# Patient Record
Sex: MALE | Race: WHITE | Employment: OTHER | URBAN - NONMETROPOLITAN AREA
[De-identification: names, ages, dates, MRNs, and addresses within clinical notes are randomized per-mention and may not be internally consistent; named-entity substitution may affect disease eponyms.]

---

## 2017-01-14 ENCOUNTER — OFFICE VISIT (OUTPATIENT)
Dept: URGENT CARE | Age: 67
End: 2017-01-14
Payer: MEDICARE

## 2017-01-14 VITALS
HEIGHT: 72 IN | OXYGEN SATURATION: 99 % | DIASTOLIC BLOOD PRESSURE: 90 MMHG | SYSTOLIC BLOOD PRESSURE: 140 MMHG | TEMPERATURE: 97.7 F | HEART RATE: 66 BPM | RESPIRATION RATE: 16 BRPM | BODY MASS INDEX: 25.06 KG/M2 | WEIGHT: 185 LBS

## 2017-01-14 DIAGNOSIS — K11.21 ACUTE PAROTITIS: Primary | ICD-10-CM

## 2017-01-14 PROCEDURE — 4040F PNEUMOC VAC/ADMIN/RCVD: CPT | Performed by: NURSE PRACTITIONER

## 2017-01-14 PROCEDURE — G8427 DOCREV CUR MEDS BY ELIG CLIN: HCPCS | Performed by: NURSE PRACTITIONER

## 2017-01-14 PROCEDURE — 1123F ACP DISCUSS/DSCN MKR DOCD: CPT | Performed by: NURSE PRACTITIONER

## 2017-01-14 PROCEDURE — G8484 FLU IMMUNIZE NO ADMIN: HCPCS | Performed by: NURSE PRACTITIONER

## 2017-01-14 PROCEDURE — 4004F PT TOBACCO SCREEN RCVD TLK: CPT | Performed by: NURSE PRACTITIONER

## 2017-01-14 PROCEDURE — 3017F COLORECTAL CA SCREEN DOC REV: CPT | Performed by: NURSE PRACTITIONER

## 2017-01-14 PROCEDURE — G8420 CALC BMI NORM PARAMETERS: HCPCS | Performed by: NURSE PRACTITIONER

## 2017-01-14 PROCEDURE — 99213 OFFICE O/P EST LOW 20 MIN: CPT | Performed by: NURSE PRACTITIONER

## 2017-01-14 RX ORDER — TRAMADOL HYDROCHLORIDE 50 MG/1
TABLET ORAL
Qty: 6 TABLET | Refills: 0 | Status: SHIPPED | OUTPATIENT
Start: 2017-01-14 | End: 2017-01-25 | Stop reason: ALTCHOICE

## 2017-01-14 RX ORDER — AMOXICILLIN AND CLAVULANATE POTASSIUM 500; 125 MG/1; MG/1
1 TABLET, FILM COATED ORAL 3 TIMES DAILY
Qty: 30 TABLET | Refills: 0 | Status: SHIPPED | OUTPATIENT
Start: 2017-01-14 | End: 2017-01-18 | Stop reason: ALTCHOICE

## 2017-01-14 ASSESSMENT — ENCOUNTER SYMPTOMS
EYES NEGATIVE: 1
SINUS PRESSURE: 0
RHINORRHEA: 0
RESPIRATORY NEGATIVE: 1
GASTROINTESTINAL NEGATIVE: 1
SORE THROAT: 0
FACIAL SWELLING: 1
ALLERGIC/IMMUNOLOGIC NEGATIVE: 1
TROUBLE SWALLOWING: 0

## 2017-01-16 ENCOUNTER — OFFICE VISIT (OUTPATIENT)
Dept: PRIMARY CARE CLINIC | Age: 67
End: 2017-01-16
Payer: MEDICARE

## 2017-01-16 VITALS
HEART RATE: 82 BPM | WEIGHT: 182 LBS | DIASTOLIC BLOOD PRESSURE: 90 MMHG | HEIGHT: 72 IN | OXYGEN SATURATION: 99 % | BODY MASS INDEX: 24.65 KG/M2 | RESPIRATION RATE: 16 BRPM | TEMPERATURE: 99.2 F | SYSTOLIC BLOOD PRESSURE: 150 MMHG

## 2017-01-16 DIAGNOSIS — K11.5 SALIVARY GLAND STONE: Primary | ICD-10-CM

## 2017-01-16 PROCEDURE — 1123F ACP DISCUSS/DSCN MKR DOCD: CPT | Performed by: FAMILY MEDICINE

## 2017-01-16 PROCEDURE — 99214 OFFICE O/P EST MOD 30 MIN: CPT | Performed by: FAMILY MEDICINE

## 2017-01-16 PROCEDURE — 4004F PT TOBACCO SCREEN RCVD TLK: CPT | Performed by: FAMILY MEDICINE

## 2017-01-16 PROCEDURE — 3017F COLORECTAL CA SCREEN DOC REV: CPT | Performed by: FAMILY MEDICINE

## 2017-01-16 PROCEDURE — G8420 CALC BMI NORM PARAMETERS: HCPCS | Performed by: FAMILY MEDICINE

## 2017-01-16 PROCEDURE — 4040F PNEUMOC VAC/ADMIN/RCVD: CPT | Performed by: FAMILY MEDICINE

## 2017-01-16 PROCEDURE — G8484 FLU IMMUNIZE NO ADMIN: HCPCS | Performed by: FAMILY MEDICINE

## 2017-01-16 PROCEDURE — G8427 DOCREV CUR MEDS BY ELIG CLIN: HCPCS | Performed by: FAMILY MEDICINE

## 2017-01-16 RX ORDER — TRAMADOL HYDROCHLORIDE 50 MG/1
TABLET ORAL
Qty: 6 TABLET | Refills: 0 | Status: CANCELLED | OUTPATIENT
Start: 2017-01-16

## 2017-01-16 RX ORDER — HYDROCODONE BITARTRATE AND ACETAMINOPHEN 5; 325 MG/1; MG/1
1 TABLET ORAL EVERY 6 HOURS PRN
Qty: 20 TABLET | Refills: 0 | Status: SHIPPED | OUTPATIENT
Start: 2017-01-16 | End: 2017-01-23

## 2017-01-16 RX ORDER — CLINDAMYCIN HYDROCHLORIDE 300 MG/1
300 CAPSULE ORAL 3 TIMES DAILY
Qty: 30 CAPSULE | Refills: 0 | Status: SHIPPED | OUTPATIENT
Start: 2017-01-16 | End: 2017-01-25 | Stop reason: SDUPTHER

## 2017-01-16 RX ORDER — NAPROXEN SODIUM 550 MG/1
550 TABLET ORAL 2 TIMES DAILY WITH MEALS
Qty: 60 TABLET | Refills: 3 | Status: SHIPPED | OUTPATIENT
Start: 2017-01-16 | End: 2017-02-08

## 2017-01-17 ENCOUNTER — HOSPITAL ENCOUNTER (OUTPATIENT)
Dept: CT IMAGING | Age: 67
Discharge: HOME | End: 2017-01-17
Payer: MEDICARE

## 2017-01-17 DIAGNOSIS — K11.5 SALIVARY GLAND STONE: ICD-10-CM

## 2017-01-17 PROCEDURE — 70486 CT MAXILLOFACIAL W/O DYE: CPT

## 2017-01-18 ENCOUNTER — OFFICE VISIT (OUTPATIENT)
Dept: PRIMARY CARE CLINIC | Age: 67
End: 2017-01-18
Payer: MEDICARE

## 2017-01-18 VITALS
OXYGEN SATURATION: 99 % | WEIGHT: 181.13 LBS | RESPIRATION RATE: 16 BRPM | HEART RATE: 82 BPM | SYSTOLIC BLOOD PRESSURE: 132 MMHG | BODY MASS INDEX: 24.53 KG/M2 | DIASTOLIC BLOOD PRESSURE: 80 MMHG | TEMPERATURE: 98.8 F | HEIGHT: 72 IN

## 2017-01-18 DIAGNOSIS — G47.00 INSOMNIA, UNSPECIFIED TYPE: ICD-10-CM

## 2017-01-18 DIAGNOSIS — K11.1 PAROTID GLAND ENLARGEMENT: Primary | ICD-10-CM

## 2017-01-18 PROCEDURE — 4040F PNEUMOC VAC/ADMIN/RCVD: CPT | Performed by: FAMILY MEDICINE

## 2017-01-18 PROCEDURE — 1123F ACP DISCUSS/DSCN MKR DOCD: CPT | Performed by: FAMILY MEDICINE

## 2017-01-18 PROCEDURE — 4004F PT TOBACCO SCREEN RCVD TLK: CPT | Performed by: FAMILY MEDICINE

## 2017-01-18 PROCEDURE — 3017F COLORECTAL CA SCREEN DOC REV: CPT | Performed by: FAMILY MEDICINE

## 2017-01-18 PROCEDURE — 99213 OFFICE O/P EST LOW 20 MIN: CPT | Performed by: FAMILY MEDICINE

## 2017-01-18 PROCEDURE — G8420 CALC BMI NORM PARAMETERS: HCPCS | Performed by: FAMILY MEDICINE

## 2017-01-18 PROCEDURE — G8484 FLU IMMUNIZE NO ADMIN: HCPCS | Performed by: FAMILY MEDICINE

## 2017-01-18 PROCEDURE — G8427 DOCREV CUR MEDS BY ELIG CLIN: HCPCS | Performed by: FAMILY MEDICINE

## 2017-01-18 RX ORDER — AMOXICILLIN AND CLAVULANATE POTASSIUM 500; 125 MG/1; MG/1
1 TABLET, FILM COATED ORAL 3 TIMES DAILY
Qty: 30 TABLET | Refills: 0
Start: 2017-01-18 | End: 2017-01-25

## 2017-01-18 RX ORDER — TEMAZEPAM 15 MG/1
15 CAPSULE ORAL NIGHTLY PRN
Qty: 10 CAPSULE | Refills: 0 | Status: SHIPPED | OUTPATIENT
Start: 2017-01-18 | End: 2017-02-01

## 2017-01-19 ASSESSMENT — ENCOUNTER SYMPTOMS
SHORTNESS OF BREATH: 0
COUGH: 0
FACIAL SWELLING: 1

## 2017-01-22 ASSESSMENT — ENCOUNTER SYMPTOMS
COUGH: 0
SHORTNESS OF BREATH: 0
FACIAL SWELLING: 1

## 2017-01-25 ENCOUNTER — OFFICE VISIT (OUTPATIENT)
Dept: OTOLARYNGOLOGY | Age: 67
End: 2017-01-25
Payer: MEDICARE

## 2017-01-25 VITALS
HEIGHT: 72 IN | BODY MASS INDEX: 25.06 KG/M2 | DIASTOLIC BLOOD PRESSURE: 80 MMHG | OXYGEN SATURATION: 97 % | WEIGHT: 185 LBS | HEART RATE: 60 BPM | SYSTOLIC BLOOD PRESSURE: 126 MMHG

## 2017-01-25 DIAGNOSIS — K11.5 SALIVARY GLAND STONE: ICD-10-CM

## 2017-01-25 DIAGNOSIS — K11.21 ACUTE PAROTITIS: Primary | ICD-10-CM

## 2017-01-25 PROCEDURE — 1123F ACP DISCUSS/DSCN MKR DOCD: CPT | Performed by: OTOLARYNGOLOGY

## 2017-01-25 PROCEDURE — G8427 DOCREV CUR MEDS BY ELIG CLIN: HCPCS | Performed by: OTOLARYNGOLOGY

## 2017-01-25 PROCEDURE — 3017F COLORECTAL CA SCREEN DOC REV: CPT | Performed by: OTOLARYNGOLOGY

## 2017-01-25 PROCEDURE — 4004F PT TOBACCO SCREEN RCVD TLK: CPT | Performed by: OTOLARYNGOLOGY

## 2017-01-25 PROCEDURE — 4040F PNEUMOC VAC/ADMIN/RCVD: CPT | Performed by: OTOLARYNGOLOGY

## 2017-01-25 PROCEDURE — 99203 OFFICE O/P NEW LOW 30 MIN: CPT | Performed by: OTOLARYNGOLOGY

## 2017-01-25 PROCEDURE — G8484 FLU IMMUNIZE NO ADMIN: HCPCS | Performed by: OTOLARYNGOLOGY

## 2017-01-25 PROCEDURE — G8420 CALC BMI NORM PARAMETERS: HCPCS | Performed by: OTOLARYNGOLOGY

## 2017-01-25 RX ORDER — CLINDAMYCIN HYDROCHLORIDE 300 MG/1
300 CAPSULE ORAL 3 TIMES DAILY
Qty: 30 CAPSULE | Refills: 0 | Status: SHIPPED | OUTPATIENT
Start: 2017-01-25 | End: 2017-02-01

## 2017-01-25 RX ORDER — AMOXICILLIN AND CLAVULANATE POTASSIUM 875; 125 MG/1; MG/1
1 TABLET, FILM COATED ORAL 2 TIMES DAILY
Qty: 20 TABLET | Refills: 0 | Status: SHIPPED | OUTPATIENT
Start: 2017-01-25 | End: 2017-02-01

## 2017-01-25 ASSESSMENT — ENCOUNTER SYMPTOMS
ALLERGIC/IMMUNOLOGIC NEGATIVE: 1
EYES NEGATIVE: 1
RESPIRATORY NEGATIVE: 1
GASTROINTESTINAL NEGATIVE: 1

## 2017-02-06 ENCOUNTER — OFFICE VISIT (OUTPATIENT)
Dept: OTOLARYNGOLOGY | Age: 67
End: 2017-02-06
Payer: MEDICARE

## 2017-02-06 VITALS
SYSTOLIC BLOOD PRESSURE: 128 MMHG | DIASTOLIC BLOOD PRESSURE: 74 MMHG | OXYGEN SATURATION: 97 % | BODY MASS INDEX: 25.14 KG/M2 | HEIGHT: 72 IN | HEART RATE: 61 BPM | WEIGHT: 185.6 LBS

## 2017-02-06 DIAGNOSIS — K11.21 PAROTITIS, ACUTE: ICD-10-CM

## 2017-02-06 DIAGNOSIS — Z87.19 HISTORY OF PAROTITIS: Primary | ICD-10-CM

## 2017-02-06 PROCEDURE — 4040F PNEUMOC VAC/ADMIN/RCVD: CPT | Performed by: OTOLARYNGOLOGY

## 2017-02-06 PROCEDURE — 1123F ACP DISCUSS/DSCN MKR DOCD: CPT | Performed by: OTOLARYNGOLOGY

## 2017-02-06 PROCEDURE — G8484 FLU IMMUNIZE NO ADMIN: HCPCS | Performed by: OTOLARYNGOLOGY

## 2017-02-06 PROCEDURE — G8420 CALC BMI NORM PARAMETERS: HCPCS | Performed by: OTOLARYNGOLOGY

## 2017-02-06 PROCEDURE — 4004F PT TOBACCO SCREEN RCVD TLK: CPT | Performed by: OTOLARYNGOLOGY

## 2017-02-06 PROCEDURE — G8427 DOCREV CUR MEDS BY ELIG CLIN: HCPCS | Performed by: OTOLARYNGOLOGY

## 2017-02-06 PROCEDURE — 99212 OFFICE O/P EST SF 10 MIN: CPT | Performed by: OTOLARYNGOLOGY

## 2017-02-06 PROCEDURE — 3017F COLORECTAL CA SCREEN DOC REV: CPT | Performed by: OTOLARYNGOLOGY

## 2017-02-06 ASSESSMENT — ENCOUNTER SYMPTOMS
EYE REDNESS: 0
DIARRHEA: 0
SINUS PRESSURE: 0
ABDOMINAL DISTENTION: 0
WHEEZING: 0
APNEA: 0
NAUSEA: 0
SHORTNESS OF BREATH: 0
EYE DISCHARGE: 0
SORE THROAT: 0
RHINORRHEA: 0
VOMITING: 0
CHEST TIGHTNESS: 0
ANAL BLEEDING: 0
RECTAL PAIN: 0
EYE PAIN: 0
TROUBLE SWALLOWING: 0
CONSTIPATION: 0
COLOR CHANGE: 0
BACK PAIN: 0
ABDOMINAL PAIN: 0
FACIAL SWELLING: 0
BLOOD IN STOOL: 0
STRIDOR: 0
CHOKING: 0
COUGH: 0
PHOTOPHOBIA: 0
EYE ITCHING: 0
VOICE CHANGE: 0

## 2017-02-08 ENCOUNTER — OFFICE VISIT (OUTPATIENT)
Dept: CARDIOLOGY | Age: 67
End: 2017-02-08
Payer: MEDICARE

## 2017-02-08 VITALS
RESPIRATION RATE: 20 BRPM | HEIGHT: 72 IN | HEART RATE: 55 BPM | BODY MASS INDEX: 25.06 KG/M2 | WEIGHT: 185 LBS | OXYGEN SATURATION: 99 % | SYSTOLIC BLOOD PRESSURE: 134 MMHG | DIASTOLIC BLOOD PRESSURE: 86 MMHG

## 2017-02-08 DIAGNOSIS — I47.20 VENTRICULAR TACHYCARDIA: ICD-10-CM

## 2017-02-08 DIAGNOSIS — I10 ESSENTIAL HYPERTENSION: Primary | ICD-10-CM

## 2017-02-08 PROCEDURE — 4040F PNEUMOC VAC/ADMIN/RCVD: CPT | Performed by: INTERNAL MEDICINE

## 2017-02-08 PROCEDURE — 1123F ACP DISCUSS/DSCN MKR DOCD: CPT | Performed by: INTERNAL MEDICINE

## 2017-02-08 PROCEDURE — 3017F COLORECTAL CA SCREEN DOC REV: CPT | Performed by: INTERNAL MEDICINE

## 2017-02-08 PROCEDURE — 4004F PT TOBACCO SCREEN RCVD TLK: CPT | Performed by: INTERNAL MEDICINE

## 2017-02-08 PROCEDURE — G8427 DOCREV CUR MEDS BY ELIG CLIN: HCPCS | Performed by: INTERNAL MEDICINE

## 2017-02-08 PROCEDURE — G8484 FLU IMMUNIZE NO ADMIN: HCPCS | Performed by: INTERNAL MEDICINE

## 2017-02-08 PROCEDURE — G8420 CALC BMI NORM PARAMETERS: HCPCS | Performed by: INTERNAL MEDICINE

## 2017-02-08 PROCEDURE — 99213 OFFICE O/P EST LOW 20 MIN: CPT | Performed by: INTERNAL MEDICINE

## 2017-03-06 ENCOUNTER — OFFICE VISIT (OUTPATIENT)
Dept: OTOLARYNGOLOGY | Age: 67
End: 2017-03-06
Payer: MEDICARE

## 2017-03-06 VITALS
WEIGHT: 183.6 LBS | SYSTOLIC BLOOD PRESSURE: 128 MMHG | HEART RATE: 68 BPM | BODY MASS INDEX: 24.87 KG/M2 | OXYGEN SATURATION: 98 % | DIASTOLIC BLOOD PRESSURE: 74 MMHG | HEIGHT: 72 IN

## 2017-03-06 DIAGNOSIS — D49.0 PAROTID NEOPLASM: Primary | ICD-10-CM

## 2017-03-06 DIAGNOSIS — K11.20 PAROTITIS: ICD-10-CM

## 2017-03-06 PROCEDURE — 1123F ACP DISCUSS/DSCN MKR DOCD: CPT | Performed by: OTOLARYNGOLOGY

## 2017-03-06 PROCEDURE — 99212 OFFICE O/P EST SF 10 MIN: CPT | Performed by: OTOLARYNGOLOGY

## 2017-03-06 PROCEDURE — 4004F PT TOBACCO SCREEN RCVD TLK: CPT | Performed by: OTOLARYNGOLOGY

## 2017-03-06 PROCEDURE — G8427 DOCREV CUR MEDS BY ELIG CLIN: HCPCS | Performed by: OTOLARYNGOLOGY

## 2017-03-06 PROCEDURE — G8484 FLU IMMUNIZE NO ADMIN: HCPCS | Performed by: OTOLARYNGOLOGY

## 2017-03-06 PROCEDURE — 4040F PNEUMOC VAC/ADMIN/RCVD: CPT | Performed by: OTOLARYNGOLOGY

## 2017-03-06 PROCEDURE — G8420 CALC BMI NORM PARAMETERS: HCPCS | Performed by: OTOLARYNGOLOGY

## 2017-03-06 PROCEDURE — 3017F COLORECTAL CA SCREEN DOC REV: CPT | Performed by: OTOLARYNGOLOGY

## 2017-03-14 ENCOUNTER — HOSPITAL ENCOUNTER (OUTPATIENT)
Dept: MRI IMAGING | Age: 67
Discharge: HOME | End: 2017-03-14
Payer: MEDICARE

## 2017-03-14 DIAGNOSIS — K11.20 PAROTITIS: ICD-10-CM

## 2017-03-14 PROCEDURE — 6360000004 HC RX CONTRAST MEDICATION: Performed by: OTOLARYNGOLOGY

## 2017-03-14 PROCEDURE — 70543 MRI ORBT/FAC/NCK W/O &W/DYE: CPT

## 2017-03-14 PROCEDURE — A9579 GAD-BASE MR CONTRAST NOS,1ML: HCPCS | Performed by: OTOLARYNGOLOGY

## 2017-03-14 RX ADMIN — GADOPENTETATE DIMEGLUMINE 20 ML: 469.01 INJECTION INTRAVENOUS at 15:54

## 2017-03-16 ENCOUNTER — TELEPHONE (OUTPATIENT)
Dept: OTOLARYNGOLOGY | Age: 67
End: 2017-03-16

## 2017-03-29 ENCOUNTER — TELEPHONE (OUTPATIENT)
Dept: OTOLARYNGOLOGY | Age: 67
End: 2017-03-29

## 2017-04-05 ENCOUNTER — OFFICE VISIT (OUTPATIENT)
Dept: OTOLARYNGOLOGY | Age: 67
End: 2017-04-05

## 2017-04-05 VITALS
DIASTOLIC BLOOD PRESSURE: 70 MMHG | HEIGHT: 72 IN | WEIGHT: 178 LBS | SYSTOLIC BLOOD PRESSURE: 128 MMHG | HEART RATE: 64 BPM | RESPIRATION RATE: 20 BRPM | OXYGEN SATURATION: 98 % | BODY MASS INDEX: 24.11 KG/M2

## 2017-04-05 DIAGNOSIS — Z98.890 POST-OPERATIVE STATE: Primary | ICD-10-CM

## 2017-04-05 PROCEDURE — 4004F PT TOBACCO SCREEN RCVD TLK: CPT | Performed by: OTOLARYNGOLOGY

## 2017-04-05 PROCEDURE — 99999 PR OFFICE/OUTPT VISIT,PROCEDURE ONLY: CPT | Performed by: OTOLARYNGOLOGY

## 2017-04-13 ENCOUNTER — OFFICE VISIT (OUTPATIENT)
Dept: PRIMARY CARE CLINIC | Age: 67
End: 2017-04-13
Payer: MEDICARE

## 2017-04-13 VITALS
BODY MASS INDEX: 24.46 KG/M2 | SYSTOLIC BLOOD PRESSURE: 116 MMHG | DIASTOLIC BLOOD PRESSURE: 72 MMHG | HEART RATE: 61 BPM | HEIGHT: 72 IN | WEIGHT: 180.6 LBS | OXYGEN SATURATION: 99 %

## 2017-04-13 DIAGNOSIS — I10 ESSENTIAL HYPERTENSION: ICD-10-CM

## 2017-04-13 DIAGNOSIS — D11.0 BENIGN PAROTID TUMOR: Primary | ICD-10-CM

## 2017-04-13 PROCEDURE — 1123F ACP DISCUSS/DSCN MKR DOCD: CPT | Performed by: FAMILY MEDICINE

## 2017-04-13 PROCEDURE — 99213 OFFICE O/P EST LOW 20 MIN: CPT | Performed by: FAMILY MEDICINE

## 2017-04-13 PROCEDURE — 3017F COLORECTAL CA SCREEN DOC REV: CPT | Performed by: FAMILY MEDICINE

## 2017-04-13 PROCEDURE — 4004F PT TOBACCO SCREEN RCVD TLK: CPT | Performed by: FAMILY MEDICINE

## 2017-04-13 PROCEDURE — G8420 CALC BMI NORM PARAMETERS: HCPCS | Performed by: FAMILY MEDICINE

## 2017-04-13 PROCEDURE — 4040F PNEUMOC VAC/ADMIN/RCVD: CPT | Performed by: FAMILY MEDICINE

## 2017-04-13 PROCEDURE — G8427 DOCREV CUR MEDS BY ELIG CLIN: HCPCS | Performed by: FAMILY MEDICINE

## 2017-04-13 ASSESSMENT — ENCOUNTER SYMPTOMS
COUGH: 0
SHORTNESS OF BREATH: 0

## 2017-07-06 ENCOUNTER — HOSPITAL ENCOUNTER (INPATIENT)
Age: 67
LOS: 1 days | Discharge: HOME | DRG: 247 | End: 2017-07-07
Attending: EMERGENCY MEDICINE | Admitting: INTERNAL MEDICINE
Payer: MEDICARE

## 2017-07-06 ENCOUNTER — APPOINTMENT (OUTPATIENT)
Dept: GENERAL RADIOLOGY | Age: 67
DRG: 247 | End: 2017-07-06
Payer: MEDICARE

## 2017-07-06 DIAGNOSIS — I21.4 NSTEMI (NON-ST ELEVATED MYOCARDIAL INFARCTION) (HCC): Primary | ICD-10-CM

## 2017-07-06 DIAGNOSIS — I47.20 VENTRICULAR TACHYCARDIA: ICD-10-CM

## 2017-07-06 LAB
ALBUMIN SERPL-MCNC: 3.9 G/DL (ref 3.5–5.2)
ALP BLD-CCNC: 109 U/L (ref 40–130)
ALT SERPL-CCNC: 17 U/L (ref 5–41)
ANION GAP SERPL CALCULATED.3IONS-SCNC: 14 MMOL/L (ref 7–19)
APTT: 31.4 SEC (ref 26–36.2)
APTT: >200 SEC (ref 26–36.2)
AST SERPL-CCNC: 20 U/L (ref 5–40)
BILIRUB SERPL-MCNC: 0.3 MG/DL (ref 0.2–1.2)
BUN BLDV-MCNC: 18 MG/DL (ref 8–23)
CALCIUM SERPL-MCNC: 9.6 MG/DL (ref 8.8–10.2)
CHLORIDE BLD-SCNC: 101 MMOL/L (ref 98–111)
CO2: 26 MMOL/L (ref 22–29)
CREAT SERPL-MCNC: 1.4 MG/DL (ref 0.5–1.2)
GFR NON-AFRICAN AMERICAN: 50
GLUCOSE BLD-MCNC: 89 MG/DL (ref 74–109)
HCT VFR BLD CALC: 47.5 % (ref 42–52)
HEMOGLOBIN: 15.7 G/DL (ref 14–18)
INR BLD: 0.94 (ref 0.88–1.18)
MCH RBC QN AUTO: 29.7 PG (ref 27–31)
MCHC RBC AUTO-ENTMCNC: 33.1 G/DL (ref 33–37)
MCV RBC AUTO: 90 FL (ref 80–94)
PDW BLD-RTO: 13.8 % (ref 11.5–14.5)
PERFORMED ON: ABNORMAL
PERFORMED ON: ABNORMAL
PLATELET # BLD: 184 K/UL (ref 130–400)
PMV BLD AUTO: 10.3 FL (ref 9.4–12.4)
POC TROPONIN I: 0.41 NG/ML (ref 0–0.08)
POC TROPONIN I: 0.5 NG/ML (ref 0–0.08)
POTASSIUM SERPL-SCNC: 4.1 MMOL/L (ref 3.5–5)
PROTHROMBIN TIME: 12.5 SEC (ref 12–14.6)
RBC # BLD: 5.28 M/UL (ref 4.7–6.1)
SODIUM BLD-SCNC: 141 MMOL/L (ref 136–145)
TOTAL PROTEIN: 7.4 G/DL (ref 6.6–8.7)
TROPONIN: 0.15 NG/ML (ref 0–0.03)
TROPONIN: 3.38 NG/ML (ref 0–0.03)
TROPONIN: 7.16 NG/ML (ref 0–0.03)
WBC # BLD: 6.9 K/UL (ref 4.8–10.8)

## 2017-07-06 PROCEDURE — 85730 THROMBOPLASTIN TIME PARTIAL: CPT

## 2017-07-06 PROCEDURE — 85027 COMPLETE CBC AUTOMATED: CPT

## 2017-07-06 PROCEDURE — 2720000000 HC MISC SURG SUPPLY STERILE $0-50

## 2017-07-06 PROCEDURE — 2580000003 HC RX 258: Performed by: INTERNAL MEDICINE

## 2017-07-06 PROCEDURE — 2500000003 HC RX 250 WO HCPCS

## 2017-07-06 PROCEDURE — 99291 CRITICAL CARE FIRST HOUR: CPT

## 2017-07-06 PROCEDURE — 87070 CULTURE OTHR SPECIMN AEROBIC: CPT

## 2017-07-06 PROCEDURE — 96375 TX/PRO/DX INJ NEW DRUG ADDON: CPT

## 2017-07-06 PROCEDURE — 85610 PROTHROMBIN TIME: CPT

## 2017-07-06 PROCEDURE — 6370000000 HC RX 637 (ALT 250 FOR IP): Performed by: INTERNAL MEDICINE

## 2017-07-06 PROCEDURE — 93458 L HRT ARTERY/VENTRICLE ANGIO: CPT | Performed by: INTERNAL MEDICINE

## 2017-07-06 PROCEDURE — C1725 CATH, TRANSLUMIN NON-LASER: HCPCS

## 2017-07-06 PROCEDURE — B2111ZZ FLUOROSCOPY OF MULTIPLE CORONARY ARTERIES USING LOW OSMOLAR CONTRAST: ICD-10-PCS | Performed by: INTERNAL MEDICINE

## 2017-07-06 PROCEDURE — 6360000002 HC RX W HCPCS: Performed by: EMERGENCY MEDICINE

## 2017-07-06 PROCEDURE — C1874 STENT, COATED/COV W/DEL SYS: HCPCS

## 2017-07-06 PROCEDURE — 96374 THER/PROPH/DIAG INJ IV PUSH: CPT

## 2017-07-06 PROCEDURE — 4A023N7 MEASUREMENT OF CARDIAC SAMPLING AND PRESSURE, LEFT HEART, PERCUTANEOUS APPROACH: ICD-10-PCS | Performed by: INTERNAL MEDICINE

## 2017-07-06 PROCEDURE — 36415 COLL VENOUS BLD VENIPUNCTURE: CPT

## 2017-07-06 PROCEDURE — C1769 GUIDE WIRE: HCPCS

## 2017-07-06 PROCEDURE — 2500000003 HC RX 250 WO HCPCS: Performed by: EMERGENCY MEDICINE

## 2017-07-06 PROCEDURE — 99291 CRITICAL CARE FIRST HOUR: CPT | Performed by: EMERGENCY MEDICINE

## 2017-07-06 PROCEDURE — C1894 INTRO/SHEATH, NON-LASER: HCPCS

## 2017-07-06 PROCEDURE — 2100000000 HC CCU R&B

## 2017-07-06 PROCEDURE — 92941 PRQ TRLML REVSC TOT OCCL AMI: CPT | Performed by: INTERNAL MEDICINE

## 2017-07-06 PROCEDURE — 2720000001 HC MISC SURG SUPPLY STERILE $51-500

## 2017-07-06 PROCEDURE — 2709999900 HC NON-CHARGEABLE SUPPLY

## 2017-07-06 PROCEDURE — 3E033PZ INTRODUCTION OF PLATELET INHIBITOR INTO PERIPHERAL VEIN, PERCUTANEOUS APPROACH: ICD-10-PCS | Performed by: INTERNAL MEDICINE

## 2017-07-06 PROCEDURE — B2151ZZ FLUOROSCOPY OF LEFT HEART USING LOW OSMOLAR CONTRAST: ICD-10-PCS | Performed by: INTERNAL MEDICINE

## 2017-07-06 PROCEDURE — 84484 ASSAY OF TROPONIN QUANT: CPT

## 2017-07-06 PROCEDURE — 71010 XR CHEST PORTABLE: CPT

## 2017-07-06 PROCEDURE — 6370000000 HC RX 637 (ALT 250 FOR IP): Performed by: EMERGENCY MEDICINE

## 2017-07-06 PROCEDURE — 027034Z DILATION OF CORONARY ARTERY, ONE ARTERY WITH DRUG-ELUTING INTRALUMINAL DEVICE, PERCUTANEOUS APPROACH: ICD-10-PCS | Performed by: INTERNAL MEDICINE

## 2017-07-06 PROCEDURE — 6370000000 HC RX 637 (ALT 250 FOR IP)

## 2017-07-06 PROCEDURE — 93005 ELECTROCARDIOGRAM TRACING: CPT

## 2017-07-06 PROCEDURE — 99223 1ST HOSP IP/OBS HIGH 75: CPT | Performed by: INTERNAL MEDICINE

## 2017-07-06 PROCEDURE — C1887 CATHETER, GUIDING: HCPCS

## 2017-07-06 PROCEDURE — 6360000002 HC RX W HCPCS

## 2017-07-06 PROCEDURE — 80053 COMPREHEN METABOLIC PANEL: CPT

## 2017-07-06 RX ORDER — HEPARIN SODIUM 5000 [USP'U]/ML
5000 INJECTION, SOLUTION INTRAVENOUS; SUBCUTANEOUS ONCE
Status: COMPLETED | OUTPATIENT
Start: 2017-07-06 | End: 2017-07-06

## 2017-07-06 RX ORDER — MORPHINE SULFATE 4 MG/ML
2 INJECTION, SOLUTION INTRAMUSCULAR; INTRAVENOUS ONCE
Status: COMPLETED | OUTPATIENT
Start: 2017-07-06 | End: 2017-07-06

## 2017-07-06 RX ORDER — SODIUM CHLORIDE 0.9 % (FLUSH) 0.9 %
10 SYRINGE (ML) INJECTION PRN
Status: DISCONTINUED | OUTPATIENT
Start: 2017-07-06 | End: 2017-07-07

## 2017-07-06 RX ORDER — PRASUGREL 10 MG/1
10 TABLET, FILM COATED ORAL DAILY
Status: DISCONTINUED | OUTPATIENT
Start: 2017-07-06 | End: 2017-07-07

## 2017-07-06 RX ORDER — SIMVASTATIN 5 MG
5 TABLET ORAL DAILY
Status: DISCONTINUED | OUTPATIENT
Start: 2017-07-06 | End: 2017-07-07

## 2017-07-06 RX ORDER — HEPARIN SODIUM 1000 [USP'U]/ML
2000 INJECTION, SOLUTION INTRAVENOUS; SUBCUTANEOUS PRN
Status: DISCONTINUED | OUTPATIENT
Start: 2017-07-06 | End: 2017-07-06

## 2017-07-06 RX ORDER — SODIUM CHLORIDE 9 MG/ML
INJECTION, SOLUTION INTRAVENOUS CONTINUOUS
Status: DISCONTINUED | OUTPATIENT
Start: 2017-07-06 | End: 2017-07-07 | Stop reason: HOSPADM

## 2017-07-06 RX ORDER — ASPIRIN 325 MG
325 TABLET ORAL ONCE
Status: COMPLETED | OUTPATIENT
Start: 2017-07-06 | End: 2017-07-06

## 2017-07-06 RX ORDER — HEPARIN SODIUM 1000 [USP'U]/ML
4000 INJECTION, SOLUTION INTRAVENOUS; SUBCUTANEOUS PRN
Status: DISCONTINUED | OUTPATIENT
Start: 2017-07-06 | End: 2017-07-06

## 2017-07-06 RX ORDER — HEPARIN SODIUM 10000 [USP'U]/100ML
12 INJECTION, SOLUTION INTRAVENOUS CONTINUOUS
Status: DISCONTINUED | OUTPATIENT
Start: 2017-07-06 | End: 2017-07-06

## 2017-07-06 RX ORDER — ONDANSETRON 2 MG/ML
4 INJECTION INTRAMUSCULAR; INTRAVENOUS EVERY 6 HOURS PRN
Status: DISCONTINUED | OUTPATIENT
Start: 2017-07-06 | End: 2017-07-07

## 2017-07-06 RX ORDER — NITROGLYCERIN 20 MG/100ML
5 INJECTION INTRAVENOUS CONTINUOUS
Status: DISCONTINUED | OUTPATIENT
Start: 2017-07-06 | End: 2017-07-06

## 2017-07-06 RX ORDER — ASPIRIN 81 MG/1
81 TABLET ORAL DAILY
Status: DISCONTINUED | OUTPATIENT
Start: 2017-07-07 | End: 2017-07-07

## 2017-07-06 RX ORDER — AMIODARONE HYDROCHLORIDE 50 MG/ML
150 INJECTION, SOLUTION INTRAVENOUS ONCE
Status: COMPLETED | OUTPATIENT
Start: 2017-07-06 | End: 2017-07-06

## 2017-07-06 RX ORDER — SODIUM CHLORIDE 0.9 % (FLUSH) 0.9 %
10 SYRINGE (ML) INJECTION EVERY 12 HOURS SCHEDULED
Status: DISCONTINUED | OUTPATIENT
Start: 2017-07-06 | End: 2017-07-07

## 2017-07-06 RX ORDER — ACETAMINOPHEN 325 MG/1
650 TABLET ORAL EVERY 4 HOURS PRN
Status: DISCONTINUED | OUTPATIENT
Start: 2017-07-06 | End: 2017-07-07

## 2017-07-06 RX ORDER — NITROGLYCERIN 0.4 MG/1
0.4 TABLET SUBLINGUAL EVERY 5 MIN PRN
Status: DISCONTINUED | OUTPATIENT
Start: 2017-07-06 | End: 2017-07-06

## 2017-07-06 RX ADMIN — Medication 10 ML: at 20:55

## 2017-07-06 RX ADMIN — MORPHINE SULFATE 2 MG: 4 INJECTION, SOLUTION INTRAMUSCULAR; INTRAVENOUS at 02:35

## 2017-07-06 RX ADMIN — ASPIRIN 325 MG ORAL TABLET 325 MG: 325 PILL ORAL at 02:25

## 2017-07-06 RX ADMIN — METOPROLOL TARTRATE 25 MG: 25 TABLET ORAL at 08:44

## 2017-07-06 RX ADMIN — METOPROLOL TARTRATE 25 MG: 25 TABLET ORAL at 20:50

## 2017-07-06 RX ADMIN — AMIODARONE HYDROCHLORIDE 150 MG: 50 INJECTION, SOLUTION INTRAVENOUS at 03:25

## 2017-07-06 RX ADMIN — PRASUGREL HYDROCHLORIDE 10 MG: 10 TABLET, FILM COATED ORAL at 08:44

## 2017-07-06 RX ADMIN — SODIUM CHLORIDE: 9 INJECTION, SOLUTION INTRAVENOUS at 05:11

## 2017-07-06 RX ADMIN — HEPARIN SODIUM 5000 UNITS: 5000 INJECTION, SOLUTION INTRAVENOUS; SUBCUTANEOUS at 03:16

## 2017-07-06 ASSESSMENT — ENCOUNTER SYMPTOMS
ABDOMINAL PAIN: 0
VOMITING: 0
EYE PAIN: 0
DIARRHEA: 0
SHORTNESS OF BREATH: 0

## 2017-07-06 ASSESSMENT — PAIN SCALES - GENERAL
PAINLEVEL_OUTOF10: 7
PAINLEVEL_OUTOF10: 6
PAINLEVEL_OUTOF10: 0

## 2017-07-06 ASSESSMENT — PAIN DESCRIPTION - ORIENTATION: ORIENTATION: LEFT

## 2017-07-06 ASSESSMENT — PAIN DESCRIPTION - LOCATION: LOCATION: CHEST;ARM

## 2017-07-07 VITALS
OXYGEN SATURATION: 96 % | BODY MASS INDEX: 25.21 KG/M2 | TEMPERATURE: 98.1 F | RESPIRATION RATE: 15 BRPM | WEIGHT: 186.1 LBS | HEART RATE: 64 BPM | HEIGHT: 72 IN | DIASTOLIC BLOOD PRESSURE: 84 MMHG | SYSTOLIC BLOOD PRESSURE: 157 MMHG

## 2017-07-07 LAB
ANION GAP SERPL CALCULATED.3IONS-SCNC: 13 MMOL/L (ref 7–19)
BUN BLDV-MCNC: 16 MG/DL (ref 8–23)
CALCIUM SERPL-MCNC: 8.4 MG/DL (ref 8.8–10.2)
CHLORIDE BLD-SCNC: 101 MMOL/L (ref 98–111)
CO2: 21 MMOL/L (ref 22–29)
CREAT SERPL-MCNC: 1.3 MG/DL (ref 0.5–1.2)
GFR NON-AFRICAN AMERICAN: 55
GLUCOSE BLD-MCNC: 97 MG/DL (ref 74–109)
HCT VFR BLD CALC: 42.4 % (ref 42–52)
HEMOGLOBIN: 13.5 G/DL (ref 14–18)
MCH RBC QN AUTO: 29.5 PG (ref 27–31)
MCHC RBC AUTO-ENTMCNC: 31.8 G/DL (ref 33–37)
MCV RBC AUTO: 92.8 FL (ref 80–94)
MRSA CULTURE ONLY: NORMAL
PDW BLD-RTO: 13.7 % (ref 11.5–14.5)
PLATELET # BLD: 147 K/UL (ref 130–400)
PMV BLD AUTO: 10.7 FL (ref 9.4–12.4)
POTASSIUM SERPL-SCNC: 3.9 MMOL/L (ref 3.5–5)
RBC # BLD: 4.57 M/UL (ref 4.7–6.1)
SODIUM BLD-SCNC: 135 MMOL/L (ref 136–145)
TROPONIN: 2.09 NG/ML (ref 0–0.03)
WBC # BLD: 6.5 K/UL (ref 4.8–10.8)

## 2017-07-07 PROCEDURE — G0009 ADMIN PNEUMOCOCCAL VACCINE: HCPCS | Performed by: INTERNAL MEDICINE

## 2017-07-07 PROCEDURE — 36415 COLL VENOUS BLD VENIPUNCTURE: CPT

## 2017-07-07 PROCEDURE — 99238 HOSP IP/OBS DSCHRG MGMT 30/<: CPT | Performed by: INTERNAL MEDICINE

## 2017-07-07 PROCEDURE — 84484 ASSAY OF TROPONIN QUANT: CPT

## 2017-07-07 PROCEDURE — 90670 PCV13 VACCINE IM: CPT | Performed by: INTERNAL MEDICINE

## 2017-07-07 PROCEDURE — 80048 BASIC METABOLIC PNL TOTAL CA: CPT

## 2017-07-07 PROCEDURE — 2580000003 HC RX 258: Performed by: INTERNAL MEDICINE

## 2017-07-07 PROCEDURE — 6360000002 HC RX W HCPCS: Performed by: INTERNAL MEDICINE

## 2017-07-07 PROCEDURE — 3E0234Z INTRODUCTION OF SERUM, TOXOID AND VACCINE INTO MUSCLE, PERCUTANEOUS APPROACH: ICD-10-PCS | Performed by: INTERNAL MEDICINE

## 2017-07-07 PROCEDURE — 6370000000 HC RX 637 (ALT 250 FOR IP): Performed by: INTERNAL MEDICINE

## 2017-07-07 PROCEDURE — 93005 ELECTROCARDIOGRAM TRACING: CPT

## 2017-07-07 PROCEDURE — 85027 COMPLETE CBC AUTOMATED: CPT

## 2017-07-07 RX ORDER — NITROGLYCERIN 0.4 MG/1
0.4 TABLET SUBLINGUAL EVERY 5 MIN PRN
Status: DISCONTINUED | OUTPATIENT
Start: 2017-07-07 | End: 2017-07-07 | Stop reason: HOSPADM

## 2017-07-07 RX ORDER — LISINOPRIL 10 MG/1
10 TABLET ORAL DAILY
Status: DISCONTINUED | OUTPATIENT
Start: 2017-07-07 | End: 2017-07-07 | Stop reason: HOSPADM

## 2017-07-07 RX ORDER — CLOPIDOGREL BISULFATE 75 MG/1
75 TABLET ORAL DAILY
Status: DISCONTINUED | OUTPATIENT
Start: 2017-07-08 | End: 2017-07-07 | Stop reason: HOSPADM

## 2017-07-07 RX ORDER — NITROGLYCERIN 0.4 MG/1
TABLET SUBLINGUAL
Qty: 25 TABLET | Refills: 3 | Status: SHIPPED | OUTPATIENT
Start: 2017-07-07 | End: 2020-11-18 | Stop reason: SDUPTHER

## 2017-07-07 RX ORDER — SIMVASTATIN 5 MG
5 TABLET ORAL DAILY
Status: DISCONTINUED | OUTPATIENT
Start: 2017-07-07 | End: 2017-07-07 | Stop reason: HOSPADM

## 2017-07-07 RX ORDER — ASPIRIN 81 MG/1
81 TABLET ORAL DAILY
Qty: 30 TABLET | Refills: 3 | COMMUNITY
Start: 2017-07-07 | End: 2020-11-18 | Stop reason: ALTCHOICE

## 2017-07-07 RX ORDER — LISINOPRIL 10 MG/1
10 TABLET ORAL DAILY
Qty: 30 TABLET | Refills: 11 | Status: ON HOLD | OUTPATIENT
Start: 2017-07-07 | End: 2017-12-01

## 2017-07-07 RX ORDER — ASPIRIN 81 MG/1
81 TABLET ORAL DAILY
Status: DISCONTINUED | OUTPATIENT
Start: 2017-07-07 | End: 2017-07-07 | Stop reason: SDUPTHER

## 2017-07-07 RX ORDER — LISINOPRIL 10 MG/1
10 TABLET ORAL DAILY
Status: DISCONTINUED | OUTPATIENT
Start: 2017-07-07 | End: 2017-07-07

## 2017-07-07 RX ORDER — CLOPIDOGREL BISULFATE 75 MG/1
75 TABLET ORAL DAILY
Qty: 30 TABLET | Refills: 11 | Status: SHIPPED | OUTPATIENT
Start: 2017-07-08 | End: 2018-07-02 | Stop reason: SDUPTHER

## 2017-07-07 RX ORDER — CLOPIDOGREL BISULFATE 75 MG/1
75 TABLET ORAL DAILY
Status: DISCONTINUED | OUTPATIENT
Start: 2017-07-08 | End: 2017-07-07

## 2017-07-07 RX ORDER — NITROGLYCERIN 0.4 MG/1
0.4 TABLET SUBLINGUAL EVERY 5 MIN PRN
Status: DISCONTINUED | OUTPATIENT
Start: 2017-07-07 | End: 2017-07-07

## 2017-07-07 RX ADMIN — PNEUMOCOCCAL 13-VALENT CONJUGATE VACCINE 0.5 ML: 2.2; 2.2; 2.2; 2.2; 2.2; 4.4; 2.2; 2.2; 2.2; 2.2; 2.2; 2.2; 2.2 INJECTION, SUSPENSION INTRAMUSCULAR at 11:44

## 2017-07-07 RX ADMIN — SIMVASTATIN 5 MG: 5 TABLET, FILM COATED ORAL at 08:18

## 2017-07-07 RX ADMIN — Medication 10 ML: at 07:50

## 2017-07-07 RX ADMIN — Medication 10 ML: at 08:19

## 2017-07-07 RX ADMIN — METOPROLOL TARTRATE 25 MG: 25 TABLET ORAL at 08:18

## 2017-07-07 RX ADMIN — LISINOPRIL 10 MG: 10 TABLET ORAL at 12:20

## 2017-07-07 RX ADMIN — PRASUGREL HYDROCHLORIDE 10 MG: 10 TABLET, FILM COATED ORAL at 08:18

## 2017-07-07 RX ADMIN — ASPIRIN 81 MG: 81 TABLET, COATED ORAL at 08:18

## 2017-07-07 ASSESSMENT — PAIN SCALES - GENERAL
PAINLEVEL_OUTOF10: 0

## 2017-07-10 LAB
EKG P AXIS: 71 DEGREES
EKG P AXIS: 72 DEGREES
EKG P AXIS: 76 DEGREES
EKG P-R INTERVAL: 116 MS
EKG P-R INTERVAL: 118 MS
EKG P-R INTERVAL: 138 MS
EKG Q-T INTERVAL: 400 MS
EKG Q-T INTERVAL: 430 MS
EKG Q-T INTERVAL: 440 MS
EKG QRS DURATION: 84 MS
EKG QRS DURATION: 84 MS
EKG QRS DURATION: 86 MS
EKG QTC CALCULATION (BAZETT): 429 MS
EKG QTC CALCULATION (BAZETT): 431 MS
EKG QTC CALCULATION (BAZETT): 435 MS
EKG T AXIS: 62 DEGREES
EKG T AXIS: 66 DEGREES
EKG T AXIS: 80 DEGREES

## 2017-07-18 ENCOUNTER — HOSPITAL ENCOUNTER (OUTPATIENT)
Dept: CARDIAC REHAB | Age: 67
Setting detail: THERAPIES SERIES
Discharge: HOME | End: 2017-07-18
Payer: MEDICARE

## 2017-07-19 ENCOUNTER — HOSPITAL ENCOUNTER (OUTPATIENT)
Dept: CARDIAC REHAB | Age: 67
Setting detail: THERAPIES SERIES
Discharge: HOME | End: 2017-07-19
Payer: MEDICARE

## 2017-07-19 PROCEDURE — 93798 PHYS/QHP OP CAR RHAB W/ECG: CPT

## 2017-07-21 ENCOUNTER — HOSPITAL ENCOUNTER (OUTPATIENT)
Dept: CARDIAC REHAB | Age: 67
Setting detail: THERAPIES SERIES
Discharge: HOME | End: 2017-07-21
Payer: MEDICARE

## 2017-07-21 PROCEDURE — 93798 PHYS/QHP OP CAR RHAB W/ECG: CPT

## 2017-07-24 ENCOUNTER — HOSPITAL ENCOUNTER (OUTPATIENT)
Dept: CARDIAC REHAB | Age: 67
Setting detail: THERAPIES SERIES
Discharge: HOME | End: 2017-07-24
Payer: MEDICARE

## 2017-07-24 PROCEDURE — 93798 PHYS/QHP OP CAR RHAB W/ECG: CPT

## 2017-07-28 ENCOUNTER — TELEPHONE (OUTPATIENT)
Dept: CARDIOLOGY | Age: 67
End: 2017-07-28

## 2017-07-28 ENCOUNTER — HOSPITAL ENCOUNTER (OUTPATIENT)
Dept: CARDIAC REHAB | Age: 67
Setting detail: THERAPIES SERIES
Discharge: HOME | End: 2017-07-28
Payer: MEDICARE

## 2017-07-28 PROCEDURE — 93798 PHYS/QHP OP CAR RHAB W/ECG: CPT

## 2017-07-31 ENCOUNTER — HOSPITAL ENCOUNTER (OUTPATIENT)
Dept: CARDIAC REHAB | Age: 67
Setting detail: THERAPIES SERIES
Discharge: HOME | End: 2017-07-31
Payer: MEDICARE

## 2017-07-31 PROCEDURE — 93798 PHYS/QHP OP CAR RHAB W/ECG: CPT

## 2017-08-04 ENCOUNTER — HOSPITAL ENCOUNTER (OUTPATIENT)
Dept: CARDIAC REHAB | Age: 67
Setting detail: THERAPIES SERIES
Discharge: HOME | End: 2017-08-04
Payer: MEDICARE

## 2017-08-04 ENCOUNTER — OFFICE VISIT (OUTPATIENT)
Dept: CARDIOLOGY | Age: 67
End: 2017-08-04
Payer: MEDICARE

## 2017-08-04 VITALS
WEIGHT: 173 LBS | HEIGHT: 72 IN | DIASTOLIC BLOOD PRESSURE: 78 MMHG | SYSTOLIC BLOOD PRESSURE: 122 MMHG | BODY MASS INDEX: 23.43 KG/M2 | HEART RATE: 64 BPM

## 2017-08-04 DIAGNOSIS — I10 ESSENTIAL HYPERTENSION: Primary | ICD-10-CM

## 2017-08-04 DIAGNOSIS — I25.10 ASHD (ARTERIOSCLEROTIC HEART DISEASE): ICD-10-CM

## 2017-08-04 PROCEDURE — G8420 CALC BMI NORM PARAMETERS: HCPCS | Performed by: INTERNAL MEDICINE

## 2017-08-04 PROCEDURE — 3017F COLORECTAL CA SCREEN DOC REV: CPT | Performed by: INTERNAL MEDICINE

## 2017-08-04 PROCEDURE — 93000 ELECTROCARDIOGRAM COMPLETE: CPT | Performed by: INTERNAL MEDICINE

## 2017-08-04 PROCEDURE — G8427 DOCREV CUR MEDS BY ELIG CLIN: HCPCS | Performed by: INTERNAL MEDICINE

## 2017-08-04 PROCEDURE — 4040F PNEUMOC VAC/ADMIN/RCVD: CPT | Performed by: INTERNAL MEDICINE

## 2017-08-04 PROCEDURE — 1123F ACP DISCUSS/DSCN MKR DOCD: CPT | Performed by: INTERNAL MEDICINE

## 2017-08-04 PROCEDURE — 93798 PHYS/QHP OP CAR RHAB W/ECG: CPT

## 2017-08-04 PROCEDURE — 1111F DSCHRG MED/CURRENT MED MERGE: CPT | Performed by: INTERNAL MEDICINE

## 2017-08-04 PROCEDURE — 99213 OFFICE O/P EST LOW 20 MIN: CPT | Performed by: INTERNAL MEDICINE

## 2017-08-04 PROCEDURE — G8598 ASA/ANTIPLAT THER USED: HCPCS | Performed by: INTERNAL MEDICINE

## 2017-08-04 PROCEDURE — 4004F PT TOBACCO SCREEN RCVD TLK: CPT | Performed by: INTERNAL MEDICINE

## 2017-08-04 RX ORDER — NEBIVOLOL 10 MG/1
10 TABLET ORAL DAILY
Qty: 30 TABLET | Refills: 3 | Status: SHIPPED | OUTPATIENT
Start: 2017-08-04 | End: 2017-08-17 | Stop reason: ALTCHOICE

## 2017-08-07 ENCOUNTER — HOSPITAL ENCOUNTER (OUTPATIENT)
Dept: CARDIAC REHAB | Age: 67
Setting detail: THERAPIES SERIES
Discharge: HOME | End: 2017-08-07
Payer: MEDICARE

## 2017-08-07 PROCEDURE — 93798 PHYS/QHP OP CAR RHAB W/ECG: CPT

## 2017-08-08 ENCOUNTER — OFFICE VISIT (OUTPATIENT)
Dept: PRIMARY CARE CLINIC | Age: 67
End: 2017-08-08
Payer: MEDICARE

## 2017-08-08 VITALS
HEART RATE: 84 BPM | HEIGHT: 73 IN | WEIGHT: 171.8 LBS | OXYGEN SATURATION: 97 % | TEMPERATURE: 98.4 F | SYSTOLIC BLOOD PRESSURE: 136 MMHG | BODY MASS INDEX: 22.77 KG/M2 | DIASTOLIC BLOOD PRESSURE: 74 MMHG

## 2017-08-08 DIAGNOSIS — I49.1 PREMATURE ATRIAL COMPLEXES: ICD-10-CM

## 2017-08-08 DIAGNOSIS — I25.10 CORONARY ARTERY DISEASE INVOLVING NATIVE CORONARY ARTERY OF NATIVE HEART WITHOUT ANGINA PECTORIS: Primary | ICD-10-CM

## 2017-08-08 DIAGNOSIS — I10 ESSENTIAL HYPERTENSION: ICD-10-CM

## 2017-08-08 DIAGNOSIS — Z72.0 TOBACCO ABUSE: ICD-10-CM

## 2017-08-08 PROCEDURE — 4040F PNEUMOC VAC/ADMIN/RCVD: CPT | Performed by: FAMILY MEDICINE

## 2017-08-08 PROCEDURE — G8598 ASA/ANTIPLAT THER USED: HCPCS | Performed by: FAMILY MEDICINE

## 2017-08-08 PROCEDURE — 3017F COLORECTAL CA SCREEN DOC REV: CPT | Performed by: FAMILY MEDICINE

## 2017-08-08 PROCEDURE — G8427 DOCREV CUR MEDS BY ELIG CLIN: HCPCS | Performed by: FAMILY MEDICINE

## 2017-08-08 PROCEDURE — G8420 CALC BMI NORM PARAMETERS: HCPCS | Performed by: FAMILY MEDICINE

## 2017-08-08 PROCEDURE — 1123F ACP DISCUSS/DSCN MKR DOCD: CPT | Performed by: FAMILY MEDICINE

## 2017-08-08 PROCEDURE — 99214 OFFICE O/P EST MOD 30 MIN: CPT | Performed by: FAMILY MEDICINE

## 2017-08-08 PROCEDURE — 4004F PT TOBACCO SCREEN RCVD TLK: CPT | Performed by: FAMILY MEDICINE

## 2017-08-08 RX ORDER — NICOTINE 21 MG/24HR
1 PATCH, TRANSDERMAL 24 HOURS TRANSDERMAL EVERY 24 HOURS
Qty: 30 PATCH | Refills: 0 | Status: SHIPPED | OUTPATIENT
Start: 2017-08-08 | End: 2018-04-05 | Stop reason: DRUGHIGH

## 2017-08-08 ASSESSMENT — ENCOUNTER SYMPTOMS
SHORTNESS OF BREATH: 0
COUGH: 0

## 2017-08-09 ENCOUNTER — HOSPITAL ENCOUNTER (OUTPATIENT)
Dept: CARDIAC REHAB | Age: 67
Setting detail: THERAPIES SERIES
Discharge: HOME | End: 2017-08-09
Payer: MEDICARE

## 2017-08-09 PROCEDURE — 93798 PHYS/QHP OP CAR RHAB W/ECG: CPT

## 2017-08-14 ENCOUNTER — HOSPITAL ENCOUNTER (OUTPATIENT)
Dept: CARDIAC REHAB | Age: 67
Setting detail: THERAPIES SERIES
Discharge: HOME | End: 2017-08-14
Payer: MEDICARE

## 2017-08-14 PROCEDURE — 93798 PHYS/QHP OP CAR RHAB W/ECG: CPT

## 2017-08-17 ENCOUNTER — OFFICE VISIT (OUTPATIENT)
Dept: CARDIOLOGY | Age: 67
End: 2017-08-17
Payer: MEDICARE

## 2017-08-17 VITALS
SYSTOLIC BLOOD PRESSURE: 138 MMHG | WEIGHT: 175 LBS | BODY MASS INDEX: 23.19 KG/M2 | HEART RATE: 66 BPM | DIASTOLIC BLOOD PRESSURE: 80 MMHG | HEIGHT: 73 IN

## 2017-08-17 DIAGNOSIS — I25.10 ASHD (ARTERIOSCLEROTIC HEART DISEASE): Primary | ICD-10-CM

## 2017-08-17 DIAGNOSIS — I10 ESSENTIAL HYPERTENSION: ICD-10-CM

## 2017-08-17 PROCEDURE — G8427 DOCREV CUR MEDS BY ELIG CLIN: HCPCS | Performed by: INTERNAL MEDICINE

## 2017-08-17 PROCEDURE — 3017F COLORECTAL CA SCREEN DOC REV: CPT | Performed by: INTERNAL MEDICINE

## 2017-08-17 PROCEDURE — 4040F PNEUMOC VAC/ADMIN/RCVD: CPT | Performed by: INTERNAL MEDICINE

## 2017-08-17 PROCEDURE — 4004F PT TOBACCO SCREEN RCVD TLK: CPT | Performed by: INTERNAL MEDICINE

## 2017-08-17 PROCEDURE — G8420 CALC BMI NORM PARAMETERS: HCPCS | Performed by: INTERNAL MEDICINE

## 2017-08-17 PROCEDURE — G8598 ASA/ANTIPLAT THER USED: HCPCS | Performed by: INTERNAL MEDICINE

## 2017-08-17 PROCEDURE — 99213 OFFICE O/P EST LOW 20 MIN: CPT | Performed by: INTERNAL MEDICINE

## 2017-08-17 PROCEDURE — 1123F ACP DISCUSS/DSCN MKR DOCD: CPT | Performed by: INTERNAL MEDICINE

## 2017-08-29 ENCOUNTER — TELEPHONE (OUTPATIENT)
Dept: PRIMARY CARE CLINIC | Age: 67
End: 2017-08-29

## 2017-08-30 ENCOUNTER — OFFICE VISIT (OUTPATIENT)
Dept: PRIMARY CARE CLINIC | Age: 67
End: 2017-08-30
Payer: MEDICARE

## 2017-08-30 VITALS
OXYGEN SATURATION: 98 % | TEMPERATURE: 98.4 F | BODY MASS INDEX: 22 KG/M2 | WEIGHT: 171.4 LBS | SYSTOLIC BLOOD PRESSURE: 136 MMHG | HEART RATE: 62 BPM | HEIGHT: 74 IN | DIASTOLIC BLOOD PRESSURE: 78 MMHG

## 2017-08-30 DIAGNOSIS — R19.7 DIARRHEA, UNSPECIFIED TYPE: ICD-10-CM

## 2017-08-30 DIAGNOSIS — R63.4 RECENT WEIGHT LOSS: ICD-10-CM

## 2017-08-30 DIAGNOSIS — R19.7 DIARRHEA, UNSPECIFIED TYPE: Primary | ICD-10-CM

## 2017-08-30 LAB
ALBUMIN SERPL-MCNC: 3.8 G/DL (ref 3.5–5.2)
ALP BLD-CCNC: 87 U/L (ref 40–130)
ALT SERPL-CCNC: 13 U/L (ref 5–41)
ANION GAP SERPL CALCULATED.3IONS-SCNC: 15 MMOL/L (ref 7–19)
AST SERPL-CCNC: 14 U/L (ref 5–40)
BASOPHILS ABSOLUTE: 0 K/UL (ref 0–0.2)
BASOPHILS RELATIVE PERCENT: 0.6 % (ref 0–1)
BILIRUB SERPL-MCNC: 0.3 MG/DL (ref 0.2–1.2)
BUN BLDV-MCNC: 13 MG/DL (ref 8–23)
CALCIUM SERPL-MCNC: 9 MG/DL (ref 8.8–10.2)
CHLORIDE BLD-SCNC: 107 MMOL/L (ref 98–111)
CO2: 24 MMOL/L (ref 22–29)
CREAT SERPL-MCNC: 1.5 MG/DL (ref 0.5–1.2)
EOSINOPHILS ABSOLUTE: 0.3 K/UL (ref 0–0.6)
EOSINOPHILS RELATIVE PERCENT: 5.5 % (ref 0–5)
GFR NON-AFRICAN AMERICAN: 46
GLUCOSE BLD-MCNC: 85 MG/DL (ref 74–109)
HCT VFR BLD CALC: 43.2 % (ref 42–52)
HEMOGLOBIN: 14.4 G/DL (ref 14–18)
LYMPHOCYTES ABSOLUTE: 1.3 K/UL (ref 1.1–4.5)
LYMPHOCYTES RELATIVE PERCENT: 24.7 % (ref 20–40)
MAGNESIUM: 2.1 MG/DL (ref 1.6–2.4)
MCH RBC QN AUTO: 30.3 PG (ref 27–31)
MCHC RBC AUTO-ENTMCNC: 33.3 G/DL (ref 33–37)
MCV RBC AUTO: 90.9 FL (ref 80–94)
MONOCYTES ABSOLUTE: 0.4 K/UL (ref 0–0.9)
MONOCYTES RELATIVE PERCENT: 7.8 % (ref 0–10)
NEUTROPHILS ABSOLUTE: 3.2 K/UL (ref 1.5–7.5)
NEUTROPHILS RELATIVE PERCENT: 61.2 % (ref 50–65)
PDW BLD-RTO: 14.2 % (ref 11.5–14.5)
PLATELET # BLD: 173 K/UL (ref 130–400)
PMV BLD AUTO: 10.1 FL (ref 9.4–12.4)
POTASSIUM SERPL-SCNC: 4.1 MMOL/L (ref 3.5–5)
RBC # BLD: 4.75 M/UL (ref 4.7–6.1)
SODIUM BLD-SCNC: 146 MMOL/L (ref 136–145)
TOTAL PROTEIN: 7 G/DL (ref 6.6–8.7)
WBC # BLD: 5.2 K/UL (ref 4.8–10.8)

## 2017-08-30 PROCEDURE — 3017F COLORECTAL CA SCREEN DOC REV: CPT | Performed by: NURSE PRACTITIONER

## 2017-08-30 PROCEDURE — G8598 ASA/ANTIPLAT THER USED: HCPCS | Performed by: NURSE PRACTITIONER

## 2017-08-30 PROCEDURE — G8420 CALC BMI NORM PARAMETERS: HCPCS | Performed by: NURSE PRACTITIONER

## 2017-08-30 PROCEDURE — 4040F PNEUMOC VAC/ADMIN/RCVD: CPT | Performed by: NURSE PRACTITIONER

## 2017-08-30 PROCEDURE — 99213 OFFICE O/P EST LOW 20 MIN: CPT | Performed by: NURSE PRACTITIONER

## 2017-08-30 PROCEDURE — 4004F PT TOBACCO SCREEN RCVD TLK: CPT | Performed by: NURSE PRACTITIONER

## 2017-08-30 PROCEDURE — 1123F ACP DISCUSS/DSCN MKR DOCD: CPT | Performed by: NURSE PRACTITIONER

## 2017-08-30 PROCEDURE — G8427 DOCREV CUR MEDS BY ELIG CLIN: HCPCS | Performed by: NURSE PRACTITIONER

## 2017-08-30 RX ORDER — CIPROFLOXACIN 500 MG/1
500 TABLET, FILM COATED ORAL 2 TIMES DAILY
Qty: 20 TABLET | Refills: 0 | Status: SHIPPED | OUTPATIENT
Start: 2017-08-30 | End: 2017-09-07

## 2017-08-31 ASSESSMENT — ENCOUNTER SYMPTOMS
ABDOMINAL PAIN: 0
BLOOD IN STOOL: 0
NAUSEA: 0
RESPIRATORY NEGATIVE: 1
EYES NEGATIVE: 1
DIARRHEA: 1
ABDOMINAL DISTENTION: 0
CONSTIPATION: 0
VOMITING: 0

## 2017-09-01 ENCOUNTER — TELEPHONE (OUTPATIENT)
Dept: PRIMARY CARE CLINIC | Age: 67
End: 2017-09-01

## 2017-09-07 ENCOUNTER — OFFICE VISIT (OUTPATIENT)
Dept: PRIMARY CARE CLINIC | Age: 67
End: 2017-09-07
Payer: MEDICARE

## 2017-09-07 VITALS
TEMPERATURE: 96.8 F | DIASTOLIC BLOOD PRESSURE: 80 MMHG | WEIGHT: 170.8 LBS | SYSTOLIC BLOOD PRESSURE: 124 MMHG | BODY MASS INDEX: 22.64 KG/M2 | OXYGEN SATURATION: 99 % | HEIGHT: 73 IN | HEART RATE: 60 BPM

## 2017-09-07 DIAGNOSIS — I10 ESSENTIAL HYPERTENSION: Primary | ICD-10-CM

## 2017-09-07 DIAGNOSIS — Z72.0 TOBACCO ABUSE: ICD-10-CM

## 2017-09-07 DIAGNOSIS — I25.10 CORONARY ARTERY DISEASE INVOLVING NATIVE CORONARY ARTERY OF NATIVE HEART WITHOUT ANGINA PECTORIS: ICD-10-CM

## 2017-09-07 PROCEDURE — G8598 ASA/ANTIPLAT THER USED: HCPCS | Performed by: FAMILY MEDICINE

## 2017-09-07 PROCEDURE — G8420 CALC BMI NORM PARAMETERS: HCPCS | Performed by: FAMILY MEDICINE

## 2017-09-07 PROCEDURE — G8427 DOCREV CUR MEDS BY ELIG CLIN: HCPCS | Performed by: FAMILY MEDICINE

## 2017-09-07 PROCEDURE — 4040F PNEUMOC VAC/ADMIN/RCVD: CPT | Performed by: FAMILY MEDICINE

## 2017-09-07 PROCEDURE — 99214 OFFICE O/P EST MOD 30 MIN: CPT | Performed by: FAMILY MEDICINE

## 2017-09-07 PROCEDURE — 3017F COLORECTAL CA SCREEN DOC REV: CPT | Performed by: FAMILY MEDICINE

## 2017-09-07 PROCEDURE — 1123F ACP DISCUSS/DSCN MKR DOCD: CPT | Performed by: FAMILY MEDICINE

## 2017-09-07 PROCEDURE — 4004F PT TOBACCO SCREEN RCVD TLK: CPT | Performed by: FAMILY MEDICINE

## 2017-09-08 ENCOUNTER — HOSPITAL ENCOUNTER (OUTPATIENT)
Dept: CARDIAC REHAB | Age: 67
Setting detail: THERAPIES SERIES
Discharge: HOME | End: 2017-09-08
Payer: MEDICARE

## 2017-09-11 ENCOUNTER — APPOINTMENT (OUTPATIENT)
Dept: CARDIAC REHAB | Age: 67
End: 2017-09-11
Payer: MEDICARE

## 2017-09-13 ENCOUNTER — APPOINTMENT (OUTPATIENT)
Dept: CARDIAC REHAB | Age: 67
End: 2017-09-13
Payer: MEDICARE

## 2017-09-15 ENCOUNTER — APPOINTMENT (OUTPATIENT)
Dept: CARDIAC REHAB | Age: 67
End: 2017-09-15
Payer: MEDICARE

## 2017-09-18 ENCOUNTER — APPOINTMENT (OUTPATIENT)
Dept: CARDIAC REHAB | Age: 67
End: 2017-09-18
Payer: MEDICARE

## 2017-09-20 ENCOUNTER — APPOINTMENT (OUTPATIENT)
Dept: CARDIAC REHAB | Age: 67
End: 2017-09-20
Payer: MEDICARE

## 2017-09-21 ENCOUNTER — OFFICE VISIT (OUTPATIENT)
Dept: CARDIOLOGY | Age: 67
End: 2017-09-21
Payer: MEDICARE

## 2017-09-21 VITALS
HEIGHT: 73 IN | WEIGHT: 170 LBS | DIASTOLIC BLOOD PRESSURE: 70 MMHG | SYSTOLIC BLOOD PRESSURE: 136 MMHG | HEART RATE: 66 BPM | BODY MASS INDEX: 22.53 KG/M2

## 2017-09-21 DIAGNOSIS — I25.10 ATHEROSCLEROTIC CARDIOVASCULAR DISEASE: Primary | ICD-10-CM

## 2017-09-21 DIAGNOSIS — R53.83 FATIGUE, UNSPECIFIED TYPE: ICD-10-CM

## 2017-09-21 DIAGNOSIS — I10 ESSENTIAL HYPERTENSION: ICD-10-CM

## 2017-09-21 PROCEDURE — G8598 ASA/ANTIPLAT THER USED: HCPCS | Performed by: INTERNAL MEDICINE

## 2017-09-21 PROCEDURE — G8420 CALC BMI NORM PARAMETERS: HCPCS | Performed by: INTERNAL MEDICINE

## 2017-09-21 PROCEDURE — 3017F COLORECTAL CA SCREEN DOC REV: CPT | Performed by: INTERNAL MEDICINE

## 2017-09-21 PROCEDURE — 99213 OFFICE O/P EST LOW 20 MIN: CPT | Performed by: INTERNAL MEDICINE

## 2017-09-21 PROCEDURE — G8427 DOCREV CUR MEDS BY ELIG CLIN: HCPCS | Performed by: INTERNAL MEDICINE

## 2017-09-21 PROCEDURE — 1123F ACP DISCUSS/DSCN MKR DOCD: CPT | Performed by: INTERNAL MEDICINE

## 2017-09-21 PROCEDURE — 4040F PNEUMOC VAC/ADMIN/RCVD: CPT | Performed by: INTERNAL MEDICINE

## 2017-09-21 PROCEDURE — 4004F PT TOBACCO SCREEN RCVD TLK: CPT | Performed by: INTERNAL MEDICINE

## 2017-09-22 ENCOUNTER — APPOINTMENT (OUTPATIENT)
Dept: CARDIAC REHAB | Age: 67
End: 2017-09-22
Payer: MEDICARE

## 2017-09-25 ENCOUNTER — APPOINTMENT (OUTPATIENT)
Dept: CARDIAC REHAB | Age: 67
End: 2017-09-25
Payer: MEDICARE

## 2017-09-25 ASSESSMENT — ENCOUNTER SYMPTOMS
DIARRHEA: 0
SHORTNESS OF BREATH: 0
COUGH: 0

## 2017-09-27 ENCOUNTER — APPOINTMENT (OUTPATIENT)
Dept: CARDIAC REHAB | Age: 67
End: 2017-09-27
Payer: MEDICARE

## 2017-09-29 ENCOUNTER — APPOINTMENT (OUTPATIENT)
Dept: CARDIAC REHAB | Age: 67
End: 2017-09-29
Payer: MEDICARE

## 2017-11-30 ENCOUNTER — HOSPITAL ENCOUNTER (OUTPATIENT)
Age: 67
Setting detail: OBSERVATION
Discharge: HOME | End: 2017-12-01
Attending: FAMILY MEDICINE | Admitting: INTERNAL MEDICINE
Payer: MEDICARE

## 2017-11-30 ENCOUNTER — APPOINTMENT (OUTPATIENT)
Dept: GENERAL RADIOLOGY | Age: 67
End: 2017-11-30
Payer: MEDICARE

## 2017-11-30 DIAGNOSIS — I47.1 SVT (SUPRAVENTRICULAR TACHYCARDIA) (HCC): Primary | ICD-10-CM

## 2017-11-30 PROBLEM — I47.10 SVT (SUPRAVENTRICULAR TACHYCARDIA): Status: ACTIVE | Noted: 2017-11-30

## 2017-11-30 LAB
ALBUMIN SERPL-MCNC: 3.9 G/DL (ref 3.5–5.2)
ALP BLD-CCNC: 98 U/L (ref 40–130)
ALT SERPL-CCNC: 17 U/L (ref 5–41)
ANION GAP SERPL CALCULATED.3IONS-SCNC: 12 MMOL/L (ref 7–19)
AST SERPL-CCNC: 19 U/L (ref 5–40)
BASOPHILS ABSOLUTE: 0 K/UL (ref 0–0.2)
BASOPHILS RELATIVE PERCENT: 0.5 % (ref 0–1)
BILIRUB SERPL-MCNC: 0.3 MG/DL (ref 0.2–1.2)
BUN BLDV-MCNC: 19 MG/DL (ref 8–23)
CALCIUM SERPL-MCNC: 8.9 MG/DL (ref 8.8–10.2)
CHLORIDE BLD-SCNC: 104 MMOL/L (ref 98–111)
CO2: 26 MMOL/L (ref 22–29)
CREAT SERPL-MCNC: 1.5 MG/DL (ref 0.5–1.2)
EOSINOPHILS ABSOLUTE: 0.3 K/UL (ref 0–0.6)
EOSINOPHILS RELATIVE PERCENT: 4.9 % (ref 0–5)
GFR NON-AFRICAN AMERICAN: 46
GLUCOSE BLD-MCNC: 91 MG/DL (ref 74–109)
HCT VFR BLD CALC: 45.9 % (ref 42–52)
HEMOGLOBIN: 14.8 G/DL (ref 14–18)
LYMPHOCYTES ABSOLUTE: 1.7 K/UL (ref 1.1–4.5)
LYMPHOCYTES RELATIVE PERCENT: 27.2 % (ref 20–40)
MCH RBC QN AUTO: 29.7 PG (ref 27–31)
MCHC RBC AUTO-ENTMCNC: 32.2 G/DL (ref 33–37)
MCV RBC AUTO: 92.2 FL (ref 80–94)
MONOCYTES ABSOLUTE: 0.4 K/UL (ref 0–0.9)
MONOCYTES RELATIVE PERCENT: 7 % (ref 0–10)
NEUTROPHILS ABSOLUTE: 3.7 K/UL (ref 1.5–7.5)
NEUTROPHILS RELATIVE PERCENT: 60.2 % (ref 50–65)
PDW BLD-RTO: 14.2 % (ref 11.5–14.5)
PERFORMED ON: NORMAL
PERFORMED ON: NORMAL
PLATELET # BLD: 168 K/UL (ref 130–400)
PMV BLD AUTO: 10.5 FL (ref 9.4–12.4)
POC TROPONIN I: 0.03 NG/ML (ref 0–0.08)
POC TROPONIN I: 0.06 NG/ML (ref 0–0.08)
POTASSIUM SERPL-SCNC: 4.3 MMOL/L (ref 3.5–5)
RBC # BLD: 4.98 M/UL (ref 4.7–6.1)
SODIUM BLD-SCNC: 142 MMOL/L (ref 136–145)
TOTAL PROTEIN: 6.4 G/DL (ref 6.6–8.7)
WBC # BLD: 6.1 K/UL (ref 4.8–10.8)

## 2017-11-30 PROCEDURE — 85025 COMPLETE CBC W/AUTO DIFF WBC: CPT

## 2017-11-30 PROCEDURE — 2500000003 HC RX 250 WO HCPCS

## 2017-11-30 PROCEDURE — 2580000003 HC RX 258: Performed by: FAMILY MEDICINE

## 2017-11-30 PROCEDURE — 6360000002 HC RX W HCPCS: Performed by: FAMILY MEDICINE

## 2017-11-30 PROCEDURE — G0378 HOSPITAL OBSERVATION PER HR: HCPCS

## 2017-11-30 PROCEDURE — 2100000000 HC CCU R&B

## 2017-11-30 PROCEDURE — 99284 EMERGENCY DEPT VISIT MOD MDM: CPT | Performed by: FAMILY MEDICINE

## 2017-11-30 PROCEDURE — 99285 EMERGENCY DEPT VISIT HI MDM: CPT

## 2017-11-30 PROCEDURE — 2500000003 HC RX 250 WO HCPCS: Performed by: FAMILY MEDICINE

## 2017-11-30 PROCEDURE — 84484 ASSAY OF TROPONIN QUANT: CPT

## 2017-11-30 PROCEDURE — 96374 THER/PROPH/DIAG INJ IV PUSH: CPT

## 2017-11-30 PROCEDURE — 93005 ELECTROCARDIOGRAM TRACING: CPT

## 2017-11-30 PROCEDURE — 71010 XR CHEST PORTABLE: CPT

## 2017-11-30 PROCEDURE — 36415 COLL VENOUS BLD VENIPUNCTURE: CPT

## 2017-11-30 PROCEDURE — 80053 COMPREHEN METABOLIC PANEL: CPT

## 2017-11-30 PROCEDURE — 96375 TX/PRO/DX INJ NEW DRUG ADDON: CPT

## 2017-11-30 RX ORDER — ADENOSINE 3 MG/ML
12 INJECTION, SOLUTION INTRAVENOUS ONCE
Status: COMPLETED | OUTPATIENT
Start: 2017-11-30 | End: 2017-11-30

## 2017-11-30 RX ORDER — SODIUM CHLORIDE 0.9 % (FLUSH) 0.9 %
10 SYRINGE (ML) INJECTION PRN
Status: DISCONTINUED | OUTPATIENT
Start: 2017-11-30 | End: 2017-12-01 | Stop reason: HOSPADM

## 2017-11-30 RX ORDER — ACETAMINOPHEN 325 MG/1
650 TABLET ORAL EVERY 4 HOURS PRN
Status: DISCONTINUED | OUTPATIENT
Start: 2017-11-30 | End: 2017-12-01 | Stop reason: HOSPADM

## 2017-11-30 RX ORDER — ADENOSINE 3 MG/ML
INJECTION, SOLUTION INTRAVENOUS
Status: DISPENSED
Start: 2017-11-30 | End: 2017-12-01

## 2017-11-30 RX ORDER — 0.9 % SODIUM CHLORIDE 0.9 %
1000 INTRAVENOUS SOLUTION INTRAVENOUS ONCE
Status: COMPLETED | OUTPATIENT
Start: 2017-11-30 | End: 2017-11-30

## 2017-11-30 RX ORDER — DILTIAZEM HYDROCHLORIDE 5 MG/ML
INJECTION INTRAVENOUS
Status: COMPLETED
Start: 2017-11-30 | End: 2017-11-30

## 2017-11-30 RX ORDER — SODIUM CHLORIDE 9 MG/ML
INJECTION, SOLUTION INTRAVENOUS CONTINUOUS
Status: DISCONTINUED | OUTPATIENT
Start: 2017-11-30 | End: 2017-12-01 | Stop reason: HOSPADM

## 2017-11-30 RX ORDER — DILTIAZEM HYDROCHLORIDE 5 MG/ML
10 INJECTION INTRAVENOUS ONCE
Status: COMPLETED | OUTPATIENT
Start: 2017-11-30 | End: 2017-11-30

## 2017-11-30 RX ORDER — SODIUM CHLORIDE 0.9 % (FLUSH) 0.9 %
10 SYRINGE (ML) INJECTION EVERY 12 HOURS SCHEDULED
Status: DISCONTINUED | OUTPATIENT
Start: 2017-11-30 | End: 2017-12-01 | Stop reason: HOSPADM

## 2017-11-30 RX ORDER — ADENOSINE 3 MG/ML
6 INJECTION, SOLUTION INTRAVENOUS ONCE
Status: COMPLETED | OUTPATIENT
Start: 2017-11-30 | End: 2017-11-30

## 2017-11-30 RX ADMIN — DILTIAZEM HYDROCHLORIDE 10 MG: 5 INJECTION INTRAVENOUS at 19:32

## 2017-11-30 RX ADMIN — ADENOSINE 6 MG: 3 INJECTION, SOLUTION INTRAVENOUS at 19:20

## 2017-11-30 RX ADMIN — SODIUM CHLORIDE 1000 ML: 9 INJECTION, SOLUTION INTRAVENOUS at 19:20

## 2017-11-30 RX ADMIN — ADENOSINE 12 MG: 3 INJECTION, SOLUTION INTRAVENOUS at 19:23

## 2017-11-30 RX ADMIN — DILTIAZEM HYDROCHLORIDE 5 MG/HR: 5 INJECTION INTRAVENOUS at 20:54

## 2017-11-30 RX ADMIN — SODIUM CHLORIDE: 9 INJECTION, SOLUTION INTRAVENOUS at 20:54

## 2017-11-30 ASSESSMENT — ENCOUNTER SYMPTOMS
CONSTIPATION: 0
COUGH: 0
PHOTOPHOBIA: 0
ABDOMINAL PAIN: 0
WHEEZING: 0
ABDOMINAL DISTENTION: 0
DIARRHEA: 0
BACK PAIN: 0
SORE THROAT: 0
SHORTNESS OF BREATH: 1
NAUSEA: 0

## 2017-12-01 VITALS
WEIGHT: 176.6 LBS | HEART RATE: 62 BPM | HEIGHT: 72 IN | TEMPERATURE: 98 F | RESPIRATION RATE: 23 BRPM | SYSTOLIC BLOOD PRESSURE: 137 MMHG | OXYGEN SATURATION: 97 % | DIASTOLIC BLOOD PRESSURE: 73 MMHG | BODY MASS INDEX: 23.92 KG/M2

## 2017-12-01 PROCEDURE — 99219 PR INITIAL OBSERVATION CARE/DAY 50 MINUTES: CPT | Performed by: INTERNAL MEDICINE

## 2017-12-01 PROCEDURE — 96372 THER/PROPH/DIAG INJ SC/IM: CPT

## 2017-12-01 PROCEDURE — 87070 CULTURE OTHR SPECIMN AEROBIC: CPT

## 2017-12-01 PROCEDURE — 2580000003 HC RX 258: Performed by: INTERNAL MEDICINE

## 2017-12-01 PROCEDURE — G0378 HOSPITAL OBSERVATION PER HR: HCPCS

## 2017-12-01 PROCEDURE — 6370000000 HC RX 637 (ALT 250 FOR IP): Performed by: INTERNAL MEDICINE

## 2017-12-01 PROCEDURE — 99999 PR OFFICE/OUTPT VISIT,PROCEDURE ONLY: CPT | Performed by: INTERNAL MEDICINE

## 2017-12-01 PROCEDURE — 6360000002 HC RX W HCPCS: Performed by: INTERNAL MEDICINE

## 2017-12-01 RX ORDER — LISINOPRIL 5 MG/1
5 TABLET ORAL DAILY
Status: DISCONTINUED | OUTPATIENT
Start: 2017-12-01 | End: 2017-12-01 | Stop reason: HOSPADM

## 2017-12-01 RX ORDER — LISINOPRIL 5 MG/1
5 TABLET ORAL DAILY
Qty: 30 TABLET | Refills: 5 | Status: SHIPPED | OUTPATIENT
Start: 2017-12-01 | End: 2018-05-30 | Stop reason: SDUPTHER

## 2017-12-01 RX ADMIN — Medication 10 ML: at 03:32

## 2017-12-01 RX ADMIN — ENOXAPARIN SODIUM 40 MG: 40 INJECTION SUBCUTANEOUS at 09:10

## 2017-12-01 RX ADMIN — VERAPAMIL HYDROCHLORIDE 180 MG: 180 TABLET, FILM COATED, EXTENDED RELEASE ORAL at 09:05

## 2017-12-01 RX ADMIN — ACETAMINOPHEN 650 MG: 325 TABLET ORAL at 03:31

## 2017-12-01 RX ADMIN — LISINOPRIL 5 MG: 5 TABLET ORAL at 09:10

## 2017-12-01 ASSESSMENT — PAIN SCALES - GENERAL
PAINLEVEL_OUTOF10: 4
PAINLEVEL_OUTOF10: 2

## 2017-12-01 NOTE — ED PROVIDER NOTES
[address] [facility],6Th [address]  eMERGENCY dEPARTMENT eNCOUnter      Pt Name: Kenisha Sales  MRN: [id number]  [place] 1950  Date of evaluation: 11/30/2017  Provider: Rosangela Cox MD    [address] [place] [address]       Chief Complaint   Patient presents with    Palpitations     presents with c/o palpitations          HISTORY OF PRESENT ILLNESS   (Location/Symptom, Timing/Onset, Context/Setting, Quality, Duration, Modifying Factors, Severity)  Note limiting factors. Kenisha Sales is a 69 y.o. male who presents to the emergency department Complaining of rapid heart rate acute onset with a vague description of heaviness to his chest with some difficulty breathing. Patient states this started while drinking coffee. And his palpitations have continued here in the emergency department where he presents with a narrow complex tachycardic rate of 170. Initial dose of adenosine slowed heart rate for a few seconds second dose of 12 mg had a more extensive response showing an narrow sinus rhythm at 80. This rate quickly went back up he was administered 10 mg of Cardizem which brought him down to 70 however subsequently he broke and increased his rate again to 170 this point patient will patient was placed on a Cardizem drip. HPI    Nursing Notes were reviewed. REVIEW OF SYSTEMS    (2-9 systems for level 4, 10 or more for level 5)     Review of Systems   Constitutional: Negative for activity change, appetite change, chills, diaphoresis, fatigue and fever. HENT: Negative for congestion and sore throat. Eyes: Negative for photophobia and visual disturbance. Respiratory: Positive for shortness of breath. Negative for cough and wheezing. Cardiovascular: Positive for palpitations. Negative for chest pain and leg swelling. Gastrointestinal: Negative for abdominal distention, abdominal pain, constipation, diarrhea and nausea. Endocrine: Negative for cold intolerance and heat intolerance.    Genitourinary: Negative for difficulty urinating and dysuria. Musculoskeletal: Negative for back pain. Skin: Negative for rash. Neurological: Negative for dizziness, seizures, syncope and weakness. Hematological: Negative for adenopathy. Psychiatric/Behavioral: Negative for agitation, confusion and suicidal ideas. PAST MEDICAL HISTORY     Past Medical History:   Diagnosis Date    Arthritis     knees    CAD (coronary artery disease)     COPD (chronic obstructive pulmonary disease) ([facility] [address]     slight    Heart attack 07/06/2017    History of blood transfusion     History of colon polyps     tubular adenoma, hyperplastic, distal rectum, duodenum    History of transfusion     Hyperlipidemia     Hypertension     Joint pain     Palpitations     Pinched nerve     right shoulder    Pinched nerve in neck     Ulcer [facility])          SURGICAL HISTORY       Past Surgical History:   Procedure Laterality Date    COLONOSCOPY  12-10-08    Dr Liana Killian COLONOSCOPY  8-27-01    Dr Jayna Ramirez  7/2013    Dr. Simmons Postal  07/06/2017    ENDOSCOPY, COLON, DIAGNOSTIC      HEMORRHOID SURGERY      JOINT REPLACEMENT Left 06/2012    knee    KNEE ARTHROSCOPY      bilateral-3 scopes on right and 1 on left    NECK SURGERY      PAROTIDECTOMY  03/31/2017    SKIN BIOPSY      lypomas on arms, side, and back    TOTAL KNEE ARTHROPLASTY      left    UPPER GASTROINTESTINAL ENDOSCOPY  8-27-01    Dr Danielle Bennett N/A 3/31/2016    Dr Jacqueline Montanez (-), Chronic esophagitis         CURRENT MEDICATIONS       Current Discharge Medication List      CONTINUE these medications which have NOT CHANGED    Details   nicotine (NICODERM CQ) 14 MG/24HR Place 1 patch onto the skin every 24 hours  Qty: 30 patch, Refills: 0    Associated Diagnoses: Tobacco abuse      nitroGLYCERIN (NITROSTAT) 0.4 MG SL tablet up to max of 3 total doses. If no relief after 1 dose, call 911.   Qty: 25 tablet, Refills: 3      clopidogrel (PLAVIX) 75 MG tablet Take 1 tablet by mouth daily  Qty: 30 tablet, Refills: 11      aspirin EC 81 MG EC tablet Take 1 tablet by mouth daily  Qty: 30 tablet, Refills: 3      simvastatin (ZOCOR) 5 MG tablet TAKE 1 TABLET BY MOUTH ONCE DAILY  Qty: 30 tablet, Refills: 11             ALLERGIES     Tape [adhesive tape]    FAMILY HISTORY       Family History   Problem Relation Age of Onset    Other Sister      pancrease removal    Cancer Father      Larynx and asbestos exposure    Esophageal Cancer Father     Heart Disease Mother      has pacemaker    Diabetes Mother     Other Mother      has had esoph stretched in the past    Colon Polyps Neg Hx     Colon Cancer Neg Hx     Liver Cancer Neg Hx     Liver Disease Neg Hx     Rectal Cancer Neg Hx     Stomach Cancer Neg Hx           SOCIAL HISTORY       Social History     Social History    Marital status:      Spouse name: N/A    Number of children: N/A    Years of education: N/A     Social History Main Topics    Smoking status: Current Every Day Smoker     Packs/day: 0.50     Years: 40.00     Types: Cigarettes    Smokeless tobacco: Never Used      Comment: Beginning to use patches and gum    Alcohol use 0.6 oz/week     1 Glasses of wine per week      Comment: occasionally    Drug use: No    Sexual activity: Not Asked     Other Topics Concern    None     Social History Narrative    None       SCREENINGS    Vaishali Coma Scale  Eye Opening: Spontaneous  Best Verbal Response: Oriented  Best Motor Response: Obeys commands  Vaishali Coma Scale Score: 15        PHYSICAL EXAM    (up to 7 for level 4, 8 or more for level 5)     ED Triage Vitals [11/30/17 1907]   BP Temp Temp src Pulse Resp SpO2 Height Weight   (!) 144/88 98.4 °F (36.9 °C) -- 178 20 98 % 6' 1\" (1.854 m) 170 lb (77.1 kg)       Physical Exam   Constitutional: He is oriented to person, place, and time. He appears well-developed and well-nourished.    HENT: Head: Normocephalic. Right Ear: External ear normal.   Eyes: Pupils are equal, round, and reactive to light. Neck: Normal range of motion. Neck supple. Cardiovascular: Normal heart sounds. Tachycardia present. Pulmonary/Chest: Effort normal and breath sounds normal. He has no wheezes. Abdominal: Soft. Bowel sounds are normal. He exhibits no distension. There is no tenderness. There is no rebound and no guarding. Musculoskeletal: Normal range of motion. He exhibits no edema or tenderness. Neurological: He is alert and oriented to person, place, and time. Skin: Skin is warm and dry. No rash noted. No erythema. No pallor. Psychiatric: His behavior is normal.       DIAGNOSTIC RESULTS     EKG: All EKG's are interpreted by the Emergency Department Physician who either signs or Co-signs this chart in the absence of a cardiologist.    EKG shows SVT with a rate of 189 patient responded well to a second dose of adenosine 12 mg rate down to 80 and showed a sinus rhythm short WA interval patient's rate then cranked back up    RADIOLOGY:   Non-plain film images such as CT, Ultrasound and MRI are read by the radiologist. Plain radiographic images are visualized and preliminarily interpreted by the emergency physician with the below findings:          XR Chest Portable   Final Result   No evidence of acute cardiopulmonary process.    Signed by Dr Xenia Orourke on 11/30/2017 8:18 PM              LABS:  Labs Reviewed   CBC WITH AUTO DIFFERENTIAL - Abnormal; Notable for the following:        Result Value    MCHC 32.2 (*)     All other components within normal limits   COMPREHENSIVE METABOLIC PANEL - Abnormal; Notable for the following:     CREATININE 1.5 (*)     GFR Non- 46 (*)     Total Protein 6.4 (*)     All other components within normal limits   MRSA SCREENING CULTURE ONLY   POCT TROPONIN   POCT VENOUS   POCT TROPONIN   POCT VENOUS       All other labs were within normal range or not returned

## 2017-12-01 NOTE — CARE COORDINATION
Observation letter explained to patient and verbalized understanding. Letter signed and placed in soft chart and copy left for patient at bedside.     .Electronically signed by Nacho Dee RN on 12/1/2017 at 12:22 PM

## 2017-12-01 NOTE — DISCHARGE SUMMARY
instructions. Activity and Diet: as directed per discharge instructions. Labs: For convenience and continuity at follow-up the following most recent labs are provided:  CBC:    Lab Results   Component Value Date    WBC 6.1 11/30/2017    HGB 14.8 11/30/2017    HCT 45.9 11/30/2017     11/30/2017       Renal:    Lab Results   Component Value Date     11/30/2017    K 4.3 11/30/2017     11/30/2017    CO2 26 11/30/2017    BUN 19 11/30/2017    CREATININE 1.5 11/30/2017    CALCIUM 8.9 11/30/2017       Discharge Medications:   Current Discharge Medication List           Details   nicotine (NICODERM CQ) 14 MG/24HR Place 1 patch onto the skin every 24 hours  Qty: 30 patch, Refills: 0    Associated Diagnoses: Tobacco abuse      nitroGLYCERIN (NITROSTAT) 0.4 MG SL tablet up to max of 3 total doses. If no relief after 1 dose, call 911. Qty: 25 tablet, Refills: 3      lisinopril (PRINIVIL;ZESTRIL) 10 MG tablet Take 1 tablet by mouth daily  Qty: 30 tablet, Refills: 11      clopidogrel (PLAVIX) 75 MG tablet Take 1 tablet by mouth daily  Qty: 30 tablet, Refills: 11      aspirin EC 81 MG EC tablet Take 1 tablet by mouth daily  Qty: 30 tablet, Refills: 3      simvastatin (ZOCOR) 5 MG tablet TAKE 1 TABLET BY MOUTH ONCE DAILY  Qty: 30 tablet, Refills: 11               Rolan Quintana MD

## 2017-12-01 NOTE — ED NOTES
Report to Poncho Headley RN at the bedside. Pt is resting in bed at this time. Advised pt that we are awaiting a bed assignment.       Latrice Donis RN  11/30/17 3006

## 2017-12-02 ENCOUNTER — CARE COORDINATION (OUTPATIENT)
Dept: CASE MANAGEMENT | Age: 67
End: 2017-12-02

## 2017-12-02 LAB — MRSA CULTURE ONLY: NORMAL

## 2017-12-02 NOTE — CARE COORDINATION
[facility] 45 Transitions Initial Follow Up Call    Call within 2 business days of discharge: Yes    Patient: Sumanth Tarango Patient : 1950   MRN: [id number]  Reason for Admission: There are no discharge diagnoses documented for the most recent discharge. Discharge Date: 17 RARS: [facility] Risk Score: 22.25     Spoke with: [address]: [facility]    Non-face-to-face services provided:      Inpatient Assessment  Care Transitions 24 Hour Call    Do you have a copy of your discharge instructions?:  Yes  Do you have all of your prescriptions and are they filled?:  Yes  Have you scheduled your follow up appointment?:  Yes  How are you going to get to your appointment?:  Car - family or friend to transport  Were you discharged with any Home Care or Post Acute Services:  No  Care Transitions Interventions         Follow Up:  Phoned the home and spoke with patient. He reported that he is feeling much better than he did the day he was admitted to the hospital.  Reported that he has had a couple of episodes of elevated heart rate and his blood pressure is running \"a little high\" but that overall, he feels he is doing well. Did not have his record of blood pressure and heart rate in front of him. Discussed meds. Pt is knowledgeable and has started new meds as ordered. Reported that he is eating and drinking well and is up and about in the home without any other symptoms. Agreeable with CTC follow up as indicated.   Future Appointments  Date Time Provider Jos Rodriguez   12/15/2017 10:00 AM KEVYN Monsivais [facility] [place]   2017 8:30 AM Janna Silva MD [facility]   3/21/2018 2:00 PM KEVYN Nichols [facility] [place]   2018 1:45 PM Denise Simmons MD [facility]       Romana Cota RN

## 2017-12-04 LAB
EKG P AXIS: 68 DEGREES
EKG P AXIS: NORMAL DEGREES
EKG P AXIS: NORMAL DEGREES
EKG P-R INTERVAL: 112 MS
EKG P-R INTERVAL: NORMAL MS
EKG P-R INTERVAL: NORMAL MS
EKG Q-T INTERVAL: 284 MS
EKG Q-T INTERVAL: 284 MS
EKG Q-T INTERVAL: 370 MS
EKG QRS DURATION: 104 MS
EKG QRS DURATION: 72 MS
EKG QRS DURATION: 86 MS
EKG QTC CALCULATION (BAZETT): 378 MS
EKG QTC CALCULATION (BAZETT): 508 MS
EKG QTC CALCULATION (BAZETT): 509 MS
EKG T AXIS: -65 DEGREES
EKG T AXIS: 28 DEGREES
EKG T AXIS: 70 DEGREES

## 2017-12-05 ENCOUNTER — CARE COORDINATION (OUTPATIENT)
Dept: CASE MANAGEMENT | Age: 67
End: 2017-12-05

## 2017-12-05 NOTE — CARE COORDINATION
[facility]    2017    Patient: Brodie Morgan  Patient : 1950   MRN: [id number]  Reason for Admission: There are no discharge diagnoses documented for the most recent discharge. Discharge Date: 17 RARS: Risk Score: 22.25       Spoke with: Brodie Morgan    Non-face-to-face services provided:      Inpatient Assessment  Care Transitions Subsequent and Final Call    Subsequent and Final Calls  Do you have any ongoing symptoms?:  No  Have your medications changed?:  No  Do you have any questions related to your medications?:  No  Do you currently have any active services?:  No  Do you have any needs or concerns that I can assist you with?:  No  Identified Barriers:  None  Care Transitions Interventions  Other Interventions: Follow Up: Spoke with patient for follow up. Reported that he is doing much better. Reported that he has not had any elevated blood pressure or heart rate since my last call, on 17. Denied any chest pain, shortness of breath, dizziness or other issues. Reported that he is up and about and doing well with ADL's. No new issues reported. Aware of meds, follow up PCP and cardiology appointments, etc.   Will follow up next week, per request for next call.     Future Appointments  Date Time Provider Jos Rodriguez   12/15/2017 10:00 AM KEVYN Phan [facility] Cardio [facility]   2017 8:30 AM Rohan Connell [facility]   3/21/2018 2:00 PM KEVYN Gan [facility]   2018 1:45 PM Jen Wilburn MD [facility]       Loy Parham RN

## 2017-12-12 ENCOUNTER — CARE COORDINATION (OUTPATIENT)
Dept: CASE MANAGEMENT | Age: 67
End: 2017-12-12

## 2017-12-15 ENCOUNTER — OFFICE VISIT (OUTPATIENT)
Dept: CARDIOLOGY | Age: 67
End: 2017-12-15
Payer: MEDICARE

## 2017-12-15 VITALS
BODY MASS INDEX: 22.8 KG/M2 | HEART RATE: 65 BPM | HEIGHT: 73 IN | SYSTOLIC BLOOD PRESSURE: 118 MMHG | WEIGHT: 172 LBS | DIASTOLIC BLOOD PRESSURE: 80 MMHG

## 2017-12-15 DIAGNOSIS — Z72.0 TOBACCO ABUSE: ICD-10-CM

## 2017-12-15 DIAGNOSIS — I25.10 CORONARY ARTERY DISEASE INVOLVING NATIVE CORONARY ARTERY OF NATIVE HEART WITHOUT ANGINA PECTORIS: ICD-10-CM

## 2017-12-15 DIAGNOSIS — I47.1 SVT (SUPRAVENTRICULAR TACHYCARDIA) (HCC): Primary | ICD-10-CM

## 2017-12-15 DIAGNOSIS — I10 ESSENTIAL HYPERTENSION: ICD-10-CM

## 2017-12-15 PROCEDURE — 4004F PT TOBACCO SCREEN RCVD TLK: CPT | Performed by: CLINICAL NURSE SPECIALIST

## 2017-12-15 PROCEDURE — 1123F ACP DISCUSS/DSCN MKR DOCD: CPT | Performed by: CLINICAL NURSE SPECIALIST

## 2017-12-15 PROCEDURE — 3017F COLORECTAL CA SCREEN DOC REV: CPT | Performed by: CLINICAL NURSE SPECIALIST

## 2017-12-15 PROCEDURE — G8598 ASA/ANTIPLAT THER USED: HCPCS | Performed by: CLINICAL NURSE SPECIALIST

## 2017-12-15 PROCEDURE — G8427 DOCREV CUR MEDS BY ELIG CLIN: HCPCS | Performed by: CLINICAL NURSE SPECIALIST

## 2017-12-15 PROCEDURE — G8420 CALC BMI NORM PARAMETERS: HCPCS | Performed by: CLINICAL NURSE SPECIALIST

## 2017-12-15 PROCEDURE — 99213 OFFICE O/P EST LOW 20 MIN: CPT | Performed by: CLINICAL NURSE SPECIALIST

## 2017-12-15 PROCEDURE — G8484 FLU IMMUNIZE NO ADMIN: HCPCS | Performed by: CLINICAL NURSE SPECIALIST

## 2017-12-15 PROCEDURE — 4040F PNEUMOC VAC/ADMIN/RCVD: CPT | Performed by: CLINICAL NURSE SPECIALIST

## 2017-12-15 NOTE — PATIENT INSTRUCTIONS
Kidney   Chronic lung diseases:   Bronchitis, Emphysema, Asthma   Cataracts   Macular degeneration   Thyroid conditions   Hearing loss   Erectile dysfunction   Dementia   Osteoporosis     Here's How   Once you've decided to quit smoking, set your target quit date a few weeks away. In the time leading up to your quit day, try some of these ideas offered by the [address] to help you successfully quit smoking. For the best results, work with your doctor. Together, you can test your lung function and compare the results to those of a nonsmoking person. The results can be given to you as your lung age. Finding out your lung age right after having the test done may help you to stop smoking. Your doctor can also discuss with you all of your options and refer you to smoking-cessation support groups. You may wish to use nicotine replacement (gum, patches, inhaler) or one of the prescription medications that have been shown to increase quit rates and prolong abstinence from smoking. But whatever you and your doctor decide on these matters, it will still be you who decides when an how to quit. Here are some techniques:     Switch Brands   Switch to a brand you find distasteful. Change to a brand that is low in tar and nicotine a couple of weeks before your target quit date. This will help change your smoking behavior. However, do not smoke more cigarettes, inhale them more often or more deeply, or place your fingertips over the holes in the filters. All of these actions will increase your nicotine intake, and the idea is to get your body used to functioning without nicotine. Cut Down the Number of Cigarettes You Smoke   Smoke only half of each cigarette. Each day, postpone the lighting of your first cigarette by one hour. Decide you'll only smoke during odd or even hours of the day. Decide beforehand how many cigarettes you'll smoke during the day.  For

## 2017-12-15 NOTE — PROGRESS NOTES
pulmonary disease) ([facility] [address]     slight    Heart attack 07/06/2017    History of blood transfusion     History of colon polyps     tubular adenoma, hyperplastic, distal rectum, duodenum    History of transfusion     Hyperlipidemia     Hypertension     Joint pain     Palpitations     Pinched nerve     right shoulder    Pinched nerve in neck     Tobacco abuse     Ulcer [facility])      Past Surgical History:   Procedure Laterality Date    COLONOSCOPY  12-10-08    Dr Lana Farnsworth COLONOSCOPY  8-27-01    Dr Lana Farnsworth COLONOSCOPY  7/2013    Dr. Valery Frazier  07/06/2017    ENDOSCOPY, COLON, DIAGNOSTIC      HEMORRHOID SURGERY      JOINT REPLACEMENT Left 06/2012    knee    KNEE ARTHROSCOPY      bilateral-3 scopes on right and 1 on left    NECK SURGERY      PAROTIDECTOMY  03/31/2017    SKIN BIOPSY      lypomas on arms, side, and back    TOTAL KNEE ARTHROPLASTY      left    UPPER GASTROINTESTINAL ENDOSCOPY  8-27-01    Dr Malka Sawyer N/A 3/31/2016    Dr Marisa Martinez (-), Chronic esophagitis     Family History   Problem Relation Age of Onset    Other Sister      pancrease removal    Cancer Father      Larynx and asbestos exposure    Esophageal Cancer Father     Heart Disease Mother      has pacemaker    Diabetes Mother     Other Mother      has had esoph stretched in the past    Colon Polyps Neg Hx     Colon Cancer Neg Hx     Liver Cancer Neg Hx     Liver Disease Neg Hx     Rectal Cancer Neg Hx     Stomach Cancer Neg Hx      Social History   Substance Use Topics    Smoking status: Current Every Day Smoker     Packs/day: 0.50     Years: 40.00     Types: Cigarettes    Smokeless tobacco: Never Used      Comment: Beginning to use patches and gum    Alcohol use 0.6 oz/week     1 Glasses of wine per week      Comment: occasionally      Current Outpatient Prescriptions   Medication Sig Dispense Refill    lisinopril (PRINIVIL;ZESTRIL) 5 MG - /80   Pulse 65   Ht 6' 1\" (1.854 m)   Wt 172 lb (78 kg)   BMI 22.69 kg/m²   General - [place] Lily is alert, cooperative, and pleasant. Well groomed. No acute distress. Body habitus is Normal.  HEENT - The head is normocephalic. No circumoral cyanosis. Dentition is normal.   EYES -  No Xanthelasma, no arcus senilis, no conjunctival hemorrhages or discharge. Neck - Supple, without increased jugular venous pressures. No carotid bruits. No mass. Respiratory - Lungs are clear bilaterally. No wheezes or rales. Normal effort without use of accessory muscles. Cardiovascular - Heart has regular rhythm and rate. No murmurs, rubs or gallops. + pedal pulses and no varicosities. Abdominal -  Soft, nontender, nondistended. Bowel sounds are intact. Extremities - No clubbing, cyanosis, or  edema. Musculoskeletal - No musculoskeletal symptoms. No clubbing . No Osler's nodes. Gait normal .  No kyphosis or scoliosis. Skin -  no statis ulcers or dermatitis. Neurological - No focal signs are identified. Oriented to person, place and time. Psychiatric -  Appropriate affect and mood. Assessment:    1. SVT (supraventricular tachycardia) ([address]     2. Essential hypertension     3. Coronary artery disease involving native coronary artery of native heart without angina pectoris     4. Tobacco abuse       Data:  BP Readings from Last 3 Encounters:   12/15/17 118/80   12/01/17 137/73   09/21/17 136/70    Pulse Readings from Last 3 Encounters:   12/15/17 65   12/01/17 62   09/21/17 66      Rate and rhythm well controlled. No recurrent sustained arrhythmias. We discussed the importance of smoking cessation. We discussed when and how to take nitroglycerin should he needed  Stent placement in July 2017 still on dual antiplatelet therapy   States taking medications as prescribed  Stable cardiovascular status. No evidence of overt heart failure, angina or dysrhythmia.      Plan    Do not stop or hold your aspirin or Plavix one year post-stent  Continue smoking cessation efforts  Follow up in  March   Call with any questions or concerns  Follow up with Janna Silva MD for non cardiac problems  Report any new problems  Cardiovascular Fitness-Exercise as tolerated. Strive for 30 minutes of exercise most days of the week. Cardiac / Healthy Diet  Continue current medications as directed  Continue plan of treatment  It is always recommended that you bring your medications bottles with you to each visit - this is for your safety!        KEVYN Martin

## 2017-12-19 ENCOUNTER — OFFICE VISIT (OUTPATIENT)
Dept: PRIMARY CARE CLINIC | Age: 67
End: 2017-12-19
Payer: MEDICARE

## 2017-12-19 VITALS
DIASTOLIC BLOOD PRESSURE: 80 MMHG | SYSTOLIC BLOOD PRESSURE: 122 MMHG | WEIGHT: 174 LBS | HEART RATE: 64 BPM | BODY MASS INDEX: 23.06 KG/M2 | OXYGEN SATURATION: 97 % | HEIGHT: 73 IN | TEMPERATURE: 97.8 F

## 2017-12-19 DIAGNOSIS — Z72.0 TOBACCO ABUSE: ICD-10-CM

## 2017-12-19 DIAGNOSIS — J44.9 CHRONIC OBSTRUCTIVE PULMONARY DISEASE, UNSPECIFIED COPD TYPE (HCC): ICD-10-CM

## 2017-12-19 DIAGNOSIS — I25.10 CORONARY ARTERY DISEASE INVOLVING NATIVE CORONARY ARTERY OF NATIVE HEART WITHOUT ANGINA PECTORIS: Primary | ICD-10-CM

## 2017-12-19 DIAGNOSIS — I10 ESSENTIAL HYPERTENSION: ICD-10-CM

## 2017-12-19 PROCEDURE — G8484 FLU IMMUNIZE NO ADMIN: HCPCS | Performed by: FAMILY MEDICINE

## 2017-12-19 PROCEDURE — 1123F ACP DISCUSS/DSCN MKR DOCD: CPT | Performed by: FAMILY MEDICINE

## 2017-12-19 PROCEDURE — 4004F PT TOBACCO SCREEN RCVD TLK: CPT | Performed by: FAMILY MEDICINE

## 2017-12-19 PROCEDURE — G8420 CALC BMI NORM PARAMETERS: HCPCS | Performed by: FAMILY MEDICINE

## 2017-12-19 PROCEDURE — G8427 DOCREV CUR MEDS BY ELIG CLIN: HCPCS | Performed by: FAMILY MEDICINE

## 2017-12-19 PROCEDURE — 3023F SPIROM DOC REV: CPT | Performed by: FAMILY MEDICINE

## 2017-12-19 PROCEDURE — 99214 OFFICE O/P EST MOD 30 MIN: CPT | Performed by: FAMILY MEDICINE

## 2017-12-19 PROCEDURE — G8926 SPIRO NO PERF OR DOC: HCPCS | Performed by: FAMILY MEDICINE

## 2017-12-19 PROCEDURE — 3017F COLORECTAL CA SCREEN DOC REV: CPT | Performed by: FAMILY MEDICINE

## 2017-12-19 PROCEDURE — G8598 ASA/ANTIPLAT THER USED: HCPCS | Performed by: FAMILY MEDICINE

## 2017-12-19 PROCEDURE — 4040F PNEUMOC VAC/ADMIN/RCVD: CPT | Performed by: FAMILY MEDICINE

## 2017-12-19 ASSESSMENT — ENCOUNTER SYMPTOMS
COUGH: 0
SHORTNESS OF BREATH: 0

## 2017-12-19 NOTE — PROGRESS NOTES
nitroGLYCERIN (NITROSTAT) 0.4 MG SL tablet up to max of 3 total doses. If no relief after 1 dose, call 911. 7/7/17  Yes NATALIA Dawn MD   clopidogrel (PLAVIX) 75 MG tablet Take 1 tablet by mouth daily 7/8/17  Yes NATALIA Dawn MD   aspirin EC 81 MG EC tablet Take 1 tablet by mouth daily 7/7/17  Yes NATALIA Dawn MD   simvastatin (ZOCOR) 5 MG tablet TAKE 1 TABLET BY MOUTH ONCE DAILY 1/9/17  Yes KEVYN Taveras       Past Medical History:   Diagnosis Date    Arthritis     knees    CAD (coronary artery disease)     COPD (chronic obstructive pulmonary disease) ([facility] [address]     slight    Heart attack 07/06/2017    History of blood transfusion     History of colon polyps     tubular adenoma, hyperplastic, distal rectum, duodenum    History of transfusion     Hyperlipidemia     Hypertension     Joint pain     Palpitations     Pinched nerve     right shoulder    Pinched nerve in neck     Tobacco abuse     Ulcer [facility])        Past Surgical History:   Procedure Laterality Date    COLONOSCOPY  12-10-08    Dr Sonia Miller COLONOSCOPY  8-27-01    Dr Vivienne Zapata  7/2013    Dr. Lawyer Mckeon  07/06/2017    ENDOSCOPY, COLON, DIAGNOSTIC      HEMORRHOID SURGERY      JOINT REPLACEMENT Left 06/2012    knee    KNEE ARTHROSCOPY      bilateral-3 scopes on right and 1 on left    NECK SURGERY      PAROTIDECTOMY  03/31/2017    SKIN BIOPSY      lypomas on arms, side, and back    TOTAL KNEE ARTHROPLASTY      left    UPPER GASTROINTESTINAL ENDOSCOPY  8-27-01    Dr Harriett Berrios ENDOSCOPY N/A 3/31/2016    Dr Hank Massey (-), Chronic esophagitis       Social History     Social History    Marital status:      Spouse name: N/A    Number of children: N/A    Years of education: N/A     Social History Main Topics    Smoking status: Current Every Day Smoker     Packs/day: 0.50     Years: 40.00     Types: Cigarettes    Smokeless desire to quit smoking in the future. Plan    No orders of the defined types were placed in this encounter. No orders of the defined types were placed in this encounter. Return in about 6 months (around 6/19/2018) for AWV. There are no Patient Instructions on file for this visit.

## 2018-01-22 NOTE — H&P
Cardiology History and Physical Note        ADMISSION DAY:  11/30/2017  7:10 PM         History of Present Illness: This is a 69year old white male who presented to the emergency department complaining of rapid heart rate and with this he had anterior chest pain. When seen in the emergency room he had a narrow complex tachycardia most suggestive of A_V fritz reentry tachycardia. He spontaneously converted to NRS. Gevena Cathy He has a history of supraventricular tachycardia. There has been no chest pain typical of angina, no overt heart failure, no syncope or presyncope. Allergies   Allergen Reactions    Tape Priscila Cake Tape] Rash     welps         Current Medications  No current facility-administered medications for this encounter. Past Medical History   has a past medical history of Arthritis; CAD (coronary artery disease); COPD (chronic obstructive pulmonary disease) ([address] Heart attack; History of blood transfusion; History of colon polyps; History of transfusion; Hyperlipidemia; Hypertension; Joint pain; Palpitations; Pinched nerve; Pinched nerve in neck; Tobacco abuse; and Ulcer ([address] Past Surgical History   has a past surgical history that includes Total knee arthroplasty; Knee arthroscopy; Hemorrhoid surgery; Upper gastrointestinal endoscopy (8-27-01); skin biopsy; Colonoscopy (12-10-08); Colonoscopy (8-27-01); Colonoscopy (7/2013); Neck surgery; Upper gastrointestinal endoscopy (N/A, 3/31/2016); Parotidectomy (03/31/2017); Coronary angioplasty with stent (07/06/2017); joint replacement (Left, 06/2012); and Endoscopy, colon, diagnostic. Social History   reports that he has been smoking Cigarettes. He has a 20.00 pack-year smoking history. He has never used smokeless tobacco. He reports that he drinks about 0.6 oz of alcohol per week . He reports that he does not use drugs.     Family History  family history includes Cancer in his father; Diabetes in his mother; Esophageal Cancer in his father; Heart Disease in his mother; Other in his mother and sister. Review of Systems:   Negative except as noted in HPI, 10 organ review performed. The ten organ systems reviewed included: cardiac, respiratory, eyes, constitutional, gastrointestinal, genitourinary, musculoskeletal, neurologic, integumentary, and ear-nose-mouth-throat. Physical Examination:    Vitals:    12/01/17 1000 12/01/17 1030 12/01/17 1215 12/01/17 1417   BP: (!) 146/80 (!) 143/90 137/73    Pulse: 57 61 63 62   Resp: 14 23     Temp:       TempSrc:       SpO2: 95% 97%     Weight:       Height:         General - He is a well-developed, well-nourished. HEENT  Ears and nose are normal.   Moist mucous membranes. Hearing is normal.  EYES -  No conjunctival hemorrhages or discharge. PERRLA. Neck - no thyromegaly, JVP is normal,  without bruit. Respiratory - breath sounds are clear   Cardiovascular  normal heart sounds. without murmur,without gallop or rub  Abdominal -  Soft. Bowel sounds present. Nontender. No hernia. Extremities - no edema. Musculoskeletal -   No cyanosis or clubbing. Skin -  No statis ulcers or dermatitis. Warm, normal turgor. Neuro/Psych -  He is alert and oriented x3 and answered questions appropriately with normal affect and mood. Data:  No intake/output data recorded. Wt Readings from Last 3 Encounters:   12/19/17 174 lb (78.9 kg)   12/15/17 172 lb (78 kg)   11/30/17 176 lb 9.6 oz (80.1 kg)      Body mass index is 23.95 kg/m². No results for input(s): WBC, RBC, HGB, HCT, MCV, MCH, MCHC, RDW, PLT, MPV in the last 72 hours. No results for input(s): NA, K, CL, CO2, BUN, CREATININE, GLUCOSE, CALCIUM, PROT, LABALBU, BILITOT, ALKPHOS, AST, ALT in the last 72 hours.    Other Electrolytes:  FLP:    Lab Results   Component Value Date    TRIG 74 05/29/2015    HDL 43 05/29/2015    LDLDIRECT 111 05/29/2015     TSH:    Lab Results   Component Value Date    TSH 1.47 03/02/2016     Troponin T: No results for

## 2018-04-05 ENCOUNTER — OFFICE VISIT (OUTPATIENT)
Dept: CARDIOLOGY | Age: 68
End: 2018-04-05
Payer: MEDICARE

## 2018-04-05 VITALS
DIASTOLIC BLOOD PRESSURE: 72 MMHG | HEART RATE: 70 BPM | WEIGHT: 172 LBS | HEIGHT: 72 IN | SYSTOLIC BLOOD PRESSURE: 120 MMHG | BODY MASS INDEX: 23.3 KG/M2

## 2018-04-05 DIAGNOSIS — Z95.5 HISTORY OF CORONARY ARTERY STENT PLACEMENT: ICD-10-CM

## 2018-04-05 DIAGNOSIS — E78.2 MIXED HYPERLIPIDEMIA: ICD-10-CM

## 2018-04-05 DIAGNOSIS — F17.200 SMOKER: ICD-10-CM

## 2018-04-05 DIAGNOSIS — I10 ESSENTIAL HYPERTENSION: ICD-10-CM

## 2018-04-05 DIAGNOSIS — I25.10 CORONARY ARTERY DISEASE INVOLVING NATIVE CORONARY ARTERY OF NATIVE HEART WITHOUT ANGINA PECTORIS: Primary | ICD-10-CM

## 2018-04-05 PROCEDURE — G8427 DOCREV CUR MEDS BY ELIG CLIN: HCPCS | Performed by: NURSE PRACTITIONER

## 2018-04-05 PROCEDURE — 3017F COLORECTAL CA SCREEN DOC REV: CPT | Performed by: NURSE PRACTITIONER

## 2018-04-05 PROCEDURE — 99213 OFFICE O/P EST LOW 20 MIN: CPT | Performed by: NURSE PRACTITIONER

## 2018-04-05 PROCEDURE — G8420 CALC BMI NORM PARAMETERS: HCPCS | Performed by: NURSE PRACTITIONER

## 2018-04-05 PROCEDURE — 1123F ACP DISCUSS/DSCN MKR DOCD: CPT | Performed by: NURSE PRACTITIONER

## 2018-04-05 PROCEDURE — 4004F PT TOBACCO SCREEN RCVD TLK: CPT | Performed by: NURSE PRACTITIONER

## 2018-04-05 PROCEDURE — G8598 ASA/ANTIPLAT THER USED: HCPCS | Performed by: NURSE PRACTITIONER

## 2018-04-05 PROCEDURE — 4040F PNEUMOC VAC/ADMIN/RCVD: CPT | Performed by: NURSE PRACTITIONER

## 2018-04-05 RX ORDER — NICOTINE 21 MG/24HR
1 PATCH, TRANSDERMAL 24 HOURS TRANSDERMAL PRN
COMMUNITY
End: 2018-09-25

## 2018-04-05 RX ORDER — SIMVASTATIN 5 MG
5 TABLET ORAL DAILY
COMMUNITY
End: 2018-09-27 | Stop reason: DRUGHIGH

## 2018-05-30 RX ORDER — LISINOPRIL 5 MG/1
TABLET ORAL
Qty: 30 TABLET | Refills: 5 | Status: SHIPPED | OUTPATIENT
Start: 2018-05-30 | End: 2018-11-23 | Stop reason: SDUPTHER

## 2018-06-19 ENCOUNTER — OFFICE VISIT (OUTPATIENT)
Dept: PRIMARY CARE CLINIC | Age: 68
End: 2018-06-19
Payer: MEDICARE

## 2018-06-19 VITALS
DIASTOLIC BLOOD PRESSURE: 70 MMHG | BODY MASS INDEX: 22.62 KG/M2 | OXYGEN SATURATION: 99 % | WEIGHT: 167 LBS | HEART RATE: 68 BPM | TEMPERATURE: 97.9 F | SYSTOLIC BLOOD PRESSURE: 126 MMHG | HEIGHT: 72 IN

## 2018-06-19 DIAGNOSIS — F17.200 SMOKER: ICD-10-CM

## 2018-06-19 DIAGNOSIS — I25.10 CORONARY ARTERY DISEASE INVOLVING NATIVE CORONARY ARTERY OF NATIVE HEART WITHOUT ANGINA PECTORIS: ICD-10-CM

## 2018-06-19 DIAGNOSIS — J44.9 CHRONIC OBSTRUCTIVE PULMONARY DISEASE, UNSPECIFIED COPD TYPE (HCC): ICD-10-CM

## 2018-06-19 DIAGNOSIS — E78.2 MIXED HYPERLIPIDEMIA: ICD-10-CM

## 2018-06-19 DIAGNOSIS — R53.83 FATIGUE, UNSPECIFIED TYPE: ICD-10-CM

## 2018-06-19 DIAGNOSIS — Z00.00 ROUTINE GENERAL MEDICAL EXAMINATION AT A HEALTH CARE FACILITY: Primary | ICD-10-CM

## 2018-06-19 DIAGNOSIS — I47.1 SVT (SUPRAVENTRICULAR TACHYCARDIA) (HCC): ICD-10-CM

## 2018-06-19 DIAGNOSIS — E55.9 VITAMIN D DEFICIENCY: ICD-10-CM

## 2018-06-19 DIAGNOSIS — I10 ESSENTIAL HYPERTENSION: ICD-10-CM

## 2018-06-19 DIAGNOSIS — E53.8 VITAMIN B12 DEFICIENCY: ICD-10-CM

## 2018-06-19 DIAGNOSIS — Z95.5 HISTORY OF CORONARY ARTERY STENT PLACEMENT: ICD-10-CM

## 2018-06-19 PROCEDURE — 4040F PNEUMOC VAC/ADMIN/RCVD: CPT | Performed by: FAMILY MEDICINE

## 2018-06-19 PROCEDURE — G0438 PPPS, INITIAL VISIT: HCPCS | Performed by: FAMILY MEDICINE

## 2018-06-19 PROCEDURE — G8598 ASA/ANTIPLAT THER USED: HCPCS | Performed by: FAMILY MEDICINE

## 2018-06-19 ASSESSMENT — LIFESTYLE VARIABLES
HOW OFTEN DURING THE LAST YEAR HAVE YOU NEEDED AN ALCOHOLIC DRINK FIRST THING IN THE MORNING TO GET YOURSELF GOING AFTER A NIGHT OF HEAVY DRINKING: 0
HOW OFTEN DURING THE LAST YEAR HAVE YOU FOUND THAT YOU WERE NOT ABLE TO STOP DRINKING ONCE YOU HAD STARTED: 0
HOW OFTEN DURING THE LAST YEAR HAVE YOU HAD A FEELING OF GUILT OR REMORSE AFTER DRINKING: 0
HAS A RELATIVE, FRIEND, DOCTOR, OR ANOTHER HEALTH PROFESSIONAL EXPRESSED CONCERN ABOUT YOUR DRINKING OR SUGGESTED YOU CUT DOWN: 0
HOW OFTEN DURING THE LAST YEAR HAVE YOU FAILED TO DO WHAT WAS NORMALLY EXPECTED FROM YOU BECAUSE OF DRINKING: 0
HOW MANY STANDARD DRINKS CONTAINING ALCOHOL DO YOU HAVE ON A TYPICAL DAY: 0
HOW OFTEN DURING THE LAST YEAR HAVE YOU BEEN UNABLE TO REMEMBER WHAT HAPPENED THE NIGHT BEFORE BECAUSE YOU HAD BEEN DRINKING: 0
HOW OFTEN DO YOU HAVE A DRINK CONTAINING ALCOHOL: 1
HOW OFTEN DO YOU HAVE SIX OR MORE DRINKS ON ONE OCCASION: 0
HAVE YOU OR SOMEONE ELSE BEEN INJURED AS A RESULT OF YOUR DRINKING: 0
AUDIT TOTAL SCORE: 1
AUDIT-C TOTAL SCORE: 1

## 2018-06-19 ASSESSMENT — ANXIETY QUESTIONNAIRES: GAD7 TOTAL SCORE: 0

## 2018-06-19 ASSESSMENT — PATIENT HEALTH QUESTIONNAIRE - PHQ9: SUM OF ALL RESPONSES TO PHQ QUESTIONS 1-9: 1

## 2018-06-21 DIAGNOSIS — R53.83 FATIGUE, UNSPECIFIED TYPE: ICD-10-CM

## 2018-06-21 DIAGNOSIS — E53.8 VITAMIN B12 DEFICIENCY: ICD-10-CM

## 2018-06-21 DIAGNOSIS — E55.9 VITAMIN D DEFICIENCY: ICD-10-CM

## 2018-06-21 DIAGNOSIS — I10 ESSENTIAL HYPERTENSION: ICD-10-CM

## 2018-06-21 LAB
ALBUMIN SERPL-MCNC: 3.7 G/DL (ref 3.5–5.2)
ALP BLD-CCNC: 83 U/L (ref 40–130)
ALT SERPL-CCNC: 12 U/L (ref 5–41)
ANION GAP SERPL CALCULATED.3IONS-SCNC: 14 MMOL/L (ref 7–19)
AST SERPL-CCNC: 12 U/L (ref 5–40)
BILIRUB SERPL-MCNC: 0.5 MG/DL (ref 0.2–1.2)
BUN BLDV-MCNC: 17 MG/DL (ref 8–23)
CALCIUM SERPL-MCNC: 9.1 MG/DL (ref 8.8–10.2)
CHLORIDE BLD-SCNC: 106 MMOL/L (ref 98–111)
CHOLESTEROL, TOTAL: 190 MG/DL (ref 160–199)
CO2: 25 MMOL/L (ref 22–29)
CREAT SERPL-MCNC: 1.5 MG/DL (ref 0.5–1.2)
GFR NON-AFRICAN AMERICAN: 46
GLUCOSE BLD-MCNC: 85 MG/DL (ref 74–109)
HDLC SERPL-MCNC: 56 MG/DL (ref 55–121)
LDL CHOLESTEROL CALCULATED: 118 MG/DL
POTASSIUM SERPL-SCNC: 4.2 MMOL/L (ref 3.5–5)
SODIUM BLD-SCNC: 145 MMOL/L (ref 136–145)
TOTAL PROTEIN: 6.6 G/DL (ref 6.6–8.7)
TRIGL SERPL-MCNC: 79 MG/DL (ref 0–149)
TSH SERPL DL<=0.05 MIU/L-ACNC: 2.26 UIU/ML (ref 0.27–4.2)
VITAMIN B-12: 326 PG/ML (ref 211–946)
VITAMIN D 25-HYDROXY: 23.3 NG/ML

## 2018-06-22 ENCOUNTER — TELEPHONE (OUTPATIENT)
Dept: PRIMARY CARE CLINIC | Age: 68
End: 2018-06-22

## 2018-06-25 ENCOUNTER — TELEPHONE (OUTPATIENT)
Dept: PRIMARY CARE CLINIC | Age: 68
End: 2018-06-25

## 2018-07-02 RX ORDER — CLOPIDOGREL BISULFATE 75 MG/1
TABLET ORAL
Qty: 30 TABLET | Refills: 5 | Status: SHIPPED | OUTPATIENT
Start: 2018-07-02 | End: 2018-09-25

## 2018-07-03 ENCOUNTER — TELEPHONE (OUTPATIENT)
Dept: PRIMARY CARE CLINIC | Age: 68
End: 2018-07-03

## 2018-07-05 RX ORDER — ERGOCALCIFEROL 1.25 MG/1
50000 CAPSULE ORAL WEEKLY
Qty: 4 CAPSULE | Refills: 2 | Status: SHIPPED | OUTPATIENT
Start: 2018-07-05 | End: 2018-09-25 | Stop reason: SDUPTHER

## 2018-07-06 ENCOUNTER — TELEPHONE (OUTPATIENT)
Dept: CARDIOLOGY | Age: 68
End: 2018-07-06

## 2018-07-30 ENCOUNTER — OFFICE VISIT (OUTPATIENT)
Dept: GASTROENTEROLOGY | Age: 68
End: 2018-07-30
Payer: MEDICARE

## 2018-07-30 VITALS
HEIGHT: 72 IN | OXYGEN SATURATION: 98 % | BODY MASS INDEX: 22.89 KG/M2 | SYSTOLIC BLOOD PRESSURE: 115 MMHG | HEART RATE: 71 BPM | WEIGHT: 169 LBS | DIASTOLIC BLOOD PRESSURE: 80 MMHG

## 2018-07-30 DIAGNOSIS — Z12.11 SCREENING FOR COLON CANCER: ICD-10-CM

## 2018-07-30 DIAGNOSIS — Z86.010 HISTORY OF ADENOMATOUS POLYP OF COLON: Primary | ICD-10-CM

## 2018-07-30 PROCEDURE — 99214 OFFICE O/P EST MOD 30 MIN: CPT | Performed by: NURSE PRACTITIONER

## 2018-07-30 PROCEDURE — G8427 DOCREV CUR MEDS BY ELIG CLIN: HCPCS | Performed by: NURSE PRACTITIONER

## 2018-07-30 PROCEDURE — 1123F ACP DISCUSS/DSCN MKR DOCD: CPT | Performed by: NURSE PRACTITIONER

## 2018-07-30 PROCEDURE — 1101F PT FALLS ASSESS-DOCD LE1/YR: CPT | Performed by: NURSE PRACTITIONER

## 2018-07-30 PROCEDURE — G8420 CALC BMI NORM PARAMETERS: HCPCS | Performed by: NURSE PRACTITIONER

## 2018-07-30 PROCEDURE — 4040F PNEUMOC VAC/ADMIN/RCVD: CPT | Performed by: NURSE PRACTITIONER

## 2018-07-30 PROCEDURE — G8598 ASA/ANTIPLAT THER USED: HCPCS | Performed by: NURSE PRACTITIONER

## 2018-07-30 PROCEDURE — 3017F COLORECTAL CA SCREEN DOC REV: CPT | Performed by: NURSE PRACTITIONER

## 2018-07-30 PROCEDURE — 4004F PT TOBACCO SCREEN RCVD TLK: CPT | Performed by: NURSE PRACTITIONER

## 2018-07-30 ASSESSMENT — ENCOUNTER SYMPTOMS
VOMITING: 0
BACK PAIN: 0
COUGH: 0
ABDOMINAL DISTENTION: 0
CHEST TIGHTNESS: 0
CONSTIPATION: 0
VOICE CHANGE: 0
BLOOD IN STOOL: 0
ABDOMINAL PAIN: 0
NAUSEA: 0
DIARRHEA: 0
SHORTNESS OF BREATH: 0
RECTAL PAIN: 0
SORE THROAT: 0

## 2018-07-30 NOTE — PATIENT INSTRUCTIONS
Schedule colonoscopy. No aspirin, ibuprofen, naproxen, fish oil or vitamin E for 5 days before procedure. Do not eat or drink after midnight the day of the procedure. Allowed medications can be taken with a small sip of water. Please review your prep instructions for allowed medications. You will not be able to drive for 24 hours after the procedure due to sedation. Bring a  with you the day of the procedure. If you are on blood thinners, clearance from the prescribing physician will be obtained before your procedure is scheduled. If it is determined it is not safe to hold these medications for a short time an alternative procedure for evaluation may be recommended. Risks of colonoscopy include, but are not limited to, perforation, bleeding, and infection, Risk of perforation and bleeding are increased if there is a polyp removed. Anesthesia risks will be reviewed with you before the procedure by a member of the anesthesia department. Your physician may also schedule a follow up appointment with the nurse practitioner to discuss pathology, symptoms or to check if you have had any problems related to your procedure. If you prefer not to return to the office after your procedure please discuss this with your physician on the day of your colonoscopy. The physician will talk with you and/or your family after the procedure is completed. Final recommendations are based on the pathologist report if biopsies or specimens are taken. For Colonoscopy: You will be given specific directions regarding restrictions to diet and bowel prep instructions including laxatives. Please read these instructions one week prior to your scheduled procedure to ensure that you are prepared. If you have any questions regarding these instructions please call our office Mon through Fri from 8:00 am to 4:00 pm.     Follow prep instructions provided for bowel prep. Take all of the bowel prep as directed.  If you are having problems with nausea, stop your prep for 30-45 min to allow the nausea to subside before resuming your prep. It is important to drink plenty of fluids throughout the day before taking your laxatives. This will help to protect your kidneys, prevent dehydration and maximize the effect of the bowel prep. If polyps are removed during the procedure they will be sent to a pathologist for analysis. Unless you have a follow up appointment scheduled, you will be notified by mail of the pathology results within 4 weeks. If you have not received results after 4 weeks you may call the office to obtain this information. Your diet before a colonoscopy bowel preparation is very important to ensure a successful colon exam. It is recommended to consider certain changes to your diet three to four days prior to the procedure. Remember that your bowels need to be empty for the exam.    What foods are good to eat? Cut down on heavy solid foods three to four days before the procedure and start introducing lighter meals to your diet. The following food suggestions are a good part of your diet before a colonoscopy bowel preparation. Light meat that is easily digestible such as chicken (without the skin)   Potatoes without skin   Cheese   Eggs   A light meal of steamed white fish   Light clear soups    Foods and drinks to avoid  Avoid foods that contain too much fiber. Stay clear of dark colored beverages. They can stick to the walls of the digestive tract and make it difficult to differentiate from blood. Some of these foods are:  Red meat, rice, nuts and vegetables   Milk, other milk based fluids and cream   Most fruit and puddings   Whole grain pasta   Cereals, bran and seeds   Colored beverages, especially those that are red or purple in color   Red colored Jell-O   On the day before the colonoscopy, continue to drink plenty of clear fluids.  It is important   to keep yourself hydrated before the exam. Please follow all instructions as provided for cleansing the bowel. Failure to have an adequately prepped colon may cause you to have incomplete exam with further testing required.      [email or web address]

## 2018-08-07 ENCOUNTER — TELEPHONE (OUTPATIENT)
Dept: PRIMARY CARE CLINIC | Age: 68
End: 2018-08-07

## 2018-08-07 DIAGNOSIS — F33.0 MILD EPISODE OF RECURRENT MAJOR DEPRESSIVE DISORDER (HCC): Primary | ICD-10-CM

## 2018-08-07 RX ORDER — ESCITALOPRAM OXALATE 10 MG/1
10 TABLET ORAL DAILY
Qty: 30 TABLET | Refills: 3 | Status: SHIPPED | OUTPATIENT
Start: 2018-08-07 | End: 2018-09-25 | Stop reason: DRUGHIGH

## 2018-09-25 ENCOUNTER — OFFICE VISIT (OUTPATIENT)
Dept: PRIMARY CARE CLINIC | Age: 68
End: 2018-09-25
Payer: MEDICARE

## 2018-09-25 VITALS
HEIGHT: 72 IN | BODY MASS INDEX: 23.03 KG/M2 | OXYGEN SATURATION: 98 % | DIASTOLIC BLOOD PRESSURE: 78 MMHG | TEMPERATURE: 97.2 F | SYSTOLIC BLOOD PRESSURE: 118 MMHG | HEART RATE: 68 BPM | WEIGHT: 170 LBS

## 2018-09-25 DIAGNOSIS — Z23 NEED FOR PROPHYLACTIC VACCINATION AND INOCULATION AGAINST VARICELLA: ICD-10-CM

## 2018-09-25 DIAGNOSIS — E78.2 MIXED HYPERLIPIDEMIA: ICD-10-CM

## 2018-09-25 DIAGNOSIS — I25.10 CORONARY ARTERY DISEASE INVOLVING NATIVE CORONARY ARTERY OF NATIVE HEART WITHOUT ANGINA PECTORIS: Primary | ICD-10-CM

## 2018-09-25 DIAGNOSIS — Z23 NEED FOR PROPHYLACTIC VACCINATION AGAINST STREPTOCOCCUS PNEUMONIAE (PNEUMOCOCCUS): ICD-10-CM

## 2018-09-25 DIAGNOSIS — I10 ESSENTIAL HYPERTENSION: ICD-10-CM

## 2018-09-25 DIAGNOSIS — F33.0 MILD EPISODE OF RECURRENT MAJOR DEPRESSIVE DISORDER (HCC): ICD-10-CM

## 2018-09-25 PROCEDURE — 3017F COLORECTAL CA SCREEN DOC REV: CPT | Performed by: FAMILY MEDICINE

## 2018-09-25 PROCEDURE — 90732 PPSV23 VACC 2 YRS+ SUBQ/IM: CPT | Performed by: FAMILY MEDICINE

## 2018-09-25 PROCEDURE — G0009 ADMIN PNEUMOCOCCAL VACCINE: HCPCS | Performed by: FAMILY MEDICINE

## 2018-09-25 PROCEDURE — G8420 CALC BMI NORM PARAMETERS: HCPCS | Performed by: FAMILY MEDICINE

## 2018-09-25 PROCEDURE — G8598 ASA/ANTIPLAT THER USED: HCPCS | Performed by: FAMILY MEDICINE

## 2018-09-25 PROCEDURE — 1123F ACP DISCUSS/DSCN MKR DOCD: CPT | Performed by: FAMILY MEDICINE

## 2018-09-25 PROCEDURE — 4040F PNEUMOC VAC/ADMIN/RCVD: CPT | Performed by: FAMILY MEDICINE

## 2018-09-25 PROCEDURE — 4004F PT TOBACCO SCREEN RCVD TLK: CPT | Performed by: FAMILY MEDICINE

## 2018-09-25 PROCEDURE — 1101F PT FALLS ASSESS-DOCD LE1/YR: CPT | Performed by: FAMILY MEDICINE

## 2018-09-25 PROCEDURE — G8427 DOCREV CUR MEDS BY ELIG CLIN: HCPCS | Performed by: FAMILY MEDICINE

## 2018-09-25 PROCEDURE — 99213 OFFICE O/P EST LOW 20 MIN: CPT | Performed by: FAMILY MEDICINE

## 2018-09-25 RX ORDER — ERGOCALCIFEROL 1.25 MG/1
50000 CAPSULE ORAL WEEKLY
Qty: 4 CAPSULE | Refills: 2 | Status: SHIPPED | OUTPATIENT
Start: 2018-09-25 | End: 2018-12-13 | Stop reason: SDUPTHER

## 2018-09-25 RX ORDER — ESCITALOPRAM OXALATE 20 MG/1
20 TABLET ORAL DAILY
Qty: 30 TABLET | Refills: 11 | Status: SHIPPED | OUTPATIENT
Start: 2018-09-25 | End: 2019-08-06 | Stop reason: SDUPTHER

## 2018-09-25 ASSESSMENT — ENCOUNTER SYMPTOMS
SHORTNESS OF BREATH: 0
COUGH: 0

## 2018-09-25 NOTE — PROGRESS NOTES
After obtaining consent, and per orders of Dr. Popeye Newton, injection of Pneumovax-23 given in Left deltoid by Kj Huitron. Patient instructed to remain in clinic for 20 minutes afterwards, and to report any adverse reaction to me immediately.

## 2018-09-25 NOTE — PROGRESS NOTES
Barry Ray is a 77 y.o. male    Chief Complaint   Patient presents with    Follow-up     6 months       HPI  Barry Ray presents to the office for follow up of depression. Patient complains of depressed mood and decreased energy. Patient is also having trouble sleeping. This has been improving since last visit but initially started since his heart attack about 1 year ago. Patient is not volunteering or being very active at home. Review of Systems   Constitutional: Negative for chills and fever. Respiratory: Negative for cough and shortness of breath. Cardiovascular: Negative for chest pain and leg swelling. Prior to Admission medications    Medication Sig Start Date End Date Taking? Authorizing Provider   vitamin D (ERGOCALCIFEROL) 96818 units CAPS capsule Take 1 capsule by mouth once a week Take after a meal 9/25/18 3/26/19 Yes Claudia Summers MD   escitalopram (LEXAPRO) 20 MG tablet Take 1 tablet by mouth daily 9/25/18  Yes Claudia Summers MD   lisinopril (PRINIVIL;ZESTRIL) 5 MG tablet TAKE 1 TABLET BY MOUTH ONCE DAILY 5/30/18  Yes KEVYN Gar   simvastatin (ZOCOR) 5 MG tablet Take 5 mg by mouth daily   Yes Historical Provider, MD   verapamil (CALAN SR) 180 MG extended release tablet TAKE 1 TABLET BY MOUTH ONCE DAILY 4/3/18  Yes KEVYN Partida   nitroGLYCERIN (NITROSTAT) 0.4 MG SL tablet up to max of 3 total doses. If no relief after 1 dose, call 911. 7/7/17  Yes NATALIA Whitaker MD   aspirin EC 81 MG EC tablet Take 1 tablet by mouth daily 7/7/17  Yes NATALIA Whitaker MD       Past Medical History:   Diagnosis Date    Arthritis     knees    CAD (coronary artery disease)     COPD (chronic obstructive [facility] disease) ([facility]     slight    Heart attack ([facility] 07/06/2017    History of blood transfusion     History of colon polyps     tubular adenoma, hyperplastic, distal rectum, duodenum    History of transfusion     Hyperlipidemia    

## 2018-09-27 ENCOUNTER — OFFICE VISIT (OUTPATIENT)
Dept: CARDIOLOGY | Age: 68
End: 2018-09-27
Payer: MEDICARE

## 2018-09-27 VITALS
DIASTOLIC BLOOD PRESSURE: 68 MMHG | SYSTOLIC BLOOD PRESSURE: 118 MMHG | WEIGHT: 170 LBS | HEART RATE: 60 BPM | BODY MASS INDEX: 23.03 KG/M2 | HEIGHT: 72 IN

## 2018-09-27 DIAGNOSIS — R00.2 PALPITATIONS: ICD-10-CM

## 2018-09-27 DIAGNOSIS — Z95.5 HISTORY OF CORONARY ARTERY STENT PLACEMENT: ICD-10-CM

## 2018-09-27 DIAGNOSIS — F17.200 SMOKER: ICD-10-CM

## 2018-09-27 DIAGNOSIS — I25.10 CORONARY ARTERY DISEASE INVOLVING NATIVE CORONARY ARTERY OF NATIVE HEART WITHOUT ANGINA PECTORIS: Primary | ICD-10-CM

## 2018-09-27 DIAGNOSIS — E78.2 MIXED HYPERLIPIDEMIA: ICD-10-CM

## 2018-09-27 DIAGNOSIS — I10 ESSENTIAL HYPERTENSION: ICD-10-CM

## 2018-09-27 PROCEDURE — 3017F COLORECTAL CA SCREEN DOC REV: CPT | Performed by: NURSE PRACTITIONER

## 2018-09-27 PROCEDURE — G8427 DOCREV CUR MEDS BY ELIG CLIN: HCPCS | Performed by: NURSE PRACTITIONER

## 2018-09-27 PROCEDURE — G8420 CALC BMI NORM PARAMETERS: HCPCS | Performed by: NURSE PRACTITIONER

## 2018-09-27 PROCEDURE — 1101F PT FALLS ASSESS-DOCD LE1/YR: CPT | Performed by: NURSE PRACTITIONER

## 2018-09-27 PROCEDURE — 99214 OFFICE O/P EST MOD 30 MIN: CPT | Performed by: NURSE PRACTITIONER

## 2018-09-27 RX ORDER — SIMVASTATIN 10 MG
10 TABLET ORAL NIGHTLY
Qty: 30 TABLET | Refills: 5 | Status: SHIPPED | OUTPATIENT
Start: 2018-09-27 | End: 2019-03-18 | Stop reason: SDUPTHER

## 2018-09-27 NOTE — PROGRESS NOTES
Mixed hyperlipidemia E78.2    History of coronary artery stent placement Z95.5     Past Medical History:   Diagnosis Date    Arthritis     knees    CAD (coronary artery disease)     COPD (chronic obstructive pulmonary disease) ([facility])     slight    [facility] 07/06/2017    History of blood transfusion     History of colon polyps     tubular adenoma, hyperplastic, distal rectum, duodenum    History of transfusion     Hyperlipidemia     Hypertension     Joint pain     Palpitations     Pinched nerve     right shoulder    Pinched nerve in neck     Tobacco abuse     Ulcer      Past Surgical History:   Procedure Laterality Date    COLONOSCOPY  12-10-08    Dr Dee Hdez COLONOSCOPY  8-27-01    Dr Dee Hdez COLONOSCOPY  07/2013    Dr. Vani Nunez: multiple polyps, adenomatous and hyperplastic (3 yr)    CORONARY ANGIOPLASTY WITH STENT PLACEMENT  07/06/2017    ENDOSCOPY, COLON, DIAGNOSTIC      HEMORRHOID SURGERY      JOINT REPLACEMENT Left 06/2012    knee    KNEE ARTHROSCOPY      bilateral-3 scopes on right and 1 on left    NECK SURGERY      PAROTIDECTOMY  03/31/2017    SKIN BIOPSY      lypomas on arms, side, and back    TOTAL KNEE ARTHROPLASTY      left    UPPER GASTROINTESTINAL ENDOSCOPY  8-27-01    Dr Joseph Bro N/A 3/31/2016    Dr Maria Isabel Nolasco (-), Chronic esophagitis     Family History   Problem Relation Age of Onset    Other Sister         pancrease removal    Cancer Father         Larynx and asbestos exposure    Esophageal Cancer Father     Heart Disease Mother         has pacemaker    Diabetes Mother     Other Mother         has had esoph stretched in the past    Colon Polyps Neg Hx     Colon Cancer Neg Hx     Liver Cancer Neg Hx     Liver Disease Neg Hx     Rectal Cancer Neg Hx     Stomach Cancer Neg Hx      Social History   Substance Use Topics    Smoking status: Current Every Day Smoker     Packs/day: 0.50     Years: 15.00     Types: gait.  Extremities - no clubbing, cyanosis or edema. Skin - no color change or rash. No pallor. No new surgical incision. Neurologic - no speech difficulty, facial asymmetry or lateralizing weakness. No seizures, presyncope or syncope. No significant dizziness. Hematologic - no easy bruising or excessive bleeding. Psychiatric - no severe anxiety or insomnia. No confusion. All other review of systems are negative. Objective  Vital Signs - /68   Pulse 60   Ht 6' (1.829 m)   Wt 170 lb (77.1 kg)   BMI 23.06 kg/m²   General - [facility] is alert, cooperative, and pleasant. Well groomed. No acute distress. Body habitus - Body mass index is 23.06 kg/m². HEENT - Head is normocephalic. No circumoral cyanosis. Dentition is normal.  EYES -   Lids normal without ptosis. No discharge, edema or subconjunctival hemorrhage. Neck - Symmetrical without apparent mass or lymphadenopathy. Respiratory - Normal respiratory effort without use of accessory muscles. Ausculatation reveals vesicular breath sounds without crackles, wheezes, rub or rhonchi. Cardiovascular - No jugular venous distention. Auscultation reveals regular rate and rhythm. No audible clicks, gallop or rub. No murmur. No lower extremity varicosities. No carotid bruits. Abdominal -  No visible distention, mass or pulsations. Extremities - No clubbing or cyanosis. No statis dermatitis or ulcers. No edema. Musculoskeletal -   No Osler's nodes. No kyphosis or scoliosis. Gait is even and regular without limp or shuffle. Ambulates without assistance. Skin -  Warm and dry; no rash or pallor. No new surgical wound. Neurological - No focal neurological deficits. Thought processes coherent. No apparent tremor. Oriented to person, place and time. Psychiatric -  Appropriate affect and mood. Assessment:     Diagnosis Orders   1.  Coronary artery disease involving native coronary artery of native heart without angina pectoris     2. History of coronary artery stent placement     3. Mixed hyperlipidemia  Lipid Panel    AST    ALT   4. Essential hypertension     5. Palpitations     6. Smoker       Data reviewed:  6/21/2018  9:47 AM - Lorraine Bosch Incoming Lab Results From [facility]     Component Results     Component Value Ref Range & Units Status Collected Lab   Cholesterol, Total 190  160 - 199 mg/dL Final 06/21/2018  7:43 AM [facility]   Triglycerides 79  0 - 149 mg/dL Final 06/21/2018  7:43 AM [facility]   HDL 56  55 - 121 mg/dL Final 06/21/2018  7:43 AM [facility]   LDL Calculated 118  <100 mg/dL Final 06/21/2018  7:43 AM [facility]     Stable CV status without symptoms of overt heart failure, arrhythmia or angina. BP and palpitations well controlled on current regimen. Increase Zocor to 10 mg daily. Recheck labs in 3 months. Health benefits of smoking cessation reviewed. No plan for cessation. Patient is compliant with medication regimen. BP Readings from Last 3 Encounters:   09/27/18 118/68   09/25/18 118/78   07/30/18 115/80    Pulse Readings from Last 3 Encounters:   09/27/18 60   09/25/18 68   07/30/18 71        Wt Readings from Last 3 Encounters:   09/27/18 170 lb (77.1 kg)   09/25/18 170 lb (77.1 kg)   07/30/18 169 lb (76.7 kg)     Plan  Previous cardiac history and records reviewed. Increase Zocor to 10 mg daily. Recheck labs in 3 months. Orders provided. Continue current medications as prescribed. Check lipid panel, AST, ALT 3 months. Continue to follow up with primary care provider for non cardiac medical problems. Call the office with any problems, questions or concerns at [phone]. Follow up as scheduled with your cardiologist - 6 months Dr. Alejandro Weathers. The following educational material has been included in this after visit summary for your review: heart health.     Additional instructions:   You have been given orders for a cholesterol

## 2018-09-27 NOTE — PATIENT INSTRUCTIONS
Increase Zocor to 10 mg daily. Recheck labs in 3 months. Orders provided. Continue current medications as prescribed. Continue to follow up with primary care provider for non cardiac medical problems. Call the office with any problems, questions or concerns at [phone]. Follow up as scheduled with your cardiologist - 6 months Dr. Bhargavi Mcdaniel. The following educational material has been included in this after visit summary for your review: heart health.     Additional instructions: You have been given orders for a cholesterol check to be done in 3 months. You will need to fast after midnight. You may have black coffee, diet soda or water before the test.  You may take your medications before the test.  Coronary artery disease risk factors you can control: Smoking, high blood pressure, high cholesterol, diabetes, being overweight, lack of exercise and stress. Continue heart healthy diet. Take medications as directed. Exercise as tolerated. Strive for 15 minutes of exercise most days of the week. If asked to keep a blood pressure log, do so for 2 weeks. Call the office to report readings at [phone]. Blood pressure goal is 140/90 or less. If you are a diabetic, the goal is 130/80 or less. If you are taking cholesterol lowering medications, it is recommended that lab work be checked annually. Always keep a current medication list. Bring your medications to every office visit.      How to take:  NITROGLYCERIN (Nitrostat) 0.4 mg tablets, sublingual.  Nitroglycerin is in a group of drugs called nitrates. Nitroglycerin dilates (widens) blood vessels, making it easier for blood to flow through them and easier for the heart to pump. Dosing Guidelines for Nitroglycerin Tablets  · At the start of an angina (chest pain) attack, place one tablet under the tongue or between the cheek and gum. Do not swallow or chew the tablet; let it dissolve on its own.   If necessary, a second and third tablet may be used, with five minutes between using each tablet. If you use a third tablet and your chest pain continues, it is time to seek immediate medical attention. Call 911 immediately and have someone drive you to the emergency room. You may be having a heart attack or other serious heart problem. · To prevent angina from exercise or stress, use 1 tablet 5 to 10 minutes before the activity. Patient Education      Learning About Benefits From Quitting Smoking  How does quitting smoking make you healthier? If you're thinking about quitting smoking, you may have a few reasons to be smoke-free. Your health may be one of them. · When you quit smoking, you lower your risks for cancer, lung disease, heart attack, stroke, blood vessel disease, and blindness from macular degeneration. · When you're smoke-free, you get sick less often, and you heal faster. You are less likely to get colds, flu, bronchitis, and pneumonia. · As a nonsmoker, you may find that your mood is better and you are less stressed. When and how will you feel healthier? Quitting has real health benefits that start from day 1 of being smoke-free. And the longer you stay smoke-free, the healthier you get and the better you feel. The first hours  · After just 20 minutes, your blood pressure and heart rate go down. That means there's less stress on your heart and blood vessels. · Within 12 hours, the level of carbon monoxide in your blood drops back to normal. That makes room for more oxygen. With more oxygen in your body, you may notice that you have more energy than when you smoked. After 2 weeks  · Your lungs start to work better. · Your risk of heart attack starts to drop. After 1 month  · When your lungs are clear, you cough less and breathe deeper, so it's easier to be active. · Your sense of taste and smell return. That means you can enjoy food more than you have since you started smoking.   Over the years  · After 1 year, your risk of research-based information, and a community of others trying to become tobacco free. Our expert coaches can talk to you about overcoming common barriers, such as dealing with stress, fighting cravings, coping with irritability, and controlling weight gain. We also offer a free telephone service, so you can speak to a  in person, if you would prefer. Call the [facility] or [phone]. What if I don't qualify for the Program?  You must be 13years of age or older to participate in the program. It is also helpful to discuss quitting with your doctor. How do I enroll in the Quit Now [place] online program?  Complete the brief Enroll Now form. If you are a [place] resident over 13years of age, you will receive an email letting you know that you are enrolled. You can use the online service as often as you want! How do I enroll in both the online and telephone program?  Complete the brief Enroll Now form. If you qualify, you will receive an email letting you know that you are enrolled. You can use the online service as often as you want! While completing the Enroll Now form you indicate the best time for a  to give you a call. If you would like, you can call the QuitLine at [phone]. We're here 7 days a week. Monday - Sunday 7 a.m. - 12 a.m. CST  Monday - Sunday 8 a.m. - 1 a.m. EST  If you call when we are closed, please leave a message and we will call you back. Patient Education        A Healthy Heart: Care Instructions  Your Care Instructions    Heart disease occurs when a substance called plaque builds up in the vessels that supply oxygen-rich blood to your heart. This can narrow the blood vessels and reduce blood flow. A heart attack happens when blood flow is completely blocked. A high-fat diet, smoking, and other factors increase the risk of heart disease. Your doctor has found that you have a chance of having heart disease.  You can do lots of things to keep Now\" link. Current as of: December 6, 2017  Content Version: 11.7  © 2904-3949 [employer]. Care instructions adapted under license by [facility] ([facility]). If you have questions about a medical condition or this instruction, always ask your healthcare professional. [address] any warranty or liability for your use of this information.

## 2018-10-25 ENCOUNTER — OFFICE VISIT (OUTPATIENT)
Dept: PRIMARY CARE CLINIC | Age: 68
End: 2018-10-25
Payer: MEDICARE

## 2018-10-25 VITALS
OXYGEN SATURATION: 98 % | DIASTOLIC BLOOD PRESSURE: 80 MMHG | HEIGHT: 72 IN | BODY MASS INDEX: 22.97 KG/M2 | TEMPERATURE: 97.3 F | WEIGHT: 169.6 LBS | HEART RATE: 68 BPM | SYSTOLIC BLOOD PRESSURE: 122 MMHG

## 2018-10-25 DIAGNOSIS — Z23 NEEDS FLU SHOT: ICD-10-CM

## 2018-10-25 DIAGNOSIS — F41.9 ANXIETY AND DEPRESSION: ICD-10-CM

## 2018-10-25 DIAGNOSIS — F32.A ANXIETY AND DEPRESSION: ICD-10-CM

## 2018-10-25 DIAGNOSIS — I25.10 CORONARY ARTERY DISEASE INVOLVING NATIVE CORONARY ARTERY OF NATIVE HEART WITHOUT ANGINA PECTORIS: Primary | ICD-10-CM

## 2018-10-25 PROCEDURE — 90662 IIV NO PRSV INCREASED AG IM: CPT | Performed by: FAMILY MEDICINE

## 2018-10-25 PROCEDURE — 3017F COLORECTAL CA SCREEN DOC REV: CPT | Performed by: FAMILY MEDICINE

## 2018-10-25 PROCEDURE — 1101F PT FALLS ASSESS-DOCD LE1/YR: CPT | Performed by: FAMILY MEDICINE

## 2018-10-25 PROCEDURE — 4040F PNEUMOC VAC/ADMIN/RCVD: CPT | Performed by: FAMILY MEDICINE

## 2018-10-25 PROCEDURE — G8482 FLU IMMUNIZE ORDER/ADMIN: HCPCS | Performed by: FAMILY MEDICINE

## 2018-10-25 PROCEDURE — G8427 DOCREV CUR MEDS BY ELIG CLIN: HCPCS | Performed by: FAMILY MEDICINE

## 2018-10-25 PROCEDURE — G0008 ADMIN INFLUENZA VIRUS VAC: HCPCS | Performed by: FAMILY MEDICINE

## 2018-10-25 PROCEDURE — 1123F ACP DISCUSS/DSCN MKR DOCD: CPT | Performed by: FAMILY MEDICINE

## 2018-10-25 PROCEDURE — 4004F PT TOBACCO SCREEN RCVD TLK: CPT | Performed by: FAMILY MEDICINE

## 2018-10-25 PROCEDURE — G8420 CALC BMI NORM PARAMETERS: HCPCS | Performed by: FAMILY MEDICINE

## 2018-10-25 PROCEDURE — G8598 ASA/ANTIPLAT THER USED: HCPCS | Performed by: FAMILY MEDICINE

## 2018-10-25 PROCEDURE — 99213 OFFICE O/P EST LOW 20 MIN: CPT | Performed by: FAMILY MEDICINE

## 2018-10-25 ASSESSMENT — ENCOUNTER SYMPTOMS
COLOR CHANGE: 0
SHORTNESS OF BREATH: 0
COUGH: 0

## 2018-10-25 NOTE — PROGRESS NOTES
Past Surgical History:   Procedure Laterality Date    COLONOSCOPY  12-10-08    Dr Clint Hudson COLONOSCOPY  8-27-01    Dr Zaira Viera  07/2013    Dr. Anneliese Domingo: multiple polyps, adenomatous and hyperplastic (3 yr)    CORONARY ANGIOPLASTY WITH STENT PLACEMENT  07/06/2017    ENDOSCOPY, COLON, DIAGNOSTIC      HEMORRHOID SURGERY      JOINT REPLACEMENT Left 06/2012    knee    KNEE ARTHROSCOPY      bilateral-3 scopes on right and 1 on left    NECK SURGERY      PAROTIDECTOMY  03/31/2017    SKIN BIOPSY      lypomas on arms, side, and back    TOTAL KNEE ARTHROPLASTY      left    UPPER GASTROINTESTINAL ENDOSCOPY  8-27-01    Dr Marie Black N/A 3/31/2016    Dr Rei Schwab (-), Chronic esophagitis       Social History     Social History    Marital status:      Spouse name: N/A    Number of children: N/A    Years of education: N/A     Social History Main Topics    Smoking status: Current Every Day Smoker     Packs/day: 0.50     Years: 15.00     Types: Cigarettes    Smokeless tobacco: Never Used    Alcohol use 0.0 oz/week      Comment: occ    Drug use: No    Sexual activity: Not Asked     Other Topics Concern    None     Social History Narrative    None       Physical Exam   Constitutional: He is oriented to person, place, and time. He appears well-developed and well-nourished. No distress. HENT:   Head: Normocephalic and atraumatic.   mild facial droop on right    Eyes: Conjunctivae are normal. No scleral icterus. Neck: Normal range of motion. Neck supple. Cardiovascular: Normal rate, regular rhythm, normal heart sounds and intact distal pulses. No murmur heard. Pulmonary/Chest: Effort normal. No respiratory distress. He has no wheezes. Musculoskeletal: Normal range of motion. Neurological: He is alert and oriented to person, place, and time. He has normal reflexes. Coordination normal.   Skin: Skin is warm and dry. No rash noted.    Psychiatric: of depression. You may need to take the medicine for at least 6 months, and often longer. Keep taking your medicine even if you feel better. If you stop taking it too soon, your symptoms may come back or get worse. You may start to feel better within 1 to 3 weeks of taking antidepressant medicine. But it can take as many as 6 to 8 weeks to see more improvement. Talk to your doctor if you have problems with your medicine or if you do not notice any improvement after 3 weeks. Antidepressants can make you feel tired, dizzy, or nervous. Some people have dry mouth, constipation, headaches, sexual problems, an upset stomach, or diarrhea. Many of these side effects are mild and go away on their own after you take the medicine for a few weeks. Some may last longer. Talk to your doctor if side effects bother you too much. You might be able to try a different medicine. If you are pregnant or breastfeeding, talk to your doctor about what medicines you can take. Learn about counseling  In many cases, counseling can work as well as medicines to treat mild to moderate depression. Counseling is done by licensed mental health providers, such as psychologists, social workers, and some types of nurses. It can be done in one-on-one sessions or in a group setting. Many people find group sessions helpful. Cognitive-behavioral therapy is a type of counseling. In this treatment therapy, you learn how to see and change unhelpful thinking styles that may be adding to your depression. Counseling and medicines often work well when used together. To manage depression  · Be physically active. Getting 30 minutes of exercise each day is good for your body and your mind. Begin slowly if it is hard for you to get started. If you already exercise, keep it up. · Plan something pleasant for yourself every day. Include activities that you have enjoyed in the past.  · Get enough sleep. Talk to your doctor if you have problems sleeping.   · Eat a balanced diet. If you do not feel hungry, eat small snacks rather than large meals. · Do not drink alcohol, use illegal drugs, or take medicines that your doctor has not prescribed for you. They may interfere with your treatment. · Spend time with family and friends. It may help to speak openly about your depression with people you trust.  · Take your medicines exactly as prescribed. Call your doctor if you think you are having a problem with your medicine. · Do not make major life decisions while you are depressed. Depression may change the way you think. You will be able to make better decisions after you feel better. · Think positively. Challenge negative thoughts with statements such as \"I am hopeful\"; \"Things will get better\"; and \"I can ask for the help I need. \" Write down these statements and read them often, even if you don't believe them yet. · Be patient with yourself. It took time for your depression to develop, and it will take time for your symptoms to improve. Do not take on too much or be too hard on yourself. · Learn all you can about depression from written and online materials. · Check out behavioral health classes to learn more about dealing with depression. · Keep the numbers for these national suicide hotlines: 8-045-786-TALK (4-628.947.6369) and 0-292-WWOZGRN (9-925.942.5711). If you or someone you know talks about suicide or feeling hopeless, get help right away. When should you call for help? Call 911 anytime you think you may need emergency care. For example, call if:    · You feel you cannot stop from hurting yourself or someone else.   [facility] your doctor now or seek immediate medical care if:    · You hear voices.     · You feel much more depressed.    Watch closely for changes in your health, and be sure to contact your doctor if:    · You are having problems with your depression medicine.     · You are not getting better as expected. Where can you learn more?   Go to [email or web address] and sign in to your [employer] account. Enter K626 in the [employer] box to learn more about \"Depression Treatment: Care Instructions. \"     If you do not have an account, please click on the \"Sign Up Now\" link. Current as of: December 7, 2017  Content Version: 11.7  © 7065-4571 [employer]. Care instructions adapted under license by [facility] ([facility]). If you have questions about a medical condition or this instruction, always ask your healthcare professional. [address] any warranty or liability for your use of this information.

## 2018-11-26 RX ORDER — LISINOPRIL 5 MG/1
TABLET ORAL
Qty: 30 TABLET | Refills: 5 | Status: SHIPPED | OUTPATIENT
Start: 2018-11-26 | End: 2019-05-21 | Stop reason: SDUPTHER

## 2018-12-13 RX ORDER — ERGOCALCIFEROL 1.25 MG/1
50000 CAPSULE ORAL WEEKLY
Qty: 4 CAPSULE | Refills: 2 | Status: SHIPPED | OUTPATIENT
Start: 2018-12-13 | End: 2019-03-12 | Stop reason: SDUPTHER

## 2019-02-04 ENCOUNTER — OFFICE VISIT (OUTPATIENT)
Dept: PRIMARY CARE CLINIC | Age: 69
End: 2019-02-04
Payer: MEDICARE

## 2019-02-04 VITALS
OXYGEN SATURATION: 96 % | BODY MASS INDEX: 23.32 KG/M2 | TEMPERATURE: 97.6 F | HEART RATE: 68 BPM | SYSTOLIC BLOOD PRESSURE: 122 MMHG | WEIGHT: 172.2 LBS | DIASTOLIC BLOOD PRESSURE: 78 MMHG | HEIGHT: 72 IN

## 2019-02-04 DIAGNOSIS — F32.A ANXIETY AND DEPRESSION: ICD-10-CM

## 2019-02-04 DIAGNOSIS — I25.10 CORONARY ARTERY DISEASE INVOLVING NATIVE CORONARY ARTERY OF NATIVE HEART WITHOUT ANGINA PECTORIS: Primary | ICD-10-CM

## 2019-02-04 DIAGNOSIS — F41.9 ANXIETY AND DEPRESSION: ICD-10-CM

## 2019-02-04 PROCEDURE — G8599 NO ASA/ANTIPLAT THER USE RNG: HCPCS | Performed by: FAMILY MEDICINE

## 2019-02-04 PROCEDURE — 99213 OFFICE O/P EST LOW 20 MIN: CPT | Performed by: FAMILY MEDICINE

## 2019-02-04 PROCEDURE — 3017F COLORECTAL CA SCREEN DOC REV: CPT | Performed by: FAMILY MEDICINE

## 2019-02-04 PROCEDURE — G8427 DOCREV CUR MEDS BY ELIG CLIN: HCPCS | Performed by: FAMILY MEDICINE

## 2019-02-04 PROCEDURE — 4004F PT TOBACCO SCREEN RCVD TLK: CPT | Performed by: FAMILY MEDICINE

## 2019-02-04 PROCEDURE — 4040F PNEUMOC VAC/ADMIN/RCVD: CPT | Performed by: FAMILY MEDICINE

## 2019-02-04 PROCEDURE — 1101F PT FALLS ASSESS-DOCD LE1/YR: CPT | Performed by: FAMILY MEDICINE

## 2019-02-04 PROCEDURE — 1123F ACP DISCUSS/DSCN MKR DOCD: CPT | Performed by: FAMILY MEDICINE

## 2019-02-04 PROCEDURE — G8420 CALC BMI NORM PARAMETERS: HCPCS | Performed by: FAMILY MEDICINE

## 2019-02-04 PROCEDURE — G8482 FLU IMMUNIZE ORDER/ADMIN: HCPCS | Performed by: FAMILY MEDICINE

## 2019-02-04 ASSESSMENT — ENCOUNTER SYMPTOMS
COUGH: 0
SHORTNESS OF BREATH: 0

## 2019-02-05 ENCOUNTER — OFFICE VISIT (OUTPATIENT)
Dept: GASTROENTEROLOGY | Age: 69
End: 2019-02-05
Payer: MEDICARE

## 2019-02-05 VITALS
WEIGHT: 173.8 LBS | DIASTOLIC BLOOD PRESSURE: 88 MMHG | BODY MASS INDEX: 23.54 KG/M2 | OXYGEN SATURATION: 98 % | HEART RATE: 68 BPM | SYSTOLIC BLOOD PRESSURE: 144 MMHG | HEIGHT: 72 IN

## 2019-02-05 DIAGNOSIS — Z86.010 HISTORY OF ADENOMATOUS POLYP OF COLON: ICD-10-CM

## 2019-02-05 DIAGNOSIS — R10.13 EPIGASTRIC BURNING SENSATION: Primary | ICD-10-CM

## 2019-02-05 DIAGNOSIS — Z87.11 HISTORY OF GASTRIC ULCER: ICD-10-CM

## 2019-02-05 PROCEDURE — G8482 FLU IMMUNIZE ORDER/ADMIN: HCPCS | Performed by: NURSE PRACTITIONER

## 2019-02-05 PROCEDURE — 1101F PT FALLS ASSESS-DOCD LE1/YR: CPT | Performed by: NURSE PRACTITIONER

## 2019-02-05 PROCEDURE — G8599 NO ASA/ANTIPLAT THER USE RNG: HCPCS | Performed by: NURSE PRACTITIONER

## 2019-02-05 PROCEDURE — 99214 OFFICE O/P EST MOD 30 MIN: CPT | Performed by: NURSE PRACTITIONER

## 2019-02-05 PROCEDURE — 3017F COLORECTAL CA SCREEN DOC REV: CPT | Performed by: NURSE PRACTITIONER

## 2019-02-05 PROCEDURE — 4040F PNEUMOC VAC/ADMIN/RCVD: CPT | Performed by: NURSE PRACTITIONER

## 2019-02-05 PROCEDURE — 1123F ACP DISCUSS/DSCN MKR DOCD: CPT | Performed by: NURSE PRACTITIONER

## 2019-02-05 PROCEDURE — G8427 DOCREV CUR MEDS BY ELIG CLIN: HCPCS | Performed by: NURSE PRACTITIONER

## 2019-02-05 PROCEDURE — G8420 CALC BMI NORM PARAMETERS: HCPCS | Performed by: NURSE PRACTITIONER

## 2019-02-05 PROCEDURE — 4004F PT TOBACCO SCREEN RCVD TLK: CPT | Performed by: NURSE PRACTITIONER

## 2019-02-05 RX ORDER — PANTOPRAZOLE SODIUM 40 MG/1
40 TABLET, DELAYED RELEASE ORAL
Qty: 30 TABLET | Refills: 2 | Status: SHIPPED | OUTPATIENT
Start: 2019-02-05 | End: 2019-07-18 | Stop reason: SDUPTHER

## 2019-02-05 ASSESSMENT — ENCOUNTER SYMPTOMS
CHEST TIGHTNESS: 0
NAUSEA: 0
BLOOD IN STOOL: 0
DIARRHEA: 0
CONSTIPATION: 0
ABDOMINAL DISTENTION: 0
SHORTNESS OF BREATH: 0
RECTAL PAIN: 0
COUGH: 0
SORE THROAT: 0
ABDOMINAL PAIN: 1
VOMITING: 0
VOICE CHANGE: 0
BACK PAIN: 0

## 2019-03-18 RX ORDER — SIMVASTATIN 10 MG
TABLET ORAL
Qty: 30 TABLET | Refills: 5 | Status: SHIPPED | OUTPATIENT
Start: 2019-03-18 | End: 2019-08-06 | Stop reason: SDUPTHER

## 2019-04-18 ENCOUNTER — OFFICE VISIT (OUTPATIENT)
Dept: CARDIOLOGY | Age: 69
End: 2019-04-18
Payer: MEDICARE

## 2019-04-18 VITALS
BODY MASS INDEX: 23.59 KG/M2 | WEIGHT: 178 LBS | HEART RATE: 52 BPM | DIASTOLIC BLOOD PRESSURE: 74 MMHG | SYSTOLIC BLOOD PRESSURE: 122 MMHG | HEIGHT: 73 IN

## 2019-04-18 DIAGNOSIS — I47.1 SVT (SUPRAVENTRICULAR TACHYCARDIA) (HCC): Primary | ICD-10-CM

## 2019-04-18 DIAGNOSIS — I25.10 CORONARY ARTERY DISEASE INVOLVING NATIVE CORONARY ARTERY OF NATIVE HEART WITHOUT ANGINA PECTORIS: ICD-10-CM

## 2019-04-18 DIAGNOSIS — E78.2 MIXED HYPERLIPIDEMIA: ICD-10-CM

## 2019-04-18 DIAGNOSIS — F17.200 SMOKER: ICD-10-CM

## 2019-04-18 DIAGNOSIS — I10 ESSENTIAL HYPERTENSION: ICD-10-CM

## 2019-04-18 PROCEDURE — 4040F PNEUMOC VAC/ADMIN/RCVD: CPT | Performed by: CLINICAL NURSE SPECIALIST

## 2019-04-18 PROCEDURE — 3017F COLORECTAL CA SCREEN DOC REV: CPT | Performed by: CLINICAL NURSE SPECIALIST

## 2019-04-18 PROCEDURE — G8599 NO ASA/ANTIPLAT THER USE RNG: HCPCS | Performed by: CLINICAL NURSE SPECIALIST

## 2019-04-18 PROCEDURE — 1123F ACP DISCUSS/DSCN MKR DOCD: CPT | Performed by: CLINICAL NURSE SPECIALIST

## 2019-04-18 PROCEDURE — 4004F PT TOBACCO SCREEN RCVD TLK: CPT | Performed by: CLINICAL NURSE SPECIALIST

## 2019-04-18 PROCEDURE — G8427 DOCREV CUR MEDS BY ELIG CLIN: HCPCS | Performed by: CLINICAL NURSE SPECIALIST

## 2019-04-18 PROCEDURE — G8420 CALC BMI NORM PARAMETERS: HCPCS | Performed by: CLINICAL NURSE SPECIALIST

## 2019-04-18 PROCEDURE — 99214 OFFICE O/P EST MOD 30 MIN: CPT | Performed by: CLINICAL NURSE SPECIALIST

## 2019-04-18 PROCEDURE — 93000 ELECTROCARDIOGRAM COMPLETE: CPT | Performed by: CLINICAL NURSE SPECIALIST

## 2019-04-18 NOTE — PATIENT INSTRUCTIONS
a different pocket to break the automatic reach. If you light up many times during the day without even thinking about it, try to look in a mirror each time you put a match to your cigarette. You may decide you don't need it. Make Smoking Inconvenient   Stop buying cigarettes by the carton. Wait until one pack is empty before you buy another. Stop carrying cigarettes with you at home or at work. Make them difficult to get to. Make Smoking Unpleasant   Smoke only under circumstances that aren't especially pleasurable for you. If you like to smoke with others, smoke alone. Turn your chair to an empty corner and focus only on the cigarette you are smoking and all its many negative effects. Collect all your cigarette butts in one large glass container as a visual reminder of the filth made by smoking. Just Before Quitting   Practice going without cigarettes. Don't think of never smoking again. Think of quitting in terms of one day at a time . Tell yourself you won't smoke today, and then don't. Clean your clothes to rid them of the cigarette smell, which can linger a long time. On the Day You Quit   Throw away all your cigarettes and matches. Hide your lighters and ashtrays. Visit the dentist and have your teeth cleaned to get rid of tobacco stains. Notice how nice they look and resolve to keep them that way. Make a list of things you'd like to buy for yourself or someone else. Estimate the cost in terms of packs of cigarettes, and put the money aside to buy these presents. Keep very busy on the big day. Go to the movies, exercise, take long walks, or go bike riding. Remind your family and friends that this is your quit date, and ask them to help you over the rough spots of the first couple of days and weeks. Buy yourself a treat or do something special to celebrate. Telephone and Internet Support   Call the [address] [employer].   Telephone, web-, and computer-based programs can offer you the support that you need to quit and to stay smoke-free. You can find many programs online. Immediately After Quitting   Develop a clean, fresh, nonsmoking environment around yourself at work and at home. Buy yourself flowers you may be surprised how much you can enjoy their scent now. The first few days after you quit, spend as much free time as possible in places where smoking isn't allowed, such as [address] [place] [address], museums, theaters, department stores, and churches. Drink large quantities of water and fruit juice (but avoid sodas that contain caffeine). Try to avoid alcohol, coffee, and other beverages that you associate with cigarette smoking. Strike up conversation instead of a match for a cigarette. If you miss the sensation of having a cigarette in your hand, play with something else a pencil, a paper clip, a marble. If you miss having something in your mouth, try toothpicks or a fake cigarette.

## 2019-04-18 NOTE — PROGRESS NOTES
[address] [place], [address] [address]  Phone: [phone]  Fax: [phone]    OFFICE VISIT:  2019    Maurizio Baires - : 1950    Reason For Visit:  Donato Hall is a 68 y.o. male who is here for Coronary Artery Disease and Tachycardia (SVT)  Angioplasty with stent placement 2017  He returns today for routine follow-up. He denies any artery vascular complaints. Active and doing well. Continues to exercise regularly. He does continue to smoke about a half pack a day    Subjective  Donato Hall denies exertional chest pain, shortness of breath, orthopnea, paroxysmal nocturnal dyspnea, syncope, presyncope, arrhythmia, edema and fatigue. The patient denies numbness or weakness to suggest cerebrovascular accident or transient ischemic attack. Serena Arce MD is PCP.   Maurizio Baires has the following history as recorded in [facility]:    Patient Active Problem List    Diagnosis Date Noted    NSTEMI (non-ST elevated myocardial infarction) [facility])      Priority: High    Ventricular tachycardia ([address]      Priority: High    Mixed hyperlipidemia 2018    History of coronary artery stent placement 2018    CAD (coronary artery disease)     Smoker     SVT (supraventricular tachycardia) ([address] 2017    Essential hypertension 2016    Premature ventricular contractions 2016    Premature atrial complexes 2016    Chest pain 2016    Palpitations 2016    Referral of patient 2016    Atypical chest pain 2016    History of adenomatous polyp of colon 2013    COPD (chronic obstructive pulmonary disease) ([address] 2013    History of gastric ulcer 2013     Past Medical History:   Diagnosis Date    Arthritis     knees    CAD (coronary artery disease)     Colon polyp     COPD (chronic obstructive pulmonary disease) ([address]     slight    Heart attack ([address] 2017    History of blood transfusion     simvastatin (ZOCOR) 10 MG tablet TAKE 1 TABLET BY MOUTH ONCE DAILY AT NIGHT 30 tablet 5    vitamin D (ERGOCALCIFEROL) 51926 units CAPS capsule TAKE 1 CAPSULE BY MOUTH ONCE WEEKLY. TAKE AFTER A MEAL. 4 capsule 11    pantoprazole (PROTONIX) 40 MG tablet Take 1 tablet by mouth every morning (before breakfast) 30 tablet 2    lisinopril (PRINIVIL;ZESTRIL) 5 MG tablet TAKE 1 TABLET BY MOUTH ONCE DAILY 30 tablet 5    escitalopram (LEXAPRO) 20 MG tablet Take 1 tablet by mouth daily 30 tablet 11    nitroGLYCERIN (NITROSTAT) 0.4 MG SL tablet up to max of 3 total doses. If no relief after 1 dose, call 911. 25 tablet 3    aspirin EC 81 MG EC tablet Take 1 tablet by mouth daily 30 tablet 3     No current facility-administered medications for this visit. Allergies: Tape Elwyn Alpers tape]    Review of Systems  Constitutional - no significant activity change, appetite change, or unexpected weight change. No fever, chills or diaphoresis. No fatigue. HEENT - no significant rhinorrhea or epistaxis. No tinnitus or significant hearing loss. Eyes - no sudden vision change or amaurosis. Respiratory - no significant wheezing, stridor, apnea or cough. No dyspnea on exertion or shortness of breath. Cardiovascular - no exertional chest pain, orthopnea or PND. No sensation of arrhythmia or slow heart rate. No claudication or leg edema. Gastrointestinal - no abdominal swelling or pain. No blood in stool. No severe constipation, diarrhea, nausea, or vomiting. Genitourinary - no difficulty urinating, dysuria, frequency, or urgency. No flank pain or hematuria. Musculoskeletal - no back pain, gait disturbance, or myalgia. Skin - no color change or rash. No pallor. No new surgical incision. Neurologic - no speech difficulty, facial asymmetry or lateralizing weakness. No seizures, presyncope, syncope, or significant dizziness. Hematologic - no easy bruising or excessive bleeding.    Psychiatric - no severe anxiety or insomnia. No confusion. All other review of systems are negative. Objective  Vital Signs - /74   Pulse 52   Ht 6' 1\" (1.854 m)   Wt 178 lb (80.7 kg)   BMI 23.48 kg/m²    - Arnoldo Waters is alert, cooperative, and pleasant. Well groomed. No acute distress. Body habitus is normal .  HEENT - The head is normocephalic. No circumoral cyanosis. Dentition is normal.   EYES -  No Xanthelasma, no arcus senilis, no conjunctival hemorrhages or discharge. Neck - Supple, without increased jugular venous pressures. No carotid bruits. No mass. Respiratory - Lungs are clear bilaterally. No wheezes or rales. Normal effort without use of accessory muscles. Cardiovascular - Heart has regular rhythm and rate. No murmurs, rubs or gallops. + pedal pulses and no varicosities. Abdominal -  Soft, nontender, nondistended. Bowel sounds are intact. Extremities - No clubbing, cyanosis, or  edema. Musculoskeletal -  No clubbing . No Osler's nodes. Gait normal .  No kyphosis or scoliosis. Skin -  no statis ulcers or dermatitis. Neurological - No focal signs are identified. Oriented to person, place and time. Psychiatric -  Appropriate affect and mood. Assessment:     Diagnosis Orders   1. SVT (supraventricular tachycardia) (HCC)  EKG 12 lead   2. Coronary artery disease involving native coronary artery of native heart without angina pectoris  Comprehensive Metabolic Panel    Lipid Panel   3. Smoker     4. Essential hypertension     5.  Mixed hyperlipidemia  Comprehensive Metabolic Panel    Lipid Panel     Data:  BP Readings from Last 3 Encounters:   04/18/19 122/74   02/05/19 (!) 144/88   02/04/19 122/78    Pulse Readings from Last 3 Encounters:   04/18/19 52   02/05/19 68   02/04/19 68        Wt Readings from Last 3 Encounters:   04/18/19 178 lb (80.7 kg)   02/05/19 173 lb 12.8 oz (78.8 kg)   02/04/19 172 lb 3.2 oz (78.1 kg)       EKG today shows sinus bradycardia with a rate of

## 2019-05-21 RX ORDER — LISINOPRIL 5 MG/1
TABLET ORAL
Qty: 30 TABLET | Refills: 5 | Status: SHIPPED | OUTPATIENT
Start: 2019-05-21 | End: 2019-12-05 | Stop reason: SDUPTHER

## 2019-08-06 ENCOUNTER — OFFICE VISIT (OUTPATIENT)
Dept: FAMILY MEDICINE CLINIC | Age: 69
End: 2019-08-06
Payer: MEDICARE

## 2019-08-06 VITALS
SYSTOLIC BLOOD PRESSURE: 142 MMHG | HEART RATE: 83 BPM | BODY MASS INDEX: 23.98 KG/M2 | OXYGEN SATURATION: 98 % | TEMPERATURE: 97 F | WEIGHT: 177 LBS | RESPIRATION RATE: 20 BRPM | HEIGHT: 72 IN | DIASTOLIC BLOOD PRESSURE: 72 MMHG

## 2019-08-06 DIAGNOSIS — E78.2 MIXED HYPERLIPIDEMIA: ICD-10-CM

## 2019-08-06 DIAGNOSIS — I10 ESSENTIAL HYPERTENSION: Primary | ICD-10-CM

## 2019-08-06 DIAGNOSIS — N18.30 CKD (CHRONIC KIDNEY DISEASE) STAGE 3, GFR 30-59 ML/MIN (HCC): ICD-10-CM

## 2019-08-06 DIAGNOSIS — I25.10 CORONARY ARTERY DISEASE INVOLVING NATIVE CORONARY ARTERY OF NATIVE HEART WITHOUT ANGINA PECTORIS: ICD-10-CM

## 2019-08-06 DIAGNOSIS — F33.0 MILD EPISODE OF RECURRENT MAJOR DEPRESSIVE DISORDER (HCC): ICD-10-CM

## 2019-08-06 DIAGNOSIS — Z87.11 HISTORY OF GASTRIC ULCER: ICD-10-CM

## 2019-08-06 DIAGNOSIS — J44.9 CHRONIC OBSTRUCTIVE PULMONARY DISEASE, UNSPECIFIED COPD TYPE (HCC): ICD-10-CM

## 2019-08-06 PROCEDURE — G8599 NO ASA/ANTIPLAT THER USE RNG: HCPCS | Performed by: FAMILY MEDICINE

## 2019-08-06 PROCEDURE — 1123F ACP DISCUSS/DSCN MKR DOCD: CPT | Performed by: FAMILY MEDICINE

## 2019-08-06 PROCEDURE — 3017F COLORECTAL CA SCREEN DOC REV: CPT | Performed by: FAMILY MEDICINE

## 2019-08-06 PROCEDURE — 3023F SPIROM DOC REV: CPT | Performed by: FAMILY MEDICINE

## 2019-08-06 PROCEDURE — 4004F PT TOBACCO SCREEN RCVD TLK: CPT | Performed by: FAMILY MEDICINE

## 2019-08-06 PROCEDURE — G8427 DOCREV CUR MEDS BY ELIG CLIN: HCPCS | Performed by: FAMILY MEDICINE

## 2019-08-06 PROCEDURE — 99215 OFFICE O/P EST HI 40 MIN: CPT | Performed by: FAMILY MEDICINE

## 2019-08-06 PROCEDURE — G8926 SPIRO NO PERF OR DOC: HCPCS | Performed by: FAMILY MEDICINE

## 2019-08-06 PROCEDURE — 4040F PNEUMOC VAC/ADMIN/RCVD: CPT | Performed by: FAMILY MEDICINE

## 2019-08-06 PROCEDURE — G8420 CALC BMI NORM PARAMETERS: HCPCS | Performed by: FAMILY MEDICINE

## 2019-08-06 RX ORDER — PANTOPRAZOLE SODIUM 40 MG/1
TABLET, DELAYED RELEASE ORAL
Qty: 30 TABLET | Refills: 5 | Status: SHIPPED | OUTPATIENT
Start: 2019-08-06 | End: 2019-11-04

## 2019-08-06 RX ORDER — SIMVASTATIN 10 MG
TABLET ORAL
Qty: 30 TABLET | Refills: 5 | Status: SHIPPED | OUTPATIENT
Start: 2019-08-06 | End: 2019-11-04 | Stop reason: ALTCHOICE

## 2019-08-06 RX ORDER — ESCITALOPRAM OXALATE 20 MG/1
20 TABLET ORAL DAILY
Qty: 30 TABLET | Refills: 5 | Status: SHIPPED | OUTPATIENT
Start: 2019-08-06 | End: 2019-11-04 | Stop reason: ALTCHOICE

## 2019-08-06 ASSESSMENT — PATIENT HEALTH QUESTIONNAIRE - PHQ9
1. LITTLE INTEREST OR PLEASURE IN DOING THINGS: 0
SUM OF ALL RESPONSES TO PHQ QUESTIONS 1-9: 0
SUM OF ALL RESPONSES TO PHQ9 QUESTIONS 1 & 2: 0
2. FEELING DOWN, DEPRESSED OR HOPELESS: 0
SUM OF ALL RESPONSES TO PHQ QUESTIONS 1-9: 0

## 2019-08-06 NOTE — PROGRESS NOTES
Alaina Hernandez is a 68 y.o. male who presents today for   Chief Complaint   Patient presents with   [facility] Patient     former pt of dr Kristine Combs       Chief Complaint: Establish care    HPI  Patient has a history of depression that began after he had a stent placed in January 2018. He was started on Lexapro at that time reports is been doing well with the medicine without any problems. He states that it has been helpful for his mood. He reports that cardiology is continue to follow and has him on verapamil and he states that he is doing well without any problems. He denies any episodes of chest pain. He has a history of arterial hypertension is doing well with his current dose lisinopril. He denies any issues with use the medicine. Denies any symptoms from abnormal blood pressures. He does have a history of gastroesophageal reflux disease as well as history of peptic ulcer disease. He has been taking Protonix is been doing well with the medicine. He does have a colonoscopy and endoscopy on Thursday With Dr. Ronda Arechiga. He has hyperlipidemia and is tolerating simvastatin without any new myalgias or GI upsets. He denies any issues with use of medicine. He does attempt to watch his diet and try to stay physically active. He also reports a history of COPD and states that he is doing well without any recent breathing problems or issues. He does have rescue inhaler available to him at home. Review of Systems   Constitutional: Negative for activity change, appetite change, fatigue and fever. HENT: Negative for congestion and rhinorrhea. Eyes: Negative for pain and discharge. Respiratory: Negative for cough and shortness of breath. Cardiovascular: Negative for chest pain and palpitations. Gastrointestinal: Negative for abdominal pain, constipation, diarrhea, nausea and vomiting. Endocrine: Negative for cold intolerance and heat intolerance.    Genitourinary: tablet     Sig: TAKE 1 TABLET BY MOUTH ONCE DAILY AT NIGHT     Dispense:  30 tablet     Refill:  5    escitalopram (LEXAPRO) 20 MG tablet     Sig: Take 1 tablet by mouth daily     Dispense:  30 tablet     Refill:  5    simvastatin (ZOCOR) 10 MG tablet     Sig: TAKE 1 TABLET BY MOUTH ONCE DAILY AT NIGHT     Dispense:  30 tablet     Refill:  5    pantoprazole (PROTONIX) 40 MG tablet     Sig: TAKE (1) TABLET BY MOUTH ONCE DAILY IN THE MORNING BEFORE BREAKFAST     Dispense:  30 tablet     Refill:  5     Medications Discontinued During This Encounter   Medication Reason    verapamil (CALAN SR) 180 MG extended release tablet REORDER    escitalopram (LEXAPRO) 20 MG tablet REORDER    simvastatin (ZOCOR) 10 MG tablet REORDER    pantoprazole (PROTONIX) 40 MG tablet REORDER     Patient Instructions   Patient given educational handouts and has had all questions answered. Patient voices understanding and agrees to plans along with risks and benefits of plan. Patient is instructed to continue prior meds,diet, and exercise plans as instructed. Patient agrees to follow up as instructed and sooner if needed. Patient agrees to go to ER if condition becomes emergent. Return in about 6 months (around 2/6/2020), or if symptoms worsen or fail to improve. More than 50% of the time was spent counseling and coordinating care for a total time of greater than 40 min face to face.

## 2019-08-24 PROBLEM — F33.0 MILD EPISODE OF RECURRENT MAJOR DEPRESSIVE DISORDER (HCC): Status: ACTIVE | Noted: 2019-08-24

## 2019-08-24 PROBLEM — N18.30 CKD (CHRONIC KIDNEY DISEASE) STAGE 3, GFR 30-59 ML/MIN (HCC): Status: ACTIVE | Noted: 2019-08-24

## 2019-08-24 ASSESSMENT — ENCOUNTER SYMPTOMS
VOMITING: 0
CONSTIPATION: 0
NAUSEA: 0
BACK PAIN: 0
ABDOMINAL PAIN: 0
RHINORRHEA: 0
DIARRHEA: 0
EYE PAIN: 0
COUGH: 0
EYE DISCHARGE: 0
SHORTNESS OF BREATH: 0

## 2019-11-04 ENCOUNTER — OFFICE VISIT (OUTPATIENT)
Dept: CARDIOLOGY | Age: 69
End: 2019-11-04
Payer: MEDICARE

## 2019-11-04 VITALS
BODY MASS INDEX: 23.81 KG/M2 | WEIGHT: 175.8 LBS | DIASTOLIC BLOOD PRESSURE: 78 MMHG | HEIGHT: 72 IN | HEART RATE: 66 BPM | SYSTOLIC BLOOD PRESSURE: 128 MMHG

## 2019-11-04 DIAGNOSIS — I47.1 SVT (SUPRAVENTRICULAR TACHYCARDIA) (HCC): Primary | ICD-10-CM

## 2019-11-04 DIAGNOSIS — Z79.899 ENCOUNTER FOR MONITORING STATIN THERAPY: Primary | ICD-10-CM

## 2019-11-04 DIAGNOSIS — Z51.81 ENCOUNTER FOR MONITORING STATIN THERAPY: Primary | ICD-10-CM

## 2019-11-04 DIAGNOSIS — E78.2 MIXED HYPERLIPIDEMIA: ICD-10-CM

## 2019-11-04 DIAGNOSIS — I25.10 CORONARY ARTERY DISEASE INVOLVING NATIVE CORONARY ARTERY OF NATIVE HEART WITHOUT ANGINA PECTORIS: ICD-10-CM

## 2019-11-04 PROCEDURE — G8420 CALC BMI NORM PARAMETERS: HCPCS | Performed by: INTERNAL MEDICINE

## 2019-11-04 PROCEDURE — 4004F PT TOBACCO SCREEN RCVD TLK: CPT | Performed by: INTERNAL MEDICINE

## 2019-11-04 PROCEDURE — 99213 OFFICE O/P EST LOW 20 MIN: CPT | Performed by: INTERNAL MEDICINE

## 2019-11-04 PROCEDURE — G8484 FLU IMMUNIZE NO ADMIN: HCPCS | Performed by: INTERNAL MEDICINE

## 2019-11-04 PROCEDURE — 1123F ACP DISCUSS/DSCN MKR DOCD: CPT | Performed by: INTERNAL MEDICINE

## 2019-11-04 PROCEDURE — G8599 NO ASA/ANTIPLAT THER USE RNG: HCPCS | Performed by: INTERNAL MEDICINE

## 2019-11-04 PROCEDURE — 3017F COLORECTAL CA SCREEN DOC REV: CPT | Performed by: INTERNAL MEDICINE

## 2019-11-04 PROCEDURE — 93000 ELECTROCARDIOGRAM COMPLETE: CPT | Performed by: INTERNAL MEDICINE

## 2019-11-04 PROCEDURE — G8427 DOCREV CUR MEDS BY ELIG CLIN: HCPCS | Performed by: INTERNAL MEDICINE

## 2019-11-04 PROCEDURE — 4040F PNEUMOC VAC/ADMIN/RCVD: CPT | Performed by: INTERNAL MEDICINE

## 2019-11-04 RX ORDER — ATORVASTATIN CALCIUM 40 MG/1
40 TABLET, FILM COATED ORAL DAILY
Qty: 30 TABLET | Refills: 3 | Status: SHIPPED | OUTPATIENT
Start: 2019-11-04 | End: 2020-02-06 | Stop reason: SDUPTHER

## 2019-12-05 RX ORDER — LISINOPRIL 5 MG/1
TABLET ORAL
Qty: 30 TABLET | Refills: 5 | Status: SHIPPED | OUTPATIENT
Start: 2019-12-05 | End: 2020-02-06 | Stop reason: SDUPTHER

## 2019-12-30 DIAGNOSIS — Z79.899 ENCOUNTER FOR MONITORING STATIN THERAPY: ICD-10-CM

## 2019-12-30 DIAGNOSIS — Z51.81 ENCOUNTER FOR MONITORING STATIN THERAPY: ICD-10-CM

## 2019-12-30 DIAGNOSIS — E78.2 MIXED HYPERLIPIDEMIA: ICD-10-CM

## 2019-12-30 LAB
ALT SERPL-CCNC: 14 U/L (ref 5–41)
AST SERPL-CCNC: 13 U/L (ref 5–40)
CHOLESTEROL, TOTAL: 132 MG/DL (ref 160–199)
HDLC SERPL-MCNC: 54 MG/DL (ref 55–121)
LDL CHOLESTEROL CALCULATED: 65 MG/DL
TRIGL SERPL-MCNC: 66 MG/DL (ref 0–149)

## 2020-02-05 DIAGNOSIS — I10 ESSENTIAL HYPERTENSION: ICD-10-CM

## 2020-02-05 DIAGNOSIS — E78.2 MIXED HYPERLIPIDEMIA: ICD-10-CM

## 2020-02-05 LAB
ALBUMIN SERPL-MCNC: 3.7 G/DL (ref 3.5–5.2)
ALP BLD-CCNC: 105 U/L (ref 40–130)
ALT SERPL-CCNC: 14 U/L (ref 5–41)
ANION GAP SERPL CALCULATED.3IONS-SCNC: 12 MMOL/L (ref 7–19)
AST SERPL-CCNC: 14 U/L (ref 5–40)
BASOPHILS ABSOLUTE: 0.1 K/UL (ref 0–0.2)
BASOPHILS RELATIVE PERCENT: 0.9 % (ref 0–1)
BILIRUB SERPL-MCNC: 0.4 MG/DL (ref 0.2–1.2)
BUN BLDV-MCNC: 15 MG/DL (ref 8–23)
CALCIUM SERPL-MCNC: 9 MG/DL (ref 8.8–10.2)
CHLORIDE BLD-SCNC: 103 MMOL/L (ref 98–111)
CHOLESTEROL, TOTAL: 137 MG/DL (ref 160–199)
CO2: 26 MMOL/L (ref 22–29)
CREAT SERPL-MCNC: 1.5 MG/DL (ref 0.5–1.2)
CREATININE URINE: 127.3 MG/DL (ref 4.2–622)
EOSINOPHILS ABSOLUTE: 0.7 K/UL (ref 0–0.6)
EOSINOPHILS RELATIVE PERCENT: 12 % (ref 0–5)
GFR NON-AFRICAN AMERICAN: 46
GLUCOSE BLD-MCNC: 84 MG/DL (ref 74–109)
HCT VFR BLD CALC: 44.1 % (ref 42–52)
HDLC SERPL-MCNC: 53 MG/DL (ref 55–121)
HEMOGLOBIN: 13.9 G/DL (ref 14–18)
IMMATURE GRANULOCYTES #: 0 K/UL
LDL CHOLESTEROL CALCULATED: 66 MG/DL
LYMPHOCYTES ABSOLUTE: 1.5 K/UL (ref 1.1–4.5)
LYMPHOCYTES RELATIVE PERCENT: 26.3 % (ref 20–40)
MCH RBC QN AUTO: 29.2 PG (ref 27–31)
MCHC RBC AUTO-ENTMCNC: 31.5 G/DL (ref 33–37)
MCV RBC AUTO: 92.6 FL (ref 80–94)
MICROALBUMIN UR-MCNC: <1.2 MG/DL (ref 0–19)
MICROALBUMIN/CREAT UR-RTO: NORMAL MG/G
MONOCYTES ABSOLUTE: 0.5 K/UL (ref 0–0.9)
MONOCYTES RELATIVE PERCENT: 8.4 % (ref 0–10)
NEUTROPHILS ABSOLUTE: 2.9 K/UL (ref 1.5–7.5)
NEUTROPHILS RELATIVE PERCENT: 52 % (ref 50–65)
PDW BLD-RTO: 13.2 % (ref 11.5–14.5)
PLATELET # BLD: 173 K/UL (ref 130–400)
PMV BLD AUTO: 10.5 FL (ref 9.4–12.4)
POTASSIUM SERPL-SCNC: 3.9 MMOL/L (ref 3.5–5)
RBC # BLD: 4.76 M/UL (ref 4.7–6.1)
SODIUM BLD-SCNC: 141 MMOL/L (ref 136–145)
TOTAL PROTEIN: 6.5 G/DL (ref 6.6–8.7)
TRIGL SERPL-MCNC: 89 MG/DL (ref 0–149)
WBC # BLD: 5.6 K/UL (ref 4.8–10.8)

## 2020-02-06 ENCOUNTER — OFFICE VISIT (OUTPATIENT)
Dept: FAMILY MEDICINE CLINIC | Age: 70
End: 2020-02-06
Payer: MEDICARE

## 2020-02-06 VITALS
BODY MASS INDEX: 24.52 KG/M2 | HEART RATE: 86 BPM | OXYGEN SATURATION: 96 % | TEMPERATURE: 98.3 F | RESPIRATION RATE: 16 BRPM | SYSTOLIC BLOOD PRESSURE: 132 MMHG | HEIGHT: 73 IN | WEIGHT: 185 LBS | DIASTOLIC BLOOD PRESSURE: 74 MMHG

## 2020-02-06 PROCEDURE — 1123F ACP DISCUSS/DSCN MKR DOCD: CPT | Performed by: FAMILY MEDICINE

## 2020-02-06 PROCEDURE — 3017F COLORECTAL CA SCREEN DOC REV: CPT | Performed by: FAMILY MEDICINE

## 2020-02-06 PROCEDURE — 99214 OFFICE O/P EST MOD 30 MIN: CPT | Performed by: FAMILY MEDICINE

## 2020-02-06 PROCEDURE — G8484 FLU IMMUNIZE NO ADMIN: HCPCS | Performed by: FAMILY MEDICINE

## 2020-02-06 PROCEDURE — G8427 DOCREV CUR MEDS BY ELIG CLIN: HCPCS | Performed by: FAMILY MEDICINE

## 2020-02-06 PROCEDURE — G8926 SPIRO NO PERF OR DOC: HCPCS | Performed by: FAMILY MEDICINE

## 2020-02-06 PROCEDURE — 4004F PT TOBACCO SCREEN RCVD TLK: CPT | Performed by: FAMILY MEDICINE

## 2020-02-06 PROCEDURE — 4040F PNEUMOC VAC/ADMIN/RCVD: CPT | Performed by: FAMILY MEDICINE

## 2020-02-06 PROCEDURE — 3023F SPIROM DOC REV: CPT | Performed by: FAMILY MEDICINE

## 2020-02-06 PROCEDURE — G8420 CALC BMI NORM PARAMETERS: HCPCS | Performed by: FAMILY MEDICINE

## 2020-02-06 RX ORDER — ATORVASTATIN CALCIUM 40 MG/1
40 TABLET, FILM COATED ORAL DAILY
Qty: 30 TABLET | Refills: 11 | Status: SHIPPED | OUTPATIENT
Start: 2020-02-06 | End: 2021-03-02

## 2020-02-06 RX ORDER — ERGOCALCIFEROL 1.25 MG/1
CAPSULE ORAL
Qty: 4 CAPSULE | Refills: 11 | Status: SHIPPED | OUTPATIENT
Start: 2020-02-06 | End: 2021-02-17

## 2020-02-06 RX ORDER — LISINOPRIL 5 MG/1
TABLET ORAL
Qty: 30 TABLET | Refills: 11 | Status: SHIPPED | OUTPATIENT
Start: 2020-02-06 | End: 2021-03-02

## 2020-02-06 NOTE — PROGRESS NOTES
Negative for pain and discharge. Respiratory: Negative for cough and shortness of breath. Cardiovascular: Negative for chest pain and palpitations. Gastrointestinal: Negative for abdominal pain, constipation, diarrhea, nausea and vomiting. Endocrine: Negative for cold intolerance and heat intolerance. Genitourinary: Negative for dysuria and hematuria. Musculoskeletal: Negative for back pain, gait problem and neck pain. Skin: Negative for rash and wound. Neurological: Negative for syncope and weakness. Hematological: Negative for adenopathy. Does not bruise/bleed easily. Psychiatric/Behavioral: Negative for dysphoric mood and sleep disturbance. The patient is not nervous/anxious. Past Medical History:   Diagnosis Date    Arthritis     knees    CAD (coronary artery disease)     Colon polyp     COPD (chronic obstructive pulmonary disease) (HCC)     slight    [facility] 07/06/2017    History of blood transfusion     History of colon polyps     tubular adenoma, hyperplastic, distal rectum, duodenum    History of transfusion     Hyperlipidemia     Hypertension     Joint pain     Palpitations     Pinched nerve     right shoulder    Pinched nerve in neck     Tobacco abuse     Ulcer        Current Outpatient Medications   Medication Sig Dispense Refill    vitamin D (ERGOCALCIFEROL) 1.25 MG (10832 UT) CAPS capsule TAKE 1 CAPSULE BY MOUTH ONCE WEEKLY. TAKE AFTER A MEAL. 4 capsule 11    verapamil (CALAN SR) 180 MG extended release tablet TAKE 1 TABLET BY MOUTH ONCE DAILY AT NIGHT 30 tablet 11    atorvastatin (LIPITOR) 40 MG tablet Take 1 tablet by mouth daily 30 tablet 11    lisinopril (PRINIVIL;ZESTRIL) 5 MG tablet TAKE 1 TABLET BY MOUTH ONCE DAILY 30 tablet 11    nitroGLYCERIN (NITROSTAT) 0.4 MG SL tablet up to max of 3 total doses.  If no relief after 1 dose, call 911. 25 tablet 3    aspirin EC 81 MG EC tablet Take 1 tablet by mouth daily 30 tablet 3     No current

## 2020-02-10 ASSESSMENT — ENCOUNTER SYMPTOMS
RHINORRHEA: 0
CONSTIPATION: 0
BACK PAIN: 0
ABDOMINAL PAIN: 0
EYE DISCHARGE: 0
DIARRHEA: 0
SHORTNESS OF BREATH: 0
NAUSEA: 0
EYE PAIN: 0
VOMITING: 0
COUGH: 0

## 2020-02-11 NOTE — PATIENT INSTRUCTIONS
numbers will appear on the screen. · Write your numbers in your log book, along with the date and time. Manual blood pressure monitors  · Place the earpieces of a stethoscope in your ears, and place the bell of the stethoscope over the artery, just below the cuff. · Close the valve on the rubber inflating bulb. · Squeeze the bulb rapidly with your opposite hand to inflate the cuff until the dial or column of mercury reads about 30 mm Hg higher than your usual systolic pressure. If you do not know your usual pressure, inflate the cuff to 210 mm Hg or until the pulse at your wrist disappears. · Open the pressure valve just slightly by twisting or pressing the valve on the bulb. · As you watch the pressure slowly fall, note the level on the dial at which you first start to hear a pulsing or tapping sound through the stethoscope. This is your systolic blood pressure. · Continue letting the air out slowly. The sounds will become muffled and will finally disappear. Note the pressure when the sounds completely disappear. This is your diastolic blood pressure. Let out all the remaining air. · Write your numbers in your log book, along with the date and time. What else should you know about the test?  It is more accurate to take the average of several readings made throughout the day than to rely on a single reading. It's normal for blood pressure to go up and down throughout the day. Follow-up care is a key part of your treatment and safety. Be sure to make and go to all appointments, and call your doctor if you are having problems. It's also a good idea to keep a list of the medicines you take. Where can you learn more? Go to https://[employer]kaity.[email or web address] org and sign in to your [employer] account. Enter C427 in the [employer] box to learn more about \"Home Blood Pressure Test: About This Test.\"     If you do not have an account, please click on the \"Sign Up Now\" link.   Current as of: April 9, 2019  Content Version: 12.3  © 1451-4907 [employer]wise, [employer]. Care instructions adapted under license by [facility] ([facility]). If you have questions about a medical condition or this instruction, always ask your healthcare professional. [address] any warranty or liability for your use of this information.

## 2020-05-08 ENCOUNTER — VIRTUAL VISIT (OUTPATIENT)
Dept: CARDIOLOGY | Age: 70
End: 2020-05-08
Payer: MEDICARE

## 2020-05-08 VITALS — HEIGHT: 73 IN | WEIGHT: 180 LBS | BODY MASS INDEX: 23.86 KG/M2

## 2020-05-08 PROCEDURE — 3017F COLORECTAL CA SCREEN DOC REV: CPT | Performed by: INTERNAL MEDICINE

## 2020-05-08 PROCEDURE — 4040F PNEUMOC VAC/ADMIN/RCVD: CPT | Performed by: INTERNAL MEDICINE

## 2020-05-08 PROCEDURE — 99212 OFFICE O/P EST SF 10 MIN: CPT | Performed by: INTERNAL MEDICINE

## 2020-05-08 PROCEDURE — 4004F PT TOBACCO SCREEN RCVD TLK: CPT | Performed by: INTERNAL MEDICINE

## 2020-05-08 PROCEDURE — G8420 CALC BMI NORM PARAMETERS: HCPCS | Performed by: INTERNAL MEDICINE

## 2020-05-08 PROCEDURE — G8427 DOCREV CUR MEDS BY ELIG CLIN: HCPCS | Performed by: INTERNAL MEDICINE

## 2020-05-08 PROCEDURE — 1123F ACP DISCUSS/DSCN MKR DOCD: CPT | Performed by: INTERNAL MEDICINE

## 2020-05-08 NOTE — PATIENT INSTRUCTIONS
1.  She would benefit from regular exercise and clearly benefit from discontinuing tobacco abuse  2. Will schedule stress test in 6 months  3.   Continued careful social distancing

## 2020-06-23 ENCOUNTER — VIRTUAL VISIT (OUTPATIENT)
Dept: FAMILY MEDICINE CLINIC | Age: 70
End: 2020-06-23
Payer: MEDICARE

## 2020-06-23 VITALS
HEIGHT: 73 IN | WEIGHT: 179 LBS | SYSTOLIC BLOOD PRESSURE: 127 MMHG | BODY MASS INDEX: 23.72 KG/M2 | DIASTOLIC BLOOD PRESSURE: 68 MMHG

## 2020-06-23 PROCEDURE — 1123F ACP DISCUSS/DSCN MKR DOCD: CPT | Performed by: FAMILY MEDICINE

## 2020-06-23 PROCEDURE — 4040F PNEUMOC VAC/ADMIN/RCVD: CPT | Performed by: FAMILY MEDICINE

## 2020-06-23 PROCEDURE — 3017F COLORECTAL CA SCREEN DOC REV: CPT | Performed by: FAMILY MEDICINE

## 2020-06-23 PROCEDURE — G0439 PPPS, SUBSEQ VISIT: HCPCS | Performed by: FAMILY MEDICINE

## 2020-06-23 ASSESSMENT — LIFESTYLE VARIABLES
AUDIT-C TOTAL SCORE: 2
HOW MANY STANDARD DRINKS CONTAINING ALCOHOL DO YOU HAVE ON A TYPICAL DAY: 0
HOW OFTEN DO YOU HAVE A DRINK CONTAINING ALCOHOL: 2
HOW OFTEN DO YOU HAVE SIX OR MORE DRINKS ON ONE OCCASION: 0
HOW OFTEN DURING THE LAST YEAR HAVE YOU BEEN UNABLE TO REMEMBER WHAT HAPPENED THE NIGHT BEFORE BECAUSE YOU HAD BEEN DRINKING: 0
HAVE YOU OR SOMEONE ELSE BEEN INJURED AS A RESULT OF YOUR DRINKING: 0
HOW OFTEN DURING THE LAST YEAR HAVE YOU FAILED TO DO WHAT WAS NORMALLY EXPECTED FROM YOU BECAUSE OF DRINKING: 0
HOW OFTEN DURING THE LAST YEAR HAVE YOU NEEDED AN ALCOHOLIC DRINK FIRST THING IN THE MORNING TO GET YOURSELF GOING AFTER A NIGHT OF HEAVY DRINKING: 0
AUDIT TOTAL SCORE: 2
HAS A RELATIVE, FRIEND, DOCTOR, OR ANOTHER HEALTH PROFESSIONAL EXPRESSED CONCERN ABOUT YOUR DRINKING OR SUGGESTED YOU CUT DOWN: 0
HOW OFTEN DURING THE LAST YEAR HAVE YOU FOUND THAT YOU WERE NOT ABLE TO STOP DRINKING ONCE YOU HAD STARTED: 0
HOW OFTEN DURING THE LAST YEAR HAVE YOU HAD A FEELING OF GUILT OR REMORSE AFTER DRINKING: 0

## 2020-06-23 ASSESSMENT — PATIENT HEALTH QUESTIONNAIRE - PHQ9
SUM OF ALL RESPONSES TO PHQ QUESTIONS 1-9: 0
SUM OF ALL RESPONSES TO PHQ QUESTIONS 1-9: 0

## 2020-06-23 NOTE — PROGRESS NOTES
Medicare Annual Wellness Visit  Name: Shawn Farley Date: 2020   MRN: [id number] Sex: Male   Age: 65 y.o. Ethnicity: Non-/Non    : 1950 Race: Randolph Reilly is here for Medicare AWV    Screenings for behavioral, psychosocial and functional/safety risks, and cognitive dysfunction are all negative except as indicated below. These results, as well as other patient data from the [address] form, are documented in Flowsheets linked to this Encounter. Allergies   Allergen Reactions    Tape Joana Genta Tape] Rash     welps         Prior to Visit Medications    Medication Sig Taking? Authorizing Provider   vitamin D (ERGOCALCIFEROL) 1.25 MG (75488 UT) CAPS capsule TAKE 1 CAPSULE BY MOUTH ONCE WEEKLY. TAKE AFTER A MEAL. Patient taking differently: TAKE 1 CAPSULE BY MOUTH EVERY OTHER WEEK. TAKE AFTER A MEAL. Yes Ananya Ro MD   verapamil (CALAN SR) 180 MG extended release tablet TAKE 1 TABLET BY MOUTH ONCE DAILY AT NIGHT Yes Ananya Ro MD   atorvastatin (LIPITOR) 40 MG tablet Take 1 tablet by mouth daily Yes Ananya Ro MD   lisinopril (PRINIVIL;ZESTRIL) 5 MG tablet TAKE 1 TABLET BY MOUTH ONCE DAILY Yes Ananya Ro MD   nitroGLYCERIN (NITROSTAT) 0.4 MG SL tablet up to max of 3 total doses. If no relief after 1 dose, call 911. Yes C. Roxan Kayser, MD   aspirin EC 81 MG EC tablet Take 1 tablet by mouth daily Yes C. Roxan Kayser, MD       Past Medical History:   Diagnosis Date    Arthritis     knees    CAD (coronary artery disease)     Colon polyp     COPD (chronic obstructive pulmonary disease) ([facility]     slight    Heart attack ([facility] [facility] 2017    History of blood transfusion     History of colon polyps     tubular adenoma, hyperplastic, distal rectum, duodenum    History of transfusion     Hyperlipidemia     Hypertension     Joint pain     Palpitations     Pinched nerve     right shoulder    Pinched nerve in neck     Tobacco abuse     Risk Assessment and screening values have been reviewed and are found in Flowsheets. The following problems were reviewed today and where indicated follow up appointments were made and/or referrals ordered.     Positive Risk Factor Screenings with Interventions:     Substance Abuse:  Social History     Tobacco History     Smoking Status  Current Every Day Smoker Smoking Frequency  0.5 packs/day for 15 years (7.5 pk yrs) Smoking Tobacco Type  Cigarettes    Smokeless Tobacco Use  Never Used          Alcohol History     Alcohol Use Status  Yes Drinks/Week  0 Standard drinks or equivalent per week Amount  0.0 standard drinks of alcohol/wk Comment  occ          Drug Use     Drug Use Status  No          Sexual Activity     Sexually Active  Not Asked               Audit Questionnaire: Screen for Alcohol Misuse  How often do you have a drink containing alcohol?: Two to four times a month  How many standard drinks containing alcohol do you have on a typical day when drinking?: One or two  How often do you have six or more drinks on one occasion?: Never  Audit-C Score: 2  During the past year, how often have you found that you were not able to stop drinking once you had started?: Never  During the past year, how often have you failed to do what was normally expected of you because of drinking?: Never  During the past year, how often have you needed a drink in the morning to get yourself going after a heavy drinking session?: Never  During the past year, how often have you had a feeling of guilt or remorse after drinking?: Never  During the past year, have you been unable to remember what happened the night before because you had been drinking?: Never  Have you or someone else been injured as a result of your drinking?: No  Has a relative or friend, doctor or health worker been concerned about your drinking or suggested you cut down?: No  Total Score: 2  Substance Abuse Interventions:  · Tobacco abuse:  patient is not ready to supervision of the attending physician. Maria Fernanda Tilley is a 65 y.o. male being evaluated by a Virtual Visit (video visit) encounter to address concerns as mentioned above. A caregiver was present when appropriate. Due to this being a TeleHealth encounter (During ZRCZD-74 public health emergency), evaluation of the following organ systems was limited: Vitals/Constitutional/EENT/Resp/CV/GI//MS/Neuro/Skin/Heme-Lymph-Imm. Pursuant to the emergency declaration under the [address] [place] [address], [address] [place] [address] authority and the [employer] Act, this Virtual Visit was conducted with patient's (and/or legal guardian's) consent, to reduce the patient's risk of exposure to COVID-19 and provide necessary medical care. The patient (and/or legal guardian) has also been advised to contact this office for worsening conditions or problems, and seek emergency medical treatment and/or call 911 if deemed necessary. Patient identification was verified at the start of the visit: Yes    Total time spent for this encounter: 30min    Services were provided through a video synchronous discussion virtually to substitute for in-person clinic visit. Patient and provider were located at their individual homes. --Aldine Pallas, LPN on 7/05/7040 at 77:19 AM    An electronic signature was used to authenticate this note.

## 2020-08-06 ENCOUNTER — VIRTUAL VISIT (OUTPATIENT)
Dept: FAMILY MEDICINE CLINIC | Age: 70
End: 2020-08-06
Payer: MEDICARE

## 2020-08-06 PROCEDURE — G8428 CUR MEDS NOT DOCUMENT: HCPCS | Performed by: FAMILY MEDICINE

## 2020-08-06 PROCEDURE — 4040F PNEUMOC VAC/ADMIN/RCVD: CPT | Performed by: FAMILY MEDICINE

## 2020-08-06 PROCEDURE — 99214 OFFICE O/P EST MOD 30 MIN: CPT | Performed by: FAMILY MEDICINE

## 2020-08-06 PROCEDURE — 4004F PT TOBACCO SCREEN RCVD TLK: CPT | Performed by: FAMILY MEDICINE

## 2020-08-06 PROCEDURE — 3017F COLORECTAL CA SCREEN DOC REV: CPT | Performed by: FAMILY MEDICINE

## 2020-08-06 PROCEDURE — 1123F ACP DISCUSS/DSCN MKR DOCD: CPT | Performed by: FAMILY MEDICINE

## 2020-08-06 PROCEDURE — G8420 CALC BMI NORM PARAMETERS: HCPCS | Performed by: FAMILY MEDICINE

## 2020-08-06 ASSESSMENT — ENCOUNTER SYMPTOMS
RHINORRHEA: 0
EYE PAIN: 0
DIARRHEA: 0
ABDOMINAL PAIN: 0
CONSTIPATION: 0
VOMITING: 0
EYE DISCHARGE: 0
SHORTNESS OF BREATH: 0
COUGH: 0
NAUSEA: 0
BACK PAIN: 0

## 2020-08-06 NOTE — PATIENT INSTRUCTIONS

## 2020-08-06 NOTE — PROGRESS NOTES
2020    TELEHEALTH EVALUATION -- Audio/Visual (During ULZAB-10 public health emergency)    HPI:    Griselda Hallmark (:  1950) has requested an audio/video evaluation for the following concern(s):    Patient has history of coronary artery disease and has been doing well without any problems recently. He denies any issues with chest pain and did have a follow-up with cardiology a few months ago without any concerns at that time. He states that he is doing well with his medication and is not have any problems at this time. He does have a history of arterial hypertension is doing well with his current medications. He states that his blood pressures been running good and this morning was 108/80. He denies any issues with use of his medicine. He has any symptoms from normal blood pressure. He does attempt to avoid excess salt intake. He also has hyperlipidemia is tolerating Lipitor without any myalgias or GI upsets. He is attempting watch his diet and try to stay physically active. He has chronic kidney disease stage III which is been fairly stable for approximately the past 8 years. He denies any recent changes any problems. He does attempt to avoid anti-inflammatories. Review of Systems   Constitutional: Negative for activity change, appetite change, fatigue and fever. HENT: Negative for congestion and rhinorrhea. Eyes: Negative for pain and discharge. Respiratory: Negative for cough and shortness of breath. Cardiovascular: Negative for chest pain and palpitations. Gastrointestinal: Negative for abdominal pain, constipation, diarrhea, nausea and vomiting. Endocrine: Negative for cold intolerance and heat intolerance. Genitourinary: Negative for dysuria and hematuria. Musculoskeletal: Negative for back pain, gait problem and neck pain. Skin: Negative for rash and wound. Neurological: Negative for syncope and weakness. Hematological: Negative for adenopathy.  Does not bruise/bleed easily. Psychiatric/Behavioral: Negative for dysphoric mood and sleep disturbance. The patient is not nervous/anxious. Prior to Visit Medications    Medication Sig Taking? Authorizing Provider   vitamin D (ERGOCALCIFEROL) 1.25 MG (54370 UT) CAPS capsule TAKE 1 CAPSULE BY MOUTH ONCE WEEKLY. TAKE AFTER A MEAL. Patient taking differently: TAKE 1 CAPSULE BY MOUTH EVERY OTHER WEEK. TAKE AFTER A MEAL. Yvette Rousseau MD   verapamil (CALAN SR) 180 MG extended release tablet TAKE 1 TABLET BY MOUTH ONCE DAILY AT NIGHT  Yvette Rousseau MD   atorvastatin (LIPITOR) 40 MG tablet Take 1 tablet by mouth daily  Yvette Rousseau MD   lisinopril (PRINIVIL;ZESTRIL) 5 MG tablet TAKE 1 TABLET BY MOUTH ONCE DAILY  Yvette Rousseau MD   nitroGLYCERIN (NITROSTAT) 0.4 MG SL tablet up to max of 3 total doses. If no relief after 1 dose, call 911. Lisa Hairston MD   aspirin EC 81 MG EC tablet Take 1 tablet by mouth daily  C. Johana Conrad MD       Social History     Tobacco Use    Smoking status: Current Every Day Smoker     Packs/day: 0.50     Years: 15.00     Pack years: 7.50     Types: Cigarettes    Smokeless tobacco: Never Used   Substance Use Topics    Alcohol use:  Yes     Alcohol/week: 0.0 standard drinks     Comment: occ    Drug use: No        Allergies   Allergen Reactions    Tape [Adhesive Tape] Rash     welps     ,   Past Medical History:   Diagnosis Date    Arthritis     knees    CAD (coronary artery disease)     Colon polyp     COPD (chronic obstructive pulmonary disease) (HCC)     slight    Heart attack ([facility] [address] 07/06/2017    History of blood transfusion     History of colon polyps     tubular adenoma, hyperplastic, distal rectum, duodenum    History of transfusion     Hyperlipidemia     Hypertension     Joint pain     Palpitations     Pinched nerve     right shoulder    Pinched nerve in neck     Tobacco abuse     Ulcer    ,   Past Surgical History:   Procedure Laterality Date    COLONOSCOPY  12-10-08    Dr Mikey Velez  8-27-01    Dr Mikey Velez  07/2013    Dr. Saint Hough: multiple polyps, adenomatous and hyperplastic (3 yr)    CORONARY ANGIOPLASTY WITH STENT PLACEMENT  07/06/2017    ENDOSCOPY, COLON, DIAGNOSTIC      HEMORRHOID SURGERY      JOINT REPLACEMENT Left 06/2012    knee    KNEE ARTHROSCOPY      bilateral-3 scopes on right and 1 on left    NECK SURGERY      PAROTIDECTOMY  03/31/2017    SKIN BIOPSY      lypomas on arms, side, and back    TOTAL KNEE ARTHROPLASTY      left    UPPER GASTROINTESTINAL ENDOSCOPY  8-27-01    Dr Robby Hester N/A 3/31/2016    Dr Nubia Martin (-), Chronic esophagitis   ,   Social History     Tobacco Use    Smoking status: Current Every Day Smoker     Packs/day: 0.50     Years: 15.00     Pack years: 7.50     Types: Cigarettes    Smokeless tobacco: Never Used   Substance Use Topics    Alcohol use:  Yes     Alcohol/week: 0.0 standard drinks     Comment: occ    Drug use: No   ,   Family History   Problem Relation Age of Onset    Other Sister         pancrease removal    Cancer Father         Larynx and asbestos exposure    Esophageal Cancer Father     Heart Disease Mother         has pacemaker    Diabetes Mother     Other Mother         has had esoph stretched in the past    Colon Polyps Neg Hx     Colon Cancer Neg Hx     Liver Cancer Neg Hx     Liver Disease Neg Hx     Rectal Cancer Neg Hx     Stomach Cancer Neg Hx        PHYSICAL EXAMINATION:  [ INSTRUCTIONS:  \"[x]\" Indicates a positive item  \"[]\" Indicates a negative item  -- DELETE ALL ITEMS NOT EXAMINED]      Constitutional: [x] Appears well-developed and well-nourished [] No apparent distress      [x] Abnormal-   Mental status  [x] Alert and awake  [x] Oriented to person/place/time [x]Able to follow commands      Eyes:  EOM    [x]  Normal  [] Abnormal-  Sclera  [x]  Normal  [] Abnormal -         Discharge [x]  None visible  [] Abnormal -    HENT:   [x] Normocephalic, atraumatic. [] Abnormal   [x] Mouth/Throat: Mucous membranes are moist.     External Ears [x] Normal  [] Abnormal-     Neck: [x] No visualized mass     Pulmonary/Chest: [x] Respiratory effort normal.  [] No visualized signs of difficulty breathing or respiratory distress        [x] Abnormal-      Musculoskeletal:           [x] Normal range of motion of neck        [] Abnormal-       Neurological:        [x] No Facial Asymmetry (Cranial nerve 7 motor function) (limited exam to video visit)          [x] No gaze palsy        [] Abnormal-         Skin:        [x] No significant exanthematous lesions or discoloration noted on facial skin         [] Abnormal-            Psychiatric:       [x] Normal Affect [x] No Hallucinations        [] Abnormal-     Other pertinent observable physical exam findings-     ASSESSMENT/PLAN:  1. Essential hypertension  Doing well with current medications. Will continue at this time continue to monitor and adjust course dictates. Stressed importance of continued home monitoring of his blood pressure.  - CBC Auto Differential; Future  - Comprehensive Metabolic Panel; Future  - Microalbumin / Creatinine Urine Ratio; Future    2. Mixed hyperlipidemia  Tolerating Lipitor. Will continue continue to monitor.  - Lipid Panel; Future    3. CKD (chronic kidney disease) stage 3, GFR 30-59 ml/min ([facility])  Appears to be stable. We will continue to monitor and further work-up and treatment as course dictates. - CBC Auto Differential; Future  - Comprehensive Metabolic Panel; Future    4. Coronary artery disease involving native coronary artery of native heart without angina pectoris  Stressed importance of maintaining follow-up with cardiology. Denies any problems at this time. We will continue to monitor and assist as needed. 5. Screening for malignant neoplasm of prostate  - Psa screening;  Future      Return in about 6 months (around 2/6/2021), or if symptoms

## 2020-10-12 ENCOUNTER — VIRTUAL VISIT (OUTPATIENT)
Dept: FAMILY MEDICINE CLINIC | Age: 70
End: 2020-10-12
Payer: MEDICARE

## 2020-10-12 PROCEDURE — 1123F ACP DISCUSS/DSCN MKR DOCD: CPT | Performed by: FAMILY MEDICINE

## 2020-10-12 PROCEDURE — 4004F PT TOBACCO SCREEN RCVD TLK: CPT | Performed by: FAMILY MEDICINE

## 2020-10-12 PROCEDURE — 3017F COLORECTAL CA SCREEN DOC REV: CPT | Performed by: FAMILY MEDICINE

## 2020-10-12 PROCEDURE — G8420 CALC BMI NORM PARAMETERS: HCPCS | Performed by: FAMILY MEDICINE

## 2020-10-12 PROCEDURE — 4040F PNEUMOC VAC/ADMIN/RCVD: CPT | Performed by: FAMILY MEDICINE

## 2020-10-12 PROCEDURE — G8428 CUR MEDS NOT DOCUMENT: HCPCS | Performed by: FAMILY MEDICINE

## 2020-10-12 PROCEDURE — 99213 OFFICE O/P EST LOW 20 MIN: CPT | Performed by: FAMILY MEDICINE

## 2020-10-12 PROCEDURE — G8484 FLU IMMUNIZE NO ADMIN: HCPCS | Performed by: FAMILY MEDICINE

## 2020-10-12 RX ORDER — SUCRALFATE 1 G/1
1 TABLET ORAL 4 TIMES DAILY
Qty: 120 TABLET | Refills: 1 | Status: SHIPPED | OUTPATIENT
Start: 2020-10-12 | End: 2021-04-05 | Stop reason: SDUPTHER

## 2020-10-12 RX ORDER — PANTOPRAZOLE SODIUM 40 MG/1
40 TABLET, DELAYED RELEASE ORAL
Qty: 30 TABLET | Refills: 2 | Status: SHIPPED | OUTPATIENT
Start: 2020-10-12 | End: 2021-04-05 | Stop reason: SDUPTHER

## 2020-10-12 NOTE — PROGRESS NOTES
tablet by mouth every morning (before breakfast) Yes Maria Fernanda Avelar MD   sucralfate (CARAFATE) 1 GM tablet Take 1 tablet by mouth 4 times daily Yes Maria Fernanda Avelar MD   vitamin D (ERGOCALCIFEROL) 1.25 MG (88909 UT) CAPS capsule TAKE 1 CAPSULE BY MOUTH ONCE WEEKLY. TAKE AFTER A MEAL. Patient taking differently: TAKE 1 CAPSULE BY MOUTH EVERY OTHER WEEK. TAKE AFTER A MEAL. Maria Fernanda Avelar MD   verapamil (CALAN SR) 180 MG extended release tablet TAKE 1 TABLET BY MOUTH ONCE DAILY AT NIGHT  Maria Fernanda Avelar MD   atorvastatin (LIPITOR) 40 MG tablet Take 1 tablet by mouth daily  Maria Fernanda Avelar MD   lisinopril (PRINIVIL;ZESTRIL) 5 MG tablet TAKE 1 TABLET BY MOUTH ONCE DAILY  Maria Fernanda Avelar MD   nitroGLYCERIN (NITROSTAT) 0.4 MG SL tablet up to max of 3 total doses. If no relief after 1 dose, call 911. Zion Lucas MD   aspirin EC 81 MG EC tablet Take 1 tablet by mouth daily  C. General MD Aris       Social History     Tobacco Use    Smoking status: Current Every Day Smoker     Packs/day: 0.50     Years: 15.00     Pack years: 7.50     Types: Cigarettes    Smokeless tobacco: Never Used   Substance Use Topics    Alcohol use:  Yes     Alcohol/week: 0.0 standard drinks     Comment: occ    Drug use: No        Allergies   Allergen Reactions    Tape [Adhesive Tape] Rash     welps     ,   Past Medical History:   Diagnosis Date    Arthritis     knees    CAD (coronary artery disease)     Colon polyp     COPD (chronic obstructive pulmonary disease) (HCC)     slight    Heart attack ([facility] [address] 07/06/2017    History of blood transfusion     History of colon polyps     tubular adenoma, hyperplastic, distal rectum, duodenum    History of transfusion     Hyperlipidemia     Hypertension     Joint pain     Palpitations     Pinched nerve     right shoulder    Pinched nerve in neck     Tobacco abuse     Ulcer    ,   Past Surgical History:   Procedure Laterality Date    COLONOSCOPY  12-10-08     [facility] COLONOSCOPY  8-27-01     Wil    COLONOSCOPY  07/2013    Dr. Montilla Antis: multiple polyps, adenomatous and hyperplastic (3 yr)    CORONARY ANGIOPLASTY WITH STENT PLACEMENT  07/06/2017    ENDOSCOPY, COLON, DIAGNOSTIC      HEMORRHOID SURGERY      JOINT REPLACEMENT Left 06/2012    knee    KNEE ARTHROSCOPY      bilateral-3 scopes on right and 1 on left    NECK SURGERY      PAROTIDECTOMY  03/31/2017    SKIN BIOPSY      lypomas on arms, side, and back    TOTAL KNEE ARTHROPLASTY      left    UPPER GASTROINTESTINAL ENDOSCOPY  8-27-01    Dr Neida Rodgers N/A 3/31/2016    Dr Shaggy Oquendo (-), Chronic esophagitis   ,   Social History     Tobacco Use    Smoking status: Current Every Day Smoker     Packs/day: 0.50     Years: 15.00     Pack years: 7.50     Types: Cigarettes    Smokeless tobacco: Never Used   Substance Use Topics    Alcohol use: Yes     Alcohol/week: 0.0 standard drinks     Comment: occ    Drug use: No   ,   Family History   Problem Relation Age of Onset    Other Sister         pancrease removal    Cancer Father         Larynx and asbestos exposure    Esophageal Cancer Father     Heart Disease Mother         has pacemaker    Diabetes Mother     Other Mother         has had esoph stretched in the past    Colon Polyps Neg Hx     Colon Cancer Neg Hx     Liver Cancer Neg Hx     Liver Disease Neg Hx     Rectal Cancer Neg Hx     Stomach Cancer Neg Hx        PHYSICAL EXAMINATION:  [ INSTRUCTIONS:  \"[x]\" Indicates a positive item  \"[]\" Indicates a negative item  -- DELETE ALL ITEMS NOT EXAMINED]       Constitutional: [x] Appears well-developed and well-nourished [x] No apparent distress      [] Abnormal-   Mental status  [x] Alert and awake  [x] Oriented to person/place/time [x]Able to follow commands      Eyes:  EOM    [x]  Normal  [] Abnormal-  Sclera  [x]  Normal  [] Abnormal -         Discharge [x]  None visible  [] Abnormal -    HENT:   [x] Normocephalic, atraumatic.   [] Abnormal   [x] Mouth/Throat: Mucous membranes are moist.     External Ears [x] Normal  [] Abnormal-     Neck: [x] No visualized mass     Pulmonary/Chest: [x] Respiratory effort normal.  [x] No visualized signs of difficulty breathing or respiratory distress        [] Abnormal-      Musculoskeletal:         [x] Normal range of motion of neck        [] Abnormal-       Neurological:        [x] No Facial Asymmetry (Cranial nerve 7 motor function) (limited exam to video visit)          [x] No gaze palsy        [] Abnormal-         Skin:        [x] No significant exanthematous lesions or discoloration noted on facial skin         [] Abnormal-            Psychiatric:       [x] Normal Affect [x] No Hallucinations        [] Abnormal-     Other pertinent observable physical exam findings-     ASSESSMENT/PLAN:  1. Gastroesophageal reflux disease, unspecified whether esophagitis present  - pantoprazole (PROTONIX) 40 MG tablet; Take 1 tablet by mouth every morning (before breakfast)  Dispense: 30 tablet; Refill: 2  - sucralfate (CARAFATE) 1 GM tablet; Take 1 tablet by mouth 4 times daily  Dispense: 120 tablet; Refill: 1    2. Epigastric pain  - pantoprazole (PROTONIX) 40 MG tablet; Take 1 tablet by mouth every morning (before breakfast)  Dispense: 30 tablet; Refill: 2  - sucralfate (CARAFATE) 1 GM tablet; Take 1 tablet by mouth 4 times daily  Dispense: 120 tablet; Refill: 1    Discussed potential diagnoses and some concerning features of his symptoms and the possible need for further work-up including the possibility of cardiology involvement/work-up. Patient voiced understanding but states that it feels like his previous episodes of reflux/ulcer and is not interested in pursuing other investigations at this time and instead is wishing to pursue management of reflux and ulcer since he has had similar events in the past.  Discussed risk and benefits of such an approach and patient voiced understanding.   This time we will attempt a trial of medication in efforts to improve his symptoms and if his symptoms resolve/improve we will continue to monitor. Discussed the importance of follow-up for ER presentation for continued or worsening symptoms and if his symptoms become more specific and do suggest GI origin may need to involve GI in his care. Discussed proper use of medication and expected course. Discussed lifestyle modifications that may build improve his symptoms. Discussed signs symptoms requiring medical attention. All questions were answered patient voiced understanding and agreement with plan as discussed. Return if symptoms worsen or fail to improve, for next scheduled follow up with PCP. Era Beth is a 65 y.o. male being evaluated by a Virtual Visit (video visit) encounter to address concerns as mentioned above. A caregiver was present when appropriate. Due to this being a TeleHealth encounter (During GZXQA-92 public health emergency), evaluation of the following organ systems was limited: Vitals/Constitutional/EENT/Resp/CV/GI//MS/Neuro/Skin/Heme-Lymph-Imm. Pursuant to the emergency declaration under the [facility], [address] [place] [address] authority and the [employer] Act, this Virtual Visit was conducted with patient's (and/or legal guardian's) consent, to reduce the patient's risk of exposure to COVID-19 and provide necessary medical care. The patient (and/or legal guardian) has also been advised to contact this office for worsening conditions or problems, and seek emergency medical treatment and/or call 911 if deemed necessary. Patient identification was verified at the start of the visit: Yes    Total time spent on this encounter: Not billed by time    Services were provided through a video synchronous discussion virtually to substitute for in-person clinic visit. Patient and provider were located at their individual homes.     --[place] Jessa Owens MD on 11/2/2020 at 7:50 AM    An electronic signature was used to authenticate this note.

## 2020-11-02 ENCOUNTER — TELEPHONE (OUTPATIENT)
Dept: CARDIOLOGY | Age: 70
End: 2020-11-02

## 2020-11-02 ASSESSMENT — ENCOUNTER SYMPTOMS
EYE DISCHARGE: 0
DIARRHEA: 0
BACK PAIN: 0
VOMITING: 0
COUGH: 0
RHINORRHEA: 0
NAUSEA: 0
SHORTNESS OF BREATH: 0
CONSTIPATION: 0
EYE PAIN: 0
ABDOMINAL PAIN: 0

## 2020-11-02 NOTE — TELEPHONE ENCOUNTER
Dr Sidney Campa stated that he wants the patient to still have the DSE instead of the regular Stress Echo.    I called the patient to let him know and he is calling central scheduling to schedule his test before his upcomming visit 11-13-20 with Fer Muse

## 2020-11-02 NOTE — PATIENT INSTRUCTIONS
people. If your symptoms are worse after you eat a certain food, you may want to stop eating that food to see if your symptoms get better. · Do not smoke or chew tobacco. Smoking can make GERD worse. If you need help quitting, talk to your doctor about stop-smoking programs and medicines. These can increase your chances of quitting for good. · If you have GERD symptoms at night, raise the head of your bed 6 to 8 inches by putting the frame on blocks or placing a foam wedge under the head of your mattress. (Adding extra pillows does not work.)  · Do not wear tight clothing around your middle. · Lose weight if you need to. Losing just 5 to 10 pounds can help. When should you call for help? Call your doctor now or seek immediate medical care if:    · You have new or different belly pain.     · Your stools are black and tarlike or have streaks of blood. Watch closely for changes in your health, and be sure to contact your doctor if:    · Your symptoms have not improved after 2 days.     · Food seems to catch in your throat or chest.   Where can you learn more? Go to [email or web address] and sign in to your [email or web address] account. Enter T017 in the [facility] box to learn more about \"Gastroesophageal Reflux Disease (GERD): Care Instructions. \"     If you do not have an account, please click on the \"Sign Up Now\" link. Current as of: April 15, 2020               Content Version: 12.6  © 5490-0090 [employer]. Care instructions adapted under license by [facility] ([facility]). If you have questions about a medical condition or this instruction, always ask your healthcare professional. [place] [address] any warranty or liability for your use of this information.

## 2020-11-02 NOTE — TELEPHONE ENCOUNTER
Called the patient to see if he ever got his stress test that Dr Aliyah Brown ordered.   Patient stated that he will call and get it scheduled before his 11-16-20 visit

## 2020-11-18 ENCOUNTER — OFFICE VISIT (OUTPATIENT)
Dept: CARDIOLOGY | Age: 70
End: 2020-11-18
Payer: MEDICARE

## 2020-11-18 VITALS
HEART RATE: 157 BPM | SYSTOLIC BLOOD PRESSURE: 118 MMHG | WEIGHT: 178 LBS | DIASTOLIC BLOOD PRESSURE: 64 MMHG | HEIGHT: 72 IN | BODY MASS INDEX: 24.11 KG/M2

## 2020-11-18 DIAGNOSIS — I48.19 PERSISTENT ATRIAL FIBRILLATION (HCC): ICD-10-CM

## 2020-11-18 LAB
ALBUMIN SERPL-MCNC: 4.1 G/DL (ref 3.5–5.2)
ALP BLD-CCNC: 105 U/L (ref 40–130)
ALT SERPL-CCNC: 15 U/L (ref 5–41)
ANION GAP SERPL CALCULATED.3IONS-SCNC: 14 MMOL/L (ref 7–19)
AST SERPL-CCNC: 12 U/L (ref 5–40)
BASOPHILS ABSOLUTE: 0.1 K/UL (ref 0–0.2)
BASOPHILS RELATIVE PERCENT: 0.7 % (ref 0–1)
BILIRUB SERPL-MCNC: <0.2 MG/DL (ref 0.2–1.2)
BUN BLDV-MCNC: 17 MG/DL (ref 8–23)
CALCIUM SERPL-MCNC: 9 MG/DL (ref 8.8–10.2)
CHLORIDE BLD-SCNC: 104 MMOL/L (ref 98–111)
CO2: 24 MMOL/L (ref 22–29)
CREAT SERPL-MCNC: 1.5 MG/DL (ref 0.5–1.2)
EOSINOPHILS ABSOLUTE: 0.9 K/UL (ref 0–0.6)
EOSINOPHILS RELATIVE PERCENT: 9.7 % (ref 0–5)
GFR AFRICAN AMERICAN: 56
GFR NON-AFRICAN AMERICAN: 46
GLUCOSE BLD-MCNC: 104 MG/DL (ref 74–109)
HCT VFR BLD CALC: 46.9 % (ref 42–52)
HEMOGLOBIN: 15.2 G/DL (ref 14–18)
IMMATURE GRANULOCYTES #: 0 K/UL
LYMPHOCYTES ABSOLUTE: 2.1 K/UL (ref 1.1–4.5)
LYMPHOCYTES RELATIVE PERCENT: 22.9 % (ref 20–40)
MCH RBC QN AUTO: 30.2 PG (ref 27–31)
MCHC RBC AUTO-ENTMCNC: 32.4 G/DL (ref 33–37)
MCV RBC AUTO: 93.2 FL (ref 80–94)
MONOCYTES ABSOLUTE: 0.7 K/UL (ref 0–0.9)
MONOCYTES RELATIVE PERCENT: 7.8 % (ref 0–10)
NEUTROPHILS ABSOLUTE: 5.3 K/UL (ref 1.5–7.5)
NEUTROPHILS RELATIVE PERCENT: 58.7 % (ref 50–65)
PDW BLD-RTO: 13.8 % (ref 11.5–14.5)
PLATELET # BLD: 206 K/UL (ref 130–400)
PMV BLD AUTO: 10.9 FL (ref 9.4–12.4)
POTASSIUM SERPL-SCNC: 3.4 MMOL/L (ref 3.5–5)
RBC # BLD: 5.03 M/UL (ref 4.7–6.1)
SODIUM BLD-SCNC: 142 MMOL/L (ref 136–145)
T4 FREE: 1.2 NG/DL (ref 0.93–1.7)
TOTAL PROTEIN: 6.7 G/DL (ref 6.6–8.7)
TSH SERPL DL<=0.05 MIU/L-ACNC: 1.74 UIU/ML (ref 0.27–4.2)
WBC # BLD: 9.1 K/UL (ref 4.8–10.8)

## 2020-11-18 PROCEDURE — 4040F PNEUMOC VAC/ADMIN/RCVD: CPT | Performed by: CLINICAL NURSE SPECIALIST

## 2020-11-18 PROCEDURE — 99214 OFFICE O/P EST MOD 30 MIN: CPT | Performed by: CLINICAL NURSE SPECIALIST

## 2020-11-18 PROCEDURE — 3017F COLORECTAL CA SCREEN DOC REV: CPT | Performed by: CLINICAL NURSE SPECIALIST

## 2020-11-18 PROCEDURE — G8420 CALC BMI NORM PARAMETERS: HCPCS | Performed by: CLINICAL NURSE SPECIALIST

## 2020-11-18 PROCEDURE — 0296T PR EXT ECG > 48HR TO 21 DAY RCRD W/CONECT INTL RCRD: CPT | Performed by: CLINICAL NURSE SPECIALIST

## 2020-11-18 PROCEDURE — 93000 ELECTROCARDIOGRAM COMPLETE: CPT | Performed by: CLINICAL NURSE SPECIALIST

## 2020-11-18 PROCEDURE — G8427 DOCREV CUR MEDS BY ELIG CLIN: HCPCS | Performed by: CLINICAL NURSE SPECIALIST

## 2020-11-18 PROCEDURE — 4004F PT TOBACCO SCREEN RCVD TLK: CPT | Performed by: CLINICAL NURSE SPECIALIST

## 2020-11-18 PROCEDURE — G8484 FLU IMMUNIZE NO ADMIN: HCPCS | Performed by: CLINICAL NURSE SPECIALIST

## 2020-11-18 PROCEDURE — 1123F ACP DISCUSS/DSCN MKR DOCD: CPT | Performed by: CLINICAL NURSE SPECIALIST

## 2020-11-18 RX ORDER — NITROGLYCERIN 0.4 MG/1
TABLET SUBLINGUAL
Qty: 25 TABLET | Refills: 3 | Status: SHIPPED | OUTPATIENT
Start: 2020-11-18 | End: 2022-01-18 | Stop reason: SDUPTHER

## 2020-11-18 NOTE — PROGRESS NOTES
[facility] Cardiology  [facility] [address]  [address]  Phone: [phone]  Fax: [phone]    OFFICE VISIT:  2020    Mar Huber - : 1950    Reason For Visit:  Radha Luciano is a 65 y.o. male who is here for 6 Month Follow-Up; Tachycardia (SVT ); and Coronary Artery Disease  History coronary disease with angioplasty and stent in   History of SVT, hypertension, tobacco abuse  Patient had virtual visit in May with Dr. Jennifer Pat. Recommended stress testing due to ongoing tobacco abuse and risk factors    Returns today for follow-up. He states he is active and doing well. He is not noticed any problems or change of activity tolerance    Subjective  Radha Luciaon denies exertional chest pain, shortness of breath, orthopnea, paroxysmal nocturnal dyspnea, syncope, presyncope, arrhythmia, edema and fatigue. The patient denies numbness or weakness to suggest cerebrovascular accident or transient ischemic attack. Marianne Martinez MD is PCP.   Mar Huber has the following history as recorded in [facility]:    Patient Active Problem List    Diagnosis Date Noted    NSTEMI (non-ST elevated myocardial infarction) [facility])      Priority: High    Ventricular tachycardia ([facility] [address]      Priority: High    CKD (chronic kidney disease) stage 3, GFR 30-59 ml/min 2019    Mild episode of recurrent major depressive disorder ([facility] [address] 2019    Mixed hyperlipidemia 2018    History of coronary artery stent placement 2018    CAD (coronary artery disease)     Smoker     SVT (supraventricular tachycardia) ([facility] [address] 2017    Essential hypertension 2016    Premature ventricular contractions 2016    Premature atrial complexes 2016    Chest pain 2016    Palpitations 2016    Referral of patient 2016    Atypical chest pain 2016    History of adenomatous polyp of colon 2013    COPD (chronic obstructive pulmonary disease) ([facility] [address] 2013    History of gastric ulcer 06/27/2013     Past Medical History:   Diagnosis Date    Arthritis     knees    CAD (coronary artery disease)     Colon polyp     COPD (chronic obstructive pulmonary disease) (HCC)     slight    Heart attack ([facility] [address] 07/06/2017    History of blood transfusion     History of colon polyps     tubular adenoma, hyperplastic, distal rectum, duodenum    History of transfusion     Hyperlipidemia     Hypertension     Joint pain     Palpitations     Pinched nerve     right shoulder    Pinched nerve in neck     Tobacco abuse     Ulcer      Past Surgical History:   Procedure Laterality Date    COLONOSCOPY  12-10-08    Dr Carl Clark COLONOSCOPY  8-27-01    Dr Carl Clark COLONOSCOPY  07/2013    Dr. Lori Terry: multiple polyps, adenomatous and hyperplastic (3 yr)    CORONARY ANGIOPLASTY WITH STENT PLACEMENT  07/06/2017    ENDOSCOPY, COLON, DIAGNOSTIC      HEMORRHOID SURGERY      JOINT REPLACEMENT Left 06/2012    knee    KNEE ARTHROSCOPY      bilateral-3 scopes on right and 1 on left    NECK SURGERY      PAROTIDECTOMY  03/31/2017    SKIN BIOPSY      lypomas on arms, side, and back    TOTAL KNEE ARTHROPLASTY      left    UPPER GASTROINTESTINAL ENDOSCOPY  8-27-01    Dr Joe Police N/A 3/31/2016    Dr Lily Eastman (-), Chronic esophagitis     Family History   Problem Relation Age of Onset    Other Sister         pancrease removal    Cancer Father         Larynx and asbestos exposure    Esophageal Cancer Father     Heart Disease Mother         has pacemaker    Diabetes Mother     Other Mother         has had esoph stretched in the past    Colon Polyps Neg Hx     Colon Cancer Neg Hx     Liver Cancer Neg Hx     Liver Disease Neg Hx     Rectal Cancer Neg Hx     Stomach Cancer Neg Hx      Social History     Tobacco Use    Smoking status: Current Every Day Smoker     Packs/day: 0.50     Years: 15.00     Pack years: 7.50     Types: Cigarettes    Smokeless tobacco: Never Used   Substance Use Topics    Alcohol use: Yes     Alcohol/week: 0.0 standard drinks     Comment: occ      Current Outpatient Medications   Medication Sig Dispense Refill    nitroGLYCERIN (NITROSTAT) 0.4 MG SL tablet up to max of 3 total doses. If no relief after 1 dose, call 911. 25 tablet 3    verapamil (CALAN SR) 180 MG extended release tablet Take 1 tablet by mouth 2 times daily TAKE 1 TABLET BY MOUTH ONCE DAILY AT NIGHT 60 tablet 11    apixaban (ELIQUIS) 5 MG TABS tablet Take 1 tablet by mouth 2 times daily 60 tablet 5    escitalopram (LEXAPRO) 20 MG tablet Take 1 tablet by mouth daily 30 tablet 3    tadalafil (CIALIS) 5 MG tablet Take 1 tablet by mouth daily as needed for Erectile Dysfunction 30 tablet 2    tiZANidine (ZANAFLEX) 4 MG tablet Take 1 tablet by mouth every 8 hours as needed (muscle relaxer) 90 tablet 1    pantoprazole (PROTONIX) 40 MG tablet Take 1 tablet by mouth every morning (before breakfast) 30 tablet 2    sucralfate (CARAFATE) 1 GM tablet Take 1 tablet by mouth 4 times daily 120 tablet 1    vitamin D (ERGOCALCIFEROL) 1.25 MG (36304 UT) CAPS capsule TAKE 1 CAPSULE BY MOUTH ONCE WEEKLY. TAKE AFTER A MEAL. (Patient taking differently: TAKE 1 CAPSULE BY MOUTH EVERY OTHER WEEK. TAKE AFTER A MEAL. ) 4 capsule 11    atorvastatin (LIPITOR) 40 MG tablet Take 1 tablet by mouth daily 30 tablet 11    lisinopril (PRINIVIL;ZESTRIL) 5 MG tablet TAKE 1 TABLET BY MOUTH ONCE DAILY 30 tablet 11     No current facility-administered medications for this visit. Allergies: Tape [facility] tape]    Review of Systems  Constitutional - no significant activity change, appetite change, or unexpected weight change. No fever, chills or diaphoresis. No fatigue. HEENT - no significant rhinorrhea or epistaxis. No tinnitus or significant hearing loss. Eyes - no sudden vision change or amaurosis. Respiratory - no significant wheezing, stridor, apnea or cough.   No dyspnea on exertion or shortness of breath. Cardiovascular - no exertional chest pain, orthopnea or PND. No sensation of arrhythmia or slow heart rate. No claudication or leg edema. Gastrointestinal - no abdominal swelling or pain. No blood in stool. No severe constipation, diarrhea, nausea, or vomiting. Genitourinary - no difficulty urinating, dysuria, frequency, or urgency. No flank pain or hematuria. Musculoskeletal - no back pain, gait disturbance, or myalgia. Skin - no color change or rash. No pallor. No new surgical incision. Neurologic - no speech difficulty, facial asymmetry or lateralizing weakness. No seizures, presyncope, syncope, or significant dizziness. Hematologic - no easy bruising or excessive bleeding. Psychiatric - no severe anxiety or insomnia. No confusion. All other review of systems are negative. Objective  Vital Signs - /64   Pulse 157   Ht 6' (1.829 m)   Wt 178 lb (80.7 kg)   BMI 24.14 kg/m²   General - [place] is alert, cooperative, and pleasant. Well groomed. No acute distress. Body habitus is normal.  HEENT - The head is normocephalic. No circumoral cyanosis. Dentition is normal.   EYES -  No Xanthelasma, no arcus senilis, no conjunctival hemorrhages or discharge. Neck - Supple, without increased jugular venous pressures. No carotid bruits. No mass. Respiratory - Lungs are clear bilaterally. No wheezes or rales. Normal effort without use of accessory muscles. Cardiovascular - Heart has iregular rhythm and tachycardic rate. No murmurs, rubs or gallops. + pedal pulses and no varicosities. Abdominal -  Soft, nontender, nondistended. Bowel sounds are intact. Extremities - No clubbing, cyanosis, or  edema. Musculoskeletal -  No clubbing . No Osler's nodes. Gait normal .  No kyphosis or scoliosis. Skin -  no statis ulcers or dermatitis. Neurological - No focal signs are identified. Oriented to person, place and time.     Psychiatric - Appropriate affect and mood. Assessment:     Diagnosis Orders   1. SVT (supraventricular tachycardia) (HCC)  EKG 12 lead   2. Persistent atrial fibrillation (HCC)  NY EXT ECG > 48HR TO 21 DAY RCRD W/CONECT INTL RCRD ([facility] Recording)    TSH without Reflex    T4, Free    Comprehensive Metabolic Panel    CBC Auto Differential    ECHO Complete 2D W Doppler W Color   3. Essential hypertension  verapamil (CALAN SR) 180 MG extended release tablet    ECHO Complete 2D W Doppler W Color   4. Tobacco abuse     5. Coronary artery disease involving native coronary artery of native heart without angina pectoris       Data:  BP Readings from Last 3 Encounters:   11/18/20 118/64   06/23/20 127/68   02/06/20 132/74    Pulse Readings from Last 3 Encounters:   11/18/20 157   02/06/20 86   11/04/19 66        Wt Readings from Last 3 Encounters:   11/18/20 178 lb (80.7 kg)   06/23/20 179 lb (81.2 kg)   05/08/20 180 lb (81.6 kg)     EKG today shows atrial fibrillation with a rate of 157. Previously has had some frequent PACs SVT but this is a new finding for him. He is asymptomatic  No recent labs. We will check electrolytes and thyroid along with CBC  Last creatinine was elevated at 1.5. Will need to reassess to evaluate for anticoagulation dosing  We will increase his verapamil to get better rate control  [id number] Score for Atrial Fibrillation Stroke Risk   Risk   Factors  Component Value   C CHF No 0   H HTN Yes 1   A2 Age >= 76 No,  (71 y.o.) 0   D DM No 0   S2 Prior Stroke/TIA No 0   V Vascular Disease Yes 1   A Age 74-69 Yes,  (71 y.o.) 1   Sc Sex male 0    [id number]  Score  3   Score last updated 11/19/20 9:35 AM CST    We will start Eliquis 5 mg twice a day for primary prevention of stroke. Discussed the importance and rationale for this medication    May be having paroxysmal arrhythmia.   We will have him wear a monitor for 48 hours to assess for A. fib burden    2D echo after wearing monitor to assess for any structural abnormality. At this time we will hold off on stress testing until rate is better controlled    Lab Results   Component Value Date     11/18/2020    K 3.4 (L) 11/18/2020     11/18/2020    CO2 24 11/18/2020    BUN 17 11/18/2020    CREATININE 1.5 (H) 11/18/2020    GLUCOSE 104 11/18/2020    CALCIUM 9.0 11/18/2020    PROT 6.7 11/18/2020    LABALBU 4.1 11/18/2020    BILITOT <0.2 11/18/2020    ALKPHOS 105 11/18/2020    AST 12 11/18/2020    ALT 15 11/18/2020    LABGLOM 46 (A) 11/18/2020    GFRAA 56 (L) 11/18/2020    GLOB 3.0 12/16/2016       Lab Results   Component Value Date    WBC 9.1 11/18/2020    HGB 15.2 11/18/2020    HCT 46.9 11/18/2020    MCV 93.2 11/18/2020     11/18/2020   TSH 1.74  T4, free 1.2      Plan  Labs today -electrolytes and thyroid  Increase your verapamil to twice a day  Monitor heart rate and blood pressure at home if able  Start Eliquis 5 mg twice a day, approximately 12 hours apart. This is to help prevent blood clot with your arrhythmia  Stop aspirin  Wear monitor for 48 hours. Press button for any symptoms of any palpitations or dizziness or irregular heartbeat    2D echo next week to assess heart size and function  Follow up in 4 weeks  Call with any questions or concerns  Follow up with Tracey Bryson MD for non cardiac problems  Report any new problems  Cardiovascular Fitness-Exercise as tolerated. Strive for 30 minutes of exercise most days of the week. Cardiac / Healthy Diet  Continue current medications as directed  Continue plan of treatment  It is always recommended that you bring your medications bottles with you to each visit - this is for your safety! KEVYN Gaitan    EMR dragon/transcription disclaimer: Much of this encounter note is electronic transcription/translation of spoken language to printed tach. Electronic translation of spoken language may be erroneous, or at times, nonsensical words or phrases may be inadvertently transcribed. Although, I have reviewed the note for such errors, some may still exist.

## 2020-11-18 NOTE — PATIENT INSTRUCTIONS
Labs today -electrolytes and thyroid    Increase your verapamil to twice a day    Monitor heart rate and blood pressure at home if able  Start Eliquis 5 mg twice a day, approximately 12 hours apart. This is to help prevent blood clot with your arrhythmia    Stop the low-dose aspirin    Wear monitor for 48 hours. Press button for any symptoms of any palpitations or dizziness or irregular heartbeat    2D echo next week to assess heart size and function  Follow up in 4 weeks  Call with any questions or concerns  Follow up with Carlos Snell MD for non cardiac problems  Report any new problems  Cardiovascular Fitness-Exercise as tolerated. Strive for 30 minutes of exercise most days of the week. Cardiac / Healthy Diet  Continue current medications as directed  Continue plan of treatment  It is always recommended that you bring your medications bottles with you to each visit - this is for your safety! Patient Education        Atrial Fibrillation: Care Instructions  Your Care Instructions     Atrial fibrillation is an irregular and often fast heartbeat. Treating this condition is important for several reasons. It can cause blood clots, which can travel from your heart to your brain and cause a stroke. If you have a fast heartbeat, you may feel lightheaded, dizzy, and weak. An irregular heartbeat can also increase your risk for heart failure. Atrial fibrillation is often the result of another heart condition, such as high blood pressure or coronary artery disease. Making changes to improve your heart condition will help you stay healthy and active. Follow-up care is a key part of your treatment and safety. Be sure to make and go to all appointments, and call your doctor if you are having problems. It's also a good idea to know your test results and keep a list of the medicines you take. How can you care for yourself at home? Medicines    · Take your medicines exactly as prescribed.  Call your doctor if you think you are having a problem with your medicine. You will get more details on the specific medicines your doctor prescribes.     · If your doctor has given you a blood thinner to prevent a stroke, be sure you get instructions about how to take your medicine safely. Blood thinners can cause serious bleeding problems.     · Do not take any vitamins, over-the-counter drugs, or herbal products without talking to your doctor first.   Lifestyle changes    · Do not smoke. Smoking can increase your chance of a stroke and heart attack. If you need help quitting, talk to your doctor about stop-smoking programs and medicines. These can increase your chances of quitting for good.     · Eat a heart-healthy diet.     · Stay at a healthy weight. Lose weight if you need to.     · Limit alcohol to 2 drinks a day for men and 1 drink a day for women. Too much alcohol can cause health problems.     · Avoid colds and flu. Get a pneumococcal vaccine shot. If you have had one before, ask your doctor whether you need another dose. Get a flu shot every year. If you must be around people with colds or flu, wash your hands often. Activity    · If your doctor recommends it, get more exercise. Walking is a good choice. Bit by bit, increase the amount you walk every day. Try for at least 30 minutes on most days of the week. You also may want to swim, bike, or do other activities. Your doctor may suggest that you join a cardiac rehabilitation program so that you can have help increasing your physical activity safely.     · Start light exercise if your doctor says it is okay. Even a small amount will help you get stronger, have more energy, and manage stress. Walking is an easy way to get exercise. Start out by walking a little more than you did in the hospital. Gradually increase the amount you walk.     · When you exercise, watch for signs that your heart is working too hard.  You are pushing too hard if you cannot talk while you are exercising. If you become short of breath or dizzy or have chest pain, sit down and rest immediately.     · Check your pulse regularly. Place two fingers on the artery at the palm side of your wrist, in line with your thumb. If your heartbeat seems uneven or fast, talk to your doctor. When should you call for help? Call 911 anytime you think you may need emergency care. For example, call if:    · You have symptoms of a heart attack. These may include:  ? Chest pain or pressure, or a strange feeling in the chest.  ? Sweating. ? Shortness of breath. ? Nausea or vomiting. ? Pain, pressure, or a strange feeling in the back, neck, jaw, or upper belly or in one or both shoulders or arms. ? Lightheadedness or sudden weakness. ? A fast or irregular heartbeat. After you call 911, the  may tell you to chew 1 adult-strength or 2 to 4 low-dose aspirin. Wait for an ambulance. Do not try to drive yourself.     · You have symptoms of a stroke. These may include:  ? Sudden numbness, tingling, weakness, or loss of movement in your face, arm, or leg, especially on only one side of your body. ? Sudden vision changes. ? Sudden trouble speaking. ? Sudden confusion or trouble understanding simple statements. ? Sudden problems with walking or balance. ? A sudden, severe headache that is different from past headaches.     · You passed out (lost consciousness). Call your doctor now or seek immediate medical care if:    · You have new or increased shortness of breath.     · You feel dizzy or lightheaded, or you feel like you may faint.     · Your heart rate becomes irregular.     · You can feel your heart flutter in your chest or skip heartbeats. Tell your doctor if these symptoms are new or worse. Watch closely for changes in your health, and be sure to contact your doctor if you have any problems. Where can you learn more? Go to [email or web address] org and sign in to your [employer] account.  Enter U020 in the [facility] box to learn more about \"Atrial Fibrillation: Care Instructions. \"     If you do not have an account, please click on the \"Sign Up Now\" link. Current as of: December 16, 2019               Content Version: 12.6  © 3905-7594 [employer]. Care instructions adapted under license by [facility] ([facility]). If you have questions about a medical condition or this instruction, always ask your healthcare professional. [place] [address] any warranty or liability for your use of this information. [place] at the [address] [place] [address] and [address] [facility] located on the first floor of [place] [address] through hospital main entrance and turn immediately to your left. Date/Time:  Mon Nov 23rd arrive 8:15 A. M for 8:45 A.M    Echocardiogram -  No prep. Takes approximately 30 min. An echocardiogram uses sound waves to produce images of your heart. This commonly used test allows your doctor to see how your heart is beating and pumping blood. Your doctor can use the images from an echocardiogram to identify various abnormalities in the heart muscle and valves. This test has 2 parts:   Ø You will be asked to disrobe from the waist up and given a gown to wear. The technologist will then hook up an EKG monitor to you for the entire exam.   Ø You will then have an ultrasound of your heart (echocardiogram) to assess the heart muscle, heart valves and heart function. You may eat and take any medicines before the exam.     If you need to change your appointment, please call outpatient scheduling at [phone].

## 2020-11-23 ENCOUNTER — HOSPITAL ENCOUNTER (OUTPATIENT)
Dept: NON INVASIVE DIAGNOSTICS | Age: 70
Discharge: HOME | End: 2020-11-23
Payer: MEDICARE

## 2020-11-23 LAB
LV EF: 55 %
LVEF MODALITY: NORMAL

## 2020-11-23 PROCEDURE — 93306 TTE W/DOPPLER COMPLETE: CPT

## 2020-12-16 ENCOUNTER — TELEMEDICINE (OUTPATIENT)
Dept: CARDIOLOGY | Age: 70
End: 2020-12-16
Payer: MEDICARE

## 2020-12-16 PROCEDURE — 99214 OFFICE O/P EST MOD 30 MIN: CPT | Performed by: CLINICAL NURSE SPECIALIST

## 2020-12-16 PROCEDURE — 3017F COLORECTAL CA SCREEN DOC REV: CPT | Performed by: CLINICAL NURSE SPECIALIST

## 2020-12-16 PROCEDURE — 1123F ACP DISCUSS/DSCN MKR DOCD: CPT | Performed by: CLINICAL NURSE SPECIALIST

## 2020-12-16 PROCEDURE — G8427 DOCREV CUR MEDS BY ELIG CLIN: HCPCS | Performed by: CLINICAL NURSE SPECIALIST

## 2020-12-16 PROCEDURE — 4040F PNEUMOC VAC/ADMIN/RCVD: CPT | Performed by: CLINICAL NURSE SPECIALIST

## 2020-12-16 NOTE — PROGRESS NOTES
2020    TELEHEALTH EVALUATION -- Audio/Visual (During FHVEF-75 public health emergency)  Patient located in their home, provider located at [id number] [facility] cardiology office    HPI:    Hadley Duong (:  1950) has requested an audio/video evaluation for the following concern(s):    Chief Complaint   Patient presents with    Follow-up     no cardiac symptoms    Atrial Fibrillation    Tachycardia    Coronary Artery Disease     History coronary disease with angioplasty and stent in 2017  History of SVT, hypertension, tobacco abuse  Patient had virtual visit in May with Dr. Vida Vargas. Recommended stress testing due to ongoing tobacco abuse and risk factors    He was seen for routine office visit 2020 and found to be in atrial fibrillation. He was started on anticoagulation with Eliquis. Labs were checked-see below, thyroid was normal.  Verapamil was increased to twice a day to help with rate control. Patient more monitor for 48 hours that showed underlying rhythm sinus with average heart rate 66 but did have a 3% A. fib burden with significant SVT episodes as well. 2D echo 2020   LV is normal in size with normal systolic function. LV ejection fraction   estimated at 55%. Mild concentric LVH. RV appears normal in size with normal systolic function. Mild left atrial enlargement. Normal right atrial size. Aortic valve is trileaflet with moderate noncoronary leaflet tip   thickening but normal leaflet mobility. No stenosis. No significant   regurgitation. Mitral valve is structurally normal with normal leaflet mobility. No   stenosis. Mild mitral regurgitation. Mild tricuspid regurgitation. No significant pericardial effusion. Follow-up today for video. He states he feels well. He is not noticed any arrhythmia. He states he is active with no change of activity tolerance. He denies any chest pain, shortness of breath, fluid retention or dizziness  Continues to smoke.   He reports taking the increased verapamil as well as the Eliquis. He is not noticed any abnormal bleeding    Review of Systems    Prior to Visit Medications    Medication Sig Taking? Authorizing Provider   nitroGLYCERIN (NITROSTAT) 0.4 MG SL tablet up to max of 3 total doses. If no relief after 1 dose, call 911. Yes KEVYN Antoine   verapamil (CALAN SR) 180 MG extended release tablet Take 1 tablet by mouth 2 times daily TAKE 1 TABLET BY MOUTH ONCE DAILY AT NIGHT Yes KEVYN Antoine   apixaban (ELIQUIS) 5 MG TABS tablet Take 1 tablet by mouth 2 times daily Yes KEVYN Antoine   escitalopram (LEXAPRO) 20 MG tablet Take 1 tablet by mouth daily Yes Asya Adams MD   tadalafil (CIALIS) 5 MG tablet Take 1 tablet by mouth daily as needed for Erectile Dysfunction Yes Asya Adams MD   tiZANidine (ZANAFLEX) 4 MG tablet Take 1 tablet by mouth every 8 hours as needed (muscle relaxer) Yes Asya Adams MD   pantoprazole (PROTONIX) 40 MG tablet Take 1 tablet by mouth every morning (before breakfast) Yes Asya Adams MD   sucralfate (CARAFATE) 1 GM tablet Take 1 tablet by mouth 4 times daily Yes Asya Adams MD   vitamin D (ERGOCALCIFEROL) 1.25 MG (27813 UT) CAPS capsule TAKE 1 CAPSULE BY MOUTH ONCE WEEKLY. TAKE AFTER A MEAL. Patient taking differently: TAKE 1 CAPSULE BY MOUTH EVERY OTHER WEEK. TAKE AFTER A MEAL. Yes Asya Adams MD   atorvastatin (LIPITOR) 40 MG tablet Take 1 tablet by mouth daily Yes Asya Adams MD   lisinopril (PRINIVIL;ZESTRIL) 5 MG tablet TAKE 1 TABLET BY MOUTH ONCE DAILY Yes Asya Adams MD       Social History     Tobacco Use    Smoking status: Current Every Day Smoker     Packs/day: 0.50     Years: 15.00     Pack years: 7.50     Types: Cigarettes    Smokeless tobacco: Never Used   Substance Use Topics    Alcohol use:  Yes     Alcohol/week: 0.0 standard drinks     Comment: occ    Drug use: No        Allergies   Allergen Reactions    Tape Bernabe Mejia Tape] Shawanda maciel     ,   Past Medical History:   Diagnosis Date    Arthritis     knees    CAD (coronary artery disease)     Colon polyp     COPD (chronic obstructive pulmonary disease) (HCC)     slight    Heart attack ([facility] [address] 07/06/2017    History of blood transfusion     History of colon polyps     tubular adenoma, hyperplastic, distal rectum, duodenum    History of transfusion     Hyperlipidemia     Hypertension     Joint pain     Palpitations     Pinched nerve     right shoulder    Pinched nerve in neck     Tobacco abuse     Ulcer    ,   Past Surgical History:   Procedure Laterality Date    COLONOSCOPY  12-10-08    Dr Darlyn Steen COLONOSCOPY  8-27-01    Dr Darlyn Steen COLONOSCOPY  07/2013    Dr. Toya Wilsonign: multiple polyps, adenomatous and hyperplastic (3 yr)    CORONARY ANGIOPLASTY WITH STENT PLACEMENT  07/06/2017    ENDOSCOPY, COLON, DIAGNOSTIC      HEMORRHOID SURGERY      JOINT REPLACEMENT Left 06/2012    knee    KNEE ARTHROSCOPY      bilateral-3 scopes on right and 1 on left    NECK SURGERY      PAROTIDECTOMY  03/31/2017    SKIN BIOPSY      lypomas on arms, side, and back    TOTAL KNEE ARTHROPLASTY      left    UPPER GASTROINTESTINAL ENDOSCOPY  8-27-01    Dr Susu Leung ENDOSCOPY N/A 3/31/2016    Dr Jose Taylor (-), Chronic esophagitis   ,   Family History   Problem Relation Age of Onset    Other Sister         pancrease removal    Cancer Father         Larynx and asbestos exposure    Esophageal Cancer Father     Heart Disease Mother         has pacemaker    Diabetes Mother     Other Mother         has had esoph stretched in the past    Colon Polyps Neg Hx     Colon Cancer Neg Hx     Liver Cancer Neg Hx     Liver Disease Neg Hx     Rectal Cancer Neg Hx     Stomach Cancer Neg Hx        PHYSICAL EXAMINATION:  [ INSTRUCTIONS:  \"[x]\" Indicates a positive item  \"[]\" Indicates a negative item  -- DELETE ALL ITEMS NOT EXAMINED]  Vital Signs: (As obtained by patient/caregiver or practitioner especially when it is asymptomatic    2. SVT (supraventricular tachycardia) ([facility] [address]  Did have some SVT episodes as well noted on his monitor. 3. Essential hypertension  Blood pressure elevated today but mostly has been controlled. On CCB and ACE inhibitor    4. Tobacco abuse  Continues to smoke    5. Coronary artery disease involving native coronary artery of native heart without angina pectoris  On CCB, ACE inhibitor and statin. Now anticoagulated on Eliquis. With new onset of A. fib will need to get stress test at some point in the near future as it is been shown to be paroxysmal but no recent evaluation of ischemic disease with ongoing smoking. Stress test was originally ordered by Dr. Val Camp prior to seeing me in October but was not completed. We will get one before he has follow-up in 3 months      No follow-ups on file. An  electronic signature was used to authenticate this note. --KEVYN Kim on 12/16/2020 at 3:54 PM        Pursuant to the emergency declaration under the [facility] waiver authority and the [employer] Act, this Virtual  Visit was conducted, with patient's consent, to reduce the patient's risk of exposure to COVID-19 and provide continuity of care for an established patient. Medical assistantDrake phone patient prior to visit to verify medications and go over complaints and update chart. Services were provided through a video synchronous discussion virtually to substitute for in-person clinic visit.

## 2021-02-17 DIAGNOSIS — E55.9 VITAMIN D DEFICIENCY: ICD-10-CM

## 2021-02-17 RX ORDER — ERGOCALCIFEROL 1.25 MG/1
CAPSULE ORAL
Qty: 4 CAPSULE | Refills: 2 | Status: SHIPPED | OUTPATIENT
Start: 2021-02-17 | End: 2021-04-05 | Stop reason: SDUPTHER

## 2021-03-01 DIAGNOSIS — F33.1 MODERATE EPISODE OF RECURRENT MAJOR DEPRESSIVE DISORDER (HCC): ICD-10-CM

## 2021-03-01 DIAGNOSIS — E78.2 MIXED HYPERLIPIDEMIA: ICD-10-CM

## 2021-03-01 DIAGNOSIS — F41.9 ANXIETY: ICD-10-CM

## 2021-03-01 DIAGNOSIS — I10 ESSENTIAL HYPERTENSION: ICD-10-CM

## 2021-03-01 DIAGNOSIS — I25.10 CORONARY ARTERY DISEASE INVOLVING NATIVE CORONARY ARTERY OF NATIVE HEART WITHOUT ANGINA PECTORIS: ICD-10-CM

## 2021-03-02 RX ORDER — ATORVASTATIN CALCIUM 40 MG/1
TABLET, FILM COATED ORAL
Qty: 30 TABLET | Refills: 0 | Status: SHIPPED | OUTPATIENT
Start: 2021-03-02 | End: 2021-03-29

## 2021-03-02 RX ORDER — LISINOPRIL 5 MG/1
TABLET ORAL
Qty: 30 TABLET | Refills: 0 | Status: SHIPPED | OUTPATIENT
Start: 2021-03-02 | End: 2021-03-29

## 2021-03-02 RX ORDER — ESCITALOPRAM OXALATE 20 MG/1
TABLET ORAL
Qty: 17 TABLET | Refills: 0 | Status: SHIPPED | OUTPATIENT
Start: 2021-03-02 | End: 2021-03-08

## 2021-03-06 DIAGNOSIS — F41.9 ANXIETY: ICD-10-CM

## 2021-03-06 DIAGNOSIS — F33.1 MODERATE EPISODE OF RECURRENT MAJOR DEPRESSIVE DISORDER (HCC): ICD-10-CM

## 2021-03-08 RX ORDER — ESCITALOPRAM OXALATE 20 MG/1
TABLET ORAL
Qty: 17 TABLET | Refills: 0 | Status: SHIPPED | OUTPATIENT
Start: 2021-03-08 | End: 2021-04-05 | Stop reason: SDUPTHER

## 2021-03-15 ENCOUNTER — HOSPITAL ENCOUNTER (OUTPATIENT)
Dept: NON INVASIVE DIAGNOSTICS | Age: 71
Discharge: HOME | End: 2021-03-15
Payer: MEDICARE

## 2021-03-15 VITALS
HEART RATE: 59 BPM | WEIGHT: 178 LBS | BODY MASS INDEX: 24.14 KG/M2 | DIASTOLIC BLOOD PRESSURE: 77 MMHG | SYSTOLIC BLOOD PRESSURE: 132 MMHG

## 2021-03-15 DIAGNOSIS — I25.10 CORONARY ARTERY DISEASE INVOLVING NATIVE CORONARY ARTERY OF NATIVE HEART WITHOUT ANGINA PECTORIS: ICD-10-CM

## 2021-03-15 DIAGNOSIS — I10 ESSENTIAL HYPERTENSION: ICD-10-CM

## 2021-03-15 DIAGNOSIS — I47.1 SVT (SUPRAVENTRICULAR TACHYCARDIA) (HCC): ICD-10-CM

## 2021-03-15 DIAGNOSIS — Z72.0 TOBACCO ABUSE: ICD-10-CM

## 2021-03-15 PROCEDURE — 2580000003 HC RX 258: Performed by: INTERNAL MEDICINE

## 2021-03-15 PROCEDURE — 2500000003 HC RX 250 WO HCPCS: Performed by: INTERNAL MEDICINE

## 2021-03-15 PROCEDURE — 93350 STRESS TTE ONLY: CPT

## 2021-03-15 PROCEDURE — 6360000002 HC RX W HCPCS: Performed by: INTERNAL MEDICINE

## 2021-03-15 RX ORDER — SODIUM CHLORIDE 9 MG/ML
INJECTION, SOLUTION INTRAVENOUS
Status: COMPLETED | OUTPATIENT
Start: 2021-03-15 | End: 2021-03-15

## 2021-03-15 RX ORDER — METOPROLOL TARTRATE 5 MG/5ML
5 INJECTION INTRAVENOUS PRN
Status: DISCONTINUED | OUTPATIENT
Start: 2021-03-15 | End: 2021-03-16 | Stop reason: HOSPADM

## 2021-03-15 RX ORDER — DOBUTAMINE HYDROCHLORIDE 200 MG/100ML
2.5-4 INJECTION INTRAVENOUS CONTINUOUS PRN
Status: DISCONTINUED | OUTPATIENT
Start: 2021-03-15 | End: 2021-03-16 | Stop reason: HOSPADM

## 2021-03-15 RX ADMIN — SODIUM CHLORIDE: 9 INJECTION, SOLUTION INTRAVENOUS at 09:20

## 2021-03-15 RX ADMIN — DOBUTAMINE HYDROCHLORIDE 10 MCG/KG/MIN: 200 INJECTION INTRAVENOUS at 09:20

## 2021-03-15 RX ADMIN — METOPROLOL TARTRATE 3 MG: 1 INJECTION, SOLUTION INTRAVENOUS at 09:31

## 2021-03-29 DIAGNOSIS — I10 ESSENTIAL HYPERTENSION: ICD-10-CM

## 2021-03-29 DIAGNOSIS — E78.2 MIXED HYPERLIPIDEMIA: ICD-10-CM

## 2021-03-29 DIAGNOSIS — I25.10 CORONARY ARTERY DISEASE INVOLVING NATIVE CORONARY ARTERY OF NATIVE HEART WITHOUT ANGINA PECTORIS: ICD-10-CM

## 2021-03-29 RX ORDER — ATORVASTATIN CALCIUM 40 MG/1
TABLET, FILM COATED ORAL
Qty: 30 TABLET | Refills: 0 | Status: SHIPPED | OUTPATIENT
Start: 2021-03-29 | End: 2021-04-05 | Stop reason: SDUPTHER

## 2021-03-29 RX ORDER — LISINOPRIL 5 MG/1
TABLET ORAL
Qty: 30 TABLET | Refills: 0 | Status: SHIPPED | OUTPATIENT
Start: 2021-03-29 | End: 2021-04-05 | Stop reason: SDUPTHER

## 2021-04-05 ENCOUNTER — VIRTUAL VISIT (OUTPATIENT)
Dept: FAMILY MEDICINE CLINIC | Age: 71
End: 2021-04-05
Payer: MEDICARE

## 2021-04-05 DIAGNOSIS — I10 ESSENTIAL HYPERTENSION: Primary | ICD-10-CM

## 2021-04-05 DIAGNOSIS — F33.1 MODERATE EPISODE OF RECURRENT MAJOR DEPRESSIVE DISORDER (HCC): ICD-10-CM

## 2021-04-05 DIAGNOSIS — F41.9 ANXIETY: ICD-10-CM

## 2021-04-05 DIAGNOSIS — E55.9 VITAMIN D DEFICIENCY: ICD-10-CM

## 2021-04-05 DIAGNOSIS — K21.9 GASTROESOPHAGEAL REFLUX DISEASE, UNSPECIFIED WHETHER ESOPHAGITIS PRESENT: ICD-10-CM

## 2021-04-05 DIAGNOSIS — R10.13 EPIGASTRIC PAIN: ICD-10-CM

## 2021-04-05 DIAGNOSIS — E78.2 MIXED HYPERLIPIDEMIA: ICD-10-CM

## 2021-04-05 DIAGNOSIS — I25.10 CORONARY ARTERY DISEASE INVOLVING NATIVE CORONARY ARTERY OF NATIVE HEART WITHOUT ANGINA PECTORIS: ICD-10-CM

## 2021-04-05 PROCEDURE — G8420 CALC BMI NORM PARAMETERS: HCPCS | Performed by: FAMILY MEDICINE

## 2021-04-05 PROCEDURE — 99214 OFFICE O/P EST MOD 30 MIN: CPT | Performed by: FAMILY MEDICINE

## 2021-04-05 PROCEDURE — 3017F COLORECTAL CA SCREEN DOC REV: CPT | Performed by: FAMILY MEDICINE

## 2021-04-05 PROCEDURE — 1123F ACP DISCUSS/DSCN MKR DOCD: CPT | Performed by: FAMILY MEDICINE

## 2021-04-05 PROCEDURE — G8428 CUR MEDS NOT DOCUMENT: HCPCS | Performed by: FAMILY MEDICINE

## 2021-04-05 PROCEDURE — 4040F PNEUMOC VAC/ADMIN/RCVD: CPT | Performed by: FAMILY MEDICINE

## 2021-04-05 PROCEDURE — 4004F PT TOBACCO SCREEN RCVD TLK: CPT | Performed by: FAMILY MEDICINE

## 2021-04-05 RX ORDER — SUCRALFATE 1 G/1
1 TABLET ORAL 4 TIMES DAILY
Qty: 120 TABLET | Refills: 1 | Status: SHIPPED | OUTPATIENT
Start: 2021-04-05 | End: 2022-04-12 | Stop reason: ALTCHOICE

## 2021-04-05 RX ORDER — ERGOCALCIFEROL 1.25 MG/1
50000 CAPSULE ORAL WEEKLY
Qty: 12 CAPSULE | Refills: 2 | Status: SHIPPED | OUTPATIENT
Start: 2021-04-05 | End: 2022-01-31

## 2021-04-05 RX ORDER — LISINOPRIL 10 MG/1
10 TABLET ORAL DAILY
Qty: 90 TABLET | Refills: 1 | Status: SHIPPED | OUTPATIENT
Start: 2021-04-05 | End: 2021-10-04

## 2021-04-05 RX ORDER — PANTOPRAZOLE SODIUM 40 MG/1
40 TABLET, DELAYED RELEASE ORAL
Qty: 90 TABLET | Refills: 1 | Status: SHIPPED | OUTPATIENT
Start: 2021-04-05 | End: 2022-01-31

## 2021-04-05 RX ORDER — ESCITALOPRAM OXALATE 20 MG/1
TABLET ORAL
Qty: 90 TABLET | Refills: 1 | Status: SHIPPED | OUTPATIENT
Start: 2021-04-05 | End: 2021-10-11 | Stop reason: SDUPTHER

## 2021-04-05 RX ORDER — ATORVASTATIN CALCIUM 40 MG/1
40 TABLET, FILM COATED ORAL DAILY
Qty: 90 TABLET | Refills: 1 | Status: SHIPPED | OUTPATIENT
Start: 2021-04-05 | End: 2021-10-11 | Stop reason: SDUPTHER

## 2021-04-05 NOTE — PROGRESS NOTES
Reji Singer (:  1950) is a 66 y.o. male,Established patient, here for evaluation of the following chief complaint(s): Anxiety and Depression      ASSESSMENT/PLAN:  1. Essential hypertension  -     lisinopril (PRINIVIL;ZESTRIL) 10 MG tablet; Take 1 tablet by mouth daily, Disp-90 tablet, R-1Normal  2. Coronary artery disease involving native coronary artery of native heart without angina pectoris  -     atorvastatin (LIPITOR) 40 MG tablet; Take 1 tablet by mouth daily, Disp-90 tablet, R-1Normal  3. Mixed hyperlipidemia  -     atorvastatin (LIPITOR) 40 MG tablet; Take 1 tablet by mouth daily, Disp-90 tablet, R-1Normal  4. Gastroesophageal reflux disease, unspecified whether esophagitis present  -     pantoprazole (PROTONIX) 40 MG tablet; Take 1 tablet by mouth every morning (before breakfast), Disp-90 tablet, R-1Normal  -     sucralfate (CARAFATE) 1 GM tablet; Take 1 tablet by mouth 4 times daily, Disp-120 tablet, R-1Normal  5. Epigastric pain  -     pantoprazole (PROTONIX) 40 MG tablet; Take 1 tablet by mouth every morning (before breakfast), Disp-90 tablet, R-1Normal  -     sucralfate (CARAFATE) 1 GM tablet; Take 1 tablet by mouth 4 times daily, Disp-120 tablet, R-1Normal  6. Moderate episode of recurrent major depressive disorder (HCC)  -     escitalopram (LEXAPRO) 20 MG tablet; TAKE 1 TABLET BY MOUTH ONCE DAILY, Disp-90 tablet, R-1Normal  7. Anxiety  -     escitalopram (LEXAPRO) 20 MG tablet; TAKE 1 TABLET BY MOUTH ONCE DAILY, Disp-90 tablet, R-1Normal  8. Vitamin D deficiency  -     vitamin D (ERGOCALCIFEROL) 1.25 MG (75392 UT) CAPS capsule; Take 1 capsule by mouth once a week, Disp-12 capsule, R-2Normal    With patient doing well with his current medications will continue with his medicine for his current medications continue to monitor with adjustments as course dictates. Discussed the importance of continued home monitoring of his blood pressure.   Discussed signs symptoms requiring medical attention. All questions were answered patient voiced understanding and agreement with plan as discussed. Return in about 3 months (around 7/5/2021), or if symptoms worsen or fail to improve. SUBJECTIVE/OBJECTIVE:  HPI   Patient has a history of arterial hypertension that he reports is doing well with his current dose of Synthroid. Denies any issues with the use of his medicine or any recent changes or symptoms. He states that his blood pressure seems to be doing well at this time. He does have a history of coronary artery disease and states that he has had a stress test as he was having some symptoms previously suggestive of a potential cardiac origin but that the stress test was all good and he is no longer having any symptoms or concerns about his heart at this time. He does have hyperlipidemia is tolerating Lipitor without any new myalgias or GI upsets. He is to watch his diet and try to stay physically active. He has Gastrosoft reflux disease and is benefiting from the use of Protonix. He does have some issues at times with epigastric pain which is eased with the use of Carafate. He reports that these medications provide him benefit and without them he sometimes has some associated GI problems. He was previously able to come off the Carafate but did have a return of his symptoms and is back on the Carafate and help calm his symptoms back down he is doing well. He was previously scheduled for an esophagogastroduodenoscopy and colonoscopy but was unable to drink the prep and the procedure was canceled. He does have a history of anxiety and depression reports that he is doing well with his current dose of Lexapro. Denies any issues with the medicine or any recent changes to his symptoms. Review of Systems   Constitutional: Negative for activity change, appetite change, fatigue and fever. HENT: Negative for congestion and rhinorrhea. Eyes: Negative for pain and discharge. Respiratory: Negative for cough and shortness of breath. Cardiovascular: Negative for chest pain and palpitations. Gastrointestinal: Negative for abdominal pain, constipation, diarrhea, nausea and vomiting. Endocrine: Negative for cold intolerance and heat intolerance. Genitourinary: Negative for dysuria and hematuria. Musculoskeletal: Positive for back pain. Negative for gait problem and neck pain. Skin: Negative for rash and wound. Neurological: Negative for syncope and weakness. Hematological: Negative for adenopathy. Does not bruise/bleed easily. Psychiatric/Behavioral: Positive for dysphoric mood. Negative for self-injury, sleep disturbance and suicidal ideas. The patient is nervous/anxious.         Patient-Reported Vitals 4/5/2021   Patient-Reported Weight 185   Patient-Reported Height 6'   Patient-Reported Systolic 820   Patient-Reported Diastolic 88   Patient-Reported Pulse 74   Patient-Reported Temperature 97.7        Physical Exam    [INSTRUCTIONS:  \"[x]\" Indicates a positive item  \"[]\" Indicates a negative item  -- DELETE ALL ITEMS NOT EXAMINED]    Constitutional: [x] Appears well-developed and well-nourished [x] No apparent distress      [] Abnormal -     Mental status: [x] Alert and awake  [x] Oriented to person/place/time [x] Able to follow commands    [] Abnormal -     Eyes:   EOM    [x]  Normal    [] Abnormal -   Sclera  [x]  Normal    [] Abnormal -          Discharge [x]  None visible   [] Abnormal -     HENT: [x] Normocephalic, atraumatic  [] Abnormal -   [x] Mouth/Throat: Mucous membranes are moist    External Ears [x] Normal  [] Abnormal -    Neck: [x] No visualized mass [] Abnormal -     Pulmonary/Chest: [x] Respiratory effort normal   [x] No visualized signs of difficulty breathing or respiratory distress        [] Abnormal -      Musculoskeletal:          [x] Normal range of motion of neck        [] Abnormal -     Neurological:        [x] No Facial Asymmetry (Cranial nerve

## 2021-04-08 ENCOUNTER — TELEPHONE (OUTPATIENT)
Dept: GASTROENTEROLOGY | Age: 71
End: 2021-04-08

## 2021-04-12 DIAGNOSIS — N52.9 ERECTILE DYSFUNCTION, UNSPECIFIED ERECTILE DYSFUNCTION TYPE: ICD-10-CM

## 2021-04-13 RX ORDER — TADALAFIL 5 MG/1
5 TABLET ORAL DAILY PRN
Qty: 30 TABLET | Refills: 2 | Status: SHIPPED | OUTPATIENT
Start: 2021-04-13 | End: 2021-10-11

## 2021-04-25 ASSESSMENT — ENCOUNTER SYMPTOMS
SHORTNESS OF BREATH: 0
ABDOMINAL PAIN: 0
VOMITING: 0
RHINORRHEA: 0
DIARRHEA: 0
EYE PAIN: 0
EYE DISCHARGE: 0
BACK PAIN: 1
CONSTIPATION: 0
NAUSEA: 0
COUGH: 0

## 2021-04-25 NOTE — PATIENT INSTRUCTIONS
Patient Education        Home Blood Pressure Test: About This Test  What is it? A home blood pressure test allows you to keep track of your blood pressure at home. Blood pressure is a measure of the force of blood against the walls of your arteries. Blood pressure readings include two numbers, such as 130/80 (say \"130 over 80\"). The first number is the systolic pressure. The second number is the diastolic pressure. Why is this test done? You may do this test at home to:  · Find out if you have high blood pressure. · Track your blood pressure if you have high blood pressure. · Track how well medicine is working to reduce high blood pressure. · Check how lifestyle changes, such as weight loss and exercise, are affecting blood pressure. How do you prepare for the test?  For at least 30 minutes before you take your blood pressure, don't exercise, drink caffeine, or smoke. Empty your bladder before the test. Sit quietly with your back straight and both feet on the floor for at least 5 minutes. This helps you take your blood pressure while you feel comfortable and relaxed. How is the test done? · If your doctor recommends it, take your blood pressure twice a day. Take it in the morning and evening. · Sit with your arm slightly bent and resting on a table so that your upper arm is at the same level as your heart. · Use the same arm each time you take your blood pressure. · Place the blood pressure cuff on the bare skin of your upper arm. You may have to roll up your sleeve, remove your arm from the sleeve, or take your shirt off. · Wrap the blood pressure cuff around your upper arm so that the lower edge of the cuff is about 1 inch above the bend of your elbow. · Do not move, talk, or text while you take your blood pressure. Follow the instructions that came with your blood pressure monitor. They might be different from the following. · Press the on/off button on the automatic monitor.  Then you may need to wait until the screen says the monitor is ready. · Press the start button. The cuff will inflate and deflate by itself. · Your blood pressure numbers will appear on the screen. · Wait one minute and take your blood pressure again. · If your monitor does not automatically save your numbers, write them in your log book, along with the date and time. Follow-up care is a key part of your treatment and safety. Be sure to make and go to all appointments, and call your doctor if you are having problems. It's also a good idea to keep a list of the medicines you take. Where can you learn more? Go to [email or web address] and sign in to your [employer] account. Enter C427 in the [employer] box to learn more about \"Home Blood Pressure Test: About This Test.\"     If you do not have an account, please click on the \"Sign Up Now\" link. Current as of: August 31, 2020               Content Version: 12.8  © 2006-2021 Healthwise, Incorporated. Care instructions adapted under license by [facility] ([facility]). If you have questions about a medical condition or this instruction, always ask your healthcare professional. [place] [address] any warranty or liability for your use of this information.

## 2021-04-27 ENCOUNTER — OFFICE VISIT (OUTPATIENT)
Dept: GASTROENTEROLOGY | Age: 71
End: 2021-04-27
Payer: MEDICARE

## 2021-04-27 VITALS
SYSTOLIC BLOOD PRESSURE: 92 MMHG | HEIGHT: 73 IN | DIASTOLIC BLOOD PRESSURE: 62 MMHG | OXYGEN SATURATION: 98 % | BODY MASS INDEX: 24.84 KG/M2 | HEART RATE: 74 BPM | WEIGHT: 187.4 LBS

## 2021-04-27 DIAGNOSIS — R10.13 EPIGASTRIC PAIN: Primary | ICD-10-CM

## 2021-04-27 DIAGNOSIS — Z86.010 HX OF COLONIC POLYPS: ICD-10-CM

## 2021-04-27 PROCEDURE — 99213 OFFICE O/P EST LOW 20 MIN: CPT | Performed by: NURSE PRACTITIONER

## 2021-04-27 PROCEDURE — G8420 CALC BMI NORM PARAMETERS: HCPCS | Performed by: NURSE PRACTITIONER

## 2021-04-27 PROCEDURE — 4004F PT TOBACCO SCREEN RCVD TLK: CPT | Performed by: NURSE PRACTITIONER

## 2021-04-27 PROCEDURE — 3017F COLORECTAL CA SCREEN DOC REV: CPT | Performed by: NURSE PRACTITIONER

## 2021-04-27 PROCEDURE — 4040F PNEUMOC VAC/ADMIN/RCVD: CPT | Performed by: NURSE PRACTITIONER

## 2021-04-27 PROCEDURE — G8427 DOCREV CUR MEDS BY ELIG CLIN: HCPCS | Performed by: NURSE PRACTITIONER

## 2021-04-27 PROCEDURE — 1123F ACP DISCUSS/DSCN MKR DOCD: CPT | Performed by: NURSE PRACTITIONER

## 2021-04-27 RX ORDER — APIXABAN 5 MG/1
5 TABLET, FILM COATED ORAL 2 TIMES DAILY
COMMUNITY
Start: 2021-02-17 | End: 2021-06-14

## 2021-04-27 ASSESSMENT — ENCOUNTER SYMPTOMS
ABDOMINAL DISTENTION: 0
ABDOMINAL PAIN: 1
ANAL BLEEDING: 0
NAUSEA: 0
SHORTNESS OF BREATH: 0
TROUBLE SWALLOWING: 0
CONSTIPATION: 0
DIARRHEA: 0
BLOOD IN STOOL: 0
RECTAL PAIN: 0
COUGH: 0
VOMITING: 0
CHOKING: 0

## 2021-04-27 NOTE — PATIENT INSTRUCTIONS
Schedule colonoscopy and endoscopy. Do not eat or drink after midnight the day of the procedure. Allowed medications can be taken with a small sip of water. Please review your prep instructions for allowed medications. You will not be able to drive for 24 hours after the procedure due to sedation. Must have a responsible adult, 18 years or older, accompany you to drive you home the day of your procedure. If you are on blood thinners, clearance from the prescribing physician will be obtained before your procedure is scheduled. If it is determined it is not safe to hold these medications for a short time an alternative procedure for evaluation may be recommended. No aspirin, ibuprofen, naproxen, fish oil or vitamin E for 5 days before procedure. Risks of colonoscopy and endoscopy include, but are not limited to, perforation, bleeding, and infection, Risk of perforation and bleeding are increased if there is a polyp removed or dilation completed. Anesthesia risks will be reviewed with you before the procedure by a member of the anesthesia department. Your physician may also schedule a follow up appointment with the nurse practitioner to discuss pathology, symptoms or to check if you have had any problems related to your procedure. If you prefer not to return to the office after your procedure please discuss this with your physician on the day of your colonoscopy. The physician will talk with you and/or your family after the procedure is completed. Final recommendations are based on the pathologist report if biopsies or specimens are taken. If polyps are removed during the procedure they will be sent to a pathologist for analysis. Unless you have a follow up appointment scheduled, you will be notified by mail of the pathology results within 4 weeks. If you have not received results after 4 weeks you may call the office to obtain this information. For Colonoscopy:   You will be given the day before the colonoscopy, continue to drink plenty of clear fluids. It is important   to keep yourself hydrated before the exam.     Please follow all instructions as provided for cleansing the bowel. Failure to have an adequately prepped colon may cause you to have incomplete exam with further testing required.      [email or web address]

## 2021-04-27 NOTE — PROGRESS NOTES
Subjective:     Patient ID: Radha Dexter is a 66 y.o. male  PCP: Josh Heredia MD  Referring Provider: No ref. provider found    HPI  Patient presents to the office today with the following complaints: Colonoscopy and Abdominal Pain (epigastric burning)      GERD  Paitent complains of heartburn/reflux. Symptoms have been present for 8-9 months ago. Symptoms occur irregularly now and have improved with use of medication. This has been associated with epigastric pain. He denies difficulty swallowing, hematemesis and melena. Medical therapy in the past has included proton pump inhibitors. He is taking Carafate and Protonix. He will take Aleve once a week or once every couple of weeks. Pt was scheduled for EGD and Colonoscopy last December. He was unable to tolerate the prep last time. Patient denies any lower GI symptoms such as constipation, diarrhea, change in bowel habits, rectal bleeding, and rectal pain. Last EGD 2016 - chronic esophagitis  Last Colonoscopy 2013 - multiple polyps    Hx CAD with stent 2017    Assessment:     1. Epigastric pain    2. Hx of colonic polyps            Plan:   - Continue PPI and Carafate  - Schedule colonoscopy and endoscopy  Instruct on bowel prep. Nothing to eat or drink after midnight the day of the exam.  Unable to drive for 24 hours after the procedure. No aspirin or nonsteroidal anti-inflammatories for 5 days before procedure. I have discussed the benefits, alternatives, and risks (including bleeding, perforation and death)  for pursuing Endoscopy (EGD/Colonscopy/EUS/ERCP) with the patient and they are willing to continue. We also discussed the need for anesthesia, IV access, proper dietary changes, medication changes if necessary, and need for bowel prep (if ordered) prior to their Endoscopic procedure.   They are aware they must have someone accompany them to their scheduled procedure to drive them home - they agree to the above and are willing to continue. Orders  No orders of the defined types were placed in this encounter. Medications  No orders of the defined types were placed in this encounter.         Patient History:     Past Medical History:   Diagnosis Date    Arthritis     knees    CAD (coronary artery disease)     Colon polyp     COPD (chronic obstructive pulmonary disease) (HCC)     slight    Heart attack ([facility] [address] 07/06/2017    History of blood transfusion     History of colon polyps     tubular adenoma, hyperplastic, distal rectum, duodenum    History of transfusion     Hyperlipidemia     Hypertension     Joint pain     Palpitations     Pinched nerve     right shoulder    Pinched nerve in neck     Tobacco abuse     Ulcer        Past Surgical History:   Procedure Laterality Date    COLONOSCOPY  12-10-08    Dr Randee Polk COLONOSCOPY  8-27-01    Dr Randee Polk COLONOSCOPY  07/2013    Dr. Bibi Chamberlain: multiple polyps, adenomatous and hyperplastic (3 yr)    CORONARY ANGIOPLASTY WITH STENT PLACEMENT  07/06/2017    ENDOSCOPY, COLON, DIAGNOSTIC      HEMORRHOID SURGERY      JOINT REPLACEMENT Left 06/2012    knee    KNEE ARTHROSCOPY      bilateral-3 scopes on right and 1 on left    NECK SURGERY      PAROTIDECTOMY  03/31/2017    SKIN BIOPSY      lypomas on arms, side, and back    TOTAL KNEE ARTHROPLASTY      left    UPPER GASTROINTESTINAL ENDOSCOPY  8-27-01    Dr Larry Lamar ENDOSCOPY N/A 3/31/2016    Dr Dominic Zarco (-), Chronic esophagitis       Family History   Problem Relation Age of Onset    Other Sister         pancrease removal    Cancer Father         Larynx and asbestos exposure    Esophageal Cancer Father     Heart Disease Mother         has pacemaker    Diabetes Mother     Other Mother         has had esoph stretched in the past    Colon Polyps Neg Hx     Colon Cancer Neg Hx     Liver Cancer Neg Hx     Liver Disease Neg Hx     Rectal Cancer Neg Hx     Stomach Cancer Neg Hx        Social History     Socioeconomic History    Marital status:      Spouse name: None    Number of children: None    Years of education: None    Highest education level: None   Occupational History    None   Social Needs    Financial resource strain: None    Food insecurity     Worry: None     Inability: None    Transportation needs     Medical: None     Non-medical: None   Tobacco Use    Smoking status: Current Every Day Smoker     Packs/day: 0.50     Years: 15.00     Pack years: 7.50     Types: Cigarettes    Smokeless tobacco: Never Used   Substance and Sexual Activity    Alcohol use:  Yes     Alcohol/week: 0.0 standard drinks     Comment: occ    Drug use: No    Sexual activity: None   Lifestyle    Physical activity     Days per week: None     Minutes per session: None    Stress: None   Relationships    Social connections     Talks on phone: None     Gets together: None     Attends Congregation service: None     Active member of club or organization: None     Attends meetings of clubs or organizations: None     Relationship status: None    Intimate partner violence     Fear of current or ex partner: None     Emotionally abused: None     Physically abused: None     Forced sexual activity: None   Other Topics Concern    None   Social History Narrative    None       Current Outpatient Medications   Medication Sig Dispense Refill    tadalafil (CIALIS) 5 MG tablet Take 1 tablet by mouth daily as needed for Erectile Dysfunction 30 tablet 2    pantoprazole (PROTONIX) 40 MG tablet Take 1 tablet by mouth every morning (before breakfast) 90 tablet 1    atorvastatin (LIPITOR) 40 MG tablet Take 1 tablet by mouth daily 90 tablet 1    escitalopram (LEXAPRO) 20 MG tablet TAKE 1 TABLET BY MOUTH ONCE DAILY 90 tablet 1    lisinopril (PRINIVIL;ZESTRIL) 10 MG tablet Take 1 tablet by mouth daily 90 tablet 1    vitamin D (ERGOCALCIFEROL) 1.25 MG (76865 UT) CAPS capsule Take 1 capsule by mouth once a week 12 capsule 2  sucralfate (CARAFATE) 1 GM tablet Take 1 tablet by mouth 4 times daily (Patient taking differently: Take 1 g by mouth 2 times daily ) 120 tablet 1    nitroGLYCERIN (NITROSTAT) 0.4 MG SL tablet up to max of 3 total doses. If no relief after 1 dose, call 911. 25 tablet 3    verapamil (CALAN SR) 180 MG extended release tablet Take 1 tablet by mouth 2 times daily TAKE 1 TABLET BY MOUTH ONCE DAILY AT NIGHT (Patient taking differently: Take 180 mg by mouth 2 times daily ) 60 tablet 11    tiZANidine (ZANAFLEX) 4 MG tablet Take 1 tablet by mouth every 8 hours as needed (muscle relaxer) 90 tablet 1     No current facility-administered medications for this visit. Allergies   Allergen Reactions    Tape Jeana De La Paz Tape] Rash     welps         Review of Systems   Constitutional: Negative for activity change, appetite change, fatigue, fever and unexpected weight change. HENT: Negative for trouble swallowing. Respiratory: Negative for cough, choking and shortness of breath. Cardiovascular: Negative for chest pain. Gastrointestinal: Positive for abdominal pain. Negative for abdominal distention, anal bleeding, blood in stool, constipation, diarrhea, nausea, rectal pain and vomiting. Allergic/Immunologic: Negative for food allergies. All other systems reviewed and are negative. Objective:     BP 92/62   Pulse 74   Ht 6' 1\" (1.854 m)   Wt 187 lb 6.4 oz (85 kg)   SpO2 98%   BMI 24.72 kg/m²     Physical Exam  Vitals signs reviewed. Constitutional:       General: He is not in acute distress. Appearance: He is well-developed. HENT:      Head: Normocephalic and atraumatic. Right Ear: External ear normal.      Left Ear: External ear normal.      Nose: Nose normal.      Comments: Mask on     Mouth/Throat:      Comments: Mask on  Eyes:      General: No scleral icterus. Right eye: No discharge. Left eye: No discharge.       Conjunctiva/sclera: Conjunctivae normal.      Pupils: Pupils are equal, round, and reactive to light. Neck:      Musculoskeletal: Normal range of motion and neck supple. Cardiovascular:      Rate and Rhythm: Normal rate and regular rhythm. Heart sounds: Normal heart sounds. No murmur. Pulmonary:      Effort: Pulmonary effort is normal. No respiratory distress. Breath sounds: Decreased breath sounds present. No wheezing or rales. Abdominal:      General: Bowel sounds are normal. There is no distension. Palpations: Abdomen is soft. There is no mass. Tenderness: There is no abdominal tenderness. There is no guarding or rebound. Musculoskeletal: Normal range of motion. Skin:     General: Skin is warm and dry. Coloration: Skin is not pale. Neurological:      Mental Status: He is alert and oriented to person, place, and time.    Psychiatric:         Behavior: Behavior normal.

## 2021-04-29 ENCOUNTER — TELEPHONE (OUTPATIENT)
Dept: CARDIOLOGY CLINIC | Age: 71
End: 2021-04-29

## 2021-04-29 NOTE — TELEPHONE ENCOUNTER
Date: 5-21-21    Cardiologist: Dr. Becki Ahuja    Procedure: Endoscopy and Colonoscopy    Surgeon: Dr. Andrey Lee    Last Office Visit: 12-16-20    Reason for office visit and medical concerns addressed at this office visit: PAF, SVT, HTN, CAD    Testing Performed and Date of Service:  Stress test 3-15-21  Echo 3-15-21    Does the patient have a stent? If so, what type? Not in last year    Current Medications: Eliquis, cialis, protonix, lipitor, lexapro, lisinopril, VitD, carafate, nitro, calan sr, zanaflex    Is the patient currently taking an anticoagulant? If so, what is the diagnosis the patient has been given to warrant the need for the anticoagulant?  Eliquis    Additional Notes: Med hold on Eliquis

## 2021-06-14 RX ORDER — APIXABAN 5 MG/1
TABLET, FILM COATED ORAL
Qty: 60 TABLET | Refills: 3 | Status: SHIPPED | OUTPATIENT
Start: 2021-06-14 | End: 2021-10-25

## 2021-09-15 ENCOUNTER — OFFICE VISIT (OUTPATIENT)
Dept: CARDIOLOGY CLINIC | Age: 71
End: 2021-09-15
Payer: MEDICARE

## 2021-09-15 VITALS
HEART RATE: 68 BPM | BODY MASS INDEX: 24.79 KG/M2 | SYSTOLIC BLOOD PRESSURE: 120 MMHG | HEIGHT: 72 IN | WEIGHT: 183 LBS | DIASTOLIC BLOOD PRESSURE: 64 MMHG

## 2021-09-15 DIAGNOSIS — I10 ESSENTIAL HYPERTENSION: ICD-10-CM

## 2021-09-15 DIAGNOSIS — Z72.0 TOBACCO ABUSE: ICD-10-CM

## 2021-09-15 DIAGNOSIS — I48.0 PAF (PAROXYSMAL ATRIAL FIBRILLATION) (HCC): ICD-10-CM

## 2021-09-15 DIAGNOSIS — I25.10 CORONARY ARTERY DISEASE INVOLVING NATIVE CORONARY ARTERY OF NATIVE HEART WITHOUT ANGINA PECTORIS: ICD-10-CM

## 2021-09-15 DIAGNOSIS — I47.1 SVT (SUPRAVENTRICULAR TACHYCARDIA) (HCC): Primary | ICD-10-CM

## 2021-09-15 PROCEDURE — 4004F PT TOBACCO SCREEN RCVD TLK: CPT | Performed by: CLINICAL NURSE SPECIALIST

## 2021-09-15 PROCEDURE — 93000 ELECTROCARDIOGRAM COMPLETE: CPT | Performed by: CLINICAL NURSE SPECIALIST

## 2021-09-15 PROCEDURE — 1123F ACP DISCUSS/DSCN MKR DOCD: CPT | Performed by: CLINICAL NURSE SPECIALIST

## 2021-09-15 PROCEDURE — 99214 OFFICE O/P EST MOD 30 MIN: CPT | Performed by: CLINICAL NURSE SPECIALIST

## 2021-09-15 PROCEDURE — G8427 DOCREV CUR MEDS BY ELIG CLIN: HCPCS | Performed by: CLINICAL NURSE SPECIALIST

## 2021-09-15 PROCEDURE — 3017F COLORECTAL CA SCREEN DOC REV: CPT | Performed by: CLINICAL NURSE SPECIALIST

## 2021-09-15 PROCEDURE — 4040F PNEUMOC VAC/ADMIN/RCVD: CPT | Performed by: CLINICAL NURSE SPECIALIST

## 2021-09-15 PROCEDURE — G8420 CALC BMI NORM PARAMETERS: HCPCS | Performed by: CLINICAL NURSE SPECIALIST

## 2021-09-15 NOTE — PROGRESS NOTES
[address] [place], [address] [address]  Phone: [phone]  Fax: [phone]    OFFICE VISIT:  9/15/2021    Michelle Nunes - : 1950    Reason For Visit:  Peter Ballard is a 66 y.o. male who is here for Follow-up (no cardiac symptoms), Irregular Heart Beat (SVT), and Coronary Artery Disease  History coronary disease with angioplasty and stent in 2017  History of SVT, hypertension, tobacco abuse  3/15/2021 patient had dobutamine stress echo that showed no evidence of myocardial ischemia with brief run of SVT  2D echo showed normal LV size and function with EF 55%. Mild concentric LVH. Mild LA enlargement mild MR    Patient had some paroxysmal atrial fibrillation last year and was started on anticoagulation with Eliquis  48-hour monitor worn that showed about a 3% A. fib burden. He returns today for follow-up    Several has been doing well. He is not noticed any arrhythmia. He has been taking his medications regularly. No abnormal bleeding with the Eliquis. Kelli Lott denies exertional chest pain, shortness of breath, orthopnea, paroxysmal nocturnal dyspnea, syncope, presyncope, arrhythmia, edema and fatigue. The patient denies numbness or weakness to suggest cerebrovascular accident or transient ischemic attack. Willis Whitman MD is PCP and follows labs.   Michelle Nunes has the following history as recorded in [facility]:    Patient Active Problem List    Diagnosis Date Noted    NSTEMI (non-ST elevated myocardial infarction) ([facility] [address]     Ventricular tachycardia ([address]     CKD (chronic kidney disease) stage 3, GFR 30-59 ml/min ([facility]) 2019    Mild episode of recurrent major depressive disorder ([address] 2019    Mixed hyperlipidemia 2018    History of coronary artery stent placement 2018    CAD (coronary artery disease)     Smoker     SVT (supraventricular tachycardia) ([address] 2017    Essential hypertension 2016    Premature ventricular contractions 06/22/2016    Premature atrial complexes 06/22/2016    Chest pain 06/22/2016    Palpitations 06/22/2016    Referral of patient 06/22/2016    Atypical chest pain 03/31/2016    History of adenomatous polyp of colon 06/27/2013    COPD (chronic obstructive pulmonary disease) ([facility] [address] 06/27/2013    History of gastric ulcer 06/27/2013     Past Medical History:   Diagnosis Date    Arthritis     knees    CAD (coronary artery disease)     Colon polyp     COPD (chronic obstructive pulmonary disease) (HCC)     slight    Heart attack ([facility] [address] 07/06/2017    History of blood transfusion     History of colon polyps     tubular adenoma, hyperplastic, distal rectum, duodenum    History of transfusion     Hyperlipidemia     Hypertension     Joint pain     Palpitations     Pinched nerve     right shoulder    Pinched nerve in neck     Tobacco abuse     Ulcer      Past Surgical History:   Procedure Laterality Date    COLONOSCOPY  12-10-08    Dr Aman Driscoll COLONOSCOPY  8-27-01    Dr Aman Driscoll COLONOSCOPY  07/2013    Dr. Melissa Mccoy: multiple polyps, adenomatous and hyperplastic (3 yr)    CORONARY ANGIOPLASTY WITH STENT PLACEMENT  07/06/2017    ENDOSCOPY, COLON, DIAGNOSTIC      HEMORRHOID SURGERY      JOINT REPLACEMENT Left 06/2012    knee    KNEE ARTHROSCOPY      bilateral-3 scopes on right and 1 on left    NECK SURGERY      PAROTIDECTOMY  03/31/2017    SKIN BIOPSY      lypomas on arms, side, and back    TOTAL KNEE ARTHROPLASTY      left    UPPER GASTROINTESTINAL ENDOSCOPY  8-27-01    Dr Melvina Hampton ENDOSCOPY N/A 3/31/2016    Dr Susan Bolivar (-), Chronic esophagitis     Family History   Problem Relation Age of Onset    Other Sister         pancrease removal    Cancer Father         Larynx and asbestos exposure    Esophageal Cancer Father     Heart Disease Mother         has pacemaker    Diabetes Mother     Other Mother         has had esoph stretched in the past  Colon Polyps Neg Hx     Colon Cancer Neg Hx     Liver Cancer Neg Hx     Liver Disease Neg Hx     Rectal Cancer Neg Hx     Stomach Cancer Neg Hx      Social History     Tobacco Use    Smoking status: Current Every Day Smoker     Packs/day: 0.50     Years: 15.00     Pack years: 7.50     Types: Cigarettes    Smokeless tobacco: Never Used   Substance Use Topics    Alcohol use: Yes     Alcohol/week: 0.0 standard drinks     Comment: occ      Current Outpatient Medications   Medication Sig Dispense Refill    ELIQUIS 5 MG TABS tablet TAKE (1) TABLET BY MOUTH TWICE A DAY. 60 tablet 3    tadalafil (CIALIS) 5 MG tablet Take 1 tablet by mouth daily as needed for Erectile Dysfunction 30 tablet 2    pantoprazole (PROTONIX) 40 MG tablet Take 1 tablet by mouth every morning (before breakfast) 90 tablet 1    atorvastatin (LIPITOR) 40 MG tablet Take 1 tablet by mouth daily 90 tablet 1    escitalopram (LEXAPRO) 20 MG tablet TAKE 1 TABLET BY MOUTH ONCE DAILY 90 tablet 1    lisinopril (PRINIVIL;ZESTRIL) 10 MG tablet Take 1 tablet by mouth daily 90 tablet 1    sucralfate (CARAFATE) 1 GM tablet Take 1 tablet by mouth 4 times daily (Patient taking differently: Take 1 g by mouth 2 times daily ) 120 tablet 1    nitroGLYCERIN (NITROSTAT) 0.4 MG SL tablet up to max of 3 total doses. If no relief after 1 dose, call 911. 25 tablet 3    verapamil (CALAN SR) 180 MG extended release tablet Take 1 tablet by mouth 2 times daily TAKE 1 TABLET BY MOUTH ONCE DAILY AT NIGHT (Patient taking differently: Take 180 mg by mouth 2 times daily ) 60 tablet 11    tiZANidine (ZANAFLEX) 4 MG tablet Take 1 tablet by mouth every 8 hours as needed (muscle relaxer) 90 tablet 1    vitamin D (ERGOCALCIFEROL) 1.25 MG (82840 UT) CAPS capsule Take 1 capsule by mouth once a week 12 capsule 2     No current facility-administered medications for this visit.      Allergies: Tape [adhesive tape]    Review of Systems  Constitutional - no significant activity are intact. Extremities - No clubbing, cyanosis, or  edema. Musculoskeletal -  No clubbing . No Osler's nodes. Gait normal .  No kyphosis or scoliosis. Skin -  no statis ulcers or dermatitis. Neurological - No focal signs are identified. Oriented to person, place and time. Psychiatric -  Appropriate affect and mood. Assessment:     Diagnosis Orders   1. SVT (supraventricular tachycardia) ([facility])  EKG 12 lead   2. PAF (paroxysmal atrial fibrillation) ([address]     3. Essential hypertension     4. Coronary artery disease involving native coronary artery of native heart without angina pectoris     5. Tobacco abuse       Data:  BP Readings from Last 3 Encounters:   09/15/21 120/64   04/27/21 92/62   03/15/21 132/77    Pulse Readings from Last 3 Encounters:   09/15/21 68   04/27/21 74   03/15/21 59        Wt Readings from Last 3 Encounters:   09/15/21 183 lb (83 kg)   04/27/21 187 lb 6.4 oz (85 kg)   03/15/21 178 lb (80.7 kg)   EKG today shows normal sinus rhythm with a rate of 68    Blood pressure and heart rate controlled medical management includes CCB, ACE and statin. Bijal anticoagulated on Eliquis   Reviewed PCP recent notes   Reviewed recent labs  Negative stress test and stable 2D echo in the last year. We discussed signs and symptoms to report. We discussed the importance of smoking cessation-education materials provided   States taking medications as prescribed  Stable cardiovascular status. No evidence of overt heart failure, angina or dysrhythmia. 30minutes were spent preparing, reviewing and seeing patient. All questions answered    Plan  Recommend smoking cessation-education materials provided  Follow up in 6 mos With Dr. Manuel Alfonso   Call with any questions or concerns  For any recurrent arrhythmias  Follow up with Nonnie Dandy, MD for non cardiac problems and labs   Report any new problems  Cardiovascular Fitness-Exercise as tolerated. Strive for 30 minutes of exercise most days of the week. Cardiac / Healthy Diet  Continue current medications as directed  Continue plan of treatment  It is always recommended that you bring your medications bottles with you to each visit - this is for your safety! KEVYN Davis dragon/transcription disclaimer: Much of this encounter note is electronic transcription/translation of spoken language to printed tach. Electronic translation of spoken language may be erroneous, or at times, nonsensical words or phrases may be inadvertently transcribed.  Although, I have reviewed the note for such errors, some may still exist.

## 2021-09-15 NOTE — PATIENT INSTRUCTIONS
Follow up in 6 mos With Dr. Rahul Saenz   Call with any questions or concerns  Follow up with Sallie Colbert MD for non cardiac problems and labs   Report any new problems  Cardiovascular Fitness-Exercise as tolerated. Strive for 30 minutes of exercise most days of the week. Cardiac / Healthy Diet  Continue current medications as directed  Continue plan of treatment  It is always recommended that you bring your medications bottles with you to each visit - this is for your safety! Tips to Help You Stop Smoking   Cigarette smoking is a preventable cause of death in the [place]. If you have thought about quitting but haven't been able to, here are some reasons why you should and some ways to do it. Here's Why   Quitting smoking now can decrease your risk of getting smoking-related illnesses like:   Heart disease, Stroke,  Cataracts, Macular degeneration, Thyroid conditions, Hearing loss, Erectile dysfunction, Dementia, Osteoporosis. Several types of cancer, including:   Lung, Mouth, Esophagus, Larynx, Bladder, Pancreas, Kidney   Chronic lung diseases:   Bronchitis, Emphysema, Asthma   Here's How   Once you've decided to quit smoking, set your target quit date a few weeks away. In the time leading up to your quit day, try some of these ideas offered by the [address] to help you successfully quit smoking. For the best results, work with your doctor. Together, you can test your lung function and compare the results to those of a nonsmoking person. The results can be given to you as your lung age. Finding out your lung age right after having the test done may help you to stop smoking. Your doctor can also discuss with you all of your options and refer you to smoking-cessation support groups.  You may wish to use nicotine replacement (gum, patches, inhaler) or one of the prescription medications that have been shown to increase quit rates and prolong abstinence from smoking. But whatever you and your doctor decide on these matters, it will still be you who decides when an how to quit. Here are some techniques:     Switch Brands   Switch to a brand you find distasteful. Change to a brand that is low in tar and nicotine a couple of weeks before your target quit date. This will help change your smoking behavior. However, do not smoke more cigarettes, inhale them more often or more deeply, or place your fingertips over the holes in the filters. All of these actions will increase your nicotine intake, and the idea is to get your body used to functioning without nicotine. Cut Down the Number of Cigarettes You Smoke   Smoke only half of each cigarette. Each day, postpone the lighting of your first cigarette by one hour. Decide you'll only smoke during odd or even hours of the day. Decide beforehand how many cigarettes you'll smoke during the day. For each additional cigarette, give a dollar to your favorite lloyd. Change your eating habits to help you cut down. For example, drink milk, which many people consider incompatible with smoking. End meals or snacks with something that won't lead to a cigarette. Reach for a glass of juice instead of a cigarette for a \"pick-me-up. \"   Remember: Cutting down can help you quit, but it's not a substitute for quitting. If you're down to about seven cigarettes a day, it's time to set your target quit date, and get ready to stick to it. Don't Smoke \"Automatically\"   Smoke only those cigarettes you really want. Catch yourself before you light up a cigarette out of pure habit. Don't empty your ashtrays. This will remind you of how many cigarettes you've smoked each day, and the sight and the smell of stale cigarettes butts will be very unpleasant.    Make yourself aware of each cigarette by using the opposite hand or putting cigarettes in an unfamiliar location or a different pocket to break the automatic reach.   If you light up many times during the day without even thinking about it, try to look in a mirror each time you put a match to your cigarette. You may decide you don't need it. Make Smoking Inconvenient   Stop buying cigarettes by the carton. Wait until one pack is empty before you buy another. Stop carrying cigarettes with you at home or at work. Make them difficult to get to. Make Smoking Unpleasant   Smoke only under circumstances that aren't especially pleasurable for you. If you like to smoke with others, smoke alone. Turn your chair to an empty corner and focus only on the cigarette you are smoking and all its many negative effects. Collect all your cigarette butts in one large glass container as a visual reminder of the filth made by smoking. Just Before Quitting   Practice going without cigarettes. Don't think of never smoking again. Think of quitting in terms of one day at a time . Tell yourself you won't smoke today, and then don't. Clean your clothes to rid them of the cigarette smell, which can linger a long time. On the Day You Quit   Throw away all your cigarettes and matches. Hide your lighters and ashtrays. Visit the dentist and have your teeth cleaned to get rid of tobacco stains. Notice how nice they look and resolve to keep them that way. Make a list of things you'd like to buy for yourself or someone else. Estimate the cost in terms of packs of cigarettes, and put the money aside to buy these presents. Keep very busy on the big day. Go to the movies, exercise, take long walks, or go bike riding. Remind your family and friends that this is your quit date, and ask them to help you over the rough spots of the first couple of days and weeks. Buy yourself a treat or do something special to celebrate. Telephone and Internet Support   Call the [place] [facility] [employer] [facility].   Telephone, web-, and computer-based programs can offer you the support that you need to quit and to stay smoke-free. You can find many programs online. Immediately After Quitting   Develop a clean, fresh, nonsmoking environment around yourself at work and at home. Buy yourself flowers you may be surprised how much you can enjoy their scent now. The first few days after you quit, spend as much free time as possible in places where smoking isn't allowed, such as [address] [place] [address], museums, theaters, department stores, and churches. Drink large quantities of water and fruit juice (but avoid sodas that contain caffeine). Try to avoid alcohol, coffee, and other beverages that you associate with cigarette smoking. Strike up conversation instead of a match for a cigarette. If you miss the sensation of having a cigarette in your hand, play with something else a pencil, a paper clip, a marble. If you miss having something in your mouth, try toothpicks or a fake cigarette.

## 2021-10-01 DIAGNOSIS — I10 ESSENTIAL HYPERTENSION: ICD-10-CM

## 2021-10-04 RX ORDER — LISINOPRIL 10 MG/1
TABLET ORAL
Qty: 30 TABLET | Refills: 0 | Status: SHIPPED | OUTPATIENT
Start: 2021-10-04 | End: 2021-10-11 | Stop reason: SDUPTHER

## 2021-10-11 ENCOUNTER — OFFICE VISIT (OUTPATIENT)
Dept: FAMILY MEDICINE CLINIC | Age: 71
End: 2021-10-11
Payer: MEDICARE

## 2021-10-11 VITALS
TEMPERATURE: 96.9 F | DIASTOLIC BLOOD PRESSURE: 62 MMHG | BODY MASS INDEX: 24.65 KG/M2 | HEART RATE: 77 BPM | SYSTOLIC BLOOD PRESSURE: 122 MMHG | OXYGEN SATURATION: 98 % | HEIGHT: 72 IN | WEIGHT: 182 LBS

## 2021-10-11 DIAGNOSIS — I10 ESSENTIAL HYPERTENSION: Primary | ICD-10-CM

## 2021-10-11 DIAGNOSIS — K21.9 GASTROESOPHAGEAL REFLUX DISEASE, UNSPECIFIED WHETHER ESOPHAGITIS PRESENT: ICD-10-CM

## 2021-10-11 DIAGNOSIS — F33.1 MODERATE EPISODE OF RECURRENT MAJOR DEPRESSIVE DISORDER (HCC): ICD-10-CM

## 2021-10-11 DIAGNOSIS — Z12.5 SCREENING FOR MALIGNANT NEOPLASM OF PROSTATE: ICD-10-CM

## 2021-10-11 DIAGNOSIS — E78.2 MIXED HYPERLIPIDEMIA: ICD-10-CM

## 2021-10-11 DIAGNOSIS — Z23 NEEDS FLU SHOT: ICD-10-CM

## 2021-10-11 DIAGNOSIS — F41.9 ANXIETY: ICD-10-CM

## 2021-10-11 DIAGNOSIS — I25.10 CORONARY ARTERY DISEASE INVOLVING NATIVE CORONARY ARTERY OF NATIVE HEART WITHOUT ANGINA PECTORIS: ICD-10-CM

## 2021-10-11 PROCEDURE — G8427 DOCREV CUR MEDS BY ELIG CLIN: HCPCS | Performed by: FAMILY MEDICINE

## 2021-10-11 PROCEDURE — 99214 OFFICE O/P EST MOD 30 MIN: CPT | Performed by: FAMILY MEDICINE

## 2021-10-11 PROCEDURE — 4004F PT TOBACCO SCREEN RCVD TLK: CPT | Performed by: FAMILY MEDICINE

## 2021-10-11 PROCEDURE — G0008 ADMIN INFLUENZA VIRUS VAC: HCPCS | Performed by: FAMILY MEDICINE

## 2021-10-11 PROCEDURE — G8420 CALC BMI NORM PARAMETERS: HCPCS | Performed by: FAMILY MEDICINE

## 2021-10-11 PROCEDURE — G8484 FLU IMMUNIZE NO ADMIN: HCPCS | Performed by: FAMILY MEDICINE

## 2021-10-11 PROCEDURE — 3017F COLORECTAL CA SCREEN DOC REV: CPT | Performed by: FAMILY MEDICINE

## 2021-10-11 PROCEDURE — 4040F PNEUMOC VAC/ADMIN/RCVD: CPT | Performed by: FAMILY MEDICINE

## 2021-10-11 PROCEDURE — 1123F ACP DISCUSS/DSCN MKR DOCD: CPT | Performed by: FAMILY MEDICINE

## 2021-10-11 PROCEDURE — 90694 VACC AIIV4 NO PRSRV 0.5ML IM: CPT | Performed by: FAMILY MEDICINE

## 2021-10-11 RX ORDER — LISINOPRIL 10 MG/1
TABLET ORAL
Qty: 30 TABLET | Refills: 0 | Status: SHIPPED | OUTPATIENT
Start: 2021-10-11 | End: 2021-12-02

## 2021-10-11 RX ORDER — ESCITALOPRAM OXALATE 20 MG/1
TABLET ORAL
Qty: 90 TABLET | Refills: 1 | Status: SHIPPED | OUTPATIENT
Start: 2021-10-11 | End: 2022-04-18 | Stop reason: SDUPTHER

## 2021-10-11 RX ORDER — ATORVASTATIN CALCIUM 40 MG/1
40 TABLET, FILM COATED ORAL DAILY
Qty: 90 TABLET | Refills: 1 | Status: SHIPPED | OUTPATIENT
Start: 2021-10-11 | End: 2022-04-18 | Stop reason: SDUPTHER

## 2021-10-11 ASSESSMENT — PATIENT HEALTH QUESTIONNAIRE - PHQ9
1. LITTLE INTEREST OR PLEASURE IN DOING THINGS: 0
SUM OF ALL RESPONSES TO PHQ9 QUESTIONS 1 & 2: 1
SUM OF ALL RESPONSES TO PHQ QUESTIONS 1-9: 1
2. FEELING DOWN, DEPRESSED OR HOPELESS: 1

## 2021-10-11 NOTE — PROGRESS NOTES
Breanna ACOSTA [address]  Dept: [phone]  Dept Fax: [phone]    Visit type: Established patient    Reason for Visit: 6 Month Follow-Up         Assessment and Plan       1. Essential hypertension  -     lisinopril (PRINIVIL;ZESTRIL) 10 MG tablet; TAKE 1 TABLET BY MOUTH ONCE DAILY, Disp-30 tablet, R-0Normal  -     CBC Auto Differential; Future  -     Comprehensive Metabolic Panel; Future  -     Microalbumin / Creatinine Urine Ratio; Future  2. Mixed hyperlipidemia  -     atorvastatin (LIPITOR) 40 MG tablet; Take 1 tablet by mouth daily, Disp-90 tablet, R-1Normal  -     Lipid Panel; Future  3. Coronary artery disease involving native coronary artery of native heart without angina pectoris  -     atorvastatin (LIPITOR) 40 MG tablet; Take 1 tablet by mouth daily, Disp-90 tablet, R-1Normal  4. Moderate episode of recurrent major depressive disorder (HCC)  -     escitalopram (LEXAPRO) 20 MG tablet; TAKE 1 TABLET BY MOUTH ONCE DAILY, Disp-90 tablet, R-1Normal  5. Anxiety  -     escitalopram (LEXAPRO) 20 MG tablet; TAKE 1 TABLET BY MOUTH ONCE DAILY, Disp-90 tablet, R-1Normal  6. Gastroesophageal reflux disease, unspecified whether esophagitis present  7. Screening for malignant neoplasm of prostate  -     PSA screening; Future  8. Needs flu shot  -     INFLUENZA, QUADV, ADJUVANTED, 72 YRS =, IM, PF, PREFILL SYR, 0.5ML (FLUAD)    With patient doing well with his current medications will continue at this time continue to monitor and adjust the course dictates. Stressed importance of continued on monitoring of his blood pressure and the importance of being diligent with his diet and compliant with his medication. Discussed lifestyle modifications that may beneficial.  Urged patient to reschedule his colonoscopy. Discussed signs symptoms require medical attention. All questions were answered and patient voiced understanding and agreement with plan as discussed.     Return in about 6 months (around 4/11/2022), or if symptoms worsen or fail to improve. Subjective       HPI   Patient has arterial hypertension that is doing well with his current medication. he denies any issues with use of his  medicine. he denies any symptoms associated with abnormal blood pressures. he is attempting to avoid excess salt intake. Patient has hyperlipidemia and reports that he is tolerating his Lipitor without any new myalgias or GI upsets. he is attempting to watch his diet and trying to stay physically active. He does have a history of anxiety and depression and reports that he is doing well with his current use of Lexapro. Denies any problems with the medicine or any recent changes to his mood. He does have gastroesophageal reflux disease and continues to do well with his use of Protonix. He states that with dietary modifications he has not been needing it as often is taking it more as needed currently. He does state that he went to get his colonoscopy and started the prep and had problems with it was unable to continue and his scope got canceled and they gave him another option for the prep but has not rescheduled as of yet. Review of Systems   Constitutional: Negative for activity change, appetite change, fatigue and fever. HENT: Negative for congestion and rhinorrhea. Eyes: Negative for pain and discharge. Respiratory: Negative for cough and shortness of breath. Cardiovascular: Negative for chest pain and palpitations. Gastrointestinal: Negative for abdominal pain, constipation, diarrhea, nausea and vomiting. Endocrine: Negative for cold intolerance and heat intolerance. Genitourinary: Negative for dysuria and hematuria. Musculoskeletal: Positive for back pain. Negative for gait problem and neck pain. Skin: Negative for rash and wound. Neurological: Negative for syncope and weakness. Hematological: Negative for adenopathy. Does not bruise/bleed easily. Psychiatric/Behavioral: Positive for dysphoric mood. Negative for self-injury, sleep disturbance and suicidal ideas. The patient is nervous/anxious. Allergies   Allergen Reactions    Tape Amber Constant Tape] Rash     welps         Outpatient Medications Prior to Visit   Medication Sig Dispense Refill    ELIQUIS 5 MG TABS tablet TAKE (1) TABLET BY MOUTH TWICE A DAY. 60 tablet 3    pantoprazole (PROTONIX) 40 MG tablet Take 1 tablet by mouth every morning (before breakfast) 90 tablet 1    vitamin D (ERGOCALCIFEROL) 1.25 MG (77712 UT) CAPS capsule Take 1 capsule by mouth once a week 12 capsule 2    sucralfate (CARAFATE) 1 GM tablet Take 1 tablet by mouth 4 times daily (Patient taking differently: Take 1 g by mouth 2 times daily ) 120 tablet 1    nitroGLYCERIN (NITROSTAT) 0.4 MG SL tablet up to max of 3 total doses. If no relief after 1 dose, call 911. 25 tablet 3    verapamil (CALAN SR) 180 MG extended release tablet Take 1 tablet by mouth 2 times daily TAKE 1 TABLET BY MOUTH ONCE DAILY AT NIGHT (Patient taking differently: Take 180 mg by mouth 2 times daily ) 60 tablet 11    tiZANidine (ZANAFLEX) 4 MG tablet Take 1 tablet by mouth every 8 hours as needed (muscle relaxer) 90 tablet 1    lisinopril (PRINIVIL;ZESTRIL) 10 MG tablet TAKE 1 TABLET BY MOUTH ONCE DAILY 30 tablet 0    tadalafil (CIALIS) 5 MG tablet Take 1 tablet by mouth daily as needed for Erectile Dysfunction 30 tablet 2    atorvastatin (LIPITOR) 40 MG tablet Take 1 tablet by mouth daily 90 tablet 1    escitalopram (LEXAPRO) 20 MG tablet TAKE 1 TABLET BY MOUTH ONCE DAILY 90 tablet 1     No facility-administered medications prior to visit.         Past Medical History:   Diagnosis Date    Arthritis     knees    CAD (coronary artery disease)     Colon polyp     COPD (chronic obstructive pulmonary disease) (HCC)     slight    Heart attack ([facility] [address] 07/06/2017    History of blood transfusion     History of colon polyps     tubular adenoma, hyperplastic, distal rectum, duodenum    History of transfusion     Hyperlipidemia     Hypertension     Joint pain     Palpitations     Pinched nerve     right shoulder    Pinched nerve in neck     Tobacco abuse     Ulcer         Social History     Tobacco Use    Smoking status: Current Every Day Smoker     Packs/day: 0.50     Years: 15.00     Pack years: 7.50     Types: Cigarettes    Smokeless tobacco: Never Used   Substance Use Topics    Alcohol use:  Yes     Alcohol/week: 0.0 standard drinks     Comment: occ        Past Surgical History:   Procedure Laterality Date    COLONOSCOPY  12-10-08    Dr Pedro Alvarez COLONOSCOPY  8-27-01    Dr Pedro Alvarez COLONOSCOPY  07/2013    Dr. Rae Leaks: multiple polyps, adenomatous and hyperplastic (3 yr)    CORONARY ANGIOPLASTY WITH STENT PLACEMENT  07/06/2017    ENDOSCOPY, COLON, DIAGNOSTIC      HEMORRHOID SURGERY      JOINT REPLACEMENT Left 06/2012    knee    KNEE ARTHROSCOPY      bilateral-3 scopes on right and 1 on left    NECK SURGERY      PAROTIDECTOMY  03/31/2017    SKIN BIOPSY      lypomas on arms, side, and back    TOTAL KNEE ARTHROPLASTY      left    UPPER GASTROINTESTINAL ENDOSCOPY  8-27-01    Dr Brenda Tanner N/A 3/31/2016    Dr Jazmin Vila (-), Chronic esophagitis       Family History   Problem Relation Age of Onset    Other Sister         pancrease removal    Cancer Father         Larynx and asbestos exposure    Esophageal Cancer Father     Heart Disease Mother         has pacemaker    Diabetes Mother     Other Mother         has had esoph stretched in the past    Colon Polyps Neg Hx     Colon Cancer Neg Hx     Liver Cancer Neg Hx     Liver Disease Neg Hx     Rectal Cancer Neg Hx     Stomach Cancer Neg Hx        Objective       /62 (Site: Right Upper Arm, Position: Sitting, Cuff Size: Medium Adult)   Pulse 77   Temp 96.9 °F (36.1 °C) (Temporal)   Ht 6' (1.829 m)   Wt 182 lb (82.6 kg)   SpO2 98% BMI 24.68 kg/m²   Physical Exam  Vitals and nursing note reviewed. Constitutional:       General: He is not in acute distress. Appearance: He is well-developed. He is not diaphoretic. HENT:      Head: Normocephalic and atraumatic. Right Ear: External ear normal.      Left Ear: External ear normal.      Mouth/Throat:      Comments: Mask in place  Eyes:      General: No scleral icterus. Right eye: No discharge. Left eye: No discharge. Conjunctiva/sclera: Conjunctivae normal.   Neck:      Thyroid: No thyromegaly. Trachea: No tracheal deviation. Cardiovascular:      Rate and Rhythm: Normal rate and regular rhythm. Heart sounds: Normal heart sounds. No murmur heard. No friction rub. No gallop. Pulmonary:      Effort: Pulmonary effort is normal. No respiratory distress. Breath sounds: Normal breath sounds. No wheezing or rales. Abdominal:      General: Bowel sounds are normal. There is no distension. Palpations: Abdomen is soft. Tenderness: There is no abdominal tenderness. Musculoskeletal:         General: No deformity (No gross deformities of upper or lower extremities). Normal range of motion. Cervical back: Normal range of motion and neck supple. Right lower leg: No edema. Left lower leg: No edema. Lymphadenopathy:      Cervical: No cervical adenopathy. Skin:     General: Skin is warm and dry. Findings: No erythema or rash. Neurological:      Mental Status: He is alert and oriented to person, place, and time. Cranial Nerves: No cranial nerve deficit. Psychiatric:         Behavior: Behavior normal.         Thought Content:  Thought content normal.           Data Reviewed and Summarized       Labs:     Imaging/Testing:        Manjinder Jimenez MD

## 2021-10-11 NOTE — PROGRESS NOTES
After obtaining consent, and per orders of Dr. Kal Yao, injection of FluAd given in Right deltoid by Natan Neely. Patient instructed to remain in clinic for 20 minutes afterwards, and to report any adverse reaction to me immediately.

## 2021-10-25 RX ORDER — APIXABAN 5 MG/1
TABLET, FILM COATED ORAL
Qty: 60 TABLET | Refills: 5 | Status: SHIPPED | OUTPATIENT
Start: 2021-10-25 | End: 2022-04-12 | Stop reason: SDUPTHER

## 2021-11-02 ASSESSMENT — ENCOUNTER SYMPTOMS
ABDOMINAL PAIN: 0
BACK PAIN: 1
NAUSEA: 0
CONSTIPATION: 0
EYE PAIN: 0
RHINORRHEA: 0
COUGH: 0
VOMITING: 0
SHORTNESS OF BREATH: 0
DIARRHEA: 0
EYE DISCHARGE: 0

## 2021-11-03 NOTE — PATIENT INSTRUCTIONS
concert. Take part in a Jewish activity or other social gathering. Go to a [employer] game. · Ask a friend to have dinner with you. Take care of yourself  · Eat a balanced diet with plenty of fresh fruits and vegetables, whole grains, and lean protein. If you have lost your appetite, eat small snacks rather than large meals. · Avoid using illegal drugs or marijuana and drinking alcohol. Do not take medicines that have not been prescribed for you. They may interfere with medicines you may be taking for depression, or they may make your depression worse. · Take your medicines exactly as they are prescribed. You may start to feel better within 1 to 3 weeks of taking antidepressant medicine. But it can take as many as 6 to 8 weeks to see more improvement. If you have questions or concerns about your medicines, or if you do not notice any improvement by 3 weeks, talk to your doctor. · Continue to take your medicine after your symptoms improve. Taking your medicine for at least 6 months after you feel better can help keep you from getting depressed again. If this isn't the first time you have been depressed, your doctor may recommend you to take medicine even longer. · If you have any side effects from your medicine, tell your doctor. Many side effects are mild and will go away on their own after you have been taking the medicine for a few weeks. Some may last longer. Talk to your doctor if side effects are bothering you too much. You might be able to try a different medicine. · Continue counseling. It may help prevent depression from returning, especially if you've had multiple episodes of depression. Talk with your counselor if you are having a hard time attending your sessions or you think the sessions aren't working. Don't just stop going. · Get enough sleep. Talk to your doctor if you are having problems sleeping. · Avoid sleeping pills unless they are prescribed by the doctor treating your depression.  Sleeping pills may make you groggy during the day, and they may interact with other medicine you are taking. · If you have any other illnesses, such as diabetes, heart disease, or high blood pressure, make sure to continue with your treatment. Tell your doctor about all of the medicines you take, including those with or without a prescription. · If you or someone you know talks about suicide, self-harm, or feeling hopeless, get help right away. Call the [address] [place] [address] at 1-800-273-talk (9-733.350.2405) or text HOME to 024756 to access the Crisis Text Line. Consider saving these numbers in your phone. When should you call for help? Call 461 anytime you think you may need emergency care. For example, call if:    · You feel like hurting yourself or someone else.     · Someone you know has depression and is about to attempt or is attempting suicide. Call your doctor now or seek immediate medical care if:    · You hear voices.     · Someone you know has depression and:  ? Starts to give away his or her possessions. ? Uses illegal drugs or drinks alcohol heavily. ? Talks or writes about death, including writing suicide notes or talking about guns, knives, or pills. ? Starts to spend a lot of time alone. ? Acts very aggressively or suddenly appears calm. Watch closely for changes in your health, and be sure to contact your doctor if:    · You do not get better as expected. Where can you learn more? Go to [email or web address]://[email or web address] org and sign in to your [employer] account. Enter D413 in the [facility] box to learn more about \"Recovering From Depression: Care Instructions. \"     If you do not have an account, please click on the \"Sign Up Now\" link. Current as of: June 16, 2021               Content Version: 13.0  © 8548-9735 Healthwise, Inc[employer]. Care instructions adapted under license by [facility] ([facility]).  If you have questions about a medical condition or this instruction, always ask your healthcare professional. [place] [address] any warranty or liability for your use of this information.

## 2021-11-22 DIAGNOSIS — I10 ESSENTIAL HYPERTENSION: ICD-10-CM

## 2021-12-02 DIAGNOSIS — I10 ESSENTIAL HYPERTENSION: ICD-10-CM

## 2021-12-02 RX ORDER — LISINOPRIL 10 MG/1
TABLET ORAL
Qty: 30 TABLET | Refills: 0 | Status: SHIPPED | OUTPATIENT
Start: 2021-12-02 | End: 2022-01-03

## 2021-12-02 NOTE — TELEPHONE ENCOUNTER
Last OV 10/11/2021  Next OV Visit date not found      Requested Prescriptions     Pending Prescriptions Disp Refills    lisinopril (PRINIVIL;ZESTRIL) 10 MG tablet [Pharmacy Med Name: LISINOPRIL 10 MG TABLET] 30 tablet 0     Sig: TAKE 1 TABLET BY MOUTH ONCE DAILY

## 2022-01-03 DIAGNOSIS — I10 ESSENTIAL HYPERTENSION: ICD-10-CM

## 2022-01-03 RX ORDER — LISINOPRIL 10 MG/1
TABLET ORAL
Qty: 30 TABLET | Refills: 0 | Status: SHIPPED | OUTPATIENT
Start: 2022-01-03 | End: 2022-01-31

## 2022-01-15 ASSESSMENT — LIFESTYLE VARIABLES
HOW OFTEN DURING THE LAST YEAR HAVE YOU FOUND THAT YOU WERE NOT ABLE TO STOP DRINKING ONCE YOU HAD STARTED: 0
HOW OFTEN DO YOU HAVE SIX OR MORE DRINKS ON ONE OCCASION: NEVER
HOW MANY STANDARD DRINKS CONTAINING ALCOHOL DO YOU HAVE ON A TYPICAL DAY: 0
HOW OFTEN DURING THE LAST YEAR HAVE YOU NEEDED AN ALCOHOLIC DRINK FIRST THING IN THE MORNING TO GET YOURSELF GOING AFTER A NIGHT OF HEAVY DRINKING: NEVER
HOW OFTEN DURING THE LAST YEAR HAVE YOU FOUND THAT YOU WERE NOT ABLE TO STOP DRINKING ONCE YOU HAD STARTED: NEVER
HAS A RELATIVE, FRIEND, DOCTOR, OR ANOTHER HEALTH PROFESSIONAL EXPRESSED CONCERN ABOUT YOUR DRINKING OR SUGGESTED YOU CUT DOWN: NO
HOW OFTEN DURING THE LAST YEAR HAVE YOU FAILED TO DO WHAT WAS NORMALLY EXPECTED FROM YOU BECAUSE OF DRINKING: NEVER
HOW OFTEN DURING THE LAST YEAR HAVE YOU NEEDED AN ALCOHOLIC DRINK FIRST THING IN THE MORNING TO GET YOURSELF GOING AFTER A NIGHT OF HEAVY DRINKING: 0
HOW OFTEN DURING THE LAST YEAR HAVE YOU FAILED TO DO WHAT WAS NORMALLY EXPECTED FROM YOU BECAUSE OF DRINKING: 0
HOW OFTEN DURING THE LAST YEAR HAVE YOU HAD A FEELING OF GUILT OR REMORSE AFTER DRINKING: 0
HOW MANY STANDARD DRINKS CONTAINING ALCOHOL DO YOU HAVE ON A TYPICAL DAY: ONE OR TWO
HOW OFTEN DO YOU HAVE A DRINK CONTAINING ALCOHOL: MONTHLY OR LESS
AUDIT-C TOTAL SCORE: 1
HOW OFTEN DURING THE LAST YEAR HAVE YOU HAD A FEELING OF GUILT OR REMORSE AFTER DRINKING: NEVER
HAVE YOU OR SOMEONE ELSE BEEN INJURED AS A RESULT OF YOUR DRINKING: NO
HAS A RELATIVE, FRIEND, DOCTOR, OR ANOTHER HEALTH PROFESSIONAL EXPRESSED CONCERN ABOUT YOUR DRINKING OR SUGGESTED YOU CUT DOWN: 0
HAVE YOU OR SOMEONE ELSE BEEN INJURED AS A RESULT OF YOUR DRINKING: 0
HOW OFTEN DO YOU HAVE A DRINK CONTAINING ALCOHOL: 1
HOW OFTEN DURING THE LAST YEAR HAVE YOU BEEN UNABLE TO REMEMBER WHAT HAPPENED THE NIGHT BEFORE BECAUSE YOU HAD BEEN DRINKING: 0
HOW OFTEN DURING THE LAST YEAR HAVE YOU BEEN UNABLE TO REMEMBER WHAT HAPPENED THE NIGHT BEFORE BECAUSE YOU HAD BEEN DRINKING: NEVER
AUDIT TOTAL SCORE: 0
AUDIT TOTAL SCORE: 1
AUDIT-C TOTAL SCORE: 0
HOW OFTEN DO YOU HAVE SIX OR MORE DRINKS ON ONE OCCASION: 0

## 2022-01-15 ASSESSMENT — PATIENT HEALTH QUESTIONNAIRE - PHQ9
SUM OF ALL RESPONSES TO PHQ QUESTIONS 1-9: 0
SUM OF ALL RESPONSES TO PHQ QUESTIONS 1-9: 0
1. LITTLE INTEREST OR PLEASURE IN DOING THINGS: 0
2. FEELING DOWN, DEPRESSED OR HOPELESS: 0
SUM OF ALL RESPONSES TO PHQ9 QUESTIONS 1 & 2: 0
SUM OF ALL RESPONSES TO PHQ QUESTIONS 1-9: 0
SUM OF ALL RESPONSES TO PHQ QUESTIONS 1-9: 0

## 2022-01-18 ENCOUNTER — OFFICE VISIT (OUTPATIENT)
Dept: FAMILY MEDICINE CLINIC | Age: 72
End: 2022-01-18
Payer: MEDICARE

## 2022-01-18 VITALS
OXYGEN SATURATION: 100 % | HEART RATE: 71 BPM | DIASTOLIC BLOOD PRESSURE: 74 MMHG | WEIGHT: 188.1 LBS | TEMPERATURE: 98 F | SYSTOLIC BLOOD PRESSURE: 138 MMHG | BODY MASS INDEX: 24.93 KG/M2 | HEIGHT: 73 IN | RESPIRATION RATE: 20 BRPM

## 2022-01-18 DIAGNOSIS — Z00.00 ROUTINE GENERAL MEDICAL EXAMINATION AT A HEALTH CARE FACILITY: Primary | ICD-10-CM

## 2022-01-18 PROCEDURE — G8484 FLU IMMUNIZE NO ADMIN: HCPCS | Performed by: FAMILY MEDICINE

## 2022-01-18 PROCEDURE — 3017F COLORECTAL CA SCREEN DOC REV: CPT | Performed by: FAMILY MEDICINE

## 2022-01-18 PROCEDURE — G0439 PPPS, SUBSEQ VISIT: HCPCS | Performed by: FAMILY MEDICINE

## 2022-01-18 PROCEDURE — 4040F PNEUMOC VAC/ADMIN/RCVD: CPT | Performed by: FAMILY MEDICINE

## 2022-01-18 PROCEDURE — 1123F ACP DISCUSS/DSCN MKR DOCD: CPT | Performed by: FAMILY MEDICINE

## 2022-01-18 RX ORDER — NITROGLYCERIN 0.4 MG/1
TABLET SUBLINGUAL
Qty: 25 TABLET | Refills: 1 | Status: SHIPPED | OUTPATIENT
Start: 2022-01-18

## 2022-01-18 SDOH — ECONOMIC STABILITY: FOOD INSECURITY: WITHIN THE PAST 12 MONTHS, YOU WORRIED THAT YOUR FOOD WOULD RUN OUT BEFORE YOU GOT MONEY TO BUY MORE.: NEVER TRUE

## 2022-01-18 SDOH — ECONOMIC STABILITY: FOOD INSECURITY: WITHIN THE PAST 12 MONTHS, THE FOOD YOU BOUGHT JUST DIDN'T LAST AND YOU DIDN'T HAVE MONEY TO GET MORE.: NEVER TRUE

## 2022-01-18 ASSESSMENT — PATIENT HEALTH QUESTIONNAIRE - PHQ9
SUM OF ALL RESPONSES TO PHQ QUESTIONS 1-9: 0
2. FEELING DOWN, DEPRESSED OR HOPELESS: 0
1. LITTLE INTEREST OR PLEASURE IN DOING THINGS: 0
SUM OF ALL RESPONSES TO PHQ QUESTIONS 1-9: 0
SUM OF ALL RESPONSES TO PHQ9 QUESTIONS 1 & 2: 0

## 2022-01-18 ASSESSMENT — SOCIAL DETERMINANTS OF HEALTH (SDOH): HOW HARD IS IT FOR YOU TO PAY FOR THE VERY BASICS LIKE FOOD, HOUSING, MEDICAL CARE, AND HEATING?: NOT HARD AT ALL

## 2022-01-18 NOTE — PATIENT INSTRUCTIONS
Personalized Preventive Plan for Julia Casanova - 1/18/2022  Medicare offers a range of preventive health benefits. Some of the tests and screenings are paid in full while other may be subject to a deductible, co-insurance, and/or copay. Some of these benefits include a comprehensive review of your medical history including lifestyle, illnesses that may run in your family, and various assessments and screenings as appropriate. After reviewing your medical record and screening and assessments performed today your provider may have ordered immunizations, labs, imaging, and/or referrals for you. A list of these orders (if applicable) as well as your Preventive Care list are included within your After Visit Summary for your review. Other Preventive Recommendations:    · A preventive eye exam performed by an eye specialist is recommended every 1-2 years to screen for glaucoma; cataracts, macular degeneration, and other eye disorders. · A preventive dental visit is recommended every 6 months. · Try to get at least 150 minutes of exercise per week or 10,000 steps per day on a pedometer . · Order or download the FREE \"Exercise & Physical Activity: Your Everyday Guide\" from [employer]. Call 0-129.501.8872 or search [employer] online. · You need 0998-7579 mg of calcium and 6711-5442 IU of vitamin D per day. It is possible to meet your calcium requirement with diet alone, but a vitamin D supplement is usually necessary to meet this goal.  · When exposed to the sun, use a sunscreen that protects against both UVA and UVB radiation with an SPF of 30 or greater. Reapply every 2 to 3 hours or after sweating, drying off with a towel, or swimming. · Always wear a seat belt when traveling in a car. Always wear a helmet when riding a bicycle or motorcycle. Heart-Healthy Diet   Sodium, Fat, and Cholesterol Controlled Diet       What Is a Heart Healthy Diet?    A heart-healthy diet is one that limits sodium , certain types of fat , and cholesterol . This type of diet is recommended for:   People with any form of cardiovascular disease (eg, coronary heart disease , peripheral vascular disease , previous heart attack , previous stroke )   People with risk factors for cardiovascular disease, such as high blood pressure , high cholesterol , or diabetes   Anyone who wants to lower their risk of developing cardiovascular disease   Sodium    Sodium is a mineral found in many foods. In general, most people consume much more sodium than they need. Diets high in sodium can increase blood pressure and lead to edema (water retention). On a heart-healthy diet, you should consume no more than 2,300 mg (milligrams) of sodium per dayabout the amount in one teaspoon of table salt. The foods highest in sodium include table salt (about 50% sodium), processed foods, convenience foods, and preserved foods. Cholesterol    Cholesterol is a fat-like, waxy substance in your blood. Our bodies make some cholesterol. It is also found in animal products, with the highest amounts in fatty meat, egg yolks, whole milk, cheese, shellfish, and organ meats. On a heart-healthy diet, you should limit your cholesterol intake to less than 200 mg per day. It is normal and important to have some cholesterol in your bloodstream. But too much cholesterol can cause plaque to build up within your arteries, which can eventually lead to a heart attack or stroke. The two types of cholesterol that are most commonly referred to are:   Low-density lipoprotein (LDL) cholesterol  Also known as bad cholesterol, this is the cholesterol that tends to build up along your arteries. Bad cholesterol levels are increased by eating fats that are saturated or hydrogenated. Optimal level of this cholesterol is less than 100. Over 130 starts to get risky for heart disease.    High-density lipoprotein (HDL) cholesterol  Also known as week) Tofu Nuts or seeds (unsalted, dry-roasted), low-sodium peanut butter Dried peas, beans, and lentils   Any smoked, cured, salted, or canned meat, fish, or poultry (including roach, chipped beef, cold cuts, hot dogs, sausages, sardines, and anchovies) Poultry skins Breaded and/or fried fish or meats Canned peas, beans, and lentils Salted nuts   Fats and Oils   Olive oil and canola oil Low-sodium, low-fat salad dressings and mayonnaise   Butter, margarine, coconut and palm oils, roach fat   Snacks, Sweets, and Condiments   Low-sodium or unsalted versions of broths, soups, soy sauce, and condiments Pepper, herbs, and spices; vinegar, lemon, or lime juice Low-fat frozen desserts (yogurt, sherbet, fruit bars) Sugar, cocoa powder, honey, syrup, jam, and preserves Low-fat, trans-fat free cookies, cakes, and pies Eric and animal crackers, fig bars, sukh snaps   High-fat desserts Broth, soups, gravies, and sauces, made from instant mixes or other high-sodium ingredients Salted snack foods Canned olives Meat tenderizers, seasoning salt, and most flavored vinegars   Beverages   Low-sodium carbonated beverages Tea and coffee in moderation Soy milk   Commercially softened water   Suggestions   Make whole grains, fruits, and vegetables the base of your diet. Choose heart-healthy fats such as canola, olive, and flaxseed oil, and foods high in heart-healthy fats, such as nuts, seeds, soybeans, tofu, and fish. Eat fish at least twice per week; the fish highest in omega-3 fatty acids and lowest in mercury include salmon, herring, mackerel, sardines, and canned chunk light tuna. If you eat fish less than twice per week or have high triglycerides, talk to your doctor about taking fish oil supplements. Read food labels.    For products low in fat and cholesterol, look for fat free, low-fat, cholesterol free, saturated fat free, and trans fat freeAlso scan the Nutrition Facts Label, which lists saturated fat, trans fat, and cholesterol amounts. For products low in sodium, look for sodium free, very low sodium, low sodium, no added salt, and unsalted   Skip the salt when cooking or at the table; if food needs more flavor, get creative and try out different herbs and spices. Garlic and onion also add substantial flavor to foods. Trim any visible fat off meat and poultry before cooking, and drain the fat off after love. Use cooking methods that require little or no added fat, such as grilling, boiling, baking, poaching, broiling, roasting, steaming, stir-frying, and sauting. Avoid fast food and convenience food. They tend to be high in saturated and trans fat and have a lot of added salt. Talk to a registered dietitian for individualized diet advice. Last Reviewed: March 2011 Tran Rivera MS, MPH, RD   Updated: 3/29/2011   ·     Heart-Healthy Diet   Sodium, Fat, and Cholesterol Controlled Diet       What Is a Heart Healthy Diet? A heart-healthy diet is one that limits sodium , certain types of fat , and cholesterol . This type of diet is recommended for:   People with any form of cardiovascular disease (eg, coronary heart disease , peripheral vascular disease , previous heart attack , previous stroke )   People with risk factors for cardiovascular disease, such as high blood pressure , high cholesterol , or diabetes   Anyone who wants to lower their risk of developing cardiovascular disease   Sodium    Sodium is a mineral found in many foods. In general, most people consume much more sodium than they need. Diets high in sodium can increase blood pressure and lead to edema (water retention). On a heart-healthy diet, you should consume no more than 2,300 mg (milligrams) of sodium per dayabout the amount in one teaspoon of table salt. The foods highest in sodium include table salt (about 50% sodium), processed foods, convenience foods, and preserved foods.    Cholesterol    Cholesterol is a fat-like, waxy substance in your blood. Our bodies make some cholesterol. It is also found in animal products, with the highest amounts in fatty meat, egg yolks, whole milk, cheese, shellfish, and organ meats. On a heart-healthy diet, you should limit your cholesterol intake to less than 200 mg per day. It is normal and important to have some cholesterol in your bloodstream. But too much cholesterol can cause plaque to build up within your arteries, which can eventually lead to a heart attack or stroke. The two types of cholesterol that are most commonly referred to are:   Low-density lipoprotein (LDL) cholesterol  Also known as bad cholesterol, this is the cholesterol that tends to build up along your arteries. Bad cholesterol levels are increased by eating fats that are saturated or hydrogenated. Optimal level of this cholesterol is less than 100. Over 130 starts to get risky for heart disease. High-density lipoprotein (HDL) cholesterol  Also known as good cholesterol, this type of cholesterol actually carries cholesterol away from your arteries and may, therefore, help lower your risk of having a heart attack. You want this level to be high (ideally greater than 60). It is a risk to have a level less than 40. You can raise this good cholesterol by eating olive oil, canola oil, avocados, or nuts. Exercise raises this level, too. Fat    Fat is calorie dense and packs a lot of calories into a small amount of food. Even though fats should be limited due to their high calorie content, not all fats are bad. In fact, some fats are quite healthful. Fat can be broken down into four main types.    The good-for-you fats are:   Monounsaturated fat  found in oils such as olive and canola, avocados, and nuts and natural nut butters; can decrease cholesterol levels, while keeping levels of HDL cholesterol high   Polyunsaturated fat  found in oils such as safflower, sunflower, soybean, corn, and sesame; can decrease total cholesterol and LDL cholesterol   Omega-3 fatty acids  particularly those found in fatty fish (such as salmon, trout, tuna, mackerel, herring, and sardines); can decrease risk of arrhythmias, decrease triglyceride levels, and slightly lower blood pressure   The fats that you want to limit are:   Saturated fat  found in animal products, many fast foods, and a few vegetables; increases total blood cholesterol, including LDL levels   Animal fats that are saturated include: butter, lard, whole-milk dairy products, meat fat, and poultry skin   Vegetable fats that are saturated include: hydrogenated shortening, palm oil, coconut oil, cocoa butter   Hydrogenated or trans fat  found in margarine and vegetable shortening, most shelf stable snack foods, and fried foods; increases LDL and decreases HDL     It is generally recommended that you limit your total fat for the day to less than 30% of your total calories. If you follow an 1800-calorie heart healthy diet, for example, this would mean 60 grams of fat or less per day. Saturated fat and trans fat in your diet raises your blood cholesterol the most, much more than dietary cholesterol does. For this reason, on a heart-healthy diet, less than 7% of your calories should come from saturated fat and ideally 0% from trans fat. On an 1800-calorie diet, this translates into less than 14 grams of saturated fat per day, leaving 46 grams of fat to come from mono- and polyunsaturated fats.    Food Choices on a Heart Healthy Diet   Food Category   Foods Recommended   Foods to Avoid   Grains   Breads and rolls without salted tops Most dry and cooked cereals Unsalted crackers and breadsticks Low-sodium or homemade breadcrumbs or stuffing All rice and pastas   Breads, rolls, and crackers with salted tops High-fat baked goods (eg, muffins, donuts, pastries) Quick breads, self-rising flour, and biscuit mixes Regular bread crumbs Instant hot cereals Commercially prepared rice, pasta, or stuffing mixes Vegetables   Most fresh, frozen, and low-sodium canned vegetables Low-sodium and salt-free vegetable juices Canned vegetables if unsalted or rinsed   Regular canned vegetables and juices, including sauerkraut and pickled vegetables Frozen vegetables with sauces Commercially prepared potato and vegetable mixes   Fruits   Most fresh, frozen, and canned fruits All fruit juices   Fruits processed with salt or sodium   Milk   Nonfat or low-fat (1%) milk Nonfat or low-fat yogurt Cottage cheese, low-fat ricotta, cheeses labeled as low-fat and low-sodium   Whole milk Reduced-fat (2%) milk Malted and chocolate milk Full fat yogurt Most cheeses (unless low-fat and low salt) Buttermilk (no more than 1 cup per week)   Meats and Beans   Lean cuts of fresh or frozen beef, veal, lamb, or pork (look for the word loin) Fresh or frozen poultry without the skin Fresh or frozen fish and some shellfish Egg whites and egg substitutes (Limit whole eggs to three per week) Tofu Nuts or seeds (unsalted, dry-roasted), low-sodium peanut butter Dried peas, beans, and lentils   Any smoked, cured, salted, or canned meat, fish, or poultry (including roach, chipped beef, cold cuts, hot dogs, sausages, sardines, and anchovies) Poultry skins Breaded and/or fried fish or meats Canned peas, beans, and lentils Salted nuts   Fats and Oils   Olive oil and canola oil Low-sodium, low-fat salad dressings and mayonnaise   Butter, margarine, coconut and palm oils, roach fat   Snacks, Sweets, and Condiments   Low-sodium or unsalted versions of broths, soups, soy sauce, and condiments Pepper, herbs, and spices; vinegar, lemon, or lime juice Low-fat frozen desserts (yogurt, sherbet, fruit bars) Sugar, cocoa powder, honey, syrup, jam, and preserves Low-fat, trans-fat free cookies, cakes, and pies Eric and animal crackers, fig bars, sukh snaps   High-fat desserts Broth, soups, gravies, and sauces, made from instant mixes or other high-sodium ingredients Salted snack foods Canned olives Meat tenderizers, seasoning salt, and most flavored vinegars   Beverages   Low-sodium carbonated beverages Tea and coffee in moderation Soy milk   Commercially softened water   Suggestions   Make whole grains, fruits, and vegetables the base of your diet. Choose heart-healthy fats such as canola, olive, and flaxseed oil, and foods high in heart-healthy fats, such as nuts, seeds, soybeans, tofu, and fish. Eat fish at least twice per week; the fish highest in omega-3 fatty acids and lowest in mercury include salmon, herring, mackerel, sardines, and canned chunk light tuna. If you eat fish less than twice per week or have high triglycerides, talk to your doctor about taking fish oil supplements. Read food labels. For products low in fat and cholesterol, look for fat free, low-fat, cholesterol free, saturated fat free, and trans fat freeAlso scan the Nutrition Facts Label, which lists saturated fat, trans fat, and cholesterol amounts. For products low in sodium, look for sodium free, very low sodium, low sodium, no added salt, and unsalted   Skip the salt when cooking or at the table; if food needs more flavor, get creative and try out different herbs and spices. Garlic and onion also add substantial flavor to foods. Trim any visible fat off meat and poultry before cooking, and drain the fat off after love. Use cooking methods that require little or no added fat, such as grilling, boiling, baking, poaching, broiling, roasting, steaming, stir-frying, and sauting. Avoid fast food and convenience food. They tend to be high in saturated and trans fat and have a lot of added salt. Talk to a registered dietitian for individualized diet advice. Last Reviewed: March 2011 Juwan Gilbert MS, MPH, RD   Updated: 3/29/2011   ·     Heart-Healthy Diet   Sodium, Fat, and Cholesterol Controlled Diet       What Is a Heart Healthy Diet?    A heart-healthy diet is one that limits sodium , certain types of fat , and cholesterol . This type of diet is recommended for:   People with any form of cardiovascular disease (eg, coronary heart disease , peripheral vascular disease , previous heart attack , previous stroke )   People with risk factors for cardiovascular disease, such as high blood pressure , high cholesterol , or diabetes   Anyone who wants to lower their risk of developing cardiovascular disease   Sodium    Sodium is a mineral found in many foods. In general, most people consume much more sodium than they need. Diets high in sodium can increase blood pressure and lead to edema (water retention). On a heart-healthy diet, you should consume no more than 2,300 mg (milligrams) of sodium per dayabout the amount in one teaspoon of table salt. The foods highest in sodium include table salt (about 50% sodium), processed foods, convenience foods, and preserved foods. Cholesterol    Cholesterol is a fat-like, waxy substance in your blood. Our bodies make some cholesterol. It is also found in animal products, with the highest amounts in fatty meat, egg yolks, whole milk, cheese, shellfish, and organ meats. On a heart-healthy diet, you should limit your cholesterol intake to less than 200 mg per day. It is normal and important to have some cholesterol in your bloodstream. But too much cholesterol can cause plaque to build up within your arteries, which can eventually lead to a heart attack or stroke. The two types of cholesterol that are most commonly referred to are:   Low-density lipoprotein (LDL) cholesterol  Also known as bad cholesterol, this is the cholesterol that tends to build up along your arteries. Bad cholesterol levels are increased by eating fats that are saturated or hydrogenated. Optimal level of this cholesterol is less than 100. Over 130 starts to get risky for heart disease.    High-density lipoprotein (HDL) cholesterol  Also known as good cholesterol, this type of cholesterol actually carries cholesterol away from your arteries and may, therefore, help lower your risk of having a heart attack. You want this level to be high (ideally greater than 60). It is a risk to have a level less than 40. You can raise this good cholesterol by eating olive oil, canola oil, avocados, or nuts. Exercise raises this level, too. Fat    Fat is calorie dense and packs a lot of calories into a small amount of food. Even though fats should be limited due to their high calorie content, not all fats are bad. In fact, some fats are quite healthful. Fat can be broken down into four main types. The good-for-you fats are:   Monounsaturated fat  found in oils such as olive and canola, avocados, and nuts and natural nut butters; can decrease cholesterol levels, while keeping levels of HDL cholesterol high   Polyunsaturated fat  found in oils such as safflower, sunflower, soybean, corn, and sesame; can decrease total cholesterol and LDL cholesterol   Omega-3 fatty acids  particularly those found in fatty fish (such as salmon, trout, tuna, mackerel, herring, and sardines); can decrease risk of arrhythmias, decrease triglyceride levels, and slightly lower blood pressure   The fats that you want to limit are:   Saturated fat  found in animal products, many fast foods, and a few vegetables; increases total blood cholesterol, including LDL levels   Animal fats that are saturated include: butter, lard, whole-milk dairy products, meat fat, and poultry skin   Vegetable fats that are saturated include: hydrogenated shortening, palm oil, coconut oil, cocoa butter   Hydrogenated or trans fat  found in margarine and vegetable shortening, most shelf stable snack foods, and fried foods; increases LDL and decreases HDL     It is generally recommended that you limit your total fat for the day to less than 30% of your total calories.  If you follow an 1800-calorie heart healthy diet, for example, this would mean 60 grams of fat or less per day. Saturated fat and trans fat in your diet raises your blood cholesterol the most, much more than dietary cholesterol does. For this reason, on a heart-healthy diet, less than 7% of your calories should come from saturated fat and ideally 0% from trans fat. On an 1800-calorie diet, this translates into less than 14 grams of saturated fat per day, leaving 46 grams of fat to come from mono- and polyunsaturated fats.    Food Choices on a Heart Healthy Diet   Food Category   Foods Recommended   Foods to Avoid   Grains   Breads and rolls without salted tops Most dry and cooked cereals Unsalted crackers and breadsticks Low-sodium or homemade breadcrumbs or stuffing All rice and pastas   Breads, rolls, and crackers with salted tops High-fat baked goods (eg, muffins, donuts, pastries) Quick breads, self-rising flour, and biscuit mixes Regular bread crumbs Instant hot cereals Commercially prepared rice, pasta, or stuffing mixes   Vegetables   Most fresh, frozen, and low-sodium canned vegetables Low-sodium and salt-free vegetable juices Canned vegetables if unsalted or rinsed   Regular canned vegetables and juices, including sauerkraut and pickled vegetables Frozen vegetables with sauces Commercially prepared potato and vegetable mixes   Fruits   Most fresh, frozen, and canned fruits All fruit juices   Fruits processed with salt or sodium   Milk   Nonfat or low-fat (1%) milk Nonfat or low-fat yogurt Cottage cheese, low-fat ricotta, cheeses labeled as low-fat and low-sodium   Whole milk Reduced-fat (2%) milk Malted and chocolate milk Full fat yogurt Most cheeses (unless low-fat and low salt) Buttermilk (no more than 1 cup per week)   Meats and Beans   Lean cuts of fresh or frozen beef, veal, lamb, or pork (look for the word loin) Fresh or frozen poultry without the skin Fresh or frozen fish and some shellfish Egg whites and egg substitutes (Limit whole eggs to three per week) Tofu Nuts or seeds (unsalted, dry-roasted), low-sodium peanut butter Dried peas, beans, and lentils   Any smoked, cured, salted, or canned meat, fish, or poultry (including roach, chipped beef, cold cuts, hot dogs, sausages, sardines, and anchovies) Poultry skins Breaded and/or fried fish or meats Canned peas, beans, and lentils Salted nuts   Fats and Oils   Olive oil and canola oil Low-sodium, low-fat salad dressings and mayonnaise   Butter, margarine, coconut and palm oils, roach fat   Snacks, Sweets, and Condiments   Low-sodium or unsalted versions of broths, soups, soy sauce, and condiments Pepper, herbs, and spices; vinegar, lemon, or lime juice Low-fat frozen desserts (yogurt, sherbet, fruit bars) Sugar, cocoa powder, honey, syrup, jam, and preserves Low-fat, trans-fat free cookies, cakes, and pies Eric and animal crackers, fig bars, sukh snaps   High-fat desserts Broth, soups, gravies, and sauces, made from instant mixes or other high-sodium ingredients Salted snack foods Canned olives Meat tenderizers, seasoning salt, and most flavored vinegars   Beverages   Low-sodium carbonated beverages Tea and coffee in moderation Soy milk   Commercially softened water   Suggestions   Make whole grains, fruits, and vegetables the base of your diet. Choose heart-healthy fats such as canola, olive, and flaxseed oil, and foods high in heart-healthy fats, such as nuts, seeds, soybeans, tofu, and fish. Eat fish at least twice per week; the fish highest in omega-3 fatty acids and lowest in mercury include salmon, herring, mackerel, sardines, and canned chunk light tuna. If you eat fish less than twice per week or have high triglycerides, talk to your doctor about taking fish oil supplements. Read food labels. For products low in fat and cholesterol, look for fat free, low-fat, cholesterol free, saturated fat free, and trans fat freeAlso scan the Nutrition Facts Label, which lists saturated fat, trans fat, and cholesterol amounts. For products low in sodium, look for sodium free, very low sodium, low sodium, no added salt, and unsalted   Skip the salt when cooking or at the table; if food needs more flavor, get creative and try out different herbs and spices. Garlic and onion also add substantial flavor to foods. Trim any visible fat off meat and poultry before cooking, and drain the fat off after love. Use cooking methods that require little or no added fat, such as grilling, boiling, baking, poaching, broiling, roasting, steaming, stir-frying, and sauting. Avoid fast food and convenience food. They tend to be high in saturated and trans fat and have a lot of added salt. Talk to a registered dietitian for individualized diet advice. Last Reviewed: March 2011 Jason Garcia MS, MPH, RD   Updated: 3/29/2011   ·     Preventing Osteoporosis: After Your Visit  Your Care Instructions  Osteoporosis means the bones are weak and thin enough that they can break easily. The older you are, the more likely you are to get osteoporosis. But with plenty of calcium, vitamin D, and exercise, you can help prevent osteoporosis. The preteen and teen years are a key time for bone building. With the help of calcium, vitamin D, and exercise in those early years and beyond, the bones reach their peak density and strength by age 27. After age 27, your bones naturally start to thin and weaken. The stronger your bones are at around age 27, the lower your risk for osteoporosis. But no matter what your age and risk are, your bones still need calcium, vitamin D, and exercise to stay strong. Also avoid smoking, and limit alcohol. Smoking and heavy alcohol use can make your bones thinner. Talk to your doctor about any special risks you might have, such as having a close relative with osteoporosis or taking a medicine that can weaken bones. Your doctor can tell you the best ways to protect your bones from thinning.   Follow-up care is a key part of your treatment and safety. Be sure to make and go to all appointments, and call your doctor if you are having problems. It's also a good idea to know your test results and keep a list of the medicines you take. How can you care for yourself at home? Get enough calcium and vitamin D. The [place] [employer] recommends adults younger than age 46 need 1,000 mg of calcium and 600 IU of vitamin D each day. Women ages 46 to 79 need 1,200 mg of calcium and 600 IU of vitamin D each day. Men ages 46 to 79 need 1,000 mg of calcium and 600 IU of vitamin D each day. Adults 71 and older need 1,200 mg of calcium and 800 IU of vitamin D each day. Eat foods rich in calcium, like yogurt, cheese, milk, and dark green vegetables. Eat foods rich in vitamin D, like eggs, fatty fish, cereal, and fortified milk. Get some sunshine. Your body uses sunshine to make its own vitamin D. The safest time to be out in the sun is before 10 a.m. or after 3 p.m. Avoid getting sunburned. Sunburn can increase your risk of skin cancer. Talk to your doctor about taking a calcium plus vitamin D supplement. Ask about what type of calcium is right for you, and how much to take at a time. Adults ages 23 to 48 should not get more than 2,500 mg of calcium and 4,000 IU of vitamin D each day, whether it is from supplements and/or food. Adults ages 46 and older should not get more than 2,000 mg of calcium and 4,000 IU of vitamin D each day from supplements and/or food. Get regular bone-building exercise. Weight-bearing and resistance exercises keep bones healthy by working the muscles and bones against gravity. Start out at an exercise level that feels right for you. Add a little at a time until you can do the following:  Do 30 minutes of weight-bearing exercise on most days of the week. Walking, jogging, stair climbing, and dancing are good choices. Do resistance exercises with weights or elastic bands 2 to 3 days a week. Limit alcohol.  Drink no more than 1 alcohol drink a day if you are a woman. Drink no more than 2 alcohol drinks a day if you are a man. Do not smoke. Smoking can make bones thin faster. If you need help quitting, talk to your doctor about stop-smoking programs and medicines. These can increase your chances of quitting for good. When should you call for help? Watch closely for changes in your health, and be sure to contact your doctor if:  You need help with a healthy eating plan. You need help with an exercise plan    © 7572-9833 [employer]. Care instructions adapted under license by [facility]. This care instruction is for use with your licensed healthcare professional. If you have questions about a medical condition or this instruction, always ask your healthcare professional. [address] any warranty or liability for your use of this information. Content Version: 9.4.43086; Last Revised: June 20, 2011              ·     Keep Your Memory Miriam Caldera       Many factors can affect your ability to remembera hectic lifestyle, aging, stress, chronic disease, and certain medicines. But, there are steps you can take to sharpen your mind and help preserve your memory. Challenge Your Brain   Regularly challenging your mind may help keeps it in top shape. Good mental exercises include:   Crossword puzzlesUse a dictionary if you need it; you will learn more that way. Brainteasers Try some! Crafts, such as wood working and sewing   Hobbies, such as gardening and building model airplanes   SocializingVisit old friends or join groups to meet new ones.    Reading   Learning a new language   Taking a class, whether it be art history or concepcion chi   TravelingExperience the food, history, and culture of your destination   Learning to use a computer   Going to museums, the theater, or thought-provoking movies   Changing things in your daily life, such as reversing your pattern in the grocery store or brushing your teeth using your nondominant hand   Use Memory Aids   There is no need to remember every detail on your own. These memory aids can help:   Calendars and day planners   Electronic organizers to store all sorts of helpful informationThese devices can \"beep\" to remind you of appointments. A book of days to record birthdays, anniversaries, and other occasions that occur on the same date every year   Detailed \"to-do\" lists and strategically placed sticky notes   Quick \"study\" sessionsBefore a gathering, review who will be there so their names will be fresh in your mind. Establish routinesFor example, keep your keys, wallet, and umbrella in the same place all the time or take medicine with your 8:00 AM glass of juice   Live a Healthy Life   Many actions that will keep your body strong will do the same for your mind. For example:   Talk to Your Doctor About Herbs and Supplements    Malnutrition and vitamin deficiencies can impair your mental function. For example, vitamin B12 deficiency can cause a range of symptoms, including confusion. But, what if your nutritional needs are being met? Can herbs and supplements still offer a benefit? Researchers have investigated a range of natural remedies, such as ginkgo , ginseng , and the supplement phosphatidylserine (PS). So far, though, the evidence is inconsistent as to whether these products can improve memory or thinking. If you are interested in taking herbs and supplements, talk to your doctor first because they may interact with other medicines that you are taking. Exercise Regularly    Among the many benefits of regular exercise are increased blood flow to the brain and decreased risk of certain diseases that can interfere with memory function. One study found that even moderate exercise has a beneficial effect.  Examples of \"moderate\" exercise include:   Playing 18 holes of golf once a week, without a cart   Playing tennis twice a week   Walking one mile per day   Manage Stress    It can be tough to remember what is important when your mind is cluttered. Make time for relaxation. Choose activities that calm you down, and make it routine. Manage Chronic Conditions    Side effects of high blood pressure , diabetes, and heart disease can interfere with mental function. Many of the lifestyle steps discussed here can help manage these conditions. Strive to eat a healthy diet, exercise regularly, get stress under control, and follow your doctor's advice for your condition. Minimize Medications    Talk to your doctor about the medicines that you take. Some may be unnecessary. Also, healthy lifestyle habits may lower the need for certain drugs. Last Reviewed: April 2010 Francesca Samuel MD   Updated: 4/13/2010   ·   Smoking Cessation  This document explains the best ways for you to quit smoking and new treatments to help. It lists new medicines that can double or triple your chances of quitting and quitting for good. It also considers ways to avoid relapses and concerns you may have about quitting, including weight gain. NICOTINE: A POWERFUL ADDICTION  If you have tried to quit smoking, you know how hard it can be. It is hard because nicotine is a very addictive drug. For some people, it can be as addictive as heroin or cocaine. Usually, people make 2 or 3 tries, or more, before finally being able to quit. Each time you try to quit, you can learn about what helps and what hurts. Quitting takes hard work and a lot of effort, but you can quit smoking. QUITTING SMOKING IS ONE OF THE MOST IMPORTANT THINGS YOU WILL EVER DO. You will live longer, feel better, and live better. The impact on your body of quitting smoking is felt almost immediately:  Within 20 minutes, blood pressure decreases. Pulse returns to its normal level. After 8 hours, carbon monoxide levels in the blood return to normal. Oxygen level increases.   After 24 hours, chance of heart attack starts to decrease. Breath, hair, and body stop smelling like smoke. After 48 hours, damaged nerve endings begin to recover. Sense of taste and smell improve. After 72 hours, the body is virtually free of nicotine. Bronchial tubes relax and breathing becomes easier. After 2 to 12 weeks, lungs can hold more air. Exercise becomes easier and circulation improves. Quitting will reduce your risk of having a heart attack, stroke, cancer, or lung disease:  After 1 year, the risk of coronary heart disease is cut in half. After 5 years, the risk of stroke falls to the same as a nonsmoker. After 10 years, the risk of lung cancer is cut in half and the risk of other cancers decreases significantly. After 15 years, the risk of coronary heart disease drops, usually to the level of a nonsmoker. If you are pregnant, quitting smoking will improve your chances of having a healthy baby. The people you live with, especially your children, will be healthier. You will have extra money to spend on things other than cigarettes. FIVE KEYS TO QUITTING  Studies have shown that these 5 steps will help you quit smoking and quit for good. You have the best chances of quitting if you use them together:  Get ready. Get support and encouragement. Learn new skills and behaviors. Get medicine to reduce your nicotine addiction and use it correctly. Be prepared for relapse or difficult situations. Be determined to continue trying to quit, even if you do not succeed at first.  1. GET READY  Set a quit date. Change your environment. Get rid of ALL cigarettes, ashtrays, matches, and lighters in your home, car, and place of work. Do not let people smoke in your home. Review your past attempts to quit. Think about what worked and what did not. Once you quit, do not smoke. NOT EVEN A PUFF! 2. GET SUPPORT AND ENCOURAGEMENT  Studies have shown that you have a better chance of being successful if you have help.  You can get support in many ways.  Tell your family, friends, and coworkers that you are going to quit and need their support. Ask them not to smoke around you. Talk to your caregivers (doctor, dentist, nurse, pharmacist, psychologist, and/or smoking counselor). Get individual, group, or telephone counseling and support. The more counseling you have, the better your chances are of quitting. Programs are available at [facility]. Call your local health department for information about programs in your area. Spiritual beliefs and practices may help some smokers quit. Quit meters are small computer programs online or downloadable that keep track of quit statistics, such as amount of \"quit-time,\" cigarettes not smoked, and money saved. Many smokers find one or more of the many self-help books available useful in helping them quit and stay off tobacco.  3. LEARN NEW SKILLS AND BEHAVIORS  Try to distract yourself from urges to smoke. Talk to someone, go for a walk, or occupy your time with a task. When you first try to quit, change your routine. Take a different route to work. Drink tea instead of coffee. Eat breakfast in a different place. Do something to reduce your stress. Take a hot bath, exercise, or read a book. Plan something enjoyable to do every day. Reward yourself for not smoking. Explore interactive web-based programs that specialize in helping you quit. 4. GET MEDICINE AND USE IT CORRECTLY  Medicines can help you stop smoking and decrease the urge to smoke. Combining medicine with the above behavioral methods and support can quadruple your chances of successfully quitting smoking. The [place] Food and Drug Administration (FDA) has approved 7 medicines to help you quit smoking. These medicines fall into 3 categories. Nicotine replacement therapy (delivers nicotine to your body without the negative effects and risks of smoking):  Nicotine gum: Available over-the-counter.   Nicotine lozenges: Available over-the-counter. Nicotine inhaler: Available by prescription. Nicotine nasal spray: Available by prescription. Nicotine skin patches (transdermal): Available by prescription and over-the-counter. Antidepressant medicine (helps people abstain from smoking, but how this works is unknown): Bupropion sustained-release (SR) tablets: Available by prescription. Nicotinic receptor partial agonist (simulates the effect of nicotine in your brain):  Varenicline tartrate tablets: Available by prescription. Ask your caregiver for advice about which medicines to use and how to use them. Carefully read the information on the package. Everyone who is trying to quit may benefit from using a medicine. If you are pregnant or trying to become pregnant, nursing an infant, you are under age 25, or you smoke fewer than 10 cigarettes per day, talk to your caregiver before taking any nicotine replacement medicines. You should stop using a nicotine replacement product and call your caregiver if you experience nausea, dizziness, weakness, vomiting, fast or irregular heartbeat, mouth problems with the lozenge or gum, or redness or swelling of the skin around the patch that does not go away. Do not use any other product containing nicotine while using a nicotine replacement product. Talk to your caregiver before using these products if you have diabetes, heart disease, asthma, stomach ulcers, you had a recent heart attack, you have high blood pressure that is not controlled with medicine, a history of irregular heartbeat, or you have been prescribed medicine to help you quit smoking. 5. BE PREPARED FOR RELAPSE OR DIFFICULT SITUATIONS  Most relapses occur within the first 3 months after quitting. Do not be discouraged if you start smoking again. Remember, most people try several times before they finally quit. You may have symptoms of withdrawal because your body is used to nicotine.  You may crave cigarettes, be irritable, feel very hungry, cough often, get headaches, or have difficulty concentrating. The withdrawal symptoms are only temporary. They are strongest when you first quit, but they will go away within 10 to 14 days. Here are some difficult situations to watch for:  Alcohol. Avoid drinking alcohol. Drinking lowers your chances of successfully quitting. Caffeine. Try to reduce the amount of caffeine you consume. It also lowers your chances of successfully quitting. Other smokers. Being around smoking can make you want to smoke. Avoid smokers. Weight gain. Many smokers will gain weight when they quit, usually less than 10 pounds. Eat a healthy diet and stay active. Do not let weight gain distract you from your main goal, quitting smoking. Some medicines that help you quit smoking may also help delay weight gain. You can always lose the weight gained after you quit. Bad mood or depression. There are a lot of ways to improve your mood other than smoking. If you are having problems with any of these situations, talk to your caregiver. SPECIAL SITUATIONS AND CONDITIONS  Studies suggest that everyone can quit smoking. Your situation or condition can give you a special reason to quit. Pregnant women/new mothers: By quitting, you protect your baby's health and your own. Hospitalized patients: By quitting, you reduce health problems and help healing. Heart attack patients: By quitting, you reduce your risk of a second heart attack. Lung, head, and neck cancer patients: By quitting, you reduce your chance of a second cancer. Parents of children and adolescents: By quitting, you protect your children from illnesses caused by secondhand smoke. QUESTIONS TO THINK ABOUT  Think about the following questions before you try to stop smoking. You may want to talk about your answers with your caregiver. Why do you want to quit? If you tried to quit in the past, what helped and what did not?   What will be the most difficult situations for you after you quit? How will you plan to handle them? Who can help you through the tough times? Your family? Friends? Caregiver? What pleasures do you get from smoking? What ways can you still get pleasure if you quit? Here are some questions to ask your caregiver: How can you help me to be successful at quitting? What medicine do you think would be best for me and how should I take it? What should I do if I need more help? What is smoking withdrawal like? How can I get information on withdrawal?  Quitting takes hard work and a lot of effort, but you can quit smoking. FOR MORE INFORMATION   Smokefree. gov ([employer]) provides free, accurate, evidence-based information and professional assistance to help support the immediate and long-term needs of people trying to quit smoking. Document Released: 12/12/2002 Document Revised: 12/06/2012 Document Reviewed: 10/04/2010  TERESE SOTO [facility] Patient Information ©2012 Roberto Matthew. ·     Keeping Home a [facility]       As we get older, changes in balance, gait, strength, vision, hearing, and cognition make even the most youthful senior more prone to accidents. Falls are one of the leading health risks for older people. This increased risk of falling is related to:   Aging process (eg, decreased muscle strength, slowed reflexes)   Higher incidence of chronic health problems (eg, arthritis, diabetes) that may limit mobility, agility or sensory awareness   Side effects of medicine (eg, dizziness, blurred vision)especially medicines like prescription pain medicines and drugs used to treat mental health conditions   Depending on the brittleness of your bones, the consequences of a fall can be serious and long lasting. Home Life   Research by the Association of Aging [facility]) shows that some home accidents among older adults can be prevented by making simple lifestyle changes and basic modifications and repairs to the home environment.  Here are some lifestyle changes that experts recommend:   Have your hearing and vision checked regularly. Be sure to wear prescription glasses that are right for you. Speak to your doctor or pharmacist about the possible side effects of your medicines. A number of medicines can cause dizziness. If you have problems with sleep, talk to your doctor. Limit your intake of alcohol. If necessary, use a cane or walker to help maintain your balance. Wear supportive, rubber-soled shoes, even at home. If you live in a region that gets wintry weather, you may want to put special cleats on your shoes to prevent you from slipping on the snow and ice. Exercise regularly to help maintain muscle tone, agility, and balance. Always hold the banister when going up or down stairs. Also, use  bars when getting in or out of the bath or shower, or using the toilet. To avoid dizziness, get up slowly from a lying down position. Sit up first, dangling your legs for a minute or two before rising to a standing position. Overall Home Safety Check   According to the Consumer Product Safety Commision's \"Older Consumer Home Safety Checklist,\" it is important to check for potential hazards in each room. And remember, proper lighting is an essential factor in home safety. If you cannot see clearly, you are more likely to fall. Important questions to ask yourself include:   Are lamp, electric, extension, and telephone cords placed out of the flow of traffic and maintained in good condition? Have frayed cords been replaced? Are all small rugs and runners slip resistant? If not, you can secure them to the floor with a special double-sided carpet tape. Are smoke detectors properly locatedone on every floor of your home and one outside of every sleeping area? Are they in good working order? Are batteries replaced at least once a year? Do you have a well-maintained carbon monoxide detector outside every sleeping are in your home?    Does your furniture layout leave plenty of space to maneuver between and around chairs, tables, beds, and sofas? Are hallways, stairs and passages between rooms well lit? Can you reach a lamp without getting out of bed? Are floor surfaces well maintained? Shag rugs, high-pile carpeting, tile floors, and polished wood floors can be particularly slippery. Stairs should always have handrails and be carpeted or fitted with a non-skid tread. Is your telephone easily reachable. Is the cord safely tucked away? Room by Room   According to the Association of Aging, bathrooms and damari are the two most potentially hazardous rooms in your home. In the Kitchen    Be sure your stove is in proper working order and always make sure burners and the oven are off before you go out or go to sleep. Keep pots on the back burners, turn handles away from the front of the stove, and keep stove clean and free of grease build-up. Kitchen ventilation systems and range exhausts should be working properly. Keep flammable objects such as towels and pot holders away from the cooking area except when in use. Make sure kitchen curtains are tied back. Move cords and appliances away from the sink and hot surfaces. If extension cords are needed, install wiring guides so they do not hang over the sink, range, or working areas. Look for coffee pots, kettles and toaster ovens with automatic shut-offs. Keep a mop handy in the kitchen so you can wipe up spills instantly. You should also have a small fire extinguisher. Arrange your kitchen with frequently used items on lower shelves to avoid the need to stand on a stepstool to reach them. Make sure countertops are well-lit to avoid injuries while cutting and preparing food. In the Bathroom    Use a non-slip mat or decals in the tub and shower, since wet, soapy tile or porcelain surfaces are extremely slippery.     Make sure bathroom rugs are non-skid or tape them firmly to the floor. Bathtubs should have at least one, preferably two, grab bars, firmly attached to structural supports in the wall. (Do not use built-in soap holders or glass shower doors as grab bars.)    Tub seats fitted with non-slip material on the legs allow you to wash sitting down. For people with limited mobility, bathtub transfer benches allow you to slide safely into the tub. Raised toilet seats and toilet safety rails are helpful for those with knee or hip problems. In the [facility]    Make sure you use a nightlight and that the area around your bed is clear of potential obstacles. Be careful with electric blankets and never go to sleep with a heating pad, which can cause serious burns even if on a low setting. Use fire-resistant mattress covers and pillows, and NEVER smoke in bed. Keep a phone next to the bed that is programmed to dial 911 at the push of a button. If you have a chronic condition, you may want to sign on with an automatic call-in service. Typically the system includes a small pendant that connects directly to an emergency medical voice-response system. You should also make arrangements to stay in contact with someonefriend, neighbor, family memberon a regular schedule. Fire Prevention   According to the [employer] (Smoke [employer]arms for Every) [address] [place] [address], senior citizens are one of the two highest risk groups for death and serious injuries due to residential fires. When cooking, wear short-sleeved items, never a bulky long-sleeved robe. The [facility]'s Safety Checklist for Older Consumers emphasizes the importance of checking basements, garages, workshops and storage areas for fire hazards, such as volatile liquids, piles of old rags or clothing and overloaded circuits. Never smoke in bed or when lying down on a couch or recliner chair. Small portable electric or kerosene heaters are responsible for many home fires and should be used cautiously if at all.  If you do use one, be sure to keep them away from flammable materials. In case of fire, make sure you have a pre-established emergency exit plan. Have a professional check your fireplace and other fuel-burning appliances yearly. [employer]   Baby boomers entering the wong years will continue to see the development of new products to help older adults live safely and independently in spite of age-related changes. Making Life More Livable  , by Jose Bowling, lists over 1,000 products for \"living well in the mature years,\" such as bathing and mobility aids, household security devices, ergonomically designed knives and peelers, and faucet valves and knobs for temperature control. Medical supply stores and organizations are good sources of information about products that improve your quality of life and insure your safety.      Last Reviewed: November 2009 Verena Reyna MD   Updated: 3/7/2011     ·

## 2022-01-18 NOTE — PROGRESS NOTES
Medicare Annual Wellness Visit  Name: Deborah Gracia Date: 2022   MRN: [id number] Sex: Male   Age: 74 y.o. Ethnicity: Non- / Non    : 1950 Race: White (non-)      Heather Russo is here for Medicare Devyn Horne is doing well. He was anxious and fidgety while here. He is active and stays busy. Screenings for behavioral, psychosocial and functional/safety risks, and cognitive dysfunction are all negative except as indicated below. These results, as well as other patient data from the [address] form, are documented in Flowsheets linked to this Encounter. Allergies   Allergen Reactions    Tape Walt Sluder Tape] Rash     welps         Prior to Visit Medications    Medication Sig Taking? Authorizing Provider   lisinopril (PRINIVIL;ZESTRIL) 10 MG tablet TAKE 1 TABLET BY MOUTH ONCE DAILY Yes Leonel Tineo MD   verapamil (CALAN SR) 180 MG extended release tablet Take 1 tablet by mouth 2 times daily Yes KEVYN Reddy   ELIQUIS 5 MG TABS tablet TAKE (1) TABLET BY MOUTH TWICE A DAY. Yes KEVYN Valencia - CNP   escitalopram (LEXAPRO) 20 MG tablet TAKE 1 TABLET BY MOUTH ONCE DAILY Yes Leonel Tineo MD   vitamin D (ERGOCALCIFEROL) 1.25 MG (98341 UT) CAPS capsule Take 1 capsule by mouth once a week Yes Leonel Tineo MD   nitroGLYCERIN (NITROSTAT) 0.4 MG SL tablet up to max of 3 total doses. If no relief after 1 dose, call 911.  Yes KEVYN Gregorio   tiZANidine (ZANAFLEX) 4 MG tablet Take 1 tablet by mouth every 8 hours as needed (muscle relaxer) Yes Leonel Tineo MD   atorvastatin (LIPITOR) 40 MG tablet Take 1 tablet by mouth daily  Leonel Tineo MD   pantoprazole (PROTONIX) 40 MG tablet Take 1 tablet by mouth every morning (before breakfast)  Patient not taking: Reported on 2022  Leonel Tineo MD   sucralfate (CARAFATE) 1 GM tablet Take 1 tablet by mouth 4 times daily  Patient not taking: Reported on 2022  Leonel Tineo MD       Past Medical History:   Diagnosis Date    Arthritis     knees    Atrial fibrillation (HCC)     CAD (coronary artery disease)     Colon polyp     COPD (chronic obstructive pulmonary disease) (HCC)     slight    Heart attack ([address] 07/06/2017    History of blood transfusion     History of colon polyps     tubular adenoma, hyperplastic, distal rectum, duodenum    History of transfusion     Hyperlipidemia     Hypertension     Joint pain     Palpitations     Pinched nerve     right shoulder    Pinched nerve in neck     Tobacco abuse     Ulcer        Past Surgical History:   Procedure Laterality Date    COLONOSCOPY  12-10-08    Dr Carlota Vasquez COLONOSCOPY  8-27-01    Dr Carlota Vasquez COLONOSCOPY  07/2013    Dr. Barbi Alexander: multiple polyps, adenomatous and hyperplastic (3 yr)    CORONARY ANGIOPLASTY WITH STENT PLACEMENT  07/06/2017    ENDOSCOPY, COLON, DIAGNOSTIC      HEMORRHOID SURGERY      JOINT REPLACEMENT Left 06/2012    knee    KNEE ARTHROSCOPY      bilateral-3 scopes on right and 1 on left    NECK SURGERY      PAROTIDECTOMY  03/31/2017    SKIN BIOPSY      lypomas on arms, side, and back    TOTAL KNEE ARTHROPLASTY      left    UPPER GASTROINTESTINAL ENDOSCOPY  8-27-01    Dr Ganesh Lucia N/A 3/31/2016    Dr Sally Wood (-), Chronic esophagitis       Family History   Problem Relation Age of Onset    Other Sister         pancrease removal    Cancer Father         Larynx and asbestos exposure    Esophageal Cancer Father     Heart Disease Mother         has pacemaker    Diabetes Mother     Other Mother         has had esoph stretched in the past    Colon Polyps Neg Hx     Colon Cancer Neg Hx     Liver Cancer Neg Hx     Liver Disease Neg Hx     Rectal Cancer Neg Hx     Stomach Cancer Neg Hx        CareTeam (Including outside providers/suppliers regularly involved in providing care):   Patient Care Team:  Jim Zarate MD as PCP - General (Family Medicine)  Jim Zarate MD as PCP [facility] Empaneled Provider  Sho Lyman MD as Consulting Physician (Specialist)  Lowell Chappell MD as Consulting Physician (Cardiology)    Wt Readings from Last 3 Encounters:   01/18/22 188 lb 1.6 oz (85.3 kg)   10/11/21 182 lb (82.6 kg)   09/15/21 183 lb (83 kg)     Vitals:    01/18/22 1402   BP: 138/74   Site: Right Upper Arm   Position: Sitting   Cuff Size: Large Adult   Pulse: 71   Resp: 20   Temp: 98 °F (36.7 °C)   TempSrc: Oral   SpO2: 100%   Weight: 188 lb 1.6 oz (85.3 kg)   Height: 6' 1\" (1.854 m)     Body mass index is 24.82 kg/m². Based upon direct observation of the patient, evaluation of cognition reveals recent and remote memory intact. Patient's complete Health Risk Assessment and screening values have been reviewed and are found in Flowsheets. The following problems were reviewed today and where indicated follow up appointments were made and/or referrals ordered. Positive Risk Factor Screenings with Interventions:         Substance History:  Social History     Tobacco History     Smoking Status  Current Every Day Smoker Smoking Frequency  0.5 packs/day for 25 years (12.5 pk yrs) Smoking Tobacco Type  Cigarettes    Smokeless Tobacco Use  Never Used          Alcohol History     Alcohol Use Status  Yes Drinks/Week  0 Standard drinks or equivalent per week Amount  0.0 standard drinks of alcohol/wk Comment  occ          Drug Use     Drug Use Status  No          Sexual Activity     Sexually Active  Not Asked               Alcohol Screening: Audit-C Score: 1  Total Score: 1    A score of 8 or more is associated with harmful or hazardous drinking. A score of 13 or more in women, and 15 or more in men, is likely to indicate alcohol dependence.   Substance Abuse Interventions:  · Tobacco abuse:  tobacco cessation tips and resources provided, patient is not ready to work toward tobacco cessation at this time     Health Habits/Nutrition:  Health Habits/Nutrition  Do you exercise for at least 20 minutes 2-3 times per week?: (!) No  Have you lost any weight without trying in the past 3 months?: No  Do you eat only one meal per day?: No  Have you seen the dentist within the past year?: Yes  Body mass index: 24.81  Health Habits/Nutrition Interventions:  · Inadequate physical activity:  educational materials provided to promote increased physical activity, patient is not ready to increase his/her physical activity level at this time    Hearing/Vision:  No exam data present  Hearing/Vision  Do you or your family notice any trouble with your hearing that hasn't been managed with hearing aids?: No  Do you have difficulty driving, watching TV, or doing any of your daily activities because of your eyesight?: No  Have you had an eye exam within the past year?: (!) No  Hearing/Vision Interventions:  · Vision concerns:  patient encouraged to make appointment with his/her eye specialist    Safety:  Safety  Do you have working smoke detectors?: Yes  Have all throw rugs been removed or fastened?: (!) No  Do you have non-slip mats or surfaces in all bathtubs/showers?: (!) No  Do all of your stairways have a railing or banister?: (!) No  Are your doorways, halls and stairs free of clutter?: Yes  Do you always fasten your seatbelt when you are in a car?: Yes  Safety Interventions:  · Home safety tips provided  · Patient declines any further evaluation/treatment for this issue     Personalized Preventive Plan   Current Health Maintenance Status  Immunization History   Administered Date(s) Administered    COVID-19, Pfizer, PF, 30mcg/0.3mL 01/15/2021, 02/05/2021, 12/20/2021    Influenza Vaccine, unspecified formulation 10/17/2016    Influenza, High Dose (Fluzone 65 yrs and older) 10/19/2017, 10/25/2018    Influenza, Quadv, IM, (6 mo and older Fluzone, Flulaval, Fluarix and 3 yrs and older Afluria) 08/31/2020    Influenza, Quadv, adjuvanted, 65 yrs +, IM, PF (Fluad) 10/11/2021    Pneumococcal Conjugate 13-valent Lila Latham) 07/07/2017    Pneumococcal Polysaccharide ([id number]) 09/25/2018    Zoster Recombinant (Shingrix) 01/01/2016        Health Maintenance   Topic Date Due    DTaP/Tdap/Td vaccine (1 - Tdap) Never done    Annual Wellness Visit (AWV)  06/24/2021    Colon cancer screen colonoscopy  01/18/2022    Shingles Vaccine (2 of 2) 01/18/2023 (Originally 2/26/2016)    Lipid screen  10/11/2022    Potassium monitoring  10/11/2022    Creatinine monitoring  10/11/2022    Depression Monitoring  01/15/2023    Flu vaccine  Completed    Pneumococcal 65+ years Vaccine  Completed    COVID-19 Vaccine  Completed    AAA screen  Completed    Hepatitis C screen  Completed    Hepatitis A vaccine  Aged Out    Hepatitis B vaccine  Aged Out    Hib vaccine  Aged Out    Meningococcal (ACWY) vaccine  Aged Out     Recommendations for [employer] Due: see orders and patient instructions/AVS.  . Recommended screening schedule for the next 5-10 years is provided to the patient in written form: see Patient Instructions/AVS.    Labs were done in October. Moira Jean-Baptiste LPN, 9/01/[birth year], performed the documented evaluation under the direct supervision of the attending physician.

## 2022-01-28 DIAGNOSIS — R10.13 EPIGASTRIC PAIN: ICD-10-CM

## 2022-01-28 DIAGNOSIS — E55.9 VITAMIN D DEFICIENCY: ICD-10-CM

## 2022-01-28 DIAGNOSIS — I10 ESSENTIAL HYPERTENSION: ICD-10-CM

## 2022-01-28 DIAGNOSIS — K21.9 GASTROESOPHAGEAL REFLUX DISEASE, UNSPECIFIED WHETHER ESOPHAGITIS PRESENT: ICD-10-CM

## 2022-01-31 RX ORDER — PANTOPRAZOLE SODIUM 40 MG/1
TABLET, DELAYED RELEASE ORAL
Qty: 30 TABLET | Refills: 0 | Status: SHIPPED | OUTPATIENT
Start: 2022-01-31 | End: 2022-03-03

## 2022-01-31 RX ORDER — LISINOPRIL 10 MG/1
TABLET ORAL
Qty: 30 TABLET | Refills: 0 | Status: SHIPPED | OUTPATIENT
Start: 2022-01-31 | End: 2022-02-28

## 2022-01-31 RX ORDER — ERGOCALCIFEROL 1.25 MG/1
CAPSULE ORAL
Qty: 4 CAPSULE | Refills: 0 | Status: SHIPPED | OUTPATIENT
Start: 2022-01-31 | End: 2022-02-28

## 2022-01-31 NOTE — TELEPHONE ENCOUNTER
Last OV 1/18/2022  Next OV 4/18/2022      Requested Prescriptions     Pending Prescriptions Disp Refills    lisinopril (PRINIVIL;ZESTRIL) 10 MG tablet [Pharmacy Med Name: LISINOPRIL 10 MG TABLET] 30 tablet 0     Sig: TAKE 1 TABLET BY MOUTH ONCE DAILY    vitamin D (ERGOCALCIFEROL) 1.25 MG (56656 UT) CAPS capsule [Pharmacy Med Name: VIT D2 1.25 MG (50,000 UNIT)] 4 capsule 0     Sig: TAKE 1 CAPSULE BY MOUTH ONCE WEEKLY.     pantoprazole (PROTONIX) 40 MG tablet [Pharmacy Med Name: PANTOPRAZOLE SOD DR 40 MG TAB] 30 tablet 0     Sig: TAKE 1 TABLET BY MOUTH DAILY IN THE MORNING (BEFORE BREAKFAST)

## 2022-02-28 DIAGNOSIS — E55.9 VITAMIN D DEFICIENCY: ICD-10-CM

## 2022-02-28 DIAGNOSIS — I10 ESSENTIAL HYPERTENSION: ICD-10-CM

## 2022-02-28 RX ORDER — LISINOPRIL 10 MG/1
TABLET ORAL
Qty: 30 TABLET | Refills: 0 | Status: SHIPPED | OUTPATIENT
Start: 2022-02-28 | End: 2022-03-21

## 2022-02-28 RX ORDER — ERGOCALCIFEROL 1.25 MG/1
CAPSULE ORAL
Qty: 4 CAPSULE | Refills: 0 | Status: SHIPPED | OUTPATIENT
Start: 2022-02-28 | End: 2022-04-18 | Stop reason: SDUPTHER

## 2022-03-03 DIAGNOSIS — R10.13 EPIGASTRIC PAIN: ICD-10-CM

## 2022-03-03 DIAGNOSIS — K21.9 GASTROESOPHAGEAL REFLUX DISEASE, UNSPECIFIED WHETHER ESOPHAGITIS PRESENT: ICD-10-CM

## 2022-03-03 RX ORDER — PANTOPRAZOLE SODIUM 40 MG/1
TABLET, DELAYED RELEASE ORAL
Qty: 30 TABLET | Refills: 0 | Status: SHIPPED | OUTPATIENT
Start: 2022-03-03 | End: 2022-04-18 | Stop reason: SDUPTHER

## 2022-03-10 ENCOUNTER — HOSPITAL ENCOUNTER (OUTPATIENT)
Dept: GENERAL RADIOLOGY | Age: 72
Discharge: HOME | End: 2022-03-10
Payer: MEDICARE

## 2022-03-10 ENCOUNTER — OFFICE VISIT (OUTPATIENT)
Dept: FAMILY MEDICINE CLINIC | Age: 72
End: 2022-03-10
Payer: MEDICARE

## 2022-03-10 VITALS
SYSTOLIC BLOOD PRESSURE: 134 MMHG | HEART RATE: 67 BPM | OXYGEN SATURATION: 99 % | BODY MASS INDEX: 25.47 KG/M2 | TEMPERATURE: 96.8 F | DIASTOLIC BLOOD PRESSURE: 78 MMHG | HEIGHT: 72 IN | WEIGHT: 188 LBS

## 2022-03-10 DIAGNOSIS — N18.30 STAGE 3 CHRONIC KIDNEY DISEASE, UNSPECIFIED WHETHER STAGE 3A OR 3B CKD (HCC): ICD-10-CM

## 2022-03-10 DIAGNOSIS — M54.6 ACUTE RIGHT-SIDED THORACIC BACK PAIN: Primary | ICD-10-CM

## 2022-03-10 DIAGNOSIS — I48.0 PAROXYSMAL ATRIAL FIBRILLATION (HCC): ICD-10-CM

## 2022-03-10 DIAGNOSIS — S39.012A BACK STRAIN, INITIAL ENCOUNTER: ICD-10-CM

## 2022-03-10 DIAGNOSIS — M54.6 ACUTE RIGHT-SIDED THORACIC BACK PAIN: ICD-10-CM

## 2022-03-10 LAB
ALBUMIN SERPL-MCNC: 4 G/DL (ref 3.5–5.2)
ALP BLD-CCNC: 124 U/L (ref 40–130)
ALT SERPL-CCNC: 18 U/L (ref 5–41)
ANION GAP SERPL CALCULATED.3IONS-SCNC: 7 MMOL/L (ref 7–19)
AST SERPL-CCNC: 15 U/L (ref 5–40)
BASOPHILS ABSOLUTE: 0 K/UL (ref 0–0.2)
BASOPHILS RELATIVE PERCENT: 0.6 % (ref 0–1)
BILIRUB SERPL-MCNC: 0.5 MG/DL (ref 0.2–1.2)
BUN BLDV-MCNC: 20 MG/DL (ref 8–23)
CALCIUM SERPL-MCNC: 9.2 MG/DL (ref 8.8–10.2)
CHLORIDE BLD-SCNC: 103 MMOL/L (ref 98–111)
CO2: 28 MMOL/L (ref 22–29)
CREAT SERPL-MCNC: 1.5 MG/DL (ref 0.5–1.2)
EOSINOPHILS ABSOLUTE: 0.5 K/UL (ref 0–0.6)
EOSINOPHILS RELATIVE PERCENT: 8.9 % (ref 0–5)
GFR AFRICAN AMERICAN: 55
GFR NON-AFRICAN AMERICAN: 46
GLUCOSE BLD-MCNC: 89 MG/DL (ref 74–109)
HCT VFR BLD CALC: 46.3 % (ref 42–52)
HEMOGLOBIN: 14.4 G/DL (ref 14–18)
IMMATURE GRANULOCYTES #: 0 K/UL
LYMPHOCYTES ABSOLUTE: 1.2 K/UL (ref 1.1–4.5)
LYMPHOCYTES RELATIVE PERCENT: 23.4 % (ref 20–40)
MCH RBC QN AUTO: 29 PG (ref 27–31)
MCHC RBC AUTO-ENTMCNC: 31.1 G/DL (ref 33–37)
MCV RBC AUTO: 93.3 FL (ref 80–94)
MONOCYTES ABSOLUTE: 0.4 K/UL (ref 0–0.9)
MONOCYTES RELATIVE PERCENT: 8.5 % (ref 0–10)
NEUTROPHILS ABSOLUTE: 3 K/UL (ref 1.5–7.5)
NEUTROPHILS RELATIVE PERCENT: 58.4 % (ref 50–65)
PDW BLD-RTO: 13.6 % (ref 11.5–14.5)
PLATELET # BLD: 168 K/UL (ref 130–400)
PMV BLD AUTO: 10.2 FL (ref 9.4–12.4)
POTASSIUM SERPL-SCNC: 4.5 MMOL/L (ref 3.5–5)
RBC # BLD: 4.96 M/UL (ref 4.7–6.1)
SODIUM BLD-SCNC: 138 MMOL/L (ref 136–145)
TOTAL PROTEIN: 7 G/DL (ref 6.6–8.7)
WBC # BLD: 5.1 K/UL (ref 4.8–10.8)

## 2022-03-10 PROCEDURE — 4004F PT TOBACCO SCREEN RCVD TLK: CPT | Performed by: FAMILY MEDICINE

## 2022-03-10 PROCEDURE — 3017F COLORECTAL CA SCREEN DOC REV: CPT | Performed by: FAMILY MEDICINE

## 2022-03-10 PROCEDURE — G8427 DOCREV CUR MEDS BY ELIG CLIN: HCPCS | Performed by: FAMILY MEDICINE

## 2022-03-10 PROCEDURE — 1123F ACP DISCUSS/DSCN MKR DOCD: CPT | Performed by: FAMILY MEDICINE

## 2022-03-10 PROCEDURE — 4040F PNEUMOC VAC/ADMIN/RCVD: CPT | Performed by: FAMILY MEDICINE

## 2022-03-10 PROCEDURE — 72070 X-RAY EXAM THORAC SPINE 2VWS: CPT

## 2022-03-10 PROCEDURE — G8417 CALC BMI ABV UP PARAM F/U: HCPCS | Performed by: FAMILY MEDICINE

## 2022-03-10 PROCEDURE — 99214 OFFICE O/P EST MOD 30 MIN: CPT | Performed by: FAMILY MEDICINE

## 2022-03-10 PROCEDURE — G8484 FLU IMMUNIZE NO ADMIN: HCPCS | Performed by: FAMILY MEDICINE

## 2022-03-10 RX ORDER — TIZANIDINE 4 MG/1
4 TABLET ORAL EVERY 8 HOURS PRN
Qty: 90 TABLET | Refills: 1 | Status: SHIPPED | OUTPATIENT
Start: 2022-03-10 | End: 2022-04-18 | Stop reason: SDUPTHER

## 2022-03-10 NOTE — PROGRESS NOTES
Tang Davistaylor [facility]  [address] [facility]  Dept: [phone]  Dept Fax: [phone]    Visit type: Established patient    Reason for Visit: Back Pain (denies injury; ongoing for 2wks)         Assessment and Plan       1. Acute right-sided thoracic back pain  -     XR THORACIC SPINE (2 VIEWS); Future  2. Back strain, initial encounter  -     tiZANidine (ZANAFLEX) 4 MG tablet; Take 1 tablet by mouth every 8 hours as needed (muscle relaxer), Disp-90 tablet, R-1Normal  3. Stage 3 chronic kidney disease, unspecified whether stage 3a or 3b CKD (HCC)  -     CBC with Auto Differential; Future  -     Comprehensive Metabolic Panel; Future  4. Paroxysmal atrial fibrillation (HCC)    Discussed suggestive diagnosis, discussed x-ray for further evaluation expected course, and proper use of medication, including OTC medications if prescription is too expensive or insurance does not cover. Discussed of his symptoms continue or worsen that further evaluation and/or specialty evaluation may be warranted. Discussed problems with the use of anti-inflammatories in the setting of chronic kidney disease as well as on blood thinners. Discussed that he would need to avoid naproxen and other NSAIDs and discussed other treatment modalities. Discussed signs and symptoms requiring medical attention. All questions were answered and patient voiced understanding and agreement with plan as discussed. Return if symptoms worsen or fail to improve, for next scheduled follow up with PCP. Subjective       HPI   Patient reports that he is having stabbing pain in the right mid to lower portion of his back. He states that it hurts when he gets in certain positions and he first noticed it coming on when he was in the shower washing his hair and also notices the pain more when he is washing his hair and gets is pulling/tightening up feeling.   He states that the pain has not gotten any worse but is not gone away has been ongoing for about a couple weeks. He states that the pain has been improved with the use of naproxen and muscle relaxers as a seem to give him some relief and allow him to get some rest.  He otherwise denies any other aggravating or relieving factors for his symptoms. Patient has a history of paroxysmal atrial fibrillation and is on Eliquis. He denies any problems with the A. fib or any bleeding that is noticed from the Eliquis. He does have chronic kidney disease states 3. He denies any recent changes or problems at this time. Review of Systems   Constitutional: Negative for activity change, appetite change, fatigue and fever. HENT: Negative for congestion and rhinorrhea. Eyes: Negative for pain and discharge. Respiratory: Negative for cough and shortness of breath. Cardiovascular: Negative for chest pain and palpitations. Gastrointestinal: Negative for abdominal pain, constipation, diarrhea, nausea and vomiting. Endocrine: Negative for cold intolerance and heat intolerance. Genitourinary: Negative for dysuria and hematuria. Musculoskeletal: Positive for back pain. Negative for gait problem and neck pain. Skin: Negative for rash and wound. Neurological: Negative for syncope and weakness. Hematological: Negative for adenopathy. Does not bruise/bleed easily. Psychiatric/Behavioral: Positive for dysphoric mood. Negative for self-injury, sleep disturbance and suicidal ideas. The patient is nervous/anxious. Allergies   Allergen Reactions    Tape Malon Smiling Tape] Rash     welps         Outpatient Medications Prior to Visit   Medication Sig Dispense Refill    pantoprazole (PROTONIX) 40 MG tablet TAKE 1 TABLET BY MOUTH DAILY IN THE MORNING (BEFORE BREAKFAST) 30 tablet 0    vitamin D (ERGOCALCIFEROL) 1.25 MG (37383 UT) CAPS capsule TAKE 1 CAPSULE BY MOUTH ONCE WEEKLY.  4 capsule 0    lisinopril (PRINIVIL;ZESTRIL) 10 MG tablet TAKE 1 TABLET BY MOUTH ONCE DAILY 30 tablet 0    nitroGLYCERIN (NITROSTAT) 0.4 MG SL tablet up to max of 3 total doses. If no relief after 1 dose, call 911. 25 tablet 1    verapamil (CALAN SR) 180 MG extended release tablet Take 1 tablet by mouth 2 times daily 180 tablet 3    ELIQUIS 5 MG TABS tablet TAKE (1) TABLET BY MOUTH TWICE A DAY. 60 tablet 5    atorvastatin (LIPITOR) 40 MG tablet Take 1 tablet by mouth daily 90 tablet 1    escitalopram (LEXAPRO) 20 MG tablet TAKE 1 TABLET BY MOUTH ONCE DAILY 90 tablet 1    sucralfate (CARAFATE) 1 GM tablet Take 1 tablet by mouth 4 times daily 120 tablet 1    tiZANidine (ZANAFLEX) 4 MG tablet Take 1 tablet by mouth every 8 hours as needed (muscle relaxer) 90 tablet 1     No facility-administered medications prior to visit. Past Medical History:   Diagnosis Date    Arthritis     knees    [facility]     CAD (coronary artery disease)     Colon polyp     COPD (chronic obstructive pulmonary disease) (HCC)     slight    [facility] 07/06/2017    History of blood transfusion     History of colon polyps     tubular adenoma, hyperplastic, distal rectum, duodenum    History of transfusion     Hyperlipidemia     Hypertension     Joint pain     Palpitations     Pinched nerve     right shoulder    Pinched nerve in neck     Tobacco abuse     Ulcer         Social History     Tobacco Use    Smoking status: Current Every Day Smoker     Packs/day: 0.50     Years: 25.00     Pack years: 12.50     Types: Cigarettes    Smokeless tobacco: Never Used   Substance Use Topics    Alcohol use:  Yes     Alcohol/week: 0.0 standard drinks     Comment: occ        Past Surgical History:   Procedure Laterality Date    COLONOSCOPY  12-10-08    Dr Sanjuan Aase COLONOSCOPY  8-27-01    Dr Sanjuan Aase COLONOSCOPY  07/2013    Dr. Lucas Sofia: multiple polyps, adenomatous and hyperplastic (3 yr)    CORONARY ANGIOPLASTY WITH STENT PLACEMENT  07/06/2017    ENDOSCOPY, COLON, DIAGNOSTIC      HEMORRHOID SURGERY  JOINT REPLACEMENT Left 06/2012    knee    KNEE ARTHROSCOPY      bilateral-3 scopes on right and 1 on left    NECK SURGERY      PAROTIDECTOMY  03/31/2017    SKIN BIOPSY      lypomas on arms, side, and back    TOTAL KNEE ARTHROPLASTY      left    UPPER GASTROINTESTINAL ENDOSCOPY  8-27-01    Dr Frost Cleaves ENDOSCOPY N/A 3/31/2016    Dr Sally Wood (-), Chronic esophagitis       Family History   Problem Relation Age of Onset    Other Sister         pancrease removal    Cancer Father         Larynx and asbestos exposure    Esophageal Cancer Father     Heart Disease Mother         has pacemaker    Diabetes Mother     Other Mother         has had esoph stretched in the past    Colon Polyps Neg Hx     Colon Cancer Neg Hx     Liver Cancer Neg Hx     Liver Disease Neg Hx     Rectal Cancer Neg Hx     Stomach Cancer Neg Hx        Objective       /78 (Site: Right Upper Arm, Position: Sitting, Cuff Size: Large Adult)   Pulse 67   Temp 96.8 °F (36 °C) (Temporal)   Ht 6' (1.829 m)   Wt 188 lb (85.3 kg)   SpO2 99%   BMI 25.50 kg/m²   Physical Exam  Vitals and nursing note reviewed. Constitutional:       General: He is not in acute distress. Appearance: He is well-developed. He is not diaphoretic. HENT:      Head: Normocephalic and atraumatic. Right Ear: External ear normal.      Left Ear: External ear normal.      Mouth/Throat:      Comments: Mask in place  Eyes:      General: No scleral icterus. Right eye: No discharge. Left eye: No discharge. Conjunctiva/sclera: Conjunctivae normal.   Neck:      Thyroid: No thyromegaly. Trachea: No tracheal deviation. Cardiovascular:      Rate and Rhythm: Normal rate and regular rhythm. Heart sounds: Normal heart sounds. No murmur heard. No friction rub. No gallop. Pulmonary:      Effort: Pulmonary effort is normal. No respiratory distress. Breath sounds: Normal breath sounds.  No wheezing or rales. Abdominal:      General: Bowel sounds are normal. There is no distension. Palpations: Abdomen is soft. Tenderness: There is no abdominal tenderness. Musculoskeletal:         General: No deformity (No gross deformities of upper or lower extremities). Normal range of motion. Cervical back: Normal range of motion and neck supple. Right lower leg: No edema. Left lower leg: No edema. Lymphadenopathy:      Cervical: No cervical adenopathy. Skin:     General: Skin is warm and dry. Findings: No erythema or rash. Neurological:      Mental Status: He is alert and oriented to person, place, and time. Cranial Nerves: No cranial nerve deficit. Psychiatric:         Behavior: Behavior normal.         Thought Content:  Thought content normal.           Data Reviewed and Summarized       Labs:     Imaging/Testing:        Audra Lott MD

## 2022-03-21 DIAGNOSIS — I10 ESSENTIAL HYPERTENSION: ICD-10-CM

## 2022-03-21 RX ORDER — LISINOPRIL 10 MG/1
TABLET ORAL
Qty: 30 TABLET | Refills: 0 | Status: SHIPPED | OUTPATIENT
Start: 2022-03-21 | End: 2022-04-18 | Stop reason: SDUPTHER

## 2022-03-24 ENCOUNTER — TELEPHONE (OUTPATIENT)
Dept: FAMILY MEDICINE CLINIC | Age: 72
End: 2022-03-24

## 2022-03-24 DIAGNOSIS — M25.50 ARTHRALGIA, UNSPECIFIED JOINT: Primary | ICD-10-CM

## 2022-03-24 RX ORDER — SENNOSIDES 8.6 MG
650 CAPSULE ORAL EVERY 8 HOURS PRN
Qty: 90 TABLET | Refills: 3 | Status: SHIPPED | OUTPATIENT
Start: 2022-03-24

## 2022-03-27 PROBLEM — I48.0 PAROXYSMAL ATRIAL FIBRILLATION (HCC): Status: ACTIVE | Noted: 2022-03-27

## 2022-03-27 ASSESSMENT — ENCOUNTER SYMPTOMS
SHORTNESS OF BREATH: 0
NAUSEA: 0
ABDOMINAL PAIN: 0
COUGH: 0
BACK PAIN: 1
VOMITING: 0
CONSTIPATION: 0
EYE PAIN: 0
EYE DISCHARGE: 0
DIARRHEA: 0
RHINORRHEA: 0

## 2022-03-27 NOTE — PATIENT INSTRUCTIONS
Patient Education        Back Strain: Care Instructions  Overview     A back strain happens when you overstretch, or pull, a muscle in your back. You may hurt your back in an accident or when you exercise or lift something. Sometimes you may not know how you hurt your back. Most back pain will get better with rest and time. You can take care of yourself at home to help your back heal.  Follow-up care is a key part of your treatment and safety. Be sure to make and go to all appointments, and call your doctor if you are having problems. It's also a good idea to know your test results and keep a list of the medicines you take. How can you care for yourself at home? · Try to stay as active as you can, but stop or reduce any activity that causes pain. · Put ice or a cold pack on the sore muscle for 10 to 20 minutes at a time to stop swelling. Try this every 1 to 2 hours for 3 days (when you are awake) or until the swelling goes down. Put a thin cloth between the ice pack and your skin. · After 2 or 3 days, apply a heating pad on low or a warm cloth to your back. Some doctors suggest that you go back and forth between hot and cold treatments. · Take pain medicines exactly as directed. ? If the doctor gave you a prescription medicine for pain, take it as prescribed. ? If you are not taking a prescription pain medicine, ask your doctor if you can take an over-the-counter medicine. · Try sleeping on your side with a pillow between your legs. Or put a pillow under your knees when you lie on your back. These measures can ease pain in your lower back. · Return to your usual level of activity slowly. When should you call for help? Call 911 anytime you think you may need emergency care. For example, call if:    · You are unable to move a leg at all. Call your doctor now or seek immediate medical care if:    · You have new or worse symptoms in your legs, belly, or buttocks.  Symptoms may include:  ? Numbness or

## 2022-04-11 ENCOUNTER — TELEPHONE (OUTPATIENT)
Dept: CARDIOLOGY CLINIC | Age: 72
End: 2022-04-11

## 2022-04-12 ENCOUNTER — OFFICE VISIT (OUTPATIENT)
Dept: CARDIOLOGY CLINIC | Age: 72
End: 2022-04-12
Payer: MEDICARE

## 2022-04-12 VITALS
BODY MASS INDEX: 25.73 KG/M2 | HEART RATE: 62 BPM | HEIGHT: 72 IN | WEIGHT: 190 LBS | SYSTOLIC BLOOD PRESSURE: 124 MMHG | DIASTOLIC BLOOD PRESSURE: 72 MMHG

## 2022-04-12 DIAGNOSIS — I48.19 PERSISTENT ATRIAL FIBRILLATION (HCC): ICD-10-CM

## 2022-04-12 DIAGNOSIS — I48.0 PAF (PAROXYSMAL ATRIAL FIBRILLATION) (HCC): ICD-10-CM

## 2022-04-12 DIAGNOSIS — I47.1 SVT (SUPRAVENTRICULAR TACHYCARDIA) (HCC): Primary | ICD-10-CM

## 2022-04-12 PROCEDURE — 99214 OFFICE O/P EST MOD 30 MIN: CPT | Performed by: INTERNAL MEDICINE

## 2022-04-12 PROCEDURE — G8427 DOCREV CUR MEDS BY ELIG CLIN: HCPCS | Performed by: INTERNAL MEDICINE

## 2022-04-12 PROCEDURE — 93000 ELECTROCARDIOGRAM COMPLETE: CPT | Performed by: INTERNAL MEDICINE

## 2022-04-12 PROCEDURE — 4004F PT TOBACCO SCREEN RCVD TLK: CPT | Performed by: INTERNAL MEDICINE

## 2022-04-12 PROCEDURE — G8417 CALC BMI ABV UP PARAM F/U: HCPCS | Performed by: INTERNAL MEDICINE

## 2022-04-12 PROCEDURE — 3017F COLORECTAL CA SCREEN DOC REV: CPT | Performed by: INTERNAL MEDICINE

## 2022-04-12 PROCEDURE — 1123F ACP DISCUSS/DSCN MKR DOCD: CPT | Performed by: INTERNAL MEDICINE

## 2022-04-12 PROCEDURE — 4040F PNEUMOC VAC/ADMIN/RCVD: CPT | Performed by: INTERNAL MEDICINE

## 2022-04-12 NOTE — PROGRESS NOTES
HISTORY  68-year-old gentleman with a history of dyslipidemia, SVT, paroxysmal atrial fibrillation/Eliquis therapy, hypertension, ongoing tobacco abuse, and coronary disease returns for routine follow-up visit. With symptoms consisting of bilateral elbow discomfort he was assessed in July 2017 with angiogram revealing normal LV function, diffuse irregularities of the LAD, 30% right coronary lesion, and critical serial obtuse marginal stenoses which were stented with a 2.5 mm device. His subsequent follow-up most recently consists of a dobutamine stress echo in March 2021 which revealed no clinical, electrocardiographic, or echocardiographic evidence of ischemia. His lipids were checked in October 2021 with an LDL of 68, HDL 45, triglycerides of 142. On return today he relates no recurrent elbow discomfort while denying significant shortness of breath chest discomfort and symptoms of dysrhythmia. He continues to smoke. He has been vaccinated and has received a single booster in protection of COVID-19. PHYSICAL EXAM  On exam he carries 190 pounds in a 6 foot frame. Pressure is 124/72 pulse of 62. He smells of tobacco with a smoker's voice. EOMs full, sclerae and conjunctiva normal. PERRLA. Mask in place. Trachea midline with no neck masses. Assessment of internal jugular veins reveals no elevation of central venous pressure at 45 degrees. Carotid pulses normal without delay or bruit. Thyroid normal to palpation. Chest exam reveals normal respiratory effort with mildly prolonged expiratory phase. No skin lesions seen. PMI normal. S1, S2 normal without murmur or tika or click. Normal bowel sounds without palpable mass or bruit. No clubbing or acrocyanosis. No significant lower extremity edema or signs of venous insufficiency. General motor strength appears to be within normal limits. Normal range of motion with normal gait.  Alert, oriented x 3, memory and cognition normal as reflected by history and conversation. EKG reveals sinus rhythm with PAC and PVC along with low voltage in the limb leads. ASSESSMENT/PLAN:   1. Coronary disease -clinically stable with negative dobutamine stress echo in March 2021. Continued tobacco abuse raises the likelihood of disease progression [discussed] continue address of hypertension and dyslipidemia along with nitroglycerin as needed chest discomfort. 2.  Atrial dysrhythmias -no symptomatic recurrence. Continue verapamil and Eliquis. 3.  Dyslipidemia  -controlled. Continue Lipitor  4. Hypertension -acceptable control. Continue lisinopril and verapamil.   5.  Pandemic response -appropriate/vaccinated/boosted

## 2022-04-18 ENCOUNTER — OFFICE VISIT (OUTPATIENT)
Dept: FAMILY MEDICINE CLINIC | Age: 72
End: 2022-04-18
Payer: MEDICARE

## 2022-04-18 VITALS
HEART RATE: 71 BPM | BODY MASS INDEX: 25.18 KG/M2 | DIASTOLIC BLOOD PRESSURE: 74 MMHG | HEIGHT: 73 IN | TEMPERATURE: 97.2 F | WEIGHT: 190 LBS | SYSTOLIC BLOOD PRESSURE: 132 MMHG | OXYGEN SATURATION: 100 %

## 2022-04-18 DIAGNOSIS — E55.9 VITAMIN D DEFICIENCY: ICD-10-CM

## 2022-04-18 DIAGNOSIS — F33.1 MODERATE EPISODE OF RECURRENT MAJOR DEPRESSIVE DISORDER (HCC): ICD-10-CM

## 2022-04-18 DIAGNOSIS — K21.9 GASTROESOPHAGEAL REFLUX DISEASE, UNSPECIFIED WHETHER ESOPHAGITIS PRESENT: ICD-10-CM

## 2022-04-18 DIAGNOSIS — Z12.5 SCREENING FOR MALIGNANT NEOPLASM OF PROSTATE: ICD-10-CM

## 2022-04-18 DIAGNOSIS — E78.2 MIXED HYPERLIPIDEMIA: ICD-10-CM

## 2022-04-18 DIAGNOSIS — R10.13 EPIGASTRIC PAIN: ICD-10-CM

## 2022-04-18 DIAGNOSIS — I25.10 CORONARY ARTERY DISEASE INVOLVING NATIVE CORONARY ARTERY OF NATIVE HEART WITHOUT ANGINA PECTORIS: ICD-10-CM

## 2022-04-18 DIAGNOSIS — F41.9 ANXIETY: ICD-10-CM

## 2022-04-18 DIAGNOSIS — S39.012D BACK STRAIN, SUBSEQUENT ENCOUNTER: ICD-10-CM

## 2022-04-18 DIAGNOSIS — I10 ESSENTIAL HYPERTENSION: Primary | ICD-10-CM

## 2022-04-18 PROCEDURE — G8427 DOCREV CUR MEDS BY ELIG CLIN: HCPCS | Performed by: FAMILY MEDICINE

## 2022-04-18 PROCEDURE — 3017F COLORECTAL CA SCREEN DOC REV: CPT | Performed by: FAMILY MEDICINE

## 2022-04-18 PROCEDURE — 4040F PNEUMOC VAC/ADMIN/RCVD: CPT | Performed by: FAMILY MEDICINE

## 2022-04-18 PROCEDURE — G8417 CALC BMI ABV UP PARAM F/U: HCPCS | Performed by: FAMILY MEDICINE

## 2022-04-18 PROCEDURE — 99214 OFFICE O/P EST MOD 30 MIN: CPT | Performed by: FAMILY MEDICINE

## 2022-04-18 PROCEDURE — 4004F PT TOBACCO SCREEN RCVD TLK: CPT | Performed by: FAMILY MEDICINE

## 2022-04-18 PROCEDURE — 1123F ACP DISCUSS/DSCN MKR DOCD: CPT | Performed by: FAMILY MEDICINE

## 2022-04-18 RX ORDER — ERGOCALCIFEROL 1.25 MG/1
CAPSULE ORAL
Qty: 4 CAPSULE | Refills: 5 | Status: SHIPPED | OUTPATIENT
Start: 2022-04-18 | End: 2022-10-18 | Stop reason: SDUPTHER

## 2022-04-18 RX ORDER — PANTOPRAZOLE SODIUM 40 MG/1
TABLET, DELAYED RELEASE ORAL
Qty: 30 TABLET | Refills: 5 | Status: SHIPPED | OUTPATIENT
Start: 2022-04-18

## 2022-04-18 RX ORDER — ATORVASTATIN CALCIUM 40 MG/1
40 TABLET, FILM COATED ORAL DAILY
Qty: 30 TABLET | Refills: 5 | Status: SHIPPED | OUTPATIENT
Start: 2022-04-18 | End: 2022-10-18 | Stop reason: SDUPTHER

## 2022-04-18 RX ORDER — LISINOPRIL 10 MG/1
TABLET ORAL
Qty: 30 TABLET | Refills: 5 | Status: SHIPPED | OUTPATIENT
Start: 2022-04-18 | End: 2022-10-18 | Stop reason: SDUPTHER

## 2022-04-18 RX ORDER — TIZANIDINE 4 MG/1
4 TABLET ORAL EVERY 8 HOURS PRN
Qty: 90 TABLET | Refills: 2 | Status: SHIPPED | OUTPATIENT
Start: 2022-04-18

## 2022-04-18 RX ORDER — ESCITALOPRAM OXALATE 20 MG/1
TABLET ORAL
Qty: 30 TABLET | Refills: 5 | Status: SHIPPED | OUTPATIENT
Start: 2022-04-18 | End: 2022-10-18 | Stop reason: SDUPTHER

## 2022-04-18 NOTE — PROGRESS NOTES
Corey ACOSTA [address]  Dept: [phone]  Dept Fax: [phone]    Visit type: Established patient    Reason for Visit: 6 Month Follow-Up         Assessment and Plan       1. Essential hypertension  -     lisinopril (PRINIVIL;ZESTRIL) 10 MG tablet; TAKE 1 TABLET BY MOUTH ONCE DAILY, Disp-30 tablet, R-5Normal  -     CBC with Auto Differential; Future  -     Comprehensive Metabolic Panel; Future  -     Microalbumin / Creatinine Urine Ratio; Future  -     TSH; Future  2. Moderate episode of recurrent major depressive disorder (HCC)  -     escitalopram (LEXAPRO) 20 MG tablet; TAKE 1 TABLET BY MOUTH ONCE DAILY, Disp-30 tablet, R-5Normal  3. Anxiety  -     escitalopram (LEXAPRO) 20 MG tablet; TAKE 1 TABLET BY MOUTH ONCE DAILY, Disp-30 tablet, R-5Normal  4. Gastroesophageal reflux disease, unspecified whether esophagitis present  -     pantoprazole (PROTONIX) 40 MG tablet; TAKE 1 TABLET BY MOUTH DAILY IN THE MORNING (BEFORE BREAKFAST), Disp-30 tablet, R-5Normal  5. Epigastric pain  -     pantoprazole (PROTONIX) 40 MG tablet; TAKE 1 TABLET BY MOUTH DAILY IN THE MORNING (BEFORE BREAKFAST), Disp-30 tablet, R-5Normal  6. Back strain, subsequent encounter  -     tiZANidine (ZANAFLEX) 4 MG tablet; Take 1 tablet by mouth every 8 hours as needed (muscle relaxer), Disp-90 tablet, R-2Normal  7. Coronary artery disease involving native coronary artery of native heart without angina pectoris  -     atorvastatin (LIPITOR) 40 MG tablet; Take 1 tablet by mouth daily, Disp-30 tablet, R-5Normal  8. Mixed hyperlipidemia  -     atorvastatin (LIPITOR) 40 MG tablet; Take 1 tablet by mouth daily, Disp-30 tablet, R-5Normal  -     Lipid Panel; Future  9. Vitamin D deficiency  -     vitamin D (ERGOCALCIFEROL) 1.25 MG (13387 UT) CAPS capsule; TAKE 1 CAPSULE BY MOUTH ONCE WEEKLY., Disp-4 capsule, R-5Normal  -     Vitamin D 25 Hydroxy; Future  10.  Screening for malignant neoplasm of prostate  -     PSA Screening; Future    With patient doing well with his current medications will continue at this time continue to monitor and adjust course dictates. Stressed importance of continued on monitoring of his blood pressure. Discussed dietary lifestyle modifications that may beneficial.  Discussed signs and symptoms requiring medical attention. All questions were answered and patient voiced understanding and agreement with plan as discussed. Return in about 6 months (around 10/18/2022), or if symptoms worsen or fail to improve. Subjective       HPI   Patient has arterial hypertension that is doing well with his current medication. he denies any issues with use of his  medicine. he denies any symptoms associated with abnormal blood pressures. he is attempting to avoid excess salt intake. Patient has hyperlipidemia and reports that he is tolerating his Lipitor without any new myalgias or GI upsets. he is attempting to watch his diet and trying to stay physically active. he has gastroesophageal reflux disease and is doing well with the current use of his Protonix. he denies any recent changes to his symptoms or any problems from the medication. He does have a history of anxiety and depression and is doing well with his current dose of Lexapro. Denies any problems with the medicine or any recent changes to his mood. He did previously strained his back and has been having some muscle spasms and he reports that he is doing well with the use of Zanaflex and the Tylenol. He states that he is satisfied with the relief he is getting from the medication and denies need for any further work-up or any further intervention at this time.     He does state that he previously had a colonoscopy scheduled and we will set up to have it done but got sick while trying to drink the prep and was unable to tolerate it and was trying to figure out where to go from there to see if there is another regiment but they are still working on trying to work with him on something that he can do to proceed with the colonoscopy that does not involve the GoLytely prep. Review of Systems   Constitutional: Negative for activity change, appetite change, fatigue and fever. HENT: Negative for congestion and rhinorrhea. Eyes: Negative for pain and discharge. Respiratory: Negative for cough and shortness of breath. Cardiovascular: Negative for chest pain and palpitations. Gastrointestinal: Negative for abdominal pain, constipation, diarrhea, nausea and vomiting. Endocrine: Negative for cold intolerance and heat intolerance. Genitourinary: Negative for dysuria and hematuria. Musculoskeletal: Positive for back pain. Negative for gait problem and neck pain. Skin: Negative for rash and wound. Neurological: Negative for syncope and weakness. Hematological: Negative for adenopathy. Does not bruise/bleed easily. Psychiatric/Behavioral: Positive for dysphoric mood. Negative for self-injury, sleep disturbance and suicidal ideas. The patient is nervous/anxious. Allergies   Allergen Reactions    Tape Laura Colder Tape] Rash     welps         Outpatient Medications Prior to Visit   Medication Sig Dispense Refill    apixaban (ELIQUIS) 5 MG TABS tablet Take 1 tablet by mouth 2 times daily 60 tablet 11    acetaminophen (TYLENOL) 650 MG extended release tablet Take 1 tablet by mouth every 8 hours as needed for Pain 90 tablet 3    lisinopril (PRINIVIL;ZESTRIL) 10 MG tablet TAKE 1 TABLET BY MOUTH ONCE DAILY 30 tablet 0    tiZANidine (ZANAFLEX) 4 MG tablet Take 1 tablet by mouth every 8 hours as needed (muscle relaxer) 90 tablet 1    pantoprazole (PROTONIX) 40 MG tablet TAKE 1 TABLET BY MOUTH DAILY IN THE MORNING (BEFORE BREAKFAST) 30 tablet 0    vitamin D (ERGOCALCIFEROL) 1.25 MG (38323 UT) CAPS capsule TAKE 1 CAPSULE BY MOUTH ONCE WEEKLY.  4 capsule 0    nitroGLYCERIN (NITROSTAT) 0.4 MG SL tablet up to max of 3 total doses. If no relief after 1 dose, call 911. 25 tablet 1    verapamil (CALAN SR) 180 MG extended release tablet Take 1 tablet by mouth 2 times daily 180 tablet 3    atorvastatin (LIPITOR) 40 MG tablet Take 1 tablet by mouth daily 90 tablet 1    escitalopram (LEXAPRO) 20 MG tablet TAKE 1 TABLET BY MOUTH ONCE DAILY 90 tablet 1    apixaban (ELIQUIS) 5 MG TABS tablet Take 1 tablet by mouth 2 times daily 60 tablet 5     No facility-administered medications prior to visit. Past Medical History:   Diagnosis Date    Arthritis     knees    [facility] ([facility] [address]     CAD (coronary artery disease)     Colon polyp     COPD (chronic obstructive pulmonary disease) (HCC)     slight    [facility] 07/[facility]/2017    History of blood transfusion     History of colon polyps     tubular adenoma, hyperplastic, distal rectum, duodenum    History of transfusion     Hyperlipidemia     Hypertension     Joint pain     Palpitations     Pinched nerve     right shoulder    Pinched nerve in neck     Tobacco abuse     Ulcer         Social History     Tobacco Use    Smoking status: Current Every Day Smoker     Packs/day: 0.50     Years: 25.00     Pack years: 12.50     Types: Cigarettes    Smokeless tobacco: Never Used   Substance Use Topics    Alcohol use:  Yes     Alcohol/week: 0.0 standard drinks     Comment: occ        Past Surgical History:   Procedure Laterality Date    COLONOSCOPY  12-10-08    Dr Aman Driscoll COLONOSCOPY  8-27-01    Dr Aman Driscoll COLONOSCOPY  07/2013    Dr. Melissa Mccoy: multiple polyps, adenomatous and hyperplastic (3 yr)    CORONARY ANGIOPLASTY WITH STENT PLACEMENT  07/06/2017    ENDOSCOPY, COLON, DIAGNOSTIC      HEMORRHOID SURGERY      JOINT REPLACEMENT Left 06/2012    knee    KNEE ARTHROSCOPY      bilateral-3 scopes on right and 1 on left    NECK SURGERY      PAROTIDECTOMY  03/31/2017    SKIN BIOPSY      lypomas on arms, side, and back    TOTAL KNEE ARTHROPLASTY      left    UPPER GASTROINTESTINAL ENDOSCOPY  8-27-01    Dr Marty Dangelo N/A 3/31/2016    Dr Danielle [place] (-), Chronic esophagitis       Family History   Problem Relation Age of Onset    Other Sister         pancrease removal    Cancer Father         Larynx and asbestos exposure    Esophageal Cancer Father     Heart Disease Mother         has pacemaker    Diabetes Mother     Other Mother         has had esoph stretched in the past    Colon Polyps Neg Hx     Colon Cancer Neg Hx     Liver Cancer Neg Hx     Liver Disease Neg Hx     Rectal Cancer Neg Hx     Stomach Cancer Neg Hx        Objective       /74 (Site: Left Upper Arm, Position: Sitting, Cuff Size: Medium Adult)   Pulse 71   Temp 97.2 °F (36.2 °C) (Temporal)   Ht 6' 1\" (1.854 m)   Wt 190 lb (86.2 kg)   SpO2 100%   BMI 25.07 kg/m²   Physical Exam  Vitals and nursing note reviewed. Constitutional:       General: He is not in acute distress. Appearance: He is well-developed. He is not diaphoretic. HENT:      Head: Normocephalic and atraumatic. Right Ear: External ear normal.      Left Ear: External ear normal.      Mouth/Throat:      Comments: Mask in place  Eyes:      General: No scleral icterus. Right eye: No discharge. Left eye: No discharge. Conjunctiva/sclera: Conjunctivae normal.   Neck:      Thyroid: No thyromegaly. Trachea: No tracheal deviation. Cardiovascular:      Rate and Rhythm: Normal rate and regular rhythm. Heart sounds: Normal heart sounds. No murmur heard. No friction rub. No gallop. Pulmonary:      Effort: Pulmonary effort is normal. No respiratory distress. Breath sounds: Normal breath sounds. No wheezing or rales. Abdominal:      General: Bowel sounds are normal. There is no distension. Palpations: Abdomen is soft. Tenderness: There is no abdominal tenderness.    Musculoskeletal:         General: No deformity (No gross deformities of upper or lower extremities). Cervical back: Normal range of motion and neck supple. Right lower leg: No edema. Left lower leg: No edema. Lymphadenopathy:      Cervical: No cervical adenopathy. Skin:     General: Skin is warm and dry. Findings: No erythema or rash. Neurological:      Mental Status: He is alert and oriented to person, place, and time. Cranial Nerves: No cranial nerve deficit. Psychiatric:         Behavior: Behavior normal.         Thought Content:  Thought content normal.           Data Reviewed and Summarized       Labs:     Imaging/Testing:        Cesar Wiley MD

## 2022-05-13 ASSESSMENT — ENCOUNTER SYMPTOMS
CONSTIPATION: 0
ABDOMINAL PAIN: 0
SHORTNESS OF BREATH: 0
BACK PAIN: 1
NAUSEA: 0
EYE PAIN: 0
DIARRHEA: 0
RHINORRHEA: 0
COUGH: 0
EYE DISCHARGE: 0
VOMITING: 0

## 2022-05-14 NOTE — PATIENT INSTRUCTIONS
Patient Education        Home Blood Pressure Test: About This Test  What is it? A home blood pressure test allows you to keep track of your blood pressure at home. Blood pressure is a measure of the force of blood against the walls of your arteries. Blood pressure readings include two numbers, such as 130/80 (say \"130 over 80\"). The first number is the systolic pressure. The second number isthe diastolic pressure. Why is this test done? You may do this test at home to:   Find out if you have high blood pressure.  Track your blood pressure if you have high blood pressure.  Track how well medicine is working to reduce high blood pressure.  Check how lifestyle changes, such as weight loss and exercise, are affecting blood pressure. How do you prepare for the test?  For at least 30 minutes before you take your blood pressure, don't exercise, drink caffeine, or smoke. Empty your bladder before the test. Sit quietly with your back straight and both feet on the floor for at least 5 minutes. Thishelps you take your blood pressure while you feel comfortable and relaxed. How is the test done?  If your doctor recommends it, take your blood pressure twice a day. Take it in the morning and evening.  Sit with your arm slightly bent and resting on a table so that your upper arm is at the same level as your heart.  Use the same arm each time you take your blood pressure.  Place the blood pressure cuff on the bare skin of your upper arm. You may have to roll up your sleeve, remove your arm from the sleeve, or take your shirt off.  Wrap the blood pressure cuff around your upper arm so that the lower edge of the cuff is about 1 inch above the bend of your elbow.  Do not move, talk, or text while you take your blood pressure. Follow the instructions that came with your blood pressure monitor. They mightbe different from the following.  Press the on/off button on the automatic monitor.  Then you may need to wait until the screen says the monitor is ready.  Press the start button. The cuff will inflate and deflate by itself.  Your blood pressure numbers will appear on the screen.  Wait one minute and take your blood pressure again.  If your monitor does not automatically save your numbers, write them in your log book, along with the date and time. Follow-up care is a key part of your treatment and safety. Be sure to make and go to all appointments, and call your doctor if you arehaving problems. It's also a good idea to keep a list of the medicines you take. Where can you learn more? Go to [email or web address] and sign in to your [employer] account. Enter C427 in the [employer] box to learn more about \"Home Blood Pressure Test: About This Test.\"     If you do not have an account, please click on the \"Sign Up Now\" link. Current as of: January 10, 2022               Content Version: 13.2  © 2006-2022 [employer]. Care instructions adapted under license by [facility] ([facility]). If you have questions about a medical condition or this instruction, always ask your healthcare professional. [place] [address] any warranty or liability for your use of this information.

## 2022-10-11 ENCOUNTER — TELEPHONE (OUTPATIENT)
Dept: CARDIOLOGY CLINIC | Age: 72
End: 2022-10-11

## 2022-10-13 ENCOUNTER — TELEPHONE (OUTPATIENT)
Dept: CARDIOLOGY CLINIC | Age: 72
End: 2022-10-13

## 2022-10-18 ENCOUNTER — OFFICE VISIT (OUTPATIENT)
Dept: FAMILY MEDICINE CLINIC | Age: 72
End: 2022-10-18
Payer: MEDICARE

## 2022-10-18 VITALS
TEMPERATURE: 97.2 F | SYSTOLIC BLOOD PRESSURE: 110 MMHG | RESPIRATION RATE: 16 BRPM | WEIGHT: 188.6 LBS | HEIGHT: 73 IN | BODY MASS INDEX: 25 KG/M2 | OXYGEN SATURATION: 98 % | DIASTOLIC BLOOD PRESSURE: 62 MMHG | HEART RATE: 62 BPM

## 2022-10-18 DIAGNOSIS — I25.10 CORONARY ARTERY DISEASE INVOLVING NATIVE CORONARY ARTERY OF NATIVE HEART WITHOUT ANGINA PECTORIS: ICD-10-CM

## 2022-10-18 DIAGNOSIS — Z12.5 SCREENING FOR MALIGNANT NEOPLASM OF PROSTATE: ICD-10-CM

## 2022-10-18 DIAGNOSIS — E78.2 MIXED HYPERLIPIDEMIA: ICD-10-CM

## 2022-10-18 DIAGNOSIS — F41.9 ANXIETY: ICD-10-CM

## 2022-10-18 DIAGNOSIS — I10 ESSENTIAL HYPERTENSION: ICD-10-CM

## 2022-10-18 DIAGNOSIS — N18.31 STAGE 3A CHRONIC KIDNEY DISEASE (HCC): ICD-10-CM

## 2022-10-18 DIAGNOSIS — F33.1 MODERATE EPISODE OF RECURRENT MAJOR DEPRESSIVE DISORDER (HCC): ICD-10-CM

## 2022-10-18 DIAGNOSIS — E55.9 VITAMIN D DEFICIENCY: ICD-10-CM

## 2022-10-18 DIAGNOSIS — G47.00 INSOMNIA, UNSPECIFIED TYPE: Primary | ICD-10-CM

## 2022-10-18 DIAGNOSIS — Z23 NEEDS FLU SHOT: ICD-10-CM

## 2022-10-18 DIAGNOSIS — Z12.11 SCREENING FOR MALIGNANT NEOPLASM OF COLON: ICD-10-CM

## 2022-10-18 PROCEDURE — G8484 FLU IMMUNIZE NO ADMIN: HCPCS | Performed by: FAMILY MEDICINE

## 2022-10-18 PROCEDURE — 3078F DIAST BP <80 MM HG: CPT | Performed by: FAMILY MEDICINE

## 2022-10-18 PROCEDURE — 4004F PT TOBACCO SCREEN RCVD TLK: CPT | Performed by: FAMILY MEDICINE

## 2022-10-18 PROCEDURE — 1123F ACP DISCUSS/DSCN MKR DOCD: CPT | Performed by: FAMILY MEDICINE

## 2022-10-18 PROCEDURE — 3017F COLORECTAL CA SCREEN DOC REV: CPT | Performed by: FAMILY MEDICINE

## 2022-10-18 PROCEDURE — 90694 VACC AIIV4 NO PRSRV 0.5ML IM: CPT | Performed by: FAMILY MEDICINE

## 2022-10-18 PROCEDURE — G0008 ADMIN INFLUENZA VIRUS VAC: HCPCS | Performed by: FAMILY MEDICINE

## 2022-10-18 PROCEDURE — 99214 OFFICE O/P EST MOD 30 MIN: CPT | Performed by: FAMILY MEDICINE

## 2022-10-18 PROCEDURE — 3074F SYST BP LT 130 MM HG: CPT | Performed by: FAMILY MEDICINE

## 2022-10-18 PROCEDURE — G8427 DOCREV CUR MEDS BY ELIG CLIN: HCPCS | Performed by: FAMILY MEDICINE

## 2022-10-18 PROCEDURE — G8420 CALC BMI NORM PARAMETERS: HCPCS | Performed by: FAMILY MEDICINE

## 2022-10-18 RX ORDER — TRAZODONE HYDROCHLORIDE 50 MG/1
TABLET ORAL
Qty: 60 TABLET | Refills: 2 | Status: SHIPPED | OUTPATIENT
Start: 2022-10-18

## 2022-10-18 RX ORDER — LISINOPRIL 10 MG/1
TABLET ORAL
Qty: 30 TABLET | Refills: 5 | Status: SHIPPED | OUTPATIENT
Start: 2022-10-18

## 2022-10-18 RX ORDER — ESCITALOPRAM OXALATE 20 MG/1
TABLET ORAL
Qty: 30 TABLET | Refills: 5 | Status: SHIPPED | OUTPATIENT
Start: 2022-10-18

## 2022-10-18 RX ORDER — ATORVASTATIN CALCIUM 40 MG/1
40 TABLET, FILM COATED ORAL DAILY
Qty: 30 TABLET | Refills: 5 | Status: SHIPPED | OUTPATIENT
Start: 2022-10-18

## 2022-10-18 RX ORDER — ERGOCALCIFEROL 1.25 MG/1
CAPSULE ORAL
Qty: 4 CAPSULE | Refills: 5 | Status: SHIPPED | OUTPATIENT
Start: 2022-10-18

## 2022-10-18 NOTE — PROGRESS NOTES
After obtaining consent, and per orders of Dr. Linda Floyd, injection of Fluad given in Left deltoid by Soni Dudley RN. Patient instructed to remain in clinic for 20 minutes afterwards, and to report any adverse reaction to me immediately.

## 2022-11-03 ENCOUNTER — TELEPHONE (OUTPATIENT)
Dept: CARDIOLOGY CLINIC | Age: 72
End: 2022-11-03

## 2022-11-09 ASSESSMENT — ENCOUNTER SYMPTOMS
CONSTIPATION: 0
COUGH: 0
BACK PAIN: 1
VOMITING: 0
SHORTNESS OF BREATH: 0
EYE PAIN: 0
DIARRHEA: 0
RHINORRHEA: 0
NAUSEA: 0
ABDOMINAL PAIN: 0
EYE DISCHARGE: 0

## 2022-11-29 ENCOUNTER — TELEPHONE (OUTPATIENT)
Dept: CARDIOLOGY CLINIC | Age: 72
End: 2022-11-29

## 2022-11-29 NOTE — TELEPHONE ENCOUNTER
Date: 1/17/23    Cardiologist: Qasim Hernandez    Procedure: Colonoscopy    Surgeon: Monika Hugh [facility] Office Visit: 4/12/22  Reason for office visit and medical concerns addressed at this office visit: cad, ckd, copd, htn, hyperlipidemia, afib,     Testing Performed and Date of Service:  3/15/21 DSE  Dobutamine stress echocardiogram without clinical, electrocardiographic,   or echocardiographic evidence of myocardial ischemia. Brief runs of SVT   with maximal dobutamine infusion. Does the patient have a stent? If so, what type? None recent    Current Medications: lisinopril, lexapro, atorvastatin, trazodone, tizanidine, protonix, eliquis, verapamil,     Is the patient currently taking an anticoagulant? If so, what is the diagnosis the patient has been given to warrant the need for the anticoagulant?  Eliquis for afib    Additional Notes: requesting to hold eliquis 2 days prior

## 2022-11-30 DIAGNOSIS — I10 ESSENTIAL HYPERTENSION: ICD-10-CM

## 2022-12-01 ENCOUNTER — OFFICE VISIT (OUTPATIENT)
Dept: CARDIOLOGY CLINIC | Age: 72
End: 2022-12-01
Payer: MEDICARE

## 2022-12-01 VITALS
DIASTOLIC BLOOD PRESSURE: 70 MMHG | HEART RATE: 62 BPM | OXYGEN SATURATION: 98 % | SYSTOLIC BLOOD PRESSURE: 122 MMHG | HEIGHT: 72 IN | BODY MASS INDEX: 25.87 KG/M2 | WEIGHT: 191 LBS

## 2022-12-01 DIAGNOSIS — Z72.0 TOBACCO ABUSE: ICD-10-CM

## 2022-12-01 DIAGNOSIS — I25.10 CORONARY ARTERY DISEASE INVOLVING NATIVE CORONARY ARTERY OF NATIVE HEART WITHOUT ANGINA PECTORIS: Primary | ICD-10-CM

## 2022-12-01 DIAGNOSIS — F17.200 SMOKER: ICD-10-CM

## 2022-12-01 DIAGNOSIS — I48.0 PAF (PAROXYSMAL ATRIAL FIBRILLATION) (HCC): ICD-10-CM

## 2022-12-01 DIAGNOSIS — I10 ESSENTIAL HYPERTENSION: ICD-10-CM

## 2022-12-01 PROCEDURE — 3078F DIAST BP <80 MM HG: CPT | Performed by: CLINICAL NURSE SPECIALIST

## 2022-12-01 PROCEDURE — 1123F ACP DISCUSS/DSCN MKR DOCD: CPT | Performed by: CLINICAL NURSE SPECIALIST

## 2022-12-01 PROCEDURE — G8484 FLU IMMUNIZE NO ADMIN: HCPCS | Performed by: CLINICAL NURSE SPECIALIST

## 2022-12-01 PROCEDURE — G8427 DOCREV CUR MEDS BY ELIG CLIN: HCPCS | Performed by: CLINICAL NURSE SPECIALIST

## 2022-12-01 PROCEDURE — 3074F SYST BP LT 130 MM HG: CPT | Performed by: CLINICAL NURSE SPECIALIST

## 2022-12-01 PROCEDURE — 3017F COLORECTAL CA SCREEN DOC REV: CPT | Performed by: CLINICAL NURSE SPECIALIST

## 2022-12-01 PROCEDURE — 99214 OFFICE O/P EST MOD 30 MIN: CPT | Performed by: CLINICAL NURSE SPECIALIST

## 2022-12-01 PROCEDURE — 4004F PT TOBACCO SCREEN RCVD TLK: CPT | Performed by: CLINICAL NURSE SPECIALIST

## 2022-12-01 PROCEDURE — G8417 CALC BMI ABV UP PARAM F/U: HCPCS | Performed by: CLINICAL NURSE SPECIALIST

## 2022-12-01 NOTE — PATIENT INSTRUCTIONS
Labs today   Maintain good blood pressure control-goal<130/80 at rest  Maintain good cholesterol control LDL goal<70 with arterial disease  If you are diabetic work to keep/obtain hemoglobin A1c< 7    Follow up in April With Dr. Snow Cleaves   Call with any questions or concerns  Follow up with Rosario Ramírez MD for non cardiac problems  Report any new problems  Cardiovascular Fitness-Exercise as tolerated. Strive for 30 minutes of exercise most days of the week. Cardiac / Healthy Diet- Avoid processed high fat foods, maintain low sodium/salt   Continue current medications as directed  Continue plan of treatment  It is always recommended that you bring your medications bottles with you to each visit - this is for your safety!

## 2022-12-01 NOTE — PROGRESS NOTES
[address] [place], [address] [address]  Phone: [phone]  Fax: [phone]    OFFICE VISIT:  2022    Yahaira Blancas - : 1950    Reason For Visit:  Gloria Valerio is a 74 y.o. male who is here for 6 Month Follow-Up and Tachycardia  History dyslipidemia, SVT, PAF with chronic anticoagulation with Eliquis, hypertension, tobacco abuse and coronary disease. Patient had stent placed to  in . Underwent dobutamine stress echo that was negative for myocardial ischemia 2021. He returns today for routine follow-up    Subjective  Gloria Valerio denies exertional chest pain, shortness of breath, orthopnea, paroxysmal nocturnal dyspnea, syncope, presyncope, arrhythmia, edema and fatigue. The patient denies numbness or weakness to suggest cerebrovascular accident or transient ischemic attack. Mercy Adams MD is PCP and follows labs - due for labs - ordered.  .  Yahaira Blancas has the following history as recorded in [facility]:    Patient Active Problem List    Diagnosis Date Noted    NSTEMI (non-ST elevated myocardial infarction) ([facility] [facility]     Ventricular tachycardia     Paroxysmal atrial fibrillation ([facility] [facility] 2022    CKD (chronic kidney disease) stage 3, GFR 30-59 ml/min ([facility]) 2019    Mild episode of recurrent major depressive disorder ([facility] [facility] 2019    Mixed hyperlipidemia 2018    History of coronary artery stent placement 2018    CAD (coronary artery disease)     Smoker     SVT (supraventricular tachycardia) ([address] 2017    Essential hypertension 2016    Premature ventricular contractions 2016    Premature atrial complexes 2016    Chest pain 2016    Palpitations 2016    Referral of patient 2016    Atypical chest pain 2016    History of adenomatous polyp of colon 2013    COPD (chronic obstructive pulmonary disease) ([facility] [address] [facility] 2013    History of gastric ulcer 2013     Past Medical History:   Diagnosis Date    Arthritis     knees    Atrial fibrillation (HCC)     CAD (coronary artery disease)     Colon polyp     COPD (chronic obstructive pulmonary disease) (HCC)     slight    Heart attack ([facility] [facility] 07/06/2017    History of blood transfusion     History of colon polyps     tubular adenoma, hyperplastic, distal rectum, duodenum    History of transfusion     Hyperlipidemia     Hypertension     Joint pain     Palpitations     Pinched nerve     right shoulder    Pinched nerve in neck     Tobacco abuse     Ulcer      Past Surgical History:   Procedure Laterality Date    COLONOSCOPY  12-10-08    Dr Stewart Glenn    COLONOSCOPY  8-27-01    Dr Richie Singh  07/2013    Dr. Denis Narrow: multiple polyps, adenomatous and hyperplastic (3 yr)    CORONARY ANGIOPLASTY WITH STENT PLACEMENT  07/06/2017    ENDOSCOPY, COLON, DIAGNOSTIC      HEMORRHOID SURGERY      JOINT REPLACEMENT Left 06/2012    knee    KNEE ARTHROSCOPY      bilateral-3 scopes on right and 1 on left    NECK SURGERY      PAROTIDECTOMY  03/31/2017    SKIN BIOPSY      lypomas on arms, side, and back    TOTAL KNEE ARTHROPLASTY      left    UPPER GASTROINTESTINAL ENDOSCOPY  8-27-01    Dr Castillo Lobe ENDOSCOPY N/A 3/31/2016    Dr Corazon Cazares (-), Chronic esophagitis     Family History   Problem Relation Age of Onset    Other Sister         pancrease removal    Cancer Father         Larynx and asbestos exposure    Esophageal Cancer Father     Heart Disease Mother         has pacemaker    Diabetes Mother     Other Mother         has had esoph stretched in the past    Colon Polyps Neg Hx     Colon Cancer Neg Hx     Liver Cancer Neg Hx     Liver Disease Neg Hx     Rectal Cancer Neg Hx     Stomach Cancer Neg Hx      Social History     Tobacco Use    Smoking status: Every Day     Packs/day: 0.50     Years: 25.00     Pack years: 12.50     Types: Cigarettes    Smokeless tobacco: Never   Substance Use Topics    Alcohol use:  Yes     Alcohol/week: 0.0 standard drinks Comment: occ      Current Outpatient Medications   Medication Sig Dispense Refill    verapamil (CALAN SR) 180 MG extended release tablet TAKE (1) TABLET BY MOUTH TWICE A DAY. 60 tablet 5    lisinopril (PRINIVIL;ZESTRIL) 10 MG tablet TAKE 1 TABLET BY MOUTH ONCE DAILY 30 tablet 5    escitalopram (LEXAPRO) 20 MG tablet TAKE 1 TABLET BY MOUTH ONCE DAILY 30 tablet 5    vitamin D (ERGOCALCIFEROL) 1.25 MG (30629 UT) CAPS capsule TAKE 1 CAPSULE BY MOUTH ONCE WEEKLY. 4 capsule 5    atorvastatin (LIPITOR) 40 MG tablet Take 1 tablet by mouth daily 30 tablet 5    traZODone (DESYREL) 50 MG tablet 1-2 tablets at bedtime as need for sleep. 60 tablet 2    tiZANidine (ZANAFLEX) 4 MG tablet Take 1 tablet by mouth every 8 hours as needed (muscle relaxer) 90 tablet 2    pantoprazole (PROTONIX) 40 MG tablet TAKE 1 TABLET BY MOUTH DAILY IN THE MORNING (BEFORE BREAKFAST) 30 tablet 5    apixaban (ELIQUIS) 5 MG TABS tablet Take 1 tablet by mouth 2 times daily 60 tablet 11    acetaminophen (TYLENOL) 650 MG extended release tablet Take 1 tablet by mouth every 8 hours as needed for Pain 90 tablet 3    nitroGLYCERIN (NITROSTAT) 0.4 MG SL tablet up to max of 3 total doses. If no relief after 1 dose, call 911. 25 tablet 1     No current facility-administered medications for this visit. Allergies: Tape Samanta Buttery tape]    Review of Systems  Constitutional - no significant activity change, appetite change, or unexpected weight change. No fever, chills or diaphoresis. No fatigue. HEENT - no significant rhinorrhea or epistaxis. No tinnitus or significant hearing loss. Eyes - no sudden vision change or amaurosis. Respiratory - no significant wheezing, stridor, apnea or cough. No dyspnea on exertion or shortness of breath. Cardiovascular - no exertional chest pain, orthopnea or PND. No sensation of arrhythmia or slow heart rate. No claudication or leg edema. Gastrointestinal - no abdominal swelling or pain. No blood in stool.  No severe constipation, diarrhea, nausea, or vomiting. Genitourinary - no difficulty urinating, dysuria, frequency, or urgency. No flank pain or hematuria. Musculoskeletal - no back pain, gait disturbance, or myalgia. Skin - no color change or rash. No pallor. No new surgical incision. Neurologic - no speech difficulty, facial asymmetry or lateralizing weakness. No seizures, presyncope, syncope, or significant dizziness. Hematologic - no easy bruising or excessive bleeding. Psychiatric - no severe anxiety or insomnia. No confusion. All other review of systems are negative. Objective  Vital Signs - /70   Pulse 62   Ht 6' (1.829 m)   Wt 191 lb (86.6 kg)   SpO2 98%   BMI 25.90 kg/m²   General - [facility] is alert, cooperative, and pleasant. Well groomed. No acute distress. Body habitus is normal.  HEENT - The head is normocephalic. No circumoral cyanosis. Dentition is normal.   EYES -  No Xanthelasma, no arcus senilis, no conjunctival hemorrhages or discharge. Neck - Supple, without increased jugular venous pressures. No carotid bruits. No mass. Respiratory - Lungs are clear bilaterally. No wheezes or rales. Normal effort without use of accessory muscles. Cardiovascular - Heart has regular rhythm and rate. No murmurs, rubs or gallops. + pedal pulses and no varicosities. Abdominal -  Soft, nontender, nondistended. Bowel sounds are intact. Extremities - No clubbing, cyanosis, or  edema. Musculoskeletal -  No clubbing . No Osler's nodes. Gait normal .  No kyphosis or scoliosis. Skin -  no statis ulcers or dermatitis. Neurological - No focal signs are identified. Oriented to person, place and time. Psychiatric -  Appropriate affect and mood. Assessment:     Diagnosis Orders   1. Coronary artery disease involving native coronary artery of native heart without angina pectoris  Low Dose Chest CT -Abnormal Lung Screen Follow up      2.  PAF (paroxysmal atrial fibrillation) ([facility]        3. Essential hypertension        4. Smoker  Low Dose Chest CT -Abnormal Lung Screen Follow up    CT lung screen [Initial/Annual]      5. Tobacco abuse  Low Dose Chest CT -Abnormal Lung Screen Follow up    CT lung screen [Initial/Annual]        Data:  BP Readings from Last 3 Encounters:   12/01/22 122/70   10/18/22 110/62   04/18/22 132/74    Pulse Readings from Last 3 Encounters:   12/01/22 62   10/18/22 62   04/18/22 71        Wt Readings from Last 3 Encounters:   12/01/22 191 lb (86.6 kg)   10/18/22 188 lb 9.6 oz (85.5 kg)   04/18/22 190 lb (86.2 kg)       Blood pressure and heart rate well controlled. Medical management includes CCB and ACE inhibitor. Remains anticoagulated on Eliquis. Also on statin       Reviewed PCP recent notes  Due for fasting labs    We will have him get them soon. Patient is also a longtime smoker. He has never had an evaluation with a low-dose CT scan for cancer screening. We discussed the importance of this and he is willing. We will get that scheduled soon  Been discussed the importance of smoking cessation         States taking medications as prescribed  Stable cardiovascular status. No evidence of overt heart failure, angina or dysrhythmia. 30 minutes were spent preparing, reviewing and seeing patient. All questions answered    Plan    Labs today   Recommend smoking cessation  Low-dose CT scan screening soon  Maintain good blood pressure control-goal<130/80 at rest  Maintain good cholesterol control LDL goal<70 with arterial disease  If you are diabetic work to keep/obtain hemoglobin A1c< 7    Follow up in April With Dr. Rene Florentino   Call with any questions or concerns  Follow up with Mendoza Whitman MD for non cardiac problems  Report any new problems  Cardiovascular Fitness-Exercise as tolerated. Strive for 30 minutes of exercise most days of the week.     Cardiac / Healthy Diet- Avoid processed high fat foods, maintain low sodium/salt   Continue current medications as directed  Continue plan of treatment  It is always recommended that you bring your medications bottles with you to each visit - this is for your safety! KEVYN Stanley dragon/transcription disclaimer: Much of this encounter note is electronic transcription/translation of spoken language to printed tach. Electronic translation of spoken language may be erroneous, or at times, nonsensical words or phrases may be inadvertently transcribed.  Although, I have reviewed the note for such errors, some may still exist.

## 2022-12-05 ENCOUNTER — TELEPHONE (OUTPATIENT)
Dept: CARDIOLOGY CLINIC | Age: 72
End: 2022-12-05

## 2022-12-05 NOTE — TELEPHONE ENCOUNTER
Date: TBD    Cardiologist: Dr. Yenifer Donahue     Procedure: Tooth extraction     Surgeon: [address] Office Visit: 12-1-22    Reason for office visit and medical concerns addressed at this office visit: CAD, CHF, HTN, CKD, MI, hyperlipidemia    Testing Performed and Date of Service:  EKG 4-12-22    Does the patient have a stent? If so, what type? Not in last year     Current Medications: verapamil, lisinopril, lexapro, vit D, lipitor, desyrel, zanaflex, protonix, eliquis, tylenol, nitro     Is the patient currently taking an anticoagulant? If so, what is the diagnosis the patient has been given to warrant the need for the anticoagulant?  Eliquis     Additional Notes: med hold on eliquis

## 2022-12-06 NOTE — TELEPHONE ENCOUNTER
Reviewing for Melissa Avelar who is off this week.   Patient may hold Eliquis 48 hours prior to procedure and resume thereafter

## 2022-12-12 ENCOUNTER — TELEPHONE (OUTPATIENT)
Dept: FAMILY MEDICINE CLINIC | Age: 72
End: 2022-12-12

## 2022-12-12 RX ORDER — HYDROCORTISONE ACETATE 25 MG/1
25 SUPPOSITORY RECTAL EVERY 12 HOURS
Qty: 30 SUPPOSITORY | Refills: 0 | Status: SHIPPED | OUTPATIENT
Start: 2022-12-12

## 2022-12-28 ENCOUNTER — TELEPHONE (OUTPATIENT)
Dept: GASTROENTEROLOGY | Age: 72
End: 2022-12-28

## 2022-12-28 NOTE — TELEPHONE ENCOUNTER
Patient: Tristan Lino    YOB: 1950      Clearance was received on December 28, 2022. for Endoscopy / Colonoscopy scheduled for: 1/17/23    Patient may discontinue the use of ELIQUIS for 2  days prior to the procedure.     IS Lovenox required:  NO     PATIENT NOTIFIED ON:  12/28/22 - PATIENT VOICED UNDERSTANDING      Clearance scanned into media

## 2023-01-03 DIAGNOSIS — S39.012D BACK STRAIN, SUBSEQUENT ENCOUNTER: ICD-10-CM

## 2023-01-03 RX ORDER — TIZANIDINE 4 MG/1
TABLET ORAL
Qty: 90 TABLET | Refills: 0 | Status: SHIPPED | OUTPATIENT
Start: 2023-01-03

## 2023-01-23 ENCOUNTER — OFFICE VISIT (OUTPATIENT)
Dept: FAMILY MEDICINE CLINIC | Age: 73
End: 2023-01-23
Payer: MEDICARE

## 2023-01-23 VITALS
WEIGHT: 190.2 LBS | OXYGEN SATURATION: 95 % | RESPIRATION RATE: 16 BRPM | BODY MASS INDEX: 25.76 KG/M2 | SYSTOLIC BLOOD PRESSURE: 108 MMHG | HEART RATE: 73 BPM | TEMPERATURE: 97.2 F | DIASTOLIC BLOOD PRESSURE: 64 MMHG | HEIGHT: 72 IN

## 2023-01-23 DIAGNOSIS — E55.9 VITAMIN D DEFICIENCY: ICD-10-CM

## 2023-01-23 DIAGNOSIS — Z87.891 PERSONAL HISTORY OF TOBACCO USE, PRESENTING HAZARDS TO HEALTH: ICD-10-CM

## 2023-01-23 DIAGNOSIS — Z12.5 SCREENING FOR MALIGNANT NEOPLASM OF PROSTATE: ICD-10-CM

## 2023-01-23 DIAGNOSIS — S39.012D BACK STRAIN, SUBSEQUENT ENCOUNTER: ICD-10-CM

## 2023-01-23 DIAGNOSIS — E78.2 MIXED HYPERLIPIDEMIA: ICD-10-CM

## 2023-01-23 DIAGNOSIS — K21.9 GASTROESOPHAGEAL REFLUX DISEASE, UNSPECIFIED WHETHER ESOPHAGITIS PRESENT: ICD-10-CM

## 2023-01-23 DIAGNOSIS — Z87.891 PERSONAL HISTORY OF TOBACCO USE: ICD-10-CM

## 2023-01-23 DIAGNOSIS — R10.13 EPIGASTRIC PAIN: ICD-10-CM

## 2023-01-23 DIAGNOSIS — I10 ESSENTIAL HYPERTENSION: ICD-10-CM

## 2023-01-23 DIAGNOSIS — Z00.00 MEDICARE ANNUAL WELLNESS VISIT, SUBSEQUENT: Primary | ICD-10-CM

## 2023-01-23 LAB
ALBUMIN SERPL-MCNC: 3.9 G/DL (ref 3.5–5.2)
ALP BLD-CCNC: 109 U/L (ref 40–130)
ALT SERPL-CCNC: 16 U/L (ref 5–41)
ANION GAP SERPL CALCULATED.3IONS-SCNC: 9 MMOL/L (ref 7–19)
AST SERPL-CCNC: 14 U/L (ref 5–40)
BASOPHILS ABSOLUTE: 0 K/UL (ref 0–0.2)
BASOPHILS RELATIVE PERCENT: 0.4 % (ref 0–1)
BILIRUB SERPL-MCNC: 0.4 MG/DL (ref 0.2–1.2)
BUN BLDV-MCNC: 17 MG/DL (ref 8–23)
CALCIUM SERPL-MCNC: 9.1 MG/DL (ref 8.8–10.2)
CHLORIDE BLD-SCNC: 104 MMOL/L (ref 98–111)
CHOLESTEROL, TOTAL: 141 MG/DL (ref 160–199)
CO2: 25 MMOL/L (ref 22–29)
CREAT SERPL-MCNC: 1.6 MG/DL (ref 0.5–1.2)
EOSINOPHILS ABSOLUTE: 0.4 K/UL (ref 0–0.6)
EOSINOPHILS RELATIVE PERCENT: 7.9 % (ref 0–5)
GFR SERPL CREATININE-BSD FRML MDRD: 45 ML/MIN/{1.73_M2}
GLUCOSE BLD-MCNC: 71 MG/DL (ref 74–109)
HCT VFR BLD CALC: 44.5 % (ref 42–52)
HDLC SERPL-MCNC: 46 MG/DL (ref 55–121)
HEMOGLOBIN: 14.2 G/DL (ref 14–18)
IMMATURE GRANULOCYTES #: 0 K/UL
LDL CHOLESTEROL CALCULATED: 71 MG/DL
LYMPHOCYTES ABSOLUTE: 1.3 K/UL (ref 1.1–4.5)
LYMPHOCYTES RELATIVE PERCENT: 25.4 % (ref 20–40)
MCH RBC QN AUTO: 30.3 PG (ref 27–31)
MCHC RBC AUTO-ENTMCNC: 31.9 G/DL (ref 33–37)
MCV RBC AUTO: 94.9 FL (ref 80–94)
MONOCYTES ABSOLUTE: 0.5 K/UL (ref 0–0.9)
MONOCYTES RELATIVE PERCENT: 8.7 % (ref 0–10)
NEUTROPHILS ABSOLUTE: 3 K/UL (ref 1.5–7.5)
NEUTROPHILS RELATIVE PERCENT: 57.2 % (ref 50–65)
PDW BLD-RTO: 13.9 % (ref 11.5–14.5)
PLATELET # BLD: 175 K/UL (ref 130–400)
PMV BLD AUTO: 10.6 FL (ref 9.4–12.4)
POTASSIUM SERPL-SCNC: 4.3 MMOL/L (ref 3.5–5)
PROSTATE SPECIFIC ANTIGEN: 1.21 NG/ML (ref 0–4)
RBC # BLD: 4.69 M/UL (ref 4.7–6.1)
SODIUM BLD-SCNC: 138 MMOL/L (ref 136–145)
TOTAL PROTEIN: 6.1 G/DL (ref 6.6–8.7)
TRIGL SERPL-MCNC: 122 MG/DL (ref 0–149)
VITAMIN D 25-HYDROXY: 81.4 NG/ML
WBC # BLD: 5.2 K/UL (ref 4.8–10.8)

## 2023-01-23 PROCEDURE — 1123F ACP DISCUSS/DSCN MKR DOCD: CPT | Performed by: FAMILY MEDICINE

## 2023-01-23 PROCEDURE — 99406 BEHAV CHNG SMOKING 3-10 MIN: CPT | Performed by: FAMILY MEDICINE

## 2023-01-23 PROCEDURE — 3078F DIAST BP <80 MM HG: CPT | Performed by: FAMILY MEDICINE

## 2023-01-23 PROCEDURE — G8484 FLU IMMUNIZE NO ADMIN: HCPCS | Performed by: FAMILY MEDICINE

## 2023-01-23 PROCEDURE — G0439 PPPS, SUBSEQ VISIT: HCPCS | Performed by: FAMILY MEDICINE

## 2023-01-23 PROCEDURE — 3017F COLORECTAL CA SCREEN DOC REV: CPT | Performed by: FAMILY MEDICINE

## 2023-01-23 PROCEDURE — G0296 VISIT TO DETERM LDCT ELIG: HCPCS | Performed by: FAMILY MEDICINE

## 2023-01-23 PROCEDURE — 3074F SYST BP LT 130 MM HG: CPT | Performed by: FAMILY MEDICINE

## 2023-01-23 RX ORDER — TIZANIDINE 4 MG/1
TABLET ORAL
Qty: 90 TABLET | Refills: 1 | Status: SHIPPED | OUTPATIENT
Start: 2023-01-23

## 2023-01-23 RX ORDER — PANTOPRAZOLE SODIUM 40 MG/1
TABLET, DELAYED RELEASE ORAL
Qty: 90 TABLET | Refills: 1 | Status: SHIPPED | OUTPATIENT
Start: 2023-01-23

## 2023-01-23 RX ORDER — ERGOCALCIFEROL 1.25 MG/1
CAPSULE ORAL
Qty: 12 CAPSULE | Refills: 1 | Status: SHIPPED | OUTPATIENT
Start: 2023-01-23

## 2023-01-23 SDOH — ECONOMIC STABILITY: FOOD INSECURITY: WITHIN THE PAST 12 MONTHS, THE FOOD YOU BOUGHT JUST DIDN'T LAST AND YOU DIDN'T HAVE MONEY TO GET MORE.: NEVER TRUE

## 2023-01-23 SDOH — ECONOMIC STABILITY: FOOD INSECURITY: WITHIN THE PAST 12 MONTHS, YOU WORRIED THAT YOUR FOOD WOULD RUN OUT BEFORE YOU GOT MONEY TO BUY MORE.: NEVER TRUE

## 2023-01-23 ASSESSMENT — PATIENT HEALTH QUESTIONNAIRE - PHQ9
3. TROUBLE FALLING OR STAYING ASLEEP: 0
7. TROUBLE CONCENTRATING ON THINGS, SUCH AS READING THE NEWSPAPER OR WATCHING TELEVISION: 0
SUM OF ALL RESPONSES TO PHQ QUESTIONS 1-9: 0
1. LITTLE INTEREST OR PLEASURE IN DOING THINGS: 0
8. MOVING OR SPEAKING SO SLOWLY THAT OTHER PEOPLE COULD HAVE NOTICED. OR THE OPPOSITE, BEING SO FIGETY OR RESTLESS THAT YOU HAVE BEEN MOVING AROUND A LOT MORE THAN USUAL: 0
2. FEELING DOWN, DEPRESSED OR HOPELESS: 0
4. FEELING TIRED OR HAVING LITTLE ENERGY: 0
SUM OF ALL RESPONSES TO PHQ QUESTIONS 1-9: 0
SUM OF ALL RESPONSES TO PHQ QUESTIONS 1-9: 0
10. IF YOU CHECKED OFF ANY PROBLEMS, HOW DIFFICULT HAVE THESE PROBLEMS MADE IT FOR YOU TO DO YOUR WORK, TAKE CARE OF THINGS AT HOME, OR GET ALONG WITH OTHER PEOPLE: 0
SUM OF ALL RESPONSES TO PHQ QUESTIONS 1-9: 0
SUM OF ALL RESPONSES TO PHQ9 QUESTIONS 1 & 2: 0
9. THOUGHTS THAT YOU WOULD BE BETTER OFF DEAD, OR OF HURTING YOURSELF: 0
5. POOR APPETITE OR OVEREATING: 0
6. FEELING BAD ABOUT YOURSELF - OR THAT YOU ARE A FAILURE OR HAVE LET YOURSELF OR YOUR FAMILY DOWN: 0

## 2023-01-23 ASSESSMENT — SOCIAL DETERMINANTS OF HEALTH (SDOH): HOW HARD IS IT FOR YOU TO PAY FOR THE VERY BASICS LIKE FOOD, HOUSING, MEDICAL CARE, AND HEATING?: NOT HARD AT ALL

## 2023-01-23 ASSESSMENT — LIFESTYLE VARIABLES
HOW MANY STANDARD DRINKS CONTAINING ALCOHOL DO YOU HAVE ON A TYPICAL DAY: 1 OR 2
HOW OFTEN DO YOU HAVE A DRINK CONTAINING ALCOHOL: MONTHLY OR LESS

## 2023-01-23 NOTE — PATIENT INSTRUCTIONS
Learning About Lung Cancer Screening  What is screening for lung cancer? Lung cancer screening is a way to find some lung cancers early, before a person has any symptoms of the cancer. Lung cancer screening may help those who have the highest risk for lung cancer--people age 48 and older who are or were heavy smokers. For most people, who aren't at increased risk, screening for lung cancer probably isn't helpful. Screening won't prevent cancer. And it may not find all lung cancers. Lung cancer screening may lower the risk of dying from lung cancer in a small number of people. How is it done? Lung cancer screening is done with a low-dose CT (computed tomography) scan. A CT scan uses X-rays, or radiation, to make detailed pictures of your body. Experts recommend that screening be done in medical centers that focus on finding and treating lung cancer. Who is screening recommended for? Lung cancer screening is recommended for people age 48 and older who are or were heavy smokers. That means people with a smoking history of at least 20 pack years. A pack year is a way to measure how heavy a smoker you are or were. To figure out your pack years, multiply how many packs a day on average (assuming 20 cigarettes per pack) you have smoked by how many years you have smoked. For example: If you smoked 1 pack a day for 20 years, that's 1 times 20. So you have a smoking history of 20 pack years. If you smoked 2 packs a day for 10 years, that's 2 times 10. So you have a smoking history of 20 pack years. Experts agree that screening is for people who have a high risk of lung cancer. But experts don't agree on what high risk means. Some say people age 48 or older with at least a 20-pack-year smoking history are high risk. Others say it's people age 54 or older with a 30-pack-year history. To see if you could benefit from screening, first find out if you are at high risk for lung cancer.  Your doctor can help you decide your lung cancer risk. What are the risks of screening? CT screening for lung cancer isn't perfect. It can show an abnormal result when it turns out there wasn't any cancer. This is called a false-positive result. This means you may need more tests to make sure you don't have cancer. These tests can be harmful and cause a lot of worry. These tests may include more CT scans and invasive testing like a lung biopsy. In a biopsy, the doctor takes a sample of tissue from inside your lung so it can be looked at under a microscope. A biopsy is the only way to tell if you have lung cancer. If the biopsy finds cancer, you and your doctor will have to decide how or whether to treat it. Some lung cancers found on CT scans are harmless and would not have caused a problem if they had not been found through screening. But because doctors can't tell which ones will turn out to be harmless, most will be treated. This means that you may get treatment--including surgery, radiation, or chemotherapy--that you don't need. There is a risk of damage to cells or tissue from being exposed to radiation, including the small amounts used in CTs, X-rays, and other medical tests. Over time, exposure to radiation may cause cancer and other health problems. But in most cases, the risk of getting cancer from being exposed to small amounts of radiation is low. It's not a reason to avoid these tests for most people. What are the benefits of screening? Your scan may be normal (negative). For some people who are at higher risk, screening lowers the chance of dying of lung cancer. How much and how long you smoked helps to determine your risk level. Screening can find some cancers early, when treatment may be more likely to work. What happens after screening? The results of your CT scan will be sent to your doctor. Someone from your care team will explain the results of your scan and answer any questions you may have.  If you need any follow-up, he or she will help you understand what to do next. After a lung cancer screening, you can go back to your usual activities right away. A lung cancer screening test can't tell if you have lung cancer. If your results are positive, your doctor can't tell whether an abnormal finding is a harmless nodule, cancer, or something else without doing more tests. What can you do to help prevent lung cancer? Some lung cancers can't be prevented. But if you smoke, quitting smoking is the best step you can take to prevent lung cancer. If you want to quit, your doctor can recommend medicines or other ways to help. Follow-up care is a key part of your treatment and safety. Be sure to make and go to all appointments, and call your doctor if you are having problems. It's also a good idea to know your test results and keep a list of the medicines you take. Where can you learn more? Go to http://www.hutton.com/ and enter Q940 to learn more about \"Learning About Lung Cancer Screening. \"  Current as of: May 4, 2022               Content Version: 13.5  © 7065-9902 [employer]. Care instructions adapted under license by [facility] ([facility]). If you have questions about a medical condition or this instruction, always ask your healthcare professional. [address] any warranty or liability for your use of this information. Learning About Being Active as an Older Adult  Why is being active important as you get older? Being active is one of the best things you can do for your health. And it's never too late to start. Being active--or getting active, if you aren't already--has definite benefits. It can:  Give you more energy,  Keep your mind sharp. Improve balance to reduce your risk of falls. Help you manage chronic illness with fewer medicines.   No matter how old you are, how fit you are, or what health problems you have, there is a form of activity that will work for you. And the more physical activity you can do, the better your overall health will be. What kinds of activity can help you stay healthy? Being more active will make your daily activities easier. Physical activity includes planned exercise and things you do in daily life. There are four types of activity:  Aerobic. Doing aerobic activity makes your heart and lungs strong. Includes walking, dancing, and gardening. Aim for at least 2½ hours spread throughout the week. It improves your energy and can help you sleep better. Muscle-strengthening. This type of activity can help maintain muscle and strengthen bones. Includes climbing stairs, using resistance bands, and lifting or carrying heavy loads. Aim for at least twice a week. It can help protect the knees and other joints. Stretching. Stretching gives you better range of motion in joints and muscles. Includes upper arm stretches, calf stretches, and gentle yoga. Aim for at least twice a week, preferably after your muscles are warmed up from other activities. It can help you function better in daily life. Balancing. This helps you stay coordinated and have good posture. Includes heel-to-toe walking, concepcion chi, and certain types of yoga. Aim for at least 3 days a week. It can reduce your risk of falling. Even if you have a hard time meeting the recommendations, it's better to be more active than less active. All activity done in each category counts toward your weekly total. You'd be surprised how daily things like carrying groceries, keeping up with grandchildren, and taking the stairs can add up. What keeps you from being active? If you've had a hard time being more active, you're not alone. Maybe you remember being able to do more. Or maybe you've never thought of yourself as being active. It's frustrating when you can't do the things you want. Being more active can help. What's holding you back? Getting started.   Have a goal, but break it into easy tasks. Small steps build into big accomplishments. Staying motivated. If you feel like skipping your activity, remember your goal. Maybe you want to move better and stay independent. Every activity gets you one step closer. Not feeling your best.  Start with 5 minutes of an activity you enjoy. Prove to yourself you can do it. As you get comfortable, increase your time. You may not be where you want to be. But you're in the process of getting there. Everyone starts somewhere. How can you find safe ways to stay active? Talk with your doctor about any physical challenges you're facing. Make a plan with your doctor if you have a health problem or aren't sure how to get started with activity. If you're already active, ask your doctor if there is anything you should change to stay safe as your body and health change. If you tend to feel dizzy after you take medicine, avoid activity at that time. Try being active before you take your medicine. This will reduce your risk of falls. If you plan to be active at home, make sure to clear your space before you get started. Remove things like TV cords, coffee tables, and throw rugs. It's safest to have plenty of space to move freely. The key to getting more active is to take it slow and steady. Try to improve only a little bit at a time. Pick just one area to improve on at first. And if an activity hurts, stop and talk to your doctor. Where can you learn more? Go to http://www.hutton.com/ and enter P600 to learn more about \"Learning About Being Active as an Older Adult. \"  Current as of: October 10, 2022               Content Version: 13.5  © 0411-6787 [employer]. Care instructions adapted under license by [facility] ([facility]). If you have questions about a medical condition or this instruction, always ask your healthcare professional. [address] any warranty or liability for your use of this information. Advance Directives: Care Instructions  Overview  An advance directive is a legal way to state your wishes at the end of your life. It tells your family and your doctor what to do if you can't say what you want. There are two main types of advance directives. You can change them any time your wishes change. Living will. This form tells your family and your doctor your wishes about life support and other treatment. The form is also called a declaration. Medical power of . This form lets you name a person to make treatment decisions for you when you can't speak for yourself. This person is called a health care agent (health care proxy, health care surrogate). The form is also called a durable power of  for health care. If you do not have an advance directive, decisions about your medical care may be made by a family member, or by a doctor or a  who doesn't know you. It may help to think of an advance directive as a gift to the people who care for you. If you have one, they won't have to make tough decisions by themselves. For more information, including forms for your state, see the [address] website (www.caringinfo.org/planning/advance-directives/). Follow-up care is a key part of your treatment and safety. Be sure to make and go to all appointments, and call your doctor if you are having problems. It's also a good idea to know your test results and keep a list of the medicines you take. What should you include in an advance directive? Many states have a unique advance directive form. (It may ask you to address specific issues.) Or you might use a universal form that's approved by many states. If your form doesn't tell you what to address, it may be hard to know what to include in your advance directive. Use the questions below to help you get started. Who do you want to make decisions about your medical care if you are not able to?   What life-support measures do you want if you have a serious illness that gets worse over time or can't be cured? What are you most afraid of that might happen? (Maybe you're afraid of having pain, losing your independence, or being kept alive by machines.)  Where would you prefer to die? (Your home? A hospital? A nursing home?)  Do you want to donate your organs when you die? Do you want certain [employer] practices performed before you die? When should you call for help? Be sure to contact your doctor if you have any questions. Where can you learn more? Go to http://www.hutton.com/ and enter R264 to learn more about \"Advance Directives: Care Instructions. \"  Current as of: June 16, 2022               Content Version: 13.5  © 7380-0221 Healthwise, Incorporated. Care instructions adapted under license by [facility] ([facility]). If you have questions about a medical condition or this instruction, always ask your healthcare professional. [place] [address] any warranty or liability for your use of this information. Personalized Preventive Plan for Ganga Feliz - 1/23/2023  Medicare offers a range of preventive health benefits. Some of the tests and screenings are paid in full while other may be subject to a deductible, co-insurance, and/or copay. Some of these benefits include a comprehensive review of your medical history including lifestyle, illnesses that may run in your family, and various assessments and screenings as appropriate. After reviewing your medical record and screening and assessments performed today your provider may have ordered immunizations, labs, imaging, and/or referrals for you. A list of these orders (if applicable) as well as your Preventive Care list are included within your After Visit Summary for your review.     Other Preventive Recommendations:    A preventive eye exam performed by an eye specialist is recommended every 1-2 years to screen for glaucoma; cataracts, macular degeneration, and other eye disorders. A preventive dental visit is recommended every 6 months. Try to get at least 150 minutes of exercise per week or 10,000 steps per day on a pedometer . Order or download the FREE \"Exercise & Physical Activity: Your Everyday Guide\" from [employer]. Call 9-367.459.3244 or search [employer] online. You need 2127-6094 mg of calcium and 0054-1095 IU of vitamin D per day. It is possible to meet your calcium requirement with diet alone, but a vitamin D supplement is usually necessary to meet this goal.  When exposed to the sun, use a sunscreen that protects against both UVA and UVB radiation with an SPF of 30 or greater. Reapply every 2 to 3 hours or after sweating, drying off with a towel, or swimming. Always wear a seat belt when traveling in a car. Always wear a helmet when riding a bicycle or motorcycle.

## 2023-01-23 NOTE — PROGRESS NOTES
Medicare Annual Wellness Visit    Ganga Feliz is here for Medicare AWV    Assessment & Plan     ICD-10-CM    1. Medicare annual wellness visit, subsequent  Z00.00       2. Personal history of tobacco use, presenting hazards to health  Z87.891 UT Smoking and Tobacco Use Cessation (Intermediate): 3-10 MINUTES [58263]      3. Personal history of tobacco use  Z87.891 UT VISIT TO DISCUSS LUNG CA SCREEN W LDCT     CT Lung Screen (Annual/Baseline)      4. Back strain, subsequent encounter  S39.012D tiZANidine (ZANAFLEX) 4 MG tablet      5. Vitamin D deficiency  E55.9 vitamin D (ERGOCALCIFEROL) 1.25 MG (70253 UT) CAPS capsule      6. Gastroesophageal reflux disease, unspecified whether esophagitis present  K21.9 pantoprazole (PROTONIX) 40 MG tablet      7. Epigastric pain  R10.13 pantoprazole (PROTONIX) 40 MG tablet              Was unable to tolerate prep and GI is not giving any alternative options and for that reason patient is refusing any further colon cancer screening. Recommendations for Preventive Services Due: see orders and patient instructions/AVS.  Recommended screening schedule for the next 5-10 years is provided to the patient in written form: see Patient Instructions/AVS.     Return if symptoms worsen or fail to improve, for Medicare Annual Wellness Visit in 1 year, next scheduled follow up with PCP. Subjective   The following acute and/or chronic problems were also addressed today:  Patient does have a history of back pain with muscle spasms and strains and has done well with the use of Zanaflex. He is continue to benefit from his Protonix for his reflux and epigastric pain without any problems or recent changes to his symptoms. He does also have history of vitamin D deficiency. Patient's complete Health Risk Assessment and screening values have been reviewed and are found in Flowsheets.  The following problems were reviewed today and where indicated follow up appointments were made and/or referrals ordered. Positive Risk Factor Screenings with Interventions:                 Weight and Activity:  Physical Activity: Inactive    Days of Exercise per Week: 0 days    Minutes of Exercise per Session: 0 min     On average, how many days per week do you engage in moderate to strenuous exercise (like a brisk walk)?: 0 days  Have you lost any weight without trying in the past 3 months?: No  Body mass index: (!) 25.79    Inactivity Interventions:  Patient declined any further interventions or treatment  He states that a lot of it is the weather. Tobacco Use:  Tobacco Use: High Risk    Smoking Tobacco Use: Every Day    Smokeless Tobacco Use: Never    Passive Exposure: Not on file     E-cigarette/Vaping       Questions Responses    E-cigarette/Vaping Use Never User    Start Date     Passive Exposure     Quit Date     Counseling Given     Comments         Interventions:  Patient declined any further intervention or treatment    Tobacco Use Counseling: Patient was counseled on tobacco cessation. Based upon patient's motivation to change his behavior, the following plan was agreed upon: Patient is not ready to work toward tobacco cessation at this time. Educational materials regarding tobacco cessation were provided. Provider spent 3 minutes counseling patient. LDCT Screening: Discussed with patient the benefits and harms of screening, follow-up diagnostic testing, over-diagnosis, false positive rate, and total radiation exposure. Counseled on the importance of adherence to annual lung cancer LDCT screening, impact of comorbidities, ability and willingness to undergo diagnosis and treatment. Counseled on the importance of maintaining cigarette smoking abstinence and cessation. The patient has a history of >20 pack years and is either still smoking or quit within the last 15 years. There are no signs or symptoms of lung cancer.               Objective   Vitals:    01/23/23 1320   BP: 108/64 Site: Left Upper Arm   Position: Sitting   Cuff Size: Medium Adult   Pulse: 73   Resp: 16   Temp: 97.2 °F (36.2 °C)   TempSrc: Infrared   SpO2: 95%   Weight: 190 lb 3.2 oz (86.3 kg)   Height: 6' (1.829 m)      Body mass index is 25.8 kg/m². Allergies   Allergen Reactions    Tape Shawna Adelita Tape] Rash     welps       Prior to Visit Medications    Medication Sig Taking? Authorizing Provider   tiZANidine (ZANAFLEX) 4 MG tablet TAKE (1) TABLET BY MOUTH EVERY EIGHT HOURS AS NEEDED. (MUSCLE RELAXER) Yes Jolene Campos MD   vitamin D (ERGOCALCIFEROL) 1.25 MG (47205 UT) CAPS capsule TAKE 1 CAPSULE BY MOUTH ONCE WEEKLY. Yes Jolene Campos MD   pantoprazole (PROTONIX) 40 MG tablet TAKE 1 TABLET BY MOUTH DAILY IN THE MORNING (BEFORE BREAKFAST) Yes Jolene Campos MD   hydrocortisone (ANUSOL-HC) 25 MG suppository Place 1 suppository rectally in the morning and 1 suppository in the evening. Yes Jolene Campos MD   verapamil (CALAN SR) 180 MG extended release tablet TAKE (1) TABLET BY MOUTH TWICE A DAY. Yes KEVYN Lopez   lisinopril (PRINIVIL;ZESTRIL) 10 MG tablet TAKE 1 TABLET BY MOUTH ONCE DAILY Yes Jolene Campos MD   escitalopram (LEXAPRO) 20 MG tablet TAKE 1 TABLET BY MOUTH ONCE DAILY Yes Jolene Campos MD   atorvastatin (LIPITOR) 40 MG tablet Take 1 tablet by mouth daily Yes Jolene Campos MD   traZODone (DESYREL) 50 MG tablet 1-2 tablets at bedtime as need for sleep. Yes Jolene Campos MD   apixaban (ELIQUIS) 5 MG TABS tablet Take 1 tablet by mouth 2 times daily Yes Vermat Torres MD   acetaminophen (TYLENOL) 650 MG extended release tablet Take 1 tablet by mouth every 8 hours as needed for Pain Yes Jolene Campos MD   nitroGLYCERIN (NITROSTAT) 0.4 MG SL tablet up to max of 3 total doses. If no relief after 1 dose, call 911.  Yes MD Gabriela Armenta (Including outside providers/suppliers regularly involved in providing care):   Patient Care Team:  Jolene Campos MD as PCP - General (Family Medicine)  Jolene Campos MD as PCP - Empaneled Provider  Fe Hung MD as Consulting Physician (Specialist)  Jamarcus Heredia MD as Consulting Physician (Cardiology)     Reviewed and updated this visit:  Tobacco  Allergies  Meds  Med Hx  Surg Hx  Soc Hx  Fam Hx             Discussed with the patient the current USPSTF guidelines released March 9, 2021 for screening for lung cancer. For adults aged 48 to [de-identified] years who have a 20 pack-year smoking history and currently smoke or have quit within the past 15 years the grade B recommendation is to:  Screen for lung cancer with low-dose computed tomography (LDCT) every year. Stop screening once a person has not smoked for 15 years or has a health problem that limits life expectancy or the ability to have lung surgery. The patient  reports that he has been smoking cigarettes and cigars. He has a 30.00 pack-year smoking history. He has never used smokeless tobacco.. Discussed with patient the risks and benefits of screening, including over-diagnosis, false positive rate, and total radiation exposure. The patient currently exhibits no signs or symptoms suggestive of lung cancer. Discussed with patient the importance of compliance with yearly annual lung cancer screenings and willingness to undergo diagnosis and treatment if screening scan is positive. In addition, the patient was counseled regarding the importance of remaining smoke free and/or total smoking cessation.     Also reviewed the following if the patient has Medicare that as of February 10, 2022, Medicare only covers LDCT screening in patients aged 51-72 with at least a 20 pack-year smoking history who currently smoke or have quit in the last 15 years

## 2023-01-26 ENCOUNTER — TELEPHONE (OUTPATIENT)
Dept: FAMILY MEDICINE CLINIC | Age: 73
End: 2023-01-26

## 2023-01-26 DIAGNOSIS — N18.31 STAGE 3A CHRONIC KIDNEY DISEASE (HCC): Primary | ICD-10-CM

## 2023-02-15 ENCOUNTER — HOSPITAL ENCOUNTER (OUTPATIENT)
Dept: CT IMAGING | Age: 73
Discharge: HOME | End: 2023-02-15
Payer: MEDICARE

## 2023-02-15 DIAGNOSIS — Z87.891 PERSONAL HISTORY OF TOBACCO USE: ICD-10-CM

## 2023-02-15 PROCEDURE — 71271 CT THORAX LUNG CANCER SCR C-: CPT

## 2023-03-06 ENCOUNTER — TELEPHONE (OUTPATIENT)
Dept: FAMILY MEDICINE CLINIC | Age: 73
End: 2023-03-06

## 2023-03-06 DIAGNOSIS — R91.8 ABNORMAL CT LUNG SCREENING: Primary | ICD-10-CM

## 2023-03-09 ENCOUNTER — TELEPHONE (OUTPATIENT)
Dept: CARDIOLOGY CLINIC | Age: 73
End: 2023-03-09

## 2023-03-09 NOTE — TELEPHONE ENCOUNTER
Date: TBD     Cardiologist: Dr. Tanna Trejo      Procedure: Left Buccal bx     Surgeon: Dr. Dat Leung      Last Office Visit: 12-1-22     Reason for office visit and medical concerns addressed at this office visit: CAD, CHF, HTN, CKD, MI, hyperlipidemia     Testing Performed and Date of Service:  EKG 4-12-22     Does the patient have a stent? If so, what type? Not in last year      Current Medications: verapamil, lisinopril, lexapro, vit D, lipitor, desyrel, zanaflex, protonix, eliquis, tylenol, nitro      Is the patient currently taking an anticoagulant? If so, what is the diagnosis the patient has been given to warrant the need for the anticoagulant?  Eliquis      Additional Notes: med hold on eliquis

## 2023-04-19 DIAGNOSIS — I25.10 CORONARY ARTERY DISEASE INVOLVING NATIVE CORONARY ARTERY OF NATIVE HEART WITHOUT ANGINA PECTORIS: ICD-10-CM

## 2023-04-19 DIAGNOSIS — E78.2 MIXED HYPERLIPIDEMIA: ICD-10-CM

## 2023-04-19 DIAGNOSIS — I10 ESSENTIAL HYPERTENSION: ICD-10-CM

## 2023-04-20 RX ORDER — LISINOPRIL 10 MG/1
TABLET ORAL
Qty: 30 TABLET | Refills: 0 | Status: SHIPPED | OUTPATIENT
Start: 2023-04-20

## 2023-04-20 RX ORDER — ATORVASTATIN CALCIUM 40 MG/1
TABLET, FILM COATED ORAL
Qty: 30 TABLET | Refills: 0 | Status: SHIPPED | OUTPATIENT
Start: 2023-04-20

## 2023-05-18 DIAGNOSIS — I48.19 PERSISTENT ATRIAL FIBRILLATION (HCC): ICD-10-CM

## 2023-05-18 DIAGNOSIS — S39.012D BACK STRAIN, SUBSEQUENT ENCOUNTER: ICD-10-CM

## 2023-05-18 DIAGNOSIS — I48.0 PAF (PAROXYSMAL ATRIAL FIBRILLATION) (HCC): ICD-10-CM

## 2023-05-18 DIAGNOSIS — I10 ESSENTIAL HYPERTENSION: ICD-10-CM

## 2023-05-18 RX ORDER — APIXABAN 5 MG/1
TABLET, FILM COATED ORAL
Qty: 60 TABLET | Refills: 5 | Status: SHIPPED | OUTPATIENT
Start: 2023-05-18

## 2023-05-18 RX ORDER — TIZANIDINE 4 MG/1
TABLET ORAL
Qty: 90 TABLET | Refills: 0 | Status: SHIPPED | OUTPATIENT
Start: 2023-05-18

## 2023-05-18 RX ORDER — LISINOPRIL 10 MG/1
TABLET ORAL
Qty: 30 TABLET | Refills: 0 | Status: SHIPPED | OUTPATIENT
Start: 2023-05-18

## 2023-06-02 DIAGNOSIS — E78.2 MIXED HYPERLIPIDEMIA: ICD-10-CM

## 2023-06-02 DIAGNOSIS — I25.10 CORONARY ARTERY DISEASE INVOLVING NATIVE CORONARY ARTERY OF NATIVE HEART WITHOUT ANGINA PECTORIS: ICD-10-CM

## 2023-06-05 DIAGNOSIS — F41.9 ANXIETY: ICD-10-CM

## 2023-06-05 DIAGNOSIS — F33.1 MODERATE EPISODE OF RECURRENT MAJOR DEPRESSIVE DISORDER (HCC): ICD-10-CM

## 2023-06-05 DIAGNOSIS — I10 ESSENTIAL HYPERTENSION: ICD-10-CM

## 2023-06-05 RX ORDER — ATORVASTATIN CALCIUM 40 MG/1
TABLET, FILM COATED ORAL
Qty: 30 TABLET | Refills: 2 | Status: SHIPPED | OUTPATIENT
Start: 2023-06-05 | End: 2023-07-24 | Stop reason: SDUPTHER

## 2023-06-05 RX ORDER — ESCITALOPRAM OXALATE 20 MG/1
TABLET ORAL
Qty: 30 TABLET | Refills: 0 | Status: SHIPPED | OUTPATIENT
Start: 2023-06-05

## 2023-06-25 DIAGNOSIS — S39.012D BACK STRAIN, SUBSEQUENT ENCOUNTER: ICD-10-CM

## 2023-06-26 RX ORDER — TIZANIDINE 4 MG/1
TABLET ORAL
Qty: 90 TABLET | Refills: 0 | Status: SHIPPED | OUTPATIENT
Start: 2023-06-26

## 2023-07-03 DIAGNOSIS — I10 ESSENTIAL HYPERTENSION: ICD-10-CM

## 2023-07-03 RX ORDER — LISINOPRIL 10 MG/1
TABLET ORAL
Qty: 30 TABLET | Refills: 2 | Status: SHIPPED | OUTPATIENT
Start: 2023-07-03

## 2023-07-03 NOTE — TELEPHONE ENCOUNTER
Last OV 1/23/2023  Next OV 7/24/2023      Requested Prescriptions     Pending Prescriptions Disp Refills    lisinopril (PRINIVIL;ZESTRIL) 10 MG tablet [Pharmacy Med Name: LISINOPRIL 10 MG TABLET] 30 tablet 2     Sig: TAKE 1 TABLET BY MOUTH ONCE DAILY

## 2023-07-10 DIAGNOSIS — F33.1 MODERATE EPISODE OF RECURRENT MAJOR DEPRESSIVE DISORDER (HCC): ICD-10-CM

## 2023-07-10 DIAGNOSIS — F41.9 ANXIETY: ICD-10-CM

## 2023-07-10 RX ORDER — ESCITALOPRAM OXALATE 20 MG/1
TABLET ORAL
Qty: 30 TABLET | Refills: 0 | Status: SHIPPED | OUTPATIENT
Start: 2023-07-10

## 2023-07-24 ENCOUNTER — OFFICE VISIT (OUTPATIENT)
Dept: FAMILY MEDICINE CLINIC | Age: 73
End: 2023-07-24
Payer: MEDICARE

## 2023-07-24 VITALS
WEIGHT: 183 LBS | BODY MASS INDEX: 24.79 KG/M2 | OXYGEN SATURATION: 98 % | HEART RATE: 74 BPM | DIASTOLIC BLOOD PRESSURE: 72 MMHG | SYSTOLIC BLOOD PRESSURE: 120 MMHG | TEMPERATURE: 97.6 F | HEIGHT: 72 IN

## 2023-07-24 DIAGNOSIS — R10.13 EPIGASTRIC PAIN: ICD-10-CM

## 2023-07-24 DIAGNOSIS — I25.10 CORONARY ARTERY DISEASE INVOLVING NATIVE CORONARY ARTERY OF NATIVE HEART WITHOUT ANGINA PECTORIS: ICD-10-CM

## 2023-07-24 DIAGNOSIS — I10 ESSENTIAL HYPERTENSION: ICD-10-CM

## 2023-07-24 DIAGNOSIS — E78.2 MIXED HYPERLIPIDEMIA: ICD-10-CM

## 2023-07-24 DIAGNOSIS — F41.9 ANXIETY: ICD-10-CM

## 2023-07-24 DIAGNOSIS — F33.1 MODERATE EPISODE OF RECURRENT MAJOR DEPRESSIVE DISORDER (HCC): Primary | ICD-10-CM

## 2023-07-24 DIAGNOSIS — K21.9 GASTROESOPHAGEAL REFLUX DISEASE, UNSPECIFIED WHETHER ESOPHAGITIS PRESENT: ICD-10-CM

## 2023-07-24 PROCEDURE — 4004F PT TOBACCO SCREEN RCVD TLK: CPT | Performed by: FAMILY MEDICINE

## 2023-07-24 PROCEDURE — 1123F ACP DISCUSS/DSCN MKR DOCD: CPT | Performed by: FAMILY MEDICINE

## 2023-07-24 PROCEDURE — 3017F COLORECTAL CA SCREEN DOC REV: CPT | Performed by: FAMILY MEDICINE

## 2023-07-24 PROCEDURE — 3078F DIAST BP <80 MM HG: CPT | Performed by: FAMILY MEDICINE

## 2023-07-24 PROCEDURE — G8427 DOCREV CUR MEDS BY ELIG CLIN: HCPCS | Performed by: FAMILY MEDICINE

## 2023-07-24 PROCEDURE — 99214 OFFICE O/P EST MOD 30 MIN: CPT | Performed by: FAMILY MEDICINE

## 2023-07-24 PROCEDURE — 3074F SYST BP LT 130 MM HG: CPT | Performed by: FAMILY MEDICINE

## 2023-07-24 PROCEDURE — G8420 CALC BMI NORM PARAMETERS: HCPCS | Performed by: FAMILY MEDICINE

## 2023-07-24 RX ORDER — ATORVASTATIN CALCIUM 40 MG/1
40 TABLET, FILM COATED ORAL DAILY
Qty: 30 TABLET | Refills: 5 | Status: SHIPPED | OUTPATIENT
Start: 2023-07-24

## 2023-07-24 RX ORDER — PANTOPRAZOLE SODIUM 40 MG/1
TABLET, DELAYED RELEASE ORAL
Qty: 30 TABLET | Refills: 5 | Status: SHIPPED | OUTPATIENT
Start: 2023-07-24

## 2023-07-24 RX ORDER — ESCITALOPRAM OXALATE 20 MG/1
20 TABLET ORAL DAILY
Qty: 30 TABLET | Refills: 5 | Status: SHIPPED | OUTPATIENT
Start: 2023-07-24

## 2023-07-24 RX ORDER — LISINOPRIL 10 MG/1
10 TABLET ORAL DAILY
Qty: 30 TABLET | Refills: 5 | Status: SHIPPED | OUTPATIENT
Start: 2023-07-24

## 2023-07-24 NOTE — PROGRESS NOTES
Shruti Nico PINAANIL [facility]  [address]  Dept: [phone]  Dept Fax: [phone]    Visit type: Established patient    Reason for Visit: Medication Refill         Assessment and Plan       1. Moderate episode of recurrent major depressive disorder (HCC)  -     escitalopram (LEXAPRO) 20 MG tablet; Take 1 tablet by mouth daily, Disp-30 tablet, R-5Normal  2. Anxiety  -     escitalopram (LEXAPRO) 20 MG tablet; Take 1 tablet by mouth daily, Disp-30 tablet, R-5Normal  3. Essential hypertension  -     lisinopril (PRINIVIL;ZESTRIL) 10 MG tablet; Take 1 tablet by mouth daily, Disp-30 tablet, R-5Normal  4. Mixed hyperlipidemia  -     atorvastatin (LIPITOR) 40 MG tablet; Take 1 tablet by mouth daily, Disp-30 tablet, R-5Normal  5. Coronary artery disease involving native coronary artery of native heart without angina pectoris  -     atorvastatin (LIPITOR) 40 MG tablet; Take 1 tablet by mouth daily, Disp-30 tablet, R-5Normal  6. Gastroesophageal reflux disease, unspecified whether esophagitis present  -     pantoprazole (PROTONIX) 40 MG tablet; TAKE 1 TABLET BY MOUTH DAILY IN THE MORNING (BEFORE BREAKFAST), Disp-30 tablet, R-5Normal  7. Epigastric pain  -     pantoprazole (PROTONIX) 40 MG tablet; TAKE 1 TABLET BY MOUTH DAILY IN THE MORNING (BEFORE BREAKFAST), Disp-30 tablet, R-5Normal    With patient doing well with his current medications we will continue at this time continue to monitor and adjust as course dictates. discussed the importance of continued home monitoring of his blood pressure and discussed dietary and lifestyle modifications that may be beneficial.  Discussed signs symptoms requiring medical attention. All questions were answered and patient voiced understanding and agreement with plan as discussed. Return in about 6 months (around 1/24/2024), or if symptoms worsen or fail to improve.        Subjective       HPI   Patient has a history of anxiety and depression and reports that

## 2023-07-30 ASSESSMENT — ENCOUNTER SYMPTOMS
ABDOMINAL PAIN: 0
EYE DISCHARGE: 0
NAUSEA: 0
BACK PAIN: 1
COUGH: 0
EYE PAIN: 0
RHINORRHEA: 0
DIARRHEA: 0
SHORTNESS OF BREATH: 0
CONSTIPATION: 0
VOMITING: 0

## 2023-08-01 DIAGNOSIS — S39.012D BACK STRAIN, SUBSEQUENT ENCOUNTER: ICD-10-CM

## 2023-08-01 RX ORDER — TIZANIDINE 4 MG/1
TABLET ORAL
Qty: 90 TABLET | Refills: 0 | Status: SHIPPED | OUTPATIENT
Start: 2023-08-01

## 2023-09-05 DIAGNOSIS — S39.012D BACK STRAIN, SUBSEQUENT ENCOUNTER: ICD-10-CM

## 2023-09-05 RX ORDER — TIZANIDINE 4 MG/1
TABLET ORAL
Qty: 90 TABLET | Refills: 0 | Status: SHIPPED | OUTPATIENT
Start: 2023-09-05

## 2023-09-12 ENCOUNTER — HOSPITAL ENCOUNTER (OUTPATIENT)
Dept: CT IMAGING | Age: 73
Discharge: HOME | End: 2023-09-12
Payer: MEDICARE

## 2023-09-12 DIAGNOSIS — R91.8 ABNORMAL CT LUNG SCREENING: ICD-10-CM

## 2023-09-12 PROCEDURE — 71250 CT THORAX DX C-: CPT

## 2023-09-13 ENCOUNTER — TELEPHONE (OUTPATIENT)
Dept: CARDIOLOGY CLINIC | Age: 73
End: 2023-09-13

## 2023-09-13 NOTE — TELEPHONE ENCOUNTER
Patient called and cancelled today appt, requesting reschedule. [address] Dom[address] not able to accommodate.  Please return his call to discuss [phone]      Thank you

## 2023-09-18 ENCOUNTER — TELEPHONE (OUTPATIENT)
Dept: FAMILY MEDICINE CLINIC | Age: 73
End: 2023-09-18

## 2023-09-18 NOTE — TELEPHONE ENCOUNTER
----- Message from George Samaniego RN sent at 9/15/2023 10:20 AM CDT -----    ----- Message -----  From: Juanita Luna Incoming Radiology Results From [facility]  Sent: 9/13/2023   7:56 AM CDT  To: Laura Chase MD

## 2023-09-20 ENCOUNTER — OFFICE VISIT (OUTPATIENT)
Dept: CARDIOLOGY CLINIC | Age: 73
End: 2023-09-20
Payer: MEDICARE

## 2023-09-20 VITALS
HEART RATE: 58 BPM | DIASTOLIC BLOOD PRESSURE: 78 MMHG | SYSTOLIC BLOOD PRESSURE: 122 MMHG | BODY MASS INDEX: 24.92 KG/M2 | WEIGHT: 184 LBS | HEIGHT: 72 IN

## 2023-09-20 DIAGNOSIS — I48.0 PAF (PAROXYSMAL ATRIAL FIBRILLATION) (HCC): ICD-10-CM

## 2023-09-20 DIAGNOSIS — I10 ESSENTIAL HYPERTENSION: ICD-10-CM

## 2023-09-20 DIAGNOSIS — Z72.0 TOBACCO ABUSE: ICD-10-CM

## 2023-09-20 DIAGNOSIS — I25.10 CORONARY ARTERY DISEASE INVOLVING NATIVE CORONARY ARTERY OF NATIVE HEART WITHOUT ANGINA PECTORIS: Primary | ICD-10-CM

## 2023-09-20 PROCEDURE — 3078F DIAST BP <80 MM HG: CPT | Performed by: CLINICAL NURSE SPECIALIST

## 2023-09-20 PROCEDURE — G8420 CALC BMI NORM PARAMETERS: HCPCS | Performed by: CLINICAL NURSE SPECIALIST

## 2023-09-20 PROCEDURE — G8427 DOCREV CUR MEDS BY ELIG CLIN: HCPCS | Performed by: CLINICAL NURSE SPECIALIST

## 2023-09-20 PROCEDURE — 4004F PT TOBACCO SCREEN RCVD TLK: CPT | Performed by: CLINICAL NURSE SPECIALIST

## 2023-09-20 PROCEDURE — 3017F COLORECTAL CA SCREEN DOC REV: CPT | Performed by: CLINICAL NURSE SPECIALIST

## 2023-09-20 PROCEDURE — 93000 ELECTROCARDIOGRAM COMPLETE: CPT | Performed by: CLINICAL NURSE SPECIALIST

## 2023-09-20 PROCEDURE — 1123F ACP DISCUSS/DSCN MKR DOCD: CPT | Performed by: CLINICAL NURSE SPECIALIST

## 2023-09-20 PROCEDURE — 99214 OFFICE O/P EST MOD 30 MIN: CPT | Performed by: CLINICAL NURSE SPECIALIST

## 2023-09-20 PROCEDURE — 3074F SYST BP LT 130 MM HG: CPT | Performed by: CLINICAL NURSE SPECIALIST

## 2023-09-20 NOTE — PATIENT INSTRUCTIONS
Maintain good blood pressure control-goal<130/80 at rest  Maintain good cholesterol control LDL goal<70 with arterial disease  If you are diabetic work to keep/obtain hemoglobin A1c< 7    [facility]   Call with any questions or concerns  Follow up with Micaela Etienne MD for non cardiac problems  Report any new problems  Cardiovascular Fitness-Exercise as tolerated. Strive for 30 minutes of exercise most days of the week. Cardiac / Healthy Diet- Avoid processed high fat foods, maintain low sodium/salt   Continue current medications as directed  Continue plan of treatment  It is always recommended that you bring your medications bottles with you to each visit - this is for your safety! Tips to Help You Stop Smoking   Cigarette smoking is a preventable cause of death in the [facility]. If you have thought about quitting but haven't been able to, here are some reasons why you should and some ways to do it. Here's Why   Quitting smoking now can decrease your risk of getting smoking-related illnesses like:   Heart disease, Stroke,  Cataracts, Macular degeneration, Thyroid conditions, Hearing loss, Erectile dysfunction, Dementia, Osteoporosis. Several types of cancer, including:   Lung, Mouth, Esophagus, Larynx, Bladder, Pancreas, Kidney   Chronic lung diseases:   Bronchitis, Emphysema, Asthma   Here's How   Once you've decided to quit smoking, set your target quit date a few weeks away. In the time leading up to your quit day, try some of these ideas offered by the [employer] to help you successfully quit smoking. For the best results, work with your doctor. Together, you can test your lung function and compare the results to those of a nonsmoking person. The results can be given to you as your lung age. Finding out your lung age right after having the test done may help you to stop smoking.      Your doctor can also discuss

## 2023-09-20 NOTE — PROGRESS NOTES
of exercise most days of the week. Cardiac / Healthy Diet- Avoid processed high fat foods, maintain low sodium/salt   Continue current medications as directed  Continue plan of treatment  It is always recommended that you bring your medications bottles with you to each visit - this is for your safety! KEVYN Gayle dragon/transcription disclaimer: Much of this encounter note is electronic transcription/translation of spoken language to printed tach. Electronic translation of spoken language may be erroneous, or at times, nonsensical words or phrases may be inadvertently transcribed.  Although, I have reviewed the note for such errors, some may still exist.

## 2023-09-23 SDOH — ECONOMIC STABILITY: HOUSING INSECURITY
IN THE LAST 12 MONTHS, WAS THERE A TIME WHEN YOU DID NOT HAVE A STEADY PLACE TO SLEEP OR SLEPT IN A SHELTER (INCLUDING NOW)?: NO

## 2023-09-23 SDOH — ECONOMIC STABILITY: FOOD INSECURITY: WITHIN THE PAST 12 MONTHS, YOU WORRIED THAT YOUR FOOD WOULD RUN OUT BEFORE YOU GOT MONEY TO BUY MORE.: NEVER TRUE

## 2023-09-23 SDOH — ECONOMIC STABILITY: TRANSPORTATION INSECURITY
IN THE PAST 12 MONTHS, HAS LACK OF TRANSPORTATION KEPT YOU FROM MEETINGS, WORK, OR FROM GETTING THINGS NEEDED FOR DAILY LIVING?: NO

## 2023-09-23 SDOH — ECONOMIC STABILITY: FOOD INSECURITY: WITHIN THE PAST 12 MONTHS, THE FOOD YOU BOUGHT JUST DIDN'T LAST AND YOU DIDN'T HAVE MONEY TO GET MORE.: NEVER TRUE

## 2023-09-23 SDOH — ECONOMIC STABILITY: INCOME INSECURITY: HOW HARD IS IT FOR YOU TO PAY FOR THE VERY BASICS LIKE FOOD, HOUSING, MEDICAL CARE, AND HEATING?: NOT HARD AT ALL

## 2023-09-26 ENCOUNTER — OFFICE VISIT (OUTPATIENT)
Dept: FAMILY MEDICINE CLINIC | Age: 73
End: 2023-09-26
Payer: MEDICARE

## 2023-09-26 VITALS
HEART RATE: 57 BPM | DIASTOLIC BLOOD PRESSURE: 70 MMHG | RESPIRATION RATE: 16 BRPM | OXYGEN SATURATION: 99 % | SYSTOLIC BLOOD PRESSURE: 124 MMHG | HEIGHT: 72 IN | BODY MASS INDEX: 24.84 KG/M2 | WEIGHT: 183.4 LBS | TEMPERATURE: 97.5 F

## 2023-09-26 DIAGNOSIS — E55.9 VITAMIN D DEFICIENCY: ICD-10-CM

## 2023-09-26 DIAGNOSIS — K21.9 GASTROESOPHAGEAL REFLUX DISEASE, UNSPECIFIED WHETHER ESOPHAGITIS PRESENT: ICD-10-CM

## 2023-09-26 DIAGNOSIS — Z12.11 SCREENING FOR MALIGNANT NEOPLASM OF COLON: ICD-10-CM

## 2023-09-26 DIAGNOSIS — I10 ESSENTIAL HYPERTENSION: Primary | ICD-10-CM

## 2023-09-26 DIAGNOSIS — E78.2 MIXED HYPERLIPIDEMIA: ICD-10-CM

## 2023-09-26 DIAGNOSIS — F33.1 MODERATE EPISODE OF RECURRENT MAJOR DEPRESSIVE DISORDER (HCC): ICD-10-CM

## 2023-09-26 DIAGNOSIS — F41.9 ANXIETY: ICD-10-CM

## 2023-09-26 PROCEDURE — 99214 OFFICE O/P EST MOD 30 MIN: CPT | Performed by: FAMILY MEDICINE

## 2023-09-26 PROCEDURE — 3074F SYST BP LT 130 MM HG: CPT | Performed by: FAMILY MEDICINE

## 2023-09-26 PROCEDURE — 3078F DIAST BP <80 MM HG: CPT | Performed by: FAMILY MEDICINE

## 2023-09-26 PROCEDURE — 3017F COLORECTAL CA SCREEN DOC REV: CPT | Performed by: FAMILY MEDICINE

## 2023-09-26 PROCEDURE — 1123F ACP DISCUSS/DSCN MKR DOCD: CPT | Performed by: FAMILY MEDICINE

## 2023-09-26 PROCEDURE — G8420 CALC BMI NORM PARAMETERS: HCPCS | Performed by: FAMILY MEDICINE

## 2023-09-26 PROCEDURE — G8427 DOCREV CUR MEDS BY ELIG CLIN: HCPCS | Performed by: FAMILY MEDICINE

## 2023-09-26 PROCEDURE — 4004F PT TOBACCO SCREEN RCVD TLK: CPT | Performed by: FAMILY MEDICINE

## 2023-09-26 RX ORDER — ERGOCALCIFEROL 1.25 MG/1
CAPSULE ORAL
Qty: 12 CAPSULE | Refills: 1 | Status: SHIPPED | OUTPATIENT
Start: 2023-09-26

## 2023-09-26 NOTE — PROGRESS NOTES
Elías ACOSTA [address]  Dept: [phone]  Dept Fax: [phone]    Visit type: Established patient    Reason for Visit: 6 Month Follow-Up         Assessment and Plan       1. Insomnia, unspecified type  -     traZODone (DESYREL) 50 MG tablet; 1-2 tablets at bedtime as need for sleep., Disp-60 tablet, R-2Normal  2. Essential hypertension  -     lisinopril (PRINIVIL;ZESTRIL) 10 MG tablet; TAKE 1 TABLET BY MOUTH ONCE DAILY, Disp-30 tablet, R-5Normal  -     Comprehensive Metabolic Panel; Future  -     CBC with Auto Differential; Future  3. Coronary artery disease involving native coronary artery of native heart without angina pectoris  -     atorvastatin (LIPITOR) 40 MG tablet; Take 1 tablet by mouth daily, Disp-30 tablet, R-5Normal  4. Moderate episode of recurrent major depressive disorder (HCC)  -     escitalopram (LEXAPRO) 20 MG tablet; TAKE 1 TABLET BY MOUTH ONCE DAILY, Disp-30 tablet, R-5Normal  5. Anxiety  -     escitalopram (LEXAPRO) 20 MG tablet; TAKE 1 TABLET BY MOUTH ONCE DAILY, Disp-30 tablet, R-5Normal  6. Mixed hyperlipidemia  -     atorvastatin (LIPITOR) 40 MG tablet; Take 1 tablet by mouth daily, Disp-30 tablet, R-5Normal  -     Lipid Panel; Future  7. Stage 3a chronic kidney disease ([facility] [address]  8. Vitamin D deficiency  -     vitamin D (ERGOCALCIFEROL) 1.25 MG (49832 UT) CAPS capsule; TAKE 1 CAPSULE BY MOUTH ONCE WEEKLY., Disp-4 capsule, R-5Normal  -     Vitamin D 25 Hydroxy; Future  9. Needs flu shot  -     Influenza, FLUAD, (age 72 y+), IM, Preservative Free, 0.5 mL  10. Screening for malignant neoplasm of prostate  -     PSA Screening; Future  11. Screening for malignant neoplasm of colon  -     America Back MD, Gastroenterology, [place]    Discussed possibility of a trial of trazodone in efforts to improve his sleep. Discussed proper use of medication but due to side effects.     With patient doing well with his current medications we will continue at this time continue to monitor and adjust as course dictates. discussed the importance of continued home monitoring of his blood pressure and discussed dietary and lifestyle modifications that may be beneficial.  Discussed signs symptoms requiring medical attention. All questions were answered and patient voiced understanding and agreement with plan as discussed. No follow-ups on file. Subjective       HPI   He has been having problems with his sleep. It has been ongoing for a while without any noticeable aggravating or alleviating factors factors. Has tried tylenol pm and zquil without much benefit. He otherwise not been take anything or do anything in efforts to improve his symptoms. Patient has arterial hypertension that is doing well with his current medication. he denies any issues with use of his  medicine. he denies any symptoms associated with abnormal blood pressures. he is attempting to avoid excess salt intake. He has chronic kidney disease and denies any recent changes or new issues. He does also have history of coronary artery disease and denies any recent chest pain or new symptoms. Patient has hyperlipidemia and reports that he is tolerating his Lipitor without any new myalgias or GI upsets. he is attempting to watch his diet and trying to stay physically active. He does have a history of anxiety and depression and has been doing well with the use of Lexapro. Denies any problems with medicine or any recent changes to his mood. Review of Systems   Constitutional:  Negative for activity change, appetite change, fatigue and fever. HENT:  Negative for congestion and rhinorrhea. Eyes:  Negative for pain and discharge. Respiratory:  Negative for cough and shortness of breath. Cardiovascular:  Negative for chest pain and palpitations. Gastrointestinal:  Negative for abdominal pain, constipation, diarrhea, nausea and vomiting.    Endocrine: Negative for artery disease)     Colon polyp     COPD (chronic obstructive pulmonary disease) (HCC)     slight    Heart attack ([facility] [address] 07/06/2017    History of blood transfusion     History of colon polyps     tubular adenoma, hyperplastic, distal rectum, duodenum    History of transfusion     Hyperlipidemia     Hypertension     Joint pain     Palpitations     Pinched nerve     right shoulder    Pinched nerve in neck     Tobacco abuse     Ulcer         Social History     Tobacco Use    Smoking status: Every Day     Packs/day: 0.50     Years: 25.00     Pack years: 12.50     Types: Cigarettes    Smokeless tobacco: Never   Substance Use Topics    Alcohol use:  Yes     Alcohol/week: 0.0 standard drinks     Comment: occ        Past Surgical History:   Procedure Laterality Date    COLONOSCOPY  12-10-08    Dr Mallory Pena    COLONOSCOPY  8-27-01    Dr Mallory Pena    COLONOSCOPY  07/2013    Dr. Elena Hoover: multiple polyps, adenomatous and hyperplastic (3 yr)    CORONARY ANGIOPLASTY WITH STENT PLACEMENT  07/06/2017    ENDOSCOPY, COLON, DIAGNOSTIC      HEMORRHOID SURGERY      JOINT REPLACEMENT Left 06/2012    knee    KNEE ARTHROSCOPY      bilateral-3 scopes on right and 1 on left    NECK SURGERY      PAROTIDECTOMY  03/31/2017    SKIN BIOPSY      lypomas on arms, side, and back    TOTAL KNEE ARTHROPLASTY      left    UPPER GASTROINTESTINAL ENDOSCOPY  8-27-01    Dr Ashley Wright ENDOSCOPY N/A 3/31/2016    Dr Rasta Vasquez (-), Chronic esophagitis       Family History   Problem Relation Age of Onset    Other Sister         pancrease removal    Cancer Father         Larynx and asbestos exposure    Esophageal Cancer Father     Heart Disease Mother         has pacemaker    Diabetes Mother     Other Mother         has had esoph stretched in the past    Colon Polyps Neg Hx     Colon Cancer Neg Hx     Liver Cancer Neg Hx     Liver Disease Neg Hx     Rectal Cancer Neg Hx     Stomach Cancer Neg Hx        Objective       /62 (Site: Left Upper Arm, Position: Sitting, Cuff Size: Medium Adult)   Pulse 62   Temp 97.2 °F (36.2 °C) (Infrared)   Resp 16   Ht 6' 1\" (1.854 m)   Wt 188 lb 9.6 oz (85.5 kg)   SpO2 98%   BMI 24.88 kg/m²   Physical Exam  Vitals and nursing note reviewed. Constitutional:       General: He is not in acute distress. Appearance: He is well-developed. He is not diaphoretic. HENT:      Head: Normocephalic and atraumatic. Right Ear: External ear normal.      Left Ear: External ear normal.      Nose: Nose normal.      Mouth/Throat:      Mouth: Mucous membranes are moist.      Pharynx: Oropharynx is clear. Eyes:      General: No scleral icterus. Right eye: No discharge. Left eye: No discharge. Conjunctiva/sclera: Conjunctivae normal.   Neck:      Thyroid: No thyromegaly. Trachea: No tracheal deviation. Cardiovascular:      Rate and Rhythm: Normal rate and regular rhythm. Heart sounds: Normal heart sounds. No murmur heard. No friction rub. No gallop. Pulmonary:      Effort: Pulmonary effort is normal. No respiratory distress. Breath sounds: Normal breath sounds. No wheezing or rales. Abdominal:      General: Bowel sounds are normal. There is no distension. Palpations: Abdomen is soft. Tenderness: There is no abdominal tenderness. Musculoskeletal:         General: No deformity (No gross deformities of upper or lower extremities). Cervical back: Normal range of motion and neck supple. Right lower leg: No edema. Left lower leg: No edema. Lymphadenopathy:      Cervical: No cervical adenopathy. Skin:     General: Skin is warm and dry. Findings: No erythema or rash. Neurological:      Mental Status: He is alert and oriented to person, place, and time. Cranial Nerves: No cranial nerve deficit. Psychiatric:         Behavior: Behavior normal.         Thought Content:  Thought content normal.         Data Reviewed and Summarized       Labs: Imaging/Testing:        Rodger Payne MD Bilobed Flap Text: The defect edges were debeveled with a #15 scalpel blade.  Given the location of the defect and the proximity to free margins a bilobe flap was deemed most appropriate.  Using a sterile surgical marker, an appropriate bilobe flap drawn around the defect.    The area thus outlined was incised deep to adipose tissue with a #15 scalpel blade.  The skin margins were undermined to an appropriate distance in all directions utilizing iris scissors.

## 2023-09-26 NOTE — PROGRESS NOTES
rectally in the morning and 1 suppository in the evening. 30 suppository 0    acetaminophen (TYLENOL) 650 MG extended release tablet Take 1 tablet by mouth every 8 hours as needed for Pain 90 tablet 3    nitroGLYCERIN (NITROSTAT) 0.4 MG SL tablet up to max of 3 total doses. If no relief after 1 dose, call 911. 25 tablet 1    vitamin D (ERGOCALCIFEROL) 1.25 MG (05532 UT) CAPS capsule TAKE 1 CAPSULE BY MOUTH ONCE WEEKLY. 12 capsule 1     No facility-administered medications prior to visit. Past Medical History:   Diagnosis Date    Arthritis     knees    Atrial fibrillation (HCC)     CAD (coronary artery disease)     Colon polyp     COPD (chronic obstructive pulmonary disease) (HCC)     slight    Heart attack ([facility]) 07/06/2017    History of blood transfusion     History of colon polyps     tubular adenoma, hyperplastic, distal rectum, duodenum    History of transfusion     Hyperlipidemia     Hypertension     Joint pain     Palpitations     Pinched nerve     right shoulder    Pinched nerve in neck     Tobacco abuse     Ulcer         Social History     Tobacco Use    Smoking status: Every Day     Packs/day: 0.50     Years: 60.00     Additional pack years: 0.00     Total pack years: 30.00     Types: Cigarettes, Cigars    Smokeless tobacco: Never   Substance Use Topics    Alcohol use:  Yes     Alcohol/week: 0.0 standard drinks of alcohol     Comment: occ        Past Surgical History:   Procedure Laterality Date    COLONOSCOPY  12-10-08    Dr Shelia Gacria    COLONOSCOPY  8-27-01    Dr Shelia Garcia    COLONOSCOPY  07/2013    Dr. Chamberlain Pack: multiple polyps, adenomatous and hyperplastic (3 yr)    CORONARY ANGIOPLASTY WITH STENT PLACEMENT  07/06/2017    ENDOSCOPY, COLON, DIAGNOSTIC      HEMORRHOID SURGERY      JOINT REPLACEMENT Left 06/2012    knee    KNEE ARTHROSCOPY      bilateral-3 scopes on right and 1 on left    NECK SURGERY      PAROTIDECTOMY  03/31/2017    SKIN BIOPSY      lypomas on arms, side, and back    TOTAL KNEE ARTHROPLASTY

## 2023-09-29 DIAGNOSIS — I10 ESSENTIAL HYPERTENSION: ICD-10-CM

## 2023-10-01 ASSESSMENT — ENCOUNTER SYMPTOMS
NAUSEA: 0
DIARRHEA: 0
EYE PAIN: 0
VOMITING: 0
EYE DISCHARGE: 0
BACK PAIN: 1
SHORTNESS OF BREATH: 0
CONSTIPATION: 0
ABDOMINAL PAIN: 0
RHINORRHEA: 0
COUGH: 0

## 2023-10-02 DIAGNOSIS — S39.012D BACK STRAIN, SUBSEQUENT ENCOUNTER: ICD-10-CM

## 2023-10-02 NOTE — TELEPHONE ENCOUNTER
Mariajose [place] called to request a refill on his medication.       Last office visit : 9/26/2023   Next office visit : Visit date not found     Requested Prescriptions     Pending Prescriptions Disp Refills    tiZANidine (Uzmaholly Farnsworth) 4 MG tablet [Pharmacy Med Name: TIZANIDINE HCL 4 MG TABLET] 90 tablet 0     Sig: TAKE (1) TABLET BY MOUTH EVERY EIGHT HOURS AS NEEDED FOR MUSCLE RELAXER

## 2023-10-06 RX ORDER — TIZANIDINE 4 MG/1
TABLET ORAL
Qty: 90 TABLET | Refills: 0 | Status: SHIPPED | OUTPATIENT
Start: 2023-10-06

## 2023-11-07 ENCOUNTER — NURSE ONLY (OUTPATIENT)
Dept: FAMILY MEDICINE CLINIC | Age: 73
End: 2023-11-07
Payer: MEDICARE

## 2023-11-07 DIAGNOSIS — Z23 NEED FOR INFLUENZA VACCINATION: Primary | ICD-10-CM

## 2023-11-07 PROCEDURE — G0008 ADMIN INFLUENZA VIRUS VAC: HCPCS | Performed by: FAMILY MEDICINE

## 2023-11-07 PROCEDURE — 90694 VACC AIIV4 NO PRSRV 0.5ML IM: CPT | Performed by: FAMILY MEDICINE

## 2023-11-07 NOTE — PROGRESS NOTES
After obtaining consent, and per orders of Dr. Georgina Govea, injection of Fluad given in Left deltoid by Lory Mccallum RN. Patient instructed to remain in clinic for 20 minutes afterwards, and to report any adverse reaction to me immediately.

## 2023-11-22 ENCOUNTER — TELEPHONE (OUTPATIENT)
Dept: CARDIOLOGY CLINIC | Age: 73
End: 2023-11-22

## 2023-11-22 NOTE — TELEPHONE ENCOUNTER
Date: 1/16/24    Cardiologist: Roshni Rice    Procedure: Colonoscopy    Surgeon: Mindi Bedolla Office Visit: 9/20/23  Reason for office visit and medical concerns addressed at this office visit: VT, cad, ckd, copd, htn, hyperlipidemia, afib,     Testing Performed and Date of Service:  3/15/21 DSE Dobutamine stress echocardiogram without clinical, electrocardiographic,   or echocardiographic evidence of myocardial ischemia. Brief runs of SVT   with maximal dobutamine infusion. RCRI = 6.6   METs 4    Current Medications: tizanidine, verapamil, protonix, lexapro, lisinopril, atorvastatin, eliquis,     Is the patient currently taking an anticoagulant? If so, what is the diagnosis the patient has been given to warrant the need for the anticoagulant?  Eliquis for afib    Additional Notes: requesting cardiac clearance and to hold eliquis

## 2023-11-22 NOTE — TELEPHONE ENCOUNTER
Okay to proceed with GI procedure.   Okay to hold Eliquis 2 days prior to procedure resume thereafter

## 2023-11-28 DIAGNOSIS — I48.0 PAF (PAROXYSMAL ATRIAL FIBRILLATION) (HCC): ICD-10-CM

## 2023-11-28 DIAGNOSIS — I48.19 PERSISTENT ATRIAL FIBRILLATION (HCC): ICD-10-CM

## 2023-11-28 RX ORDER — APIXABAN 5 MG/1
TABLET, FILM COATED ORAL
Qty: 60 TABLET | Refills: 5 | Status: SHIPPED | OUTPATIENT
Start: 2023-11-28

## 2023-11-30 DIAGNOSIS — S39.012D BACK STRAIN, SUBSEQUENT ENCOUNTER: ICD-10-CM

## 2023-11-30 RX ORDER — TIZANIDINE 4 MG/1
TABLET ORAL
Qty: 90 TABLET | Refills: 0 | Status: SHIPPED | OUTPATIENT
Start: 2023-11-30

## 2023-12-07 ENCOUNTER — TELEPHONE (OUTPATIENT)
Dept: GASTROENTEROLOGY | Age: 73
End: 2023-12-07

## 2023-12-07 NOTE — TELEPHONE ENCOUNTER
Patient: Jessica García    YOB: 1950      Clearance was received on December 7, 2023. for Endoscopy / Colonoscopy scheduled for: 1/16/14    Patient may discontinue the use of ELIQUIS for 2  days prior to the procedure.     IS Lovenox required:  NO    PATIENT NOTIFIED ON:  12/7/23      Clearance scanned into media

## 2023-12-07 NOTE — TELEPHONE ENCOUNTER
Patient: Jean-Pierre Concepcion    YOB: 1950      Clearance was received on December 7, 2023. for Endoscopy / Colonoscopy scheduled for: 1/16/24    Patient may discontinue the use of ELIQUIS for 2  days prior to the procedure.     IS Lovenox required:  NO    PATIENT NOTIFIED ON:  12/7/23      Clearance scanned into media

## 2024-01-08 DIAGNOSIS — S39.012D BACK STRAIN, SUBSEQUENT ENCOUNTER: ICD-10-CM

## 2024-01-08 RX ORDER — TIZANIDINE 4 MG/1
TABLET ORAL
Qty: 90 TABLET | Refills: 0 | Status: SHIPPED | OUTPATIENT
Start: 2024-01-08

## 2024-01-12 ENCOUNTER — TELEPHONE (OUTPATIENT)
Dept: GASTROENTEROLOGY | Age: 74
End: 2024-01-12

## 2024-01-12 NOTE — TELEPHONE ENCOUNTER
Called patient to remind them of their procedure with Dr. Yordy Collins  at [facility]  on 1/16/24 to arrive at 8:00AM    They are aware that regardless of the weather, Dr. Collins will still be here for the procedure    NO ANS / LM

## 2024-01-25 DIAGNOSIS — I10 ESSENTIAL HYPERTENSION: ICD-10-CM

## 2024-01-25 RX ORDER — LISINOPRIL 10 MG/1
10 TABLET ORAL DAILY
Qty: 30 TABLET | Refills: 0 | Status: SHIPPED | OUTPATIENT
Start: 2024-01-25

## 2024-01-30 ENCOUNTER — OFFICE VISIT (OUTPATIENT)
Dept: FAMILY MEDICINE CLINIC | Age: 74
End: 2024-01-30
Payer: MEDICARE

## 2024-01-30 VITALS
HEART RATE: 62 BPM | SYSTOLIC BLOOD PRESSURE: 114 MMHG | HEIGHT: 72 IN | WEIGHT: 188 LBS | OXYGEN SATURATION: 99 % | BODY MASS INDEX: 25.47 KG/M2 | TEMPERATURE: 97.2 F | DIASTOLIC BLOOD PRESSURE: 74 MMHG

## 2024-01-30 DIAGNOSIS — I10 PRIMARY HYPERTENSION: ICD-10-CM

## 2024-01-30 DIAGNOSIS — E78.2 MIXED HYPERLIPIDEMIA: ICD-10-CM

## 2024-01-30 DIAGNOSIS — M54.9 RIGHT-SIDED BACK PAIN, UNSPECIFIED BACK LOCATION, UNSPECIFIED CHRONICITY: Primary | ICD-10-CM

## 2024-01-30 DIAGNOSIS — S39.012D BACK STRAIN, SUBSEQUENT ENCOUNTER: ICD-10-CM

## 2024-01-30 PROCEDURE — 3074F SYST BP LT 130 MM HG: CPT | Performed by: FAMILY MEDICINE

## 2024-01-30 PROCEDURE — G8419 CALC BMI OUT NRM PARAM NOF/U: HCPCS | Performed by: FAMILY MEDICINE

## 2024-01-30 PROCEDURE — 99214 OFFICE O/P EST MOD 30 MIN: CPT | Performed by: FAMILY MEDICINE

## 2024-01-30 PROCEDURE — G8427 DOCREV CUR MEDS BY ELIG CLIN: HCPCS | Performed by: FAMILY MEDICINE

## 2024-01-30 PROCEDURE — 3078F DIAST BP <80 MM HG: CPT | Performed by: FAMILY MEDICINE

## 2024-01-30 PROCEDURE — 1123F ACP DISCUSS/DSCN MKR DOCD: CPT | Performed by: FAMILY MEDICINE

## 2024-01-30 PROCEDURE — G8484 FLU IMMUNIZE NO ADMIN: HCPCS | Performed by: FAMILY MEDICINE

## 2024-01-30 PROCEDURE — 96372 THER/PROPH/DIAG INJ SC/IM: CPT | Performed by: FAMILY MEDICINE

## 2024-01-30 PROCEDURE — 4004F PT TOBACCO SCREEN RCVD TLK: CPT | Performed by: FAMILY MEDICINE

## 2024-01-30 RX ORDER — TRIAMCINOLONE ACETONIDE 40 MG/ML
80 INJECTION, SUSPENSION INTRA-ARTICULAR; INTRAMUSCULAR ONCE
Status: COMPLETED | OUTPATIENT
Start: 2024-01-30 | End: 2024-01-30

## 2024-01-30 RX ORDER — TIZANIDINE 4 MG/1
4 TABLET ORAL EVERY 8 HOURS PRN
Qty: 90 TABLET | Refills: 0 | Status: SHIPPED | OUTPATIENT
Start: 2024-01-30

## 2024-01-30 RX ADMIN — TRIAMCINOLONE ACETONIDE 80 MG: 40 INJECTION, SUSPENSION INTRA-ARTICULAR; INTRAMUSCULAR at 12:42

## 2024-01-30 NOTE — PROGRESS NOTES
.   KARLA RIVAS [address] [place] [address]  Dept: [phone]  Dept Fax: [phone]    Visit type: Established patient    Reason for Visit: Lower Back Pain (Started 2 weeks ago and is getting worse.)         Assessment and Plan       1. Right-sided back pain, unspecified back location, unspecified chronicity  -     XR LUMBAR SPINE (2-3 VIEWS); Future  -     XR THORACIC SPINE (3 VIEWS); Future  2. Back strain, subsequent encounter  -     triamcinolone acetonide (KENALOG-40) injection 80 mg; 80 mg, IntraMUSCular, ONCE, 1 dose, On Tue 1/30/24 at 1300  -     tiZANidine (ZANAFLEX) 4 MG tablet; Take 1 tablet by mouth every 8 hours as needed (muscle spasm), Disp-90 tablet, R-0Normal  3. Primary hypertension  4. Mixed hyperlipidemia    Discussed possibility of x-rays further evaluation.  Discussed trial of steroid shot and proper use of Zanaflex were discussed proper care involved back and signs symptoms of require further medical attention.  Discussed possibly further imaging and/or specialty evaluation moving forward pending on x-ray results.  Stressed importance be compliant with his other medications and continual monitoring of his blood pressure.  Discussed dietary lifestyle modifications that may be beneficial.  All questions were answered patient voiced understanding and agreement with plan as discussed.    Return if symptoms worsen or fail to improve, for next scheduled follow up with PCP.       Subjective       HPI   Patient reports that he has been having right lower back pain to flank pain that has been ongoing for the past couple weeks. He denies any changes in his routine or any injuries that he can contribute to the symptoms occurring. He states that he has been taking muscle relaxer that help but make him sleepy. He has also taken some tylenol but otherwise has not been taking anything for his symptoms. He denies any other alleviating factors for his symptoms other than potentially

## 2024-01-30 NOTE — PROGRESS NOTES
After obtaining consent, and per orders of Dr. Trevino, injection of Kenalog given in Right upper quad. gluteus by Colleen Villarreal RN. Patient instructed to remain in clinic for 20 minutes afterwards, and to report any adverse reaction to me immediately.

## 2024-02-20 DIAGNOSIS — F33.1 MODERATE EPISODE OF RECURRENT MAJOR DEPRESSIVE DISORDER (HCC): ICD-10-CM

## 2024-02-20 DIAGNOSIS — F41.9 ANXIETY: ICD-10-CM

## 2024-02-20 RX ORDER — ESCITALOPRAM OXALATE 20 MG/1
20 TABLET ORAL DAILY
Qty: 30 TABLET | Refills: 0 | Status: SHIPPED | OUTPATIENT
Start: 2024-02-20

## 2024-02-22 ENCOUNTER — TELEPHONE (OUTPATIENT)
Dept: CARDIOLOGY CLINIC | Age: 74
End: 2024-02-22

## 2024-02-22 NOTE — TELEPHONE ENCOUNTER
Date: 3-5-24     Cardiologist: Familia     Procedure: Cataract Sx     Surgeon: Dr. Sánchez     Last Office Visit: 9/20/23  Reason for office visit and medical concerns addressed at this office visit: VT, cad, ckd, copd, htn, hyperlipidemia, afib,      Testing Performed and Date of Service:  3/15/21 DSE Dobutamine stress echocardiogram without clinical, electrocardiographic,   or echocardiographic evidence of myocardial ischemia. Brief runs of SVT   with maximal dobutamine infusion.        RCRI = 6.6   METs 4     Current Medications: tizanidine, verapamil, protonix, lexapro, lisinopril, atorvastatin, eliquis,      Is the patient currently taking an anticoagulant? If so, what is the diagnosis the patient has been given to warrant the need for the anticoagulant? Eliquis for afib     Additional Notes: requesting cardiac clearance

## 2024-03-01 DIAGNOSIS — I10 ESSENTIAL HYPERTENSION: ICD-10-CM

## 2024-03-01 DIAGNOSIS — I25.10 CORONARY ARTERY DISEASE INVOLVING NATIVE CORONARY ARTERY OF NATIVE HEART WITHOUT ANGINA PECTORIS: ICD-10-CM

## 2024-03-01 DIAGNOSIS — E78.2 MIXED HYPERLIPIDEMIA: ICD-10-CM

## 2024-03-01 DIAGNOSIS — R10.13 EPIGASTRIC PAIN: ICD-10-CM

## 2024-03-01 DIAGNOSIS — K21.9 GASTROESOPHAGEAL REFLUX DISEASE, UNSPECIFIED WHETHER ESOPHAGITIS PRESENT: ICD-10-CM

## 2024-03-04 RX ORDER — PANTOPRAZOLE SODIUM 40 MG/1
TABLET, DELAYED RELEASE ORAL
Qty: 30 TABLET | Refills: 2 | Status: SHIPPED | OUTPATIENT
Start: 2024-03-04

## 2024-03-04 RX ORDER — LISINOPRIL 10 MG/1
10 TABLET ORAL DAILY
Qty: 30 TABLET | Refills: 2 | Status: SHIPPED | OUTPATIENT
Start: 2024-03-04

## 2024-03-04 RX ORDER — ATORVASTATIN CALCIUM 40 MG/1
40 TABLET, FILM COATED ORAL DAILY
Qty: 30 TABLET | Refills: 2 | Status: SHIPPED | OUTPATIENT
Start: 2024-03-04

## 2024-03-05 SDOH — HEALTH STABILITY: PHYSICAL HEALTH: ON AVERAGE, HOW MANY MINUTES DO YOU ENGAGE IN EXERCISE AT THIS LEVEL?: 10 MIN

## 2024-03-05 SDOH — HEALTH STABILITY: PHYSICAL HEALTH: ON AVERAGE, HOW MANY DAYS PER WEEK DO YOU ENGAGE IN MODERATE TO STRENUOUS EXERCISE (LIKE A BRISK WALK)?: 2 DAYS

## 2024-03-05 ASSESSMENT — PATIENT HEALTH QUESTIONNAIRE - PHQ9
7. TROUBLE CONCENTRATING ON THINGS, SUCH AS READING THE NEWSPAPER OR WATCHING TELEVISION: NOT AT ALL
SUM OF ALL RESPONSES TO PHQ QUESTIONS 1-9: 0
SUM OF ALL RESPONSES TO PHQ9 QUESTIONS 1 & 2: 0
3. TROUBLE FALLING OR STAYING ASLEEP: NOT AT ALL
SUM OF ALL RESPONSES TO PHQ QUESTIONS 1-9: 0
4. FEELING TIRED OR HAVING LITTLE ENERGY: NOT AT ALL
SUM OF ALL RESPONSES TO PHQ QUESTIONS 1-9: 0
SUM OF ALL RESPONSES TO PHQ QUESTIONS 1-9: 0
6. FEELING BAD ABOUT YOURSELF - OR THAT YOU ARE A FAILURE OR HAVE LET YOURSELF OR YOUR FAMILY DOWN: NOT AT ALL
2. FEELING DOWN, DEPRESSED OR HOPELESS: NOT AT ALL
8. MOVING OR SPEAKING SO SLOWLY THAT OTHER PEOPLE COULD HAVE NOTICED. OR THE OPPOSITE, BEING SO FIGETY OR RESTLESS THAT YOU HAVE BEEN MOVING AROUND A LOT MORE THAN USUAL: NOT AT ALL
1. LITTLE INTEREST OR PLEASURE IN DOING THINGS: NOT AT ALL
10. IF YOU CHECKED OFF ANY PROBLEMS, HOW DIFFICULT HAVE THESE PROBLEMS MADE IT FOR YOU TO DO YOUR WORK, TAKE CARE OF THINGS AT HOME, OR GET ALONG WITH OTHER PEOPLE: NOT DIFFICULT AT ALL
5. POOR APPETITE OR OVEREATING: NOT AT ALL

## 2024-03-05 ASSESSMENT — LIFESTYLE VARIABLES
HOW OFTEN DO YOU HAVE A DRINK CONTAINING ALCOHOL: 3
HOW OFTEN DO YOU HAVE SIX OR MORE DRINKS ON ONE OCCASION: 1
HOW MANY STANDARD DRINKS CONTAINING ALCOHOL DO YOU HAVE ON A TYPICAL DAY: 1 OR 2
HOW MANY STANDARD DRINKS CONTAINING ALCOHOL DO YOU HAVE ON A TYPICAL DAY: 1
HOW OFTEN DO YOU HAVE A DRINK CONTAINING ALCOHOL: 2-4 TIMES A MONTH

## 2024-03-12 ENCOUNTER — OFFICE VISIT (OUTPATIENT)
Dept: FAMILY MEDICINE CLINIC | Age: 74
End: 2024-03-12
Payer: MEDICARE

## 2024-03-12 VITALS
BODY MASS INDEX: 24.16 KG/M2 | SYSTOLIC BLOOD PRESSURE: 132 MMHG | DIASTOLIC BLOOD PRESSURE: 72 MMHG | HEIGHT: 72 IN | OXYGEN SATURATION: 100 % | HEART RATE: 73 BPM | WEIGHT: 178.4 LBS | TEMPERATURE: 98.1 F

## 2024-03-12 DIAGNOSIS — F41.9 ANXIETY: ICD-10-CM

## 2024-03-12 DIAGNOSIS — I10 ESSENTIAL HYPERTENSION: ICD-10-CM

## 2024-03-12 DIAGNOSIS — Z00.00 MEDICARE ANNUAL WELLNESS VISIT, SUBSEQUENT: Primary | ICD-10-CM

## 2024-03-12 DIAGNOSIS — Z12.5 ENCOUNTER FOR SCREENING FOR MALIGNANT NEOPLASM OF PROSTATE: ICD-10-CM

## 2024-03-12 DIAGNOSIS — F33.1 MODERATE EPISODE OF RECURRENT MAJOR DEPRESSIVE DISORDER (HCC): ICD-10-CM

## 2024-03-12 DIAGNOSIS — Z00.00 MEDICARE ANNUAL WELLNESS VISIT, SUBSEQUENT: ICD-10-CM

## 2024-03-12 DIAGNOSIS — E55.9 VITAMIN D DEFICIENCY: ICD-10-CM

## 2024-03-12 LAB
ALBUMIN SERPL-MCNC: 3.5 G/DL (ref 3.5–5.2)
ALP SERPL-CCNC: 106 U/L (ref 40–130)
ALT SERPL-CCNC: 18 U/L (ref 5–41)
ANION GAP SERPL CALCULATED.3IONS-SCNC: 11 MMOL/L (ref 7–19)
AST SERPL-CCNC: 12 U/L (ref 5–40)
BASOPHILS # BLD: 0 K/UL (ref 0–0.2)
BASOPHILS NFR BLD: 0.6 % (ref 0–1)
BILIRUB SERPL-MCNC: 0.4 MG/DL (ref 0.2–1.2)
BUN SERPL-MCNC: 20 MG/DL (ref 8–23)
CALCIUM SERPL-MCNC: 8.7 MG/DL (ref 8.8–10.2)
CHLORIDE SERPL-SCNC: 107 MMOL/L (ref 98–111)
CHOLEST SERPL-MCNC: 123 MG/DL (ref 160–199)
CO2 SERPL-SCNC: 25 MMOL/L (ref 22–29)
CREAT SERPL-MCNC: 1.4 MG/DL (ref 0.5–1.2)
CREAT UR-MCNC: 244.2 MG/DL (ref 39–259)
EOSINOPHIL # BLD: 0.2 K/UL (ref 0–0.6)
EOSINOPHIL NFR BLD: 3.1 % (ref 0–5)
ERYTHROCYTE [DISTWIDTH] IN BLOOD BY AUTOMATED COUNT: 14.5 % (ref 11.5–14.5)
GLUCOSE SERPL-MCNC: 92 MG/DL (ref 74–109)
HCT VFR BLD AUTO: 44.8 % (ref 42–52)
HDLC SERPL-MCNC: 52 MG/DL (ref 55–121)
HGB BLD-MCNC: 14.2 G/DL (ref 14–18)
IMM GRANULOCYTES # BLD: 0 K/UL
LDLC SERPL CALC-MCNC: 60 MG/DL
LYMPHOCYTES # BLD: 1 K/UL (ref 1.1–4.5)
LYMPHOCYTES NFR BLD: 20.2 % (ref 20–40)
MCH RBC QN AUTO: 30.7 PG (ref 27–31)
MCHC RBC AUTO-ENTMCNC: 31.7 G/DL (ref 33–37)
MCV RBC AUTO: 96.8 FL (ref 80–94)
MICROALBUMIN UR-MCNC: <1.2 MG/DL (ref 0–19)
MICROALBUMIN/CREAT UR-RTO: NORMAL MG/G
MONOCYTES # BLD: 0.5 K/UL (ref 0–0.9)
MONOCYTES NFR BLD: 9.2 % (ref 0–10)
NEUTROPHILS # BLD: 3.3 K/UL (ref 1.5–7.5)
NEUTS SEG NFR BLD: 66.7 % (ref 50–65)
PLATELET # BLD AUTO: 143 K/UL (ref 130–400)
PMV BLD AUTO: 11.5 FL (ref 9.4–12.4)
POTASSIUM SERPL-SCNC: 3.8 MMOL/L (ref 3.5–5)
PROT SERPL-MCNC: 5.9 G/DL (ref 6.6–8.7)
PSA SERPL-MCNC: 1.26 NG/ML (ref 0–4)
RBC # BLD AUTO: 4.63 M/UL (ref 4.7–6.1)
SODIUM SERPL-SCNC: 143 MMOL/L (ref 136–145)
TRIGL SERPL-MCNC: 55 MG/DL (ref 0–149)
TSH SERPL DL<=0.005 MIU/L-ACNC: 1.36 UIU/ML (ref 0.27–4.2)
WBC # BLD AUTO: 4.9 K/UL (ref 4.8–10.8)

## 2024-03-12 PROCEDURE — 3078F DIAST BP <80 MM HG: CPT | Performed by: FAMILY MEDICINE

## 2024-03-12 PROCEDURE — 3075F SYST BP GE 130 - 139MM HG: CPT | Performed by: FAMILY MEDICINE

## 2024-03-12 PROCEDURE — G0439 PPPS, SUBSEQ VISIT: HCPCS | Performed by: FAMILY MEDICINE

## 2024-03-12 PROCEDURE — G8484 FLU IMMUNIZE NO ADMIN: HCPCS | Performed by: FAMILY MEDICINE

## 2024-03-12 PROCEDURE — 1123F ACP DISCUSS/DSCN MKR DOCD: CPT | Performed by: FAMILY MEDICINE

## 2024-03-12 RX ORDER — ESCITALOPRAM OXALATE 20 MG/1
20 TABLET ORAL DAILY
Qty: 30 TABLET | Refills: 5 | Status: SHIPPED | OUTPATIENT
Start: 2024-03-12

## 2024-03-12 RX ORDER — ERGOCALCIFEROL 1.25 MG/1
CAPSULE ORAL
Qty: 12 CAPSULE | Refills: 1 | Status: SHIPPED | OUTPATIENT
Start: 2024-03-12

## 2024-03-12 ASSESSMENT — PATIENT HEALTH QUESTIONNAIRE - PHQ9
4. FEELING TIRED OR HAVING LITTLE ENERGY: NOT AT ALL
3. TROUBLE FALLING OR STAYING ASLEEP: NOT AT ALL
1. LITTLE INTEREST OR PLEASURE IN DOING THINGS: NOT AT ALL
5. POOR APPETITE OR OVEREATING: NOT AT ALL
SUM OF ALL RESPONSES TO PHQ QUESTIONS 1-9: 0
SUM OF ALL RESPONSES TO PHQ QUESTIONS 1-9: 0
6. FEELING BAD ABOUT YOURSELF - OR THAT YOU ARE A FAILURE OR HAVE LET YOURSELF OR YOUR FAMILY DOWN: NOT AT ALL
8. MOVING OR SPEAKING SO SLOWLY THAT OTHER PEOPLE COULD HAVE NOTICED. OR THE OPPOSITE, BEING SO FIGETY OR RESTLESS THAT YOU HAVE BEEN MOVING AROUND A LOT MORE THAN USUAL: NOT AT ALL
7. TROUBLE CONCENTRATING ON THINGS, SUCH AS READING THE NEWSPAPER OR WATCHING TELEVISION: NOT AT ALL
SUM OF ALL RESPONSES TO PHQ QUESTIONS 1-9: 0
SUM OF ALL RESPONSES TO PHQ QUESTIONS 1-9: 0
2. FEELING DOWN, DEPRESSED OR HOPELESS: NOT AT ALL
10. IF YOU CHECKED OFF ANY PROBLEMS, HOW DIFFICULT HAVE THESE PROBLEMS MADE IT FOR YOU TO DO YOUR WORK, TAKE CARE OF THINGS AT HOME, OR GET ALONG WITH OTHER PEOPLE: NOT DIFFICULT AT ALL
SUM OF ALL RESPONSES TO PHQ9 QUESTIONS 1 & 2: 0
9. THOUGHTS THAT YOU WOULD BE BETTER OFF DEAD, OR OF HURTING YOURSELF: NOT AT ALL

## 2024-03-12 NOTE — PROGRESS NOTES
Medicare Annual Wellness Visit    Rojas Call is here for Medicare AWV    Assessment & Plan   Medicare annual wellness visit, subsequent  -     CBC with Auto Differential; Future  -     Comprehensive Metabolic Panel; Future  -     Lipid Panel; Future  -     Microalbumin / Creatinine Urine Ratio; Future  -     PSA Screening; Future  -     TSH; Future  Encounter for screening for malignant neoplasm of prostate  -     PSA Screening; Future  Moderate episode of recurrent major depressive disorder (HCC)  -     escitalopram (LEXAPRO) 20 MG tablet; Take 1 tablet by mouth daily, Disp-30 tablet, R-5Normal  Anxiety  -     escitalopram (LEXAPRO) 20 MG tablet; Take 1 tablet by mouth daily, Disp-30 tablet, R-5Normal  Vitamin D deficiency  -     vitamin D (ERGOCALCIFEROL) 1.25 MG (57607 UT) CAPS capsule; TAKE 1 CAPSULE BY MOUTH ONCE WEEKLY., Disp-12 capsule, R-1Normal  Essential hypertension  -     verapamil (CALAN SR) 180 MG extended release tablet; TAKE (1) TABLET BY MOUTH TWICE A DAY., Disp-60 tablet, R-5Normal    Recommendations for Preventive Services Due: see orders and patient instructions/AVS.  Recommended screening schedule for the next 5-10 years is provided to the patient in written form: see Patient Instructions/AVS.     Return if symptoms worsen or fail to improve, for next scheduled follow up with PCP.     Subjective       Patient's complete Health Risk Assessment and screening values have been reviewed and are found in Flowsheets. The following problems were reviewed today and where indicated follow up appointments were made and/or referrals ordered.    Positive Risk Factor Screenings with Interventions:                       Tobacco Use:  Tobacco Use: High Risk (9/26/2023)    Patient History     Smoking Tobacco Use: Every Day     Smokeless Tobacco Use: Never     Passive Exposure: Not on file     E-cigarette/Vaping       Questions Responses    E-cigarette/Vaping Use Never User    Start Date     Passive

## 2024-04-11 DIAGNOSIS — S39.012D BACK STRAIN, SUBSEQUENT ENCOUNTER: ICD-10-CM

## 2024-04-11 RX ORDER — TIZANIDINE 4 MG/1
TABLET ORAL
Qty: 90 TABLET | Refills: 0 | Status: SHIPPED | OUTPATIENT
Start: 2024-04-11

## 2024-05-14 ENCOUNTER — OFFICE VISIT (OUTPATIENT)
Dept: FAMILY MEDICINE CLINIC | Age: 74
End: 2024-05-14
Payer: MEDICARE

## 2024-05-14 VITALS
TEMPERATURE: 97.3 F | SYSTOLIC BLOOD PRESSURE: 120 MMHG | BODY MASS INDEX: 23.62 KG/M2 | DIASTOLIC BLOOD PRESSURE: 68 MMHG | HEART RATE: 55 BPM | HEIGHT: 72 IN | WEIGHT: 174.4 LBS | OXYGEN SATURATION: 100 %

## 2024-05-14 DIAGNOSIS — M54.50 LOW BACK PAIN, UNSPECIFIED BACK PAIN LATERALITY, UNSPECIFIED CHRONICITY, UNSPECIFIED WHETHER SCIATICA PRESENT: Primary | ICD-10-CM

## 2024-05-14 PROCEDURE — 4004F PT TOBACCO SCREEN RCVD TLK: CPT | Performed by: FAMILY MEDICINE

## 2024-05-14 PROCEDURE — 99213 OFFICE O/P EST LOW 20 MIN: CPT | Performed by: FAMILY MEDICINE

## 2024-05-14 PROCEDURE — G8427 DOCREV CUR MEDS BY ELIG CLIN: HCPCS | Performed by: FAMILY MEDICINE

## 2024-05-14 PROCEDURE — 3078F DIAST BP <80 MM HG: CPT | Performed by: FAMILY MEDICINE

## 2024-05-14 PROCEDURE — 1123F ACP DISCUSS/DSCN MKR DOCD: CPT | Performed by: FAMILY MEDICINE

## 2024-05-14 PROCEDURE — G8420 CALC BMI NORM PARAMETERS: HCPCS | Performed by: FAMILY MEDICINE

## 2024-05-14 PROCEDURE — 3074F SYST BP LT 130 MM HG: CPT | Performed by: FAMILY MEDICINE

## 2024-05-14 RX ORDER — TRIAMCINOLONE ACETONIDE 40 MG/ML
80 INJECTION, SUSPENSION INTRA-ARTICULAR; INTRAMUSCULAR ONCE
Status: COMPLETED | OUTPATIENT
Start: 2024-05-14 | End: 2024-05-14

## 2024-05-14 RX ADMIN — TRIAMCINOLONE ACETONIDE 80 MG: 40 INJECTION, SUSPENSION INTRA-ARTICULAR; INTRAMUSCULAR at 08:53

## 2024-05-14 ASSESSMENT — ENCOUNTER SYMPTOMS
DIARRHEA: 0
VOMITING: 0
RHINORRHEA: 0
COUGH: 0
ABDOMINAL PAIN: 0
EYE PAIN: 0
BACK PAIN: 1
SHORTNESS OF BREATH: 0
NAUSEA: 0
CONSTIPATION: 0

## 2024-05-14 NOTE — PROGRESS NOTES
After obtaining consent, and per orders of Dr. Trevino, injection of kenalog given in Right upper quad. gluteus by Colleen Villarreal RN. Patient instructed to remain in clinic for 20 minutes afterwards, and to report any adverse reaction to me immediately.    
History of transfusion     Hyperlipidemia     Hypertension     Joint pain     Palpitations     Pinched nerve     right shoulder    Pinched nerve in neck     Tobacco abuse     Ulcer         Social History     Tobacco Use    Smoking status: Every Day     Current packs/day: 0.50     Average packs/day: 0.5 packs/day for 60.0 years (30.0 ttl pk-yrs)     Types: Cigarettes, Cigars    Smokeless tobacco: Never   Substance Use Topics    Alcohol use: Yes     Alcohol/week: 0.0 standard drinks of alcohol     Comment: occ        Past Surgical History:   Procedure Laterality Date    COLONOSCOPY  12-10-08    Dr De La Torre    COLONOSCOPY  8-27-01    Dr De La Torre    COLONOSCOPY  07/2013    Dr. Strange: multiple polyps, adenomatous and hyperplastic (3 yr)    CORONARY ANGIOPLASTY WITH STENT PLACEMENT  07/06/2017    ENDOSCOPY, COLON, DIAGNOSTIC      HEMORRHOID SURGERY      JOINT REPLACEMENT Left 06/2012    knee    KNEE ARTHROSCOPY      bilateral-3 scopes on right and 1 on left    NECK SURGERY      PAROTIDECTOMY  03/31/2017    SKIN BIOPSY      lypomas on arms, side, and back    TOTAL KNEE ARTHROPLASTY      left    UPPER GASTROINTESTINAL ENDOSCOPY  8-27-01    Dr De La Torre    UPPER GASTROINTESTINAL ENDOSCOPY N/A 3/31/2016    Dr Strange-Vilma (-), Chronic esophagitis       Family History   Problem Relation Age of Onset    Other Sister         pancrease removal    Cancer Father         Larynx and asbestos exposure    Esophageal Cancer Father     Heart Disease Mother         has pacemaker    Diabetes Mother     Other Mother         has had esoph stretched in the past    Colon Polyps Neg Hx     Colon Cancer Neg Hx     Liver Cancer Neg Hx     Liver Disease Neg Hx     Rectal Cancer Neg Hx     Stomach Cancer Neg Hx        Objective       /68 (Site: Left Upper Arm, Position: Sitting, Cuff Size: Small Adult)   Pulse 55   Temp 97.3 °F (36.3 °C) (Temporal)   Ht 1.829 m (6')   Wt 79.1 kg (174 lb 6.4 oz)   SpO2 100%   BMI 23.65 kg/m²   Physical

## 2024-05-20 DIAGNOSIS — I10 ESSENTIAL HYPERTENSION: ICD-10-CM

## 2024-05-20 RX ORDER — LISINOPRIL 10 MG/1
10 TABLET ORAL DAILY
Qty: 30 TABLET | Refills: 0 | Status: SHIPPED | OUTPATIENT
Start: 2024-05-20

## 2024-06-07 ENCOUNTER — APPOINTMENT (OUTPATIENT)
Dept: GENERAL RADIOLOGY | Age: 74
End: 2024-06-07
Payer: MEDICARE

## 2024-06-07 ENCOUNTER — HOSPITAL ENCOUNTER (OUTPATIENT)
Age: 74
Setting detail: OBSERVATION
Discharge: HOME | End: 2024-06-08
Attending: INTERNAL MEDICINE | Admitting: INTERNAL MEDICINE
Payer: MEDICARE

## 2024-06-07 DIAGNOSIS — R79.89 ELEVATED TROPONIN: ICD-10-CM

## 2024-06-07 DIAGNOSIS — I48.92 ATRIAL FIBRILLATION AND FLUTTER (HCC): ICD-10-CM

## 2024-06-07 DIAGNOSIS — R53.83 FATIGUE, UNSPECIFIED TYPE: Primary | ICD-10-CM

## 2024-06-07 DIAGNOSIS — J18.9 COMMUNITY ACQUIRED PNEUMONIA, UNSPECIFIED LATERALITY: ICD-10-CM

## 2024-06-07 DIAGNOSIS — I48.91 ATRIAL FIBRILLATION AND FLUTTER (HCC): ICD-10-CM

## 2024-06-07 PROBLEM — R30.0 DYSURIA: Status: ACTIVE | Noted: 2024-06-07

## 2024-06-07 PROBLEM — U07.1 COVID-19 VIRUS INFECTION: Status: ACTIVE | Noted: 2024-06-07

## 2024-06-07 PROBLEM — D69.6 THROMBOCYTOPENIA (HCC): Status: ACTIVE | Noted: 2024-06-07

## 2024-06-07 PROBLEM — A41.9 SEPSIS (HCC): Status: ACTIVE | Noted: 2024-06-07

## 2024-06-07 LAB
25(OH)D3 SERPL-MCNC: 62.5 NG/ML
ALBUMIN SERPL-MCNC: 3.2 G/DL (ref 3.5–5.2)
ALP SERPL-CCNC: 87 U/L (ref 40–130)
ALT SERPL-CCNC: 21 U/L (ref 5–41)
ANION GAP SERPL CALCULATED.3IONS-SCNC: 12 MMOL/L (ref 7–19)
APTT PPP: 36 SEC (ref 26–36.2)
AST SERPL-CCNC: 25 U/L (ref 5–40)
B PARAP IS1001 DNA NPH QL NAA+NON-PROBE: NOT DETECTED
B PERT.PT PRMT NPH QL NAA+NON-PROBE: NOT DETECTED
BASOPHILS # BLD: 0 K/UL (ref 0–0.2)
BASOPHILS # BLD: 0 K/UL (ref 0–0.2)
BASOPHILS NFR BLD: 0 % (ref 0–1)
BASOPHILS NFR BLD: 0.2 % (ref 0–1)
BILIRUB SERPL-MCNC: 0.7 MG/DL (ref 0.2–1.2)
BUN SERPL-MCNC: 20 MG/DL (ref 8–23)
C PNEUM DNA NPH QL NAA+NON-PROBE: NOT DETECTED
CALCIUM SERPL-MCNC: 8.4 MG/DL (ref 8.8–10.2)
CHLORIDE SERPL-SCNC: 103 MMOL/L (ref 98–111)
CO2 SERPL-SCNC: 20 MMOL/L (ref 22–29)
CREAT SERPL-MCNC: 1.2 MG/DL (ref 0.5–1.2)
CRP SERPL HS-MCNC: 14.45 MG/DL (ref 0–0.5)
D DIMER PPP FEU-MCNC: 0.99 UG/ML FEU (ref 0–0.48)
EOSINOPHIL # BLD: 0 K/UL (ref 0–0.6)
EOSINOPHIL # BLD: 0 K/UL (ref 0–0.6)
EOSINOPHIL NFR BLD: 0 % (ref 0–5)
EOSINOPHIL NFR BLD: 0 % (ref 0–5)
ERYTHROCYTE [DISTWIDTH] IN BLOOD BY AUTOMATED COUNT: 15.4 % (ref 11.5–14.5)
ERYTHROCYTE [DISTWIDTH] IN BLOOD BY AUTOMATED COUNT: 15.5 % (ref 11.5–14.5)
FERRITIN SERPL-MCNC: 257.6 NG/ML (ref 30–400)
FERRITIN SERPL-MCNC: 268.7 NG/ML (ref 30–400)
FLUAV RNA NPH QL NAA+NON-PROBE: NOT DETECTED
FLUBV RNA NPH QL NAA+NON-PROBE: NOT DETECTED
GLUCOSE SERPL-MCNC: 104 MG/DL (ref 74–109)
HADV DNA NPH QL NAA+NON-PROBE: NOT DETECTED
HCOV 229E RNA NPH QL NAA+NON-PROBE: NOT DETECTED
HCOV HKU1 RNA NPH QL NAA+NON-PROBE: NOT DETECTED
HCOV NL63 RNA NPH QL NAA+NON-PROBE: NOT DETECTED
HCOV OC43 RNA NPH QL NAA+NON-PROBE: NOT DETECTED
HCT VFR BLD AUTO: 43.8 % (ref 42–52)
HCT VFR BLD AUTO: 45.6 % (ref 42–52)
HGB BLD-MCNC: 14.2 G/DL (ref 14–18)
HGB BLD-MCNC: 15 G/DL (ref 14–18)
HMPV RNA NPH QL NAA+NON-PROBE: NOT DETECTED
HPIV1 RNA NPH QL NAA+NON-PROBE: NOT DETECTED
HPIV2 RNA NPH QL NAA+NON-PROBE: NOT DETECTED
HPIV3 RNA NPH QL NAA+NON-PROBE: NOT DETECTED
HPIV4 RNA NPH QL NAA+NON-PROBE: NOT DETECTED
IMM GRANULOCYTES # BLD: 0 K/UL
IMM GRANULOCYTES # BLD: 0 K/UL
INR PPP: 1.4 (ref 0.88–1.18)
LACTATE BLDV-SCNC: 1.9 MMOL/L (ref 0.5–1.9)
LYMPHOCYTES # BLD: 0.6 K/UL (ref 1.1–4.5)
LYMPHOCYTES # BLD: 0.8 K/UL (ref 1.1–4.5)
LYMPHOCYTES NFR BLD: 10.7 % (ref 20–40)
LYMPHOCYTES NFR BLD: 13.8 % (ref 20–40)
M PNEUMO DNA NPH QL NAA+NON-PROBE: NOT DETECTED
MCH RBC QN AUTO: 30.9 PG (ref 27–31)
MCH RBC QN AUTO: 30.9 PG (ref 27–31)
MCHC RBC AUTO-ENTMCNC: 32.4 G/DL (ref 33–37)
MCHC RBC AUTO-ENTMCNC: 32.9 G/DL (ref 33–37)
MCV RBC AUTO: 93.8 FL (ref 80–94)
MCV RBC AUTO: 95.4 FL (ref 80–94)
MONOCYTES # BLD: 0.2 K/UL (ref 0–0.9)
MONOCYTES # BLD: 0.4 K/UL (ref 0–0.9)
MONOCYTES NFR BLD: 3.6 % (ref 0–10)
MONOCYTES NFR BLD: 6.4 % (ref 0–10)
NEUTROPHILS # BLD: 4.8 K/UL (ref 1.5–7.5)
NEUTROPHILS # BLD: 4.8 K/UL (ref 1.5–7.5)
NEUTS SEG NFR BLD: 82.3 % (ref 50–65)
NEUTS SEG NFR BLD: 82.4 % (ref 50–65)
PLATELET # BLD AUTO: 63 K/UL (ref 130–400)
PLATELET # BLD AUTO: 72 K/UL (ref 130–400)
PMV BLD AUTO: 12.3 FL (ref 9.4–12.4)
PMV BLD AUTO: 12.7 FL (ref 9.4–12.4)
POTASSIUM SERPL-SCNC: 4.5 MMOL/L (ref 3.5–5)
PROCALCITONIN: 0.36 NG/ML (ref 0–0.09)
PROT SERPL-MCNC: 6.1 G/DL (ref 6.6–8.7)
PROTHROMBIN TIME: 16.8 SEC (ref 12–14.6)
RBC # BLD AUTO: 4.59 M/UL (ref 4.7–6.1)
RBC # BLD AUTO: 4.86 M/UL (ref 4.7–6.1)
RSV RNA NPH QL NAA+NON-PROBE: NOT DETECTED
RV+EV RNA NPH QL NAA+NON-PROBE: NOT DETECTED
SARS-COV-2 RNA NPH QL NAA+NON-PROBE: DETECTED
SODIUM SERPL-SCNC: 135 MMOL/L (ref 136–145)
TROPONIN, HIGH SENSITIVITY: 34 NG/L (ref 0–22)
TROPONIN, HIGH SENSITIVITY: 37 NG/L (ref 0–22)
TROPONIN, HIGH SENSITIVITY: 46 NG/L (ref 0–22)
WBC # BLD AUTO: 5.8 K/UL (ref 4.8–10.8)
WBC # BLD AUTO: 5.8 K/UL (ref 4.8–10.8)

## 2024-06-07 PROCEDURE — 36415 COLL VENOUS BLD VENIPUNCTURE: CPT

## 2024-06-07 PROCEDURE — 0202U NFCT DS 22 TRGT SARS-COV-2: CPT

## 2024-06-07 PROCEDURE — 96361 HYDRATE IV INFUSION ADD-ON: CPT

## 2024-06-07 PROCEDURE — 2500000003 HC RX 250 WO HCPCS: Performed by: PHYSICIAN ASSISTANT

## 2024-06-07 PROCEDURE — 85025 COMPLETE CBC W/AUTO DIFF WBC: CPT

## 2024-06-07 PROCEDURE — 84145 PROCALCITONIN (PCT): CPT

## 2024-06-07 PROCEDURE — G0378 HOSPITAL OBSERVATION PER HR: HCPCS

## 2024-06-07 PROCEDURE — 2580000003 HC RX 258: Performed by: PHYSICIAN ASSISTANT

## 2024-06-07 PROCEDURE — 94150 VITAL CAPACITY TEST: CPT

## 2024-06-07 PROCEDURE — 85730 THROMBOPLASTIN TIME PARTIAL: CPT

## 2024-06-07 PROCEDURE — 83605 ASSAY OF LACTIC ACID: CPT

## 2024-06-07 PROCEDURE — 86140 C-REACTIVE PROTEIN: CPT

## 2024-06-07 PROCEDURE — 99285 EMERGENCY DEPT VISIT HI MDM: CPT

## 2024-06-07 PROCEDURE — 2580000003 HC RX 258: Performed by: NURSE PRACTITIONER

## 2024-06-07 PROCEDURE — 96374 THER/PROPH/DIAG INJ IV PUSH: CPT

## 2024-06-07 PROCEDURE — 94760 N-INVAS EAR/PLS OXIMETRY 1: CPT

## 2024-06-07 PROCEDURE — 2700000000 HC OXYGEN THERAPY PER DAY

## 2024-06-07 PROCEDURE — 82306 VITAMIN D 25 HYDROXY: CPT

## 2024-06-07 PROCEDURE — 6370000000 HC RX 637 (ALT 250 FOR IP): Performed by: NURSE PRACTITIONER

## 2024-06-07 PROCEDURE — 85610 PROTHROMBIN TIME: CPT

## 2024-06-07 PROCEDURE — 96365 THER/PROPH/DIAG IV INF INIT: CPT

## 2024-06-07 PROCEDURE — 6360000002 HC RX W HCPCS: Performed by: NURSE PRACTITIONER

## 2024-06-07 PROCEDURE — 82728 ASSAY OF FERRITIN: CPT

## 2024-06-07 PROCEDURE — 71045 X-RAY EXAM CHEST 1 VIEW: CPT

## 2024-06-07 PROCEDURE — 84484 ASSAY OF TROPONIN QUANT: CPT

## 2024-06-07 PROCEDURE — 87040 BLOOD CULTURE FOR BACTERIA: CPT

## 2024-06-07 PROCEDURE — 85379 FIBRIN DEGRADATION QUANT: CPT

## 2024-06-07 PROCEDURE — 80053 COMPREHEN METABOLIC PANEL: CPT

## 2024-06-07 PROCEDURE — 96375 TX/PRO/DX INJ NEW DRUG ADDON: CPT

## 2024-06-07 RX ORDER — ESCITALOPRAM OXALATE 5 MG/1
20 TABLET ORAL DAILY
Status: DISCONTINUED | OUTPATIENT
Start: 2024-06-07 | End: 2024-06-08 | Stop reason: HOSPADM

## 2024-06-07 RX ORDER — SODIUM CHLORIDE 9 MG/ML
INJECTION, SOLUTION INTRAVENOUS CONTINUOUS
Status: DISCONTINUED | OUTPATIENT
Start: 2024-06-07 | End: 2024-06-08 | Stop reason: HOSPADM

## 2024-06-07 RX ORDER — SODIUM CHLORIDE 0.9 % (FLUSH) 0.9 %
5-40 SYRINGE (ML) INJECTION PRN
Status: DISCONTINUED | OUTPATIENT
Start: 2024-06-07 | End: 2024-06-08 | Stop reason: HOSPADM

## 2024-06-07 RX ORDER — ALBUTEROL SULFATE 90 UG/1
2 AEROSOL, METERED RESPIRATORY (INHALATION) EVERY 4 HOURS PRN
Status: DISCONTINUED | OUTPATIENT
Start: 2024-06-07 | End: 2024-06-08 | Stop reason: HOSPADM

## 2024-06-07 RX ORDER — NITROGLYCERIN 0.4 MG/1
0.4 TABLET SUBLINGUAL EVERY 5 MIN PRN
Status: DISCONTINUED | OUTPATIENT
Start: 2024-06-07 | End: 2024-06-08 | Stop reason: HOSPADM

## 2024-06-07 RX ORDER — SODIUM CHLORIDE 9 MG/ML
INJECTION, SOLUTION INTRAVENOUS PRN
Status: DISCONTINUED | OUTPATIENT
Start: 2024-06-07 | End: 2024-06-08 | Stop reason: HOSPADM

## 2024-06-07 RX ORDER — ACETAMINOPHEN 325 MG/1
650 TABLET ORAL EVERY 6 HOURS PRN
Status: DISCONTINUED | OUTPATIENT
Start: 2024-06-07 | End: 2024-06-08 | Stop reason: HOSPADM

## 2024-06-07 RX ORDER — DEXAMETHASONE SODIUM PHOSPHATE 10 MG/ML
6 INJECTION, SOLUTION INTRAMUSCULAR; INTRAVENOUS EVERY 24 HOURS
Status: DISCONTINUED | OUTPATIENT
Start: 2024-06-07 | End: 2024-06-08 | Stop reason: HOSPADM

## 2024-06-07 RX ORDER — ONDANSETRON 4 MG/1
4 TABLET, ORALLY DISINTEGRATING ORAL EVERY 8 HOURS PRN
Status: DISCONTINUED | OUTPATIENT
Start: 2024-06-07 | End: 2024-06-08 | Stop reason: HOSPADM

## 2024-06-07 RX ORDER — 0.9 % SODIUM CHLORIDE 0.9 %
30 INTRAVENOUS SOLUTION INTRAVENOUS ONCE
Status: COMPLETED | OUTPATIENT
Start: 2024-06-07 | End: 2024-06-07

## 2024-06-07 RX ORDER — LISINOPRIL 5 MG/1
10 TABLET ORAL DAILY
Status: DISCONTINUED | OUTPATIENT
Start: 2024-06-07 | End: 2024-06-08 | Stop reason: HOSPADM

## 2024-06-07 RX ORDER — SODIUM CHLORIDE 0.9 % (FLUSH) 0.9 %
5-40 SYRINGE (ML) INJECTION EVERY 12 HOURS SCHEDULED
Status: DISCONTINUED | OUTPATIENT
Start: 2024-06-07 | End: 2024-06-08 | Stop reason: HOSPADM

## 2024-06-07 RX ORDER — ATORVASTATIN CALCIUM 40 MG/1
40 TABLET, FILM COATED ORAL DAILY
Status: DISCONTINUED | OUTPATIENT
Start: 2024-06-07 | End: 2024-06-08 | Stop reason: HOSPADM

## 2024-06-07 RX ORDER — POLYETHYLENE GLYCOL 3350 17 G/17G
17 POWDER, FOR SOLUTION ORAL DAILY PRN
Status: DISCONTINUED | OUTPATIENT
Start: 2024-06-07 | End: 2024-06-08 | Stop reason: HOSPADM

## 2024-06-07 RX ORDER — TIZANIDINE 4 MG/1
4 TABLET ORAL EVERY 8 HOURS PRN
Status: DISCONTINUED | OUTPATIENT
Start: 2024-06-07 | End: 2024-06-08 | Stop reason: HOSPADM

## 2024-06-07 RX ORDER — DILTIAZEM HYDROCHLORIDE 5 MG/ML
10 INJECTION INTRAVENOUS ONCE
Status: COMPLETED | OUTPATIENT
Start: 2024-06-07 | End: 2024-06-07

## 2024-06-07 RX ORDER — GUAIFENESIN 600 MG/1
600 TABLET, EXTENDED RELEASE ORAL 2 TIMES DAILY
Status: DISCONTINUED | OUTPATIENT
Start: 2024-06-07 | End: 2024-06-08 | Stop reason: HOSPADM

## 2024-06-07 RX ORDER — ONDANSETRON 2 MG/ML
4 INJECTION INTRAMUSCULAR; INTRAVENOUS EVERY 6 HOURS PRN
Status: DISCONTINUED | OUTPATIENT
Start: 2024-06-07 | End: 2024-06-08 | Stop reason: HOSPADM

## 2024-06-07 RX ORDER — PANTOPRAZOLE SODIUM 40 MG/1
40 TABLET, DELAYED RELEASE ORAL
Status: DISCONTINUED | OUTPATIENT
Start: 2024-06-08 | End: 2024-06-08 | Stop reason: HOSPADM

## 2024-06-07 RX ADMIN — WATER 1000 MG: 1 INJECTION INTRAMUSCULAR; INTRAVENOUS; SUBCUTANEOUS at 16:53

## 2024-06-07 RX ADMIN — SODIUM CHLORIDE, PRESERVATIVE FREE 10 ML: 5 INJECTION INTRAVENOUS at 20:15

## 2024-06-07 RX ADMIN — DEXAMETHASONE SODIUM PHOSPHATE 6 MG: 10 INJECTION, SOLUTION INTRAMUSCULAR; INTRAVENOUS at 16:53

## 2024-06-07 RX ADMIN — LISINOPRIL 10 MG: 5 TABLET ORAL at 20:15

## 2024-06-07 RX ADMIN — SODIUM CHLORIDE 2328 ML: 9 INJECTION, SOLUTION INTRAVENOUS at 12:28

## 2024-06-07 RX ADMIN — VERAPAMIL HYDROCHLORIDE 180 MG: 180 TABLET, FILM COATED, EXTENDED RELEASE ORAL at 20:15

## 2024-06-07 RX ADMIN — AZITHROMYCIN MONOHYDRATE 500 MG: 500 INJECTION, POWDER, LYOPHILIZED, FOR SOLUTION INTRAVENOUS at 20:18

## 2024-06-07 RX ADMIN — SODIUM CHLORIDE: 9 INJECTION, SOLUTION INTRAVENOUS at 20:16

## 2024-06-07 RX ADMIN — APIXABAN 5 MG: 5 TABLET, FILM COATED ORAL at 20:15

## 2024-06-07 RX ADMIN — DILTIAZEM HYDROCHLORIDE 10 MG: 5 INJECTION, SOLUTION INTRAVENOUS at 13:24

## 2024-06-07 RX ADMIN — GUAIFENESIN 600 MG: 600 TABLET ORAL at 20:15

## 2024-06-07 ASSESSMENT — ENCOUNTER SYMPTOMS
COLOR CHANGE: 0
COUGH: 1
EYE ITCHING: 0
SHORTNESS OF BREATH: 0
APNEA: 0
BACK PAIN: 0
EYE DISCHARGE: 0
PHOTOPHOBIA: 0

## 2024-06-07 NOTE — ED NOTES
Patient agreeable to stay after APRN spoke to patient regarding plan of care and risks of leaving AMA.

## 2024-06-07 NOTE — PROGRESS NOTES
Pt now requiring 3L supplemental oxygen per nasal prongs having initially noted to have spo2 on room air of 91% earlier during evaluation. Will add decadron 6mg iv daily x10 days, continuous pulse oximetry, incentive spirometry q2hrs cullen peter, follow crp and albuterol mdi q4hrs prn.

## 2024-06-07 NOTE — H&P
[facility]      Hospitalist - History & Physical      PCP: Juwan Trevino MD    Date of Admission: 6/7/2024    Date of Service: 6/7/2024    Chief Complaint:  Fatigue    History Of Present Illness:   The patient is a 74 y.o. male who presented to [facility] ED for evaluation of fatigue and generalized weakness. Pt has history of copd, cad, atrial fibrillation and hypertension.    Pt reports recent fever of 102.4 associated with generalized weakness and fatigue over past week. He has had problems with urgency, frequency and occasional urinary incontinence. He denies past similar problems. He has had no abdominal pain or constipation. He denies new or worsening cough to me as well as problems with shortness of breath. He has had decreased po intake of foods and fluids. He relates that he is has had covid vaccinations and booster. Pt reports recently returning home from a trip taken to [place].    In ED, resp pcr test positive for SARS-CoV-2, sodium 135, potassium 4.5, creatinine 1.2, bun 20, glucose 104, troponin 46, lactic acid 1.9, cxr-Hazy densities in the lungs concerning for pneumonitis/pneumonia. INR 1.40, wbc 5.8, hgb 15, platelets 72k. Pt is admitted inpatient to hospitalist.    Past Medical History:        Diagnosis Date    Arthritis     knees    Atrial fibrillation (HCC)     CAD (coronary artery disease)     Colon polyp     COPD (chronic obstructive pulmonary disease) (HCC)     slight    Heart attack (HCC) 07/06/2017    History of blood transfusion     History of colon polyps     tubular adenoma, hyperplastic, distal rectum, duodenum    History of transfusion     Hyperlipidemia     Hypertension     Joint pain     Palpitations     Pinched nerve     right shoulder    Pinched nerve in neck     Tobacco abuse     Ulcer        Past Surgical History:        Procedure Laterality Date    COLONOSCOPY  12-10-08    Dr De La Torre    COLONOSCOPY  8-27-01    Dr De La Torre    COLONOSCOPY  07/2013    Dr. Strange: multiple polyps,

## 2024-06-07 NOTE — ED NOTES
Patient asking when his bed will be ready. This RN stated the bed was assigned, but did not have a timeline when patient would be departing ED. Patient stated he wished to go home rather than stay. PA notified and at bedside. Patient stated once again after PA left room that he did not wish to stay.

## 2024-06-07 NOTE — ED PROVIDER NOTES
[facility] EMERGENCY DEPT  eMERGENCYdEPARTMENT eNCOUnter      Pt Name: Rojas Call  MRN: [id number]  Birthdate 1950  Date of evaluation: 6/7/2024  Provider:JO ANN Ledezma    CHIEF COMPLAINT       Chief Complaint   Patient presents with    Fatigue     Pt notes weakness and fatigue since returning from [place] Monday, fever 102 last night and urinary incontinence          HISTORY OF PRESENT ILLNESS  (Location/Symptom, Timing/Onset, Context/Setting, Quality, Duration, Modifying Factors, Severity.)   Rojas Call is a 74 y.o. male who presents to the emergency department with complaints of body aches chills fatigue just returned from [place] on Monday he has had intermittent urinary incontinence for a week he tells me it is not so much in incontinence as it is he will randomly suddenly feel a strong urge to void and if he does not get to the restroom quickly he can have an accident on himself.  But he also tells me that he has episodes where he can willingly go before they go on a trip just to make sure this does not happen.  He denies any hematuria he has a productive cough baseline he feels is worse he has a COPD history.  He is [facility]  Fever at home took Tylenol prior to arrival there are some tachypnea and tachycardia concern for SIRS plus possible source of infection.  Wife is asymptomatic.    HPI    Nursing Notes were reviewed and I agree.    REVIEW OF SYSTEMS    (2-9 systems for level 4, 10 or more for level 5)     Review of Systems   Constitutional:  Positive for chills, fatigue and fever. Negative for activity change and appetite change.   HENT:  Negative for congestion and dental problem.    Eyes:  Negative for photophobia, discharge and itching.   Respiratory:  Positive for cough. Negative for apnea and shortness of breath.    Cardiovascular:  Negative for chest pain.   Genitourinary:  Positive for frequency.   Musculoskeletal:  Negative for arthralgias, back pain,  for the following components:    Protime 16.8 (*)     INR 1.40 (*)     All other components within normal limits    Narrative:     CORRECTING ORDER ERROR - INCORRECT COMBINATION OF ORDERS. CANCELLED [facility]   06/07/2024  12:41 CHAD   TROPONIN - Abnormal; Notable for the following components:    Troponin, High Sensitivity 46 (*)     All other components within normal limits   D-DIMER, QUANTITATIVE - Abnormal; Notable for the following components:    D-Dimer, Quant 0.99 (*)     All other components within normal limits   CULTURE, BLOOD 1   CULTURE, BLOOD 2   APTT    Narrative:     CORRECTING ORDER ERROR - INCORRECT COMBINATION OF ORDERS. CANCELLED [facility]   06/07/2024  12:41 CHAD   LACTIC ACID   URINALYSIS WITH REFLEX TO CULTURE   BLOOD GAS, ARTERIAL       All other labs were within normal range or notreturned as of this dictation.    RE-ASSESSMENT          EMERGENCY DEPARTMENT COURSE and DIFFERENTIAL DIAGNOSIS/MDM:   Vitals:    Vitals:    06/07/24 1630 06/07/24 1645 06/07/24 1700 06/07/24 1715   BP: 111/79  108/73    Pulse: 100 (!) 117 (!) 109 (!) 114   Resp: 25 25 22 24   Temp:       TempSrc:       SpO2: 92% 92% (!) 89% 92%   Weight:       Height:         Is this patient to be included in the SEP-1 core measure? Yes SEP-1 CORE MEASURE DATA      Sepsis Criteria   Severe Sepsis Criteria   Septic Shock Criteria       Must meet 2:    []Temp >100.9 F (38.3 C) or < 96.8 F (36 C)  [x]HR > 90  [x]RR > 20  []WBC > 12 or < 4 or 10% bands    AND:    [x] Infection Confirmed or Suspected.     Must meet 1:    []Lactate > 2       or   []Signs of Organ Dysfunction:    - SBP < 90 or MAP < 65  -Creatinine > 2 or increased from baseline  -Urine Output < 0.5 ml/kg/hr  -Bilirubin > 2  -INR > 1.5 (not anticoagulated)  -Platelets < 100,000  -Acute Respiratory Failure as evidenced by new need for NIPPV or mechanical ventilation   Must meet 1:    []Lactate > 4        or   []SBP < 90 or MAP < 65 for at least two readings in the first hour after fluid

## 2024-06-07 NOTE — ED NOTES
KEVYN Fields notified of patient wanting to leave AMA. APRN stated he would be down to talk to patient shortly. Autumn Akhtar RN talked to patient in regards to staying. Patient still stating he wants to go home.

## 2024-06-07 NOTE — ED NOTES
ED TO INPATIENT SBAR HANDOFF    Patient Name: Rojas Call   : 1950  74 y.o.   Family/Caregiver Present: Yes (Wife)  Code Status Order: Prior    C-SSRS: Risk of Suicide: No Risk  Sitter No  Restraints: No      Situation  Chief Complaint:   Chief Complaint   Patient presents with    Fatigue     Pt notes weakness and fatigue since returning from [place] Monday, fever 102 last night and urinary incontinence      Patient Diagnosis: COVID-19 virus infection [U07.1]     Brief Description of Patient's Condition: Rojas Call is a 74 year-old male who presents to the emergency department with complaints of body aches, chills, and fatigue. Patient just returned from [place] on Monday. He has had intermittent urinary incontinence for a week, he tells me it is not so much incontinence as it is he will randomly suddenly feel a strong urge to void, and if he does not get to the restroom quickly he can have an accident on himself. But he also tells me that he has episodes where he can willingly go before they go on a trip just to make sure this does not happen. He denies any hematuria. He has a productive cough baseline. He feels is worse, he has a COPD history. He is on room air baseline, but was put on 3L because his O2 dropped. Cardiac history on Eliquis. Fever at home took Tylenol prior to arrival there are some tachypnea and tachycardia concern for SIRS plus possible source of infection. Wife is asymptomatic. COVID+  Mental Status: A&O X4  Arrived from: Home    Imaging:   XR CHEST PORTABLE   Final Result       Hazy densities in the lungs concerning for pneumonitis/pneumonia.               ______________________________________    Electronically signed by: ELIZABETH FISHER D.O.   Date:     2024   Time:    12:39         COVID-19 Results:   Internal Administration   First Dose COVID-19, PFIZER PURPLE top, DILUTE for use, (age 12 y+), 30mcg/0.3mL  01/15/2021   Second Dose COVID-19, PFIZER PURPLE top, DILUTE for  YULIYA (Dottie)        cefTRIAXone (ROCEPHIN) 1,000 mg in sterile water 10 mL IV syringe Admin Date  06/07/2024 Action  Given Dose  1,000 mg Rate   Route  IntraVENous Administered By  Gina Elizondo RN        dexAMETHasone (PF) (DECADRON) injection 6 mg Admin Date  06/07/2024 Action  Given Dose  6 mg Rate   Route  IntraVENous Administered By  Gina Elizondo RN        dilTIAZem injection 10 mg Admin Date  06/07/2024 Action  Given Dose  10 mg Rate   Route  IntraVENous Administered By  Gina Elizondo RN            History:   Past Medical History:   Diagnosis Date    Arthritis     knees    Atrial fibrillation (HCC)     CAD (coronary artery disease)     Colon polyp     COPD (chronic obstructive pulmonary disease) (HCC)     slight    Heart attack (HCC) 07/06/2017    History of blood transfusion     History of colon polyps     tubular adenoma, hyperplastic, distal rectum, duodenum    History of transfusion     Hyperlipidemia     Hypertension     Joint pain     Palpitations     Pinched nerve     right shoulder    Pinched nerve in neck     Tobacco abuse     Ulcer        Assessment  Vitals: Level of Consciousness: Alert (0)   Vitals:    06/07/24 1630 06/07/24 1645 06/07/24 1700 06/07/24 1715   BP: 111/79  108/73    Pulse: 100 (!) 117 (!) 109 (!) 114   Resp: 25 25 22 24   Temp:       TempSrc:       SpO2: 92% 92% (!) 89% 92%   Weight:       Height:         Predictive Model Details          45 (Caution)  Factor Value    Calculated 6/7/2024 18:27 26% Age 74 years old    Deterioration Index Model 24% Supplemental oxygen Nasal cannula     23% Respiratory rate 24     12% Pulse 114     6% Potassium 4.5 mmol/L     4% Pulse oximetry 92 %     3% Sodium abnormal (135 mmol/L)     1% Platelet count abnormal (72 K/uL)     1% Systolic 108     0% WBC count 5.8 K/uL     0% Hematocrit 45.6 %     0% Temperature 98.4 °F (36.9 °C)       NPO? No  O2 Flow Rate: O2 Device: Nasal cannula O2 Flow Rate (L/min): 3 L/min  Cardiac Rhythm:   NIH

## 2024-06-07 NOTE — ED NOTES
No improvement on SpO2 saturation with 2L NC. PA aware. Patient placed on 3L NC. Patient staying at 88%. RT called per PA verbal order. RT stated watch patient and if O2 drops to lower than 88% turn up O2 until O2 is within range. Patient has hx of COPD and normally wears no oxygen.

## 2024-06-08 VITALS
OXYGEN SATURATION: 96 % | DIASTOLIC BLOOD PRESSURE: 85 MMHG | SYSTOLIC BLOOD PRESSURE: 118 MMHG | RESPIRATION RATE: 20 BRPM | BODY MASS INDEX: 23.84 KG/M2 | HEART RATE: 108 BPM | WEIGHT: 176 LBS | HEIGHT: 72 IN | TEMPERATURE: 97.3 F

## 2024-06-08 LAB
ALBUMIN SERPL-MCNC: 2.9 G/DL (ref 3.5–5.2)
ALP SERPL-CCNC: 122 U/L (ref 40–130)
ALT SERPL-CCNC: 50 U/L (ref 5–41)
ANION GAP SERPL CALCULATED.3IONS-SCNC: 12 MMOL/L (ref 7–19)
AST SERPL-CCNC: 56 U/L (ref 5–40)
BACTERIA BLD CULT ORG #2: NORMAL
BACTERIA BLD CULT: NORMAL
BILIRUB SERPL-MCNC: 0.6 MG/DL (ref 0.2–1.2)
BUN SERPL-MCNC: 29 MG/DL (ref 8–23)
CALCIUM SERPL-MCNC: 8 MG/DL (ref 8.8–10.2)
CHLORIDE SERPL-SCNC: 108 MMOL/L (ref 98–111)
CO2 SERPL-SCNC: 20 MMOL/L (ref 22–29)
CREAT SERPL-MCNC: 1.2 MG/DL (ref 0.5–1.2)
GLUCOSE SERPL-MCNC: 112 MG/DL (ref 74–109)
POTASSIUM SERPL-SCNC: 4.2 MMOL/L (ref 3.5–5)
PROT SERPL-MCNC: 5.5 G/DL (ref 6.6–8.7)
SODIUM SERPL-SCNC: 140 MMOL/L (ref 136–145)

## 2024-06-08 PROCEDURE — 96361 HYDRATE IV INFUSION ADD-ON: CPT

## 2024-06-08 PROCEDURE — 94761 N-INVAS EAR/PLS OXIMETRY MLT: CPT

## 2024-06-08 PROCEDURE — 6370000000 HC RX 637 (ALT 250 FOR IP): Performed by: NURSE PRACTITIONER

## 2024-06-08 PROCEDURE — 94618 PULMONARY STRESS TESTING: CPT

## 2024-06-08 PROCEDURE — 80053 COMPREHEN METABOLIC PANEL: CPT

## 2024-06-08 PROCEDURE — G0378 HOSPITAL OBSERVATION PER HR: HCPCS

## 2024-06-08 PROCEDURE — 36415 COLL VENOUS BLD VENIPUNCTURE: CPT

## 2024-06-08 RX ORDER — GUAIFENESIN 600 MG/1
600 TABLET, EXTENDED RELEASE ORAL EVERY 8 HOURS PRN
Qty: 21 TABLET | Refills: 0 | Status: SHIPPED | OUTPATIENT
Start: 2024-06-08 | End: 2024-06-15

## 2024-06-08 RX ORDER — AZITHROMYCIN 500 MG/1
500 TABLET, FILM COATED ORAL DAILY
Qty: 1 PACKET | Refills: 0 | Status: SHIPPED | OUTPATIENT
Start: 2024-06-08 | End: 2024-06-11

## 2024-06-08 RX ORDER — ALBUTEROL SULFATE 90 UG/1
2 AEROSOL, METERED RESPIRATORY (INHALATION) EVERY 4 HOURS PRN
Qty: 1 G | Refills: 0 | Status: SHIPPED | OUTPATIENT
Start: 2024-06-08 | End: 2024-06-15

## 2024-06-08 RX ORDER — DEXAMETHASONE 0.5 MG/1
4 TABLET ORAL
Qty: 24 TABLET | Refills: 0 | Status: SHIPPED | OUTPATIENT
Start: 2024-06-08 | End: 2024-06-11

## 2024-06-08 RX ADMIN — LISINOPRIL 10 MG: 5 TABLET ORAL at 08:14

## 2024-06-08 RX ADMIN — BARICITINIB 4 MG: 2 TABLET, FILM COATED ORAL at 10:34

## 2024-06-08 RX ADMIN — APIXABAN 5 MG: 5 TABLET, FILM COATED ORAL at 08:14

## 2024-06-08 RX ADMIN — ESCITALOPRAM 20 MG: 5 TABLET, FILM COATED ORAL at 08:14

## 2024-06-08 RX ADMIN — VERAPAMIL HYDROCHLORIDE 180 MG: 180 TABLET, FILM COATED, EXTENDED RELEASE ORAL at 08:14

## 2024-06-08 RX ADMIN — GUAIFENESIN 600 MG: 600 TABLET ORAL at 08:14

## 2024-06-08 RX ADMIN — ATORVASTATIN CALCIUM 40 MG: 40 TABLET, FILM COATED ORAL at 08:14

## 2024-06-08 RX ADMIN — PANTOPRAZOLE SODIUM 40 MG: 40 TABLET, DELAYED RELEASE ORAL at 08:14

## 2024-06-08 NOTE — DISCHARGE SUMMARY
Discharge Summary    NAME: Rojas Call  :  1950  MRN:  [id number]    Admit date:  2024  Discharge date:  2024    Admitting Physician:  Ole Giles MD    Advance Directive: Full Code    Consults: none    Primary Care Physician:  Juwan Trevino MD    Discharge Diagnoses:  Principal Problem:    COVID-19 virus infection  Active Problems:    COPD (chronic obstructive pulmonary disease) (HCC)    Essential hypertension    Paroxysmal atrial fibrillation (HCC)    Dysuria    Sepsis (HCC)    Thrombocytopenia (HCC)  Resolved Problems:    * No resolved hospital problems. *      Significant Diagnostic Studies:   XR CHEST PORTABLE    Result Date: 2024  EXAM: CHEST RADIOGRAPH (1 VIEW)  TECHNIQUE: Frontal Chest Radiograph.  HISTORY: Fever  COMPARISON: None.  FINDINGS:  Lines, Tubes, Devices:  None  Lungs and Pleura:  Hazy densities in the left mid and lower lung and in the right lower lung centrally. No large pleural effusion or pneumothorax.  Cardiac silhouette: Normal.  Bones: No acute abnormality.        Hazy densities in the lungs concerning for pneumonitis/pneumonia.    ______________________________________ Electronically signed by: ELIZABETH FISHER D.O. Date:     2024 Time:    12:39       Pertinent Labs:   CBC:   Recent Labs     24  1220 24   WBC 5.8 5.8   HGB 15.0 14.2   PLT 72* 63*     BMP:    Recent Labs     24  1220 24  0922   * 140   K 4.5 4.2    108   CO2 20* 20*   BUN 20 29*   CREATININE 1.2 1.2   GLUCOSE 104 112*     INR:   Recent Labs     24  122   INR 1.40*     Lipids: No results for input(s): \"CHOL\", \"HDL\" in the last 72 hours.    Invalid input(s): \"LDLCALCU\"  ABGs:No results for input(s): \"PHART\", \"TQY1WRB\", \"PO2ART\", \"SSB6OZR\", \"BEART\", \"HGBAE\", \"P7MLMHTY\", \"CARBOXHGBART\", \"02THERAPY\" in the last 72 hours.  HgBA1c:  No results for input(s): \"LABA1C\" in the last 72 hours.    Procedures:   Hospital Course: Patient is a 74-year-old  tablet  Commonly known as: Zithromax  Take 1 tablet by mouth daily for 3 days     dexAMETHasone 0.5 MG tablet  Commonly known as: DECADRON  Take 8 tablets by mouth daily (with breakfast) for 3 days     guaiFENesin 600 MG extended release tablet  Commonly known as: MUCINEX  Take 1 tablet by mouth every 8 hours as needed for Congestion            CONTINUE taking these medications      acetaminophen 650 MG extended release tablet  Commonly known as: TYLENOL  Take 1 tablet by mouth every 8 hours as needed for Pain     atorvastatin 40 MG tablet  Commonly known as: LIPITOR  TAKE 1 TABLET BY MOUTH ONCE DAILY     Eliquis 5 MG Tabs tablet  Generic drug: apixaban  TAKE (1) TABLET BY MOUTH TWICE A DAY.     escitalopram 20 MG tablet  Commonly known as: LEXAPRO  Take 1 tablet by mouth daily     hydrocortisone 25 MG suppository  Commonly known as: ANUSOL-HC  Place 1 suppository rectally in the morning and 1 suppository in the evening.     lisinopril 10 MG tablet  Commonly known as: PRINIVIL;ZESTRIL  TAKE 1 TABLET BY MOUTH ONCE DAILY     nitroGLYCERIN 0.4 MG SL tablet  Commonly known as: NITROSTAT  up to max of 3 total doses. If no relief after 1 dose, call 911.     pantoprazole 40 MG tablet  Commonly known as: PROTONIX  TAKE 1 TABLET BY MOUTH DAILY IN THE MORNING BEFORE BREAKFAST     tiZANidine 4 MG tablet  Commonly known as: ZANAFLEX  TAKE (1) TABLET BY MOUTH EVERY EIGHT HOURS AS NEEDED FOR MUSCLE RELAXER     verapamil 180 MG extended release tablet  Commonly known as: CALAN SR  TAKE (1) TABLET BY MOUTH TWICE A DAY.     vitamin D 1.25 MG (50996 UT) Caps capsule  Commonly known as: ERGOCALCIFEROL  TAKE 1 CAPSULE BY MOUTH ONCE WEEKLY.               Where to Get Your Medications        These medications were sent to [facility]/pharmacy #[id number] - [place], [place] - [address] - P [phone] - F [phone]  [address] [facility] [address]      Phone: [phone]   albuterol sulfate  (90 Base) MCG/ACT inhaler  azithromycin 500  MG tablet  dexAMETHasone 0.5 MG tablet  guaiFENesin 600 MG extended release tablet         Discharge Instructions:   Follow up with Juwan Trevino MD in 3 to 7 days days.  Take medications as directed.  Resume activity as tolerated.    Diet: ADULT DIET; Regular; Low Fat/Low Chol/High Fiber/2 gm Na     Disposition: Patient is medically stable and will be discharged home.    Time spent on discharge 40.    Signed:  KEVYN Meeks - CNP  6/8/2024 2:21 PM

## 2024-06-08 NOTE — PROGRESS NOTES
06/08/24 1134   Resting (Room Air)   SpO2 91   During Walk (Room Air)   SpO2 93   Walk/Assistance Device Ambulation   After Walk   SpO2 94   Does the Patient Qualify for Home O2 No

## 2024-06-08 NOTE — PROGRESS NOTES
Pharmacy Consult      Rojas Call is a 74 y.o. male for whom pharmacy has been consulted to dose baricitinib.    Patient Active Problem List   Diagnosis    History of adenomatous polyp of colon    COPD (chronic obstructive pulmonary disease) (HCC)    History of gastric ulcer    Atypical chest pain    Premature ventricular contractions    Premature atrial complexes    Chest pain    Palpitations    Referral of patient    Essential hypertension    NSTEMI (non-ST elevated myocardial infarction) (HCC)    Ventricular tachycardia (HCC)    SVT (supraventricular tachycardia) (HCC)    CAD (coronary artery disease)    Smoker    Mixed hyperlipidemia    History of coronary artery stent placement    CKD (chronic kidney disease) stage 3, GFR 30-59 ml/min (HCC)    Mild episode of recurrent major depressive disorder (HCC)    Paroxysmal atrial fibrillation (HCC)    COVID-19 virus infection    Dysuria    Sepsis (HCC)    Thrombocytopenia (HCC)       Allergies:  Tape [adhesive tape]     Ht/Wt:   Ht Readings from Last 1 Encounters:   06/07/24 1.829 m (6')        Wt Readings from Last 1 Encounters:   06/07/24 79.8 kg (176 lb)         Does the patient meet all of the criteria for receiving baricitinib (see below)?Yes.  Has a daily CMP (or LFTs) and CBC with auto differential been ordered? Yes.  If either are not ordered, the pharmacist should enter CMP and CBC with auto differential daily (per pharmacy practice) under the physician authorizing baricitinib.    Recent Labs     06/07/24  1220 06/07/24 1957   LABGLOM 63  --    NEUTROABS 4.8 4.8   ALT 21  --    AST 25  --            Assessment/Plan:    Start patient on baricitinib 4 mg daily for 14 days of total treatment or until hospital discharge, whichever is first. Pharmacy will continue to follow GFR, ALC, ANC and aminotransferases.    Thank you for the consult.     Electronically signed by Heather Verdugo RPH on 6/8/2024 at 9:40 AM

## 2024-06-10 ENCOUNTER — CARE COORDINATION (OUTPATIENT)
Dept: CASE MANAGEMENT | Age: 74
End: 2024-06-10

## 2024-06-10 NOTE — CARE COORDINATION
Care Transitions Note  Initial Call - Call within 2 business days of discharge: Yes    Attempted to reach patient for transitions of care follow up. Unable to reach patient.    Outreach Attempts:   Multiple attempts to contact patient at phone numbers on file.     Patient: Rojas Call    Patient : 1950   MRN: [id number]    Reason for Admission: Covid  Discharge Date: 24  RURS: No data recorded  Last Discharge Facility       Date Complaint Diagnosis Description Type Department Provider    24 Fatigue Fatigue, unspecified type ... ED to Hosp-Admission (Discharged) (ADMITTED) [facility] Ole Giles MD            Was this an external facility discharge? No    Follow Up Appointment:   Patient has hospital follow up appointment scheduled within 7 days of discharge.    Future Appointments         Provider Specialty Dept Phone    2024 4:15 PM Juwan Trevino MD Family Medicine [phone]    7/3/2024 3:00 PM Nathaniel Barbosa MD Cardiology [phone]    2024 1:00 PM Juwan Trevino MD Family Medicine [phone]            Plan for follow-up on next business day.      Roya Edmonds RN

## 2024-06-11 ENCOUNTER — CARE COORDINATION (OUTPATIENT)
Dept: CASE MANAGEMENT | Age: 74
End: 2024-06-11

## 2024-06-11 NOTE — CARE COORDINATION
Care Transitions Note  Initial Call - Call within 2 business days of discharge: Yes    Attempted to reach patient for transitions of care follow up. Unable to reach patient.    Outreach Attempts:   Multiple attempts to contact patient at phone numbers on file.     Patient: Rojas Call    Patient : 1950   MRN: [id number]    Reason for Admission: COVID-19, COPD, et al  Discharge Date: 24  RURS: No data recorded  Last Discharge Facility       Date Complaint Diagnosis Description Type Department Provider    24 Fatigue Fatigue, unspecified type ... ED to Hosp-Admission (Discharged) (ADMITTED) [facility] Ole Giles MD            Was this an external facility discharge? No    Follow Up Appointment:   Patient has hospital follow up appointment scheduled within 7 days of discharge.    Future Appointments         Provider Specialty Dept Phone    2024 4:00 PM Juwan Trevino MD Family Medicine [phone]    7/3/2024 3:00 PM Nathaniel Barbosa MD Cardiology [phone]    2024 1:00 PM Juwan Trevino MD Family Medicine [phone]            No further follow-up call indicated     Nerissa Kapadia RN

## 2024-06-12 ENCOUNTER — TELEMEDICINE (OUTPATIENT)
Dept: FAMILY MEDICINE CLINIC | Age: 74
End: 2024-06-12
Payer: MEDICARE

## 2024-06-12 DIAGNOSIS — I25.10 CORONARY ARTERY DISEASE INVOLVING NATIVE CORONARY ARTERY OF NATIVE HEART WITHOUT ANGINA PECTORIS: ICD-10-CM

## 2024-06-12 DIAGNOSIS — I10 ESSENTIAL HYPERTENSION: ICD-10-CM

## 2024-06-12 DIAGNOSIS — E78.2 MIXED HYPERLIPIDEMIA: ICD-10-CM

## 2024-06-12 LAB
BACTERIA BLD CULT ORG #2: NORMAL
BACTERIA BLD CULT: NORMAL

## 2024-06-12 PROCEDURE — 99214 OFFICE O/P EST MOD 30 MIN: CPT | Performed by: FAMILY MEDICINE

## 2024-06-12 PROCEDURE — G8428 CUR MEDS NOT DOCUMENT: HCPCS | Performed by: FAMILY MEDICINE

## 2024-06-12 PROCEDURE — 1123F ACP DISCUSS/DSCN MKR DOCD: CPT | Performed by: FAMILY MEDICINE

## 2024-06-12 PROCEDURE — 4004F PT TOBACCO SCREEN RCVD TLK: CPT | Performed by: FAMILY MEDICINE

## 2024-06-12 PROCEDURE — G8420 CALC BMI NORM PARAMETERS: HCPCS | Performed by: FAMILY MEDICINE

## 2024-06-12 RX ORDER — LISINOPRIL 10 MG/1
10 TABLET ORAL DAILY
Qty: 90 TABLET | Refills: 1 | Status: SHIPPED | OUTPATIENT
Start: 2024-06-12

## 2024-06-12 RX ORDER — ATORVASTATIN CALCIUM 40 MG/1
40 TABLET, FILM COATED ORAL DAILY
Qty: 90 TABLET | Refills: 1 | Status: SHIPPED | OUTPATIENT
Start: 2024-06-12

## 2024-06-12 ASSESSMENT — ENCOUNTER SYMPTOMS
EYE PAIN: 0
ABDOMINAL PAIN: 0
COUGH: 0
BACK PAIN: 1
NAUSEA: 0
VOMITING: 0
CONSTIPATION: 0
SHORTNESS OF BREATH: 0
RHINORRHEA: 0
EYE DISCHARGE: 0
DIARRHEA: 0

## 2024-06-12 NOTE — PROGRESS NOTES
Rojas Call, was evaluated through a synchronous (real-time) audio-video encounter. The patient (or guardian if applicable) is aware that this is a billable service, which includes applicable co-pays. This Virtual Visit was conducted with patient's (and/or legal guardian's) consent. Patient identification was verified, and a caregiver was present when appropriate.   The patient was located at Home: [address] [place] [address] [place] [address]  Provider was located at Facility (Appt Dept): [address] [place] [phone]  Confirm you are appropriately licensed, registered, or certified to deliver care in the state where the patient is located as indicated above. If you are not or unsure, please re-schedule the visit: Yes, I confirm.     Rojas Call (:  1950) is a Established patient, presenting virtually for evaluation of the following:    Assessment & Plan   Below is the assessment and plan developed based on review of pertinent history, physical exam, labs, studies, and medications.  1. Coronary artery disease involving native coronary artery of native heart without angina pectoris  -     atorvastatin (LIPITOR) 40 MG tablet; Take 1 tablet by mouth daily, Disp-90 tablet, R-1Normal  2. Mixed hyperlipidemia  -     atorvastatin (LIPITOR) 40 MG tablet; Take 1 tablet by mouth daily, Disp-90 tablet, R-1Normal  3. Essential hypertension  -     lisinopril (PRINIVIL;ZESTRIL) 10 MG tablet; Take 1 tablet by mouth daily, Disp-90 tablet, R-1Normal    With patient appearing to be stable with his current medications for his comorbid medical conditions we will continue at this time continue to monitor and adjust as course dictates.  discussed the importance of continued home monitoring of his blood pressure and discussed dietary and lifestyle modifications that may be beneficial.  Discussed signs symptoms requiring medical attention.  All questions were answered and patient voiced understanding and agreement

## 2024-06-13 DIAGNOSIS — I48.19 PERSISTENT ATRIAL FIBRILLATION (HCC): ICD-10-CM

## 2024-06-13 DIAGNOSIS — I48.0 PAF (PAROXYSMAL ATRIAL FIBRILLATION) (HCC): ICD-10-CM

## 2024-06-13 RX ORDER — APIXABAN 5 MG/1
TABLET, FILM COATED ORAL
Qty: 60 TABLET | Refills: 5 | Status: SHIPPED | OUTPATIENT
Start: 2024-06-13

## 2024-06-14 DIAGNOSIS — R10.13 EPIGASTRIC PAIN: ICD-10-CM

## 2024-06-14 DIAGNOSIS — K21.9 GASTROESOPHAGEAL REFLUX DISEASE, UNSPECIFIED WHETHER ESOPHAGITIS PRESENT: ICD-10-CM

## 2024-06-14 RX ORDER — PANTOPRAZOLE SODIUM 40 MG/1
TABLET, DELAYED RELEASE ORAL
Qty: 30 TABLET | Refills: 0 | Status: SHIPPED | OUTPATIENT
Start: 2024-06-14

## 2024-06-17 LAB
EKG P AXIS: NORMAL DEGREES
EKG P-R INTERVAL: NORMAL MS
EKG Q-T INTERVAL: 286 MS
EKG QRS DURATION: 70 MS
EKG QTC CALCULATION (BAZETT): 440 MS
EKG T AXIS: 60 DEGREES

## 2024-06-20 ENCOUNTER — APPOINTMENT (OUTPATIENT)
Dept: GENERAL RADIOLOGY | Age: 74
DRG: 280 | End: 2024-06-20
Payer: MEDICARE

## 2024-06-20 ENCOUNTER — HOSPITAL ENCOUNTER (INPATIENT)
Age: 74
DRG: 280 | End: 2024-06-20
Attending: EMERGENCY MEDICINE | Admitting: INTERNAL MEDICINE
Payer: MEDICARE

## 2024-06-20 DIAGNOSIS — I48.91 ATRIAL FIBRILLATION (HCC): ICD-10-CM

## 2024-06-20 DIAGNOSIS — I48.91 ATRIAL FIBRILLATION WITH RVR (HCC): ICD-10-CM

## 2024-06-20 DIAGNOSIS — I21.4 NSTEMI (NON-ST ELEVATED MYOCARDIAL INFARCTION) (HCC): ICD-10-CM

## 2024-06-20 DIAGNOSIS — I48.19 ATRIAL FIBRILLATION, PERSISTENT (HCC): ICD-10-CM

## 2024-06-20 DIAGNOSIS — I48.91 ATRIAL FIBRILLATION, UNSPECIFIED TYPE (HCC): Primary | ICD-10-CM

## 2024-06-20 PROBLEM — E87.20 METABOLIC ACIDOSIS WITH NORMAL ANION GAP AND BICARBONATE LOSSES: Status: ACTIVE | Noted: 2024-06-20

## 2024-06-20 LAB
ALBUMIN SERPL-MCNC: 3.4 G/DL (ref 3.5–5.2)
ALP SERPL-CCNC: 140 U/L (ref 40–130)
ALT SERPL-CCNC: 37 U/L (ref 5–41)
ANION GAP SERPL CALCULATED.3IONS-SCNC: 17 MMOL/L (ref 7–19)
AST SERPL-CCNC: 24 U/L (ref 5–40)
BASOPHILS # BLD: 0 K/UL (ref 0–0.2)
BASOPHILS NFR BLD: 0.3 % (ref 0–1)
BILIRUB SERPL-MCNC: 0.9 MG/DL (ref 0.2–1.2)
BUN SERPL-MCNC: 23 MG/DL (ref 8–23)
CALCIUM SERPL-MCNC: 8.9 MG/DL (ref 8.8–10.2)
CHLORIDE SERPL-SCNC: 105 MMOL/L (ref 98–111)
CO2 SERPL-SCNC: 19 MMOL/L (ref 22–29)
CREAT SERPL-MCNC: 1.2 MG/DL (ref 0.5–1.2)
EOSINOPHIL # BLD: 0.1 K/UL (ref 0–0.6)
EOSINOPHIL NFR BLD: 0.8 % (ref 0–5)
ERYTHROCYTE [DISTWIDTH] IN BLOOD BY AUTOMATED COUNT: 15.3 % (ref 11.5–14.5)
GLUCOSE SERPL-MCNC: 85 MG/DL (ref 74–109)
HCT VFR BLD AUTO: 52.1 % (ref 42–52)
HGB BLD-MCNC: 16.6 G/DL (ref 14–18)
IMM GRANULOCYTES # BLD: 0 K/UL
LYMPHOCYTES # BLD: 1.1 K/UL (ref 1.1–4.5)
LYMPHOCYTES NFR BLD: 16.1 % (ref 20–40)
MAGNESIUM SERPL-MCNC: 1.9 MG/DL (ref 1.6–2.4)
MCH RBC QN AUTO: 30.3 PG (ref 27–31)
MCHC RBC AUTO-ENTMCNC: 31.9 G/DL (ref 33–37)
MCV RBC AUTO: 95.1 FL (ref 80–94)
MONOCYTES # BLD: 0.7 K/UL (ref 0–0.9)
MONOCYTES NFR BLD: 9.3 % (ref 0–10)
NEUTROPHILS # BLD: 5.2 K/UL (ref 1.5–7.5)
NEUTS SEG NFR BLD: 73.1 % (ref 50–65)
PLATELET # BLD AUTO: 209 K/UL (ref 130–400)
PMV BLD AUTO: 10.9 FL (ref 9.4–12.4)
POTASSIUM SERPL-SCNC: 4.4 MMOL/L (ref 3.5–5)
PROT SERPL-MCNC: 7 G/DL (ref 6.6–8.7)
RBC # BLD AUTO: 5.48 M/UL (ref 4.7–6.1)
SODIUM SERPL-SCNC: 141 MMOL/L (ref 136–145)
TROPONIN, HIGH SENSITIVITY: 44 NG/L (ref 0–22)
TROPONIN, HIGH SENSITIVITY: 46 NG/L (ref 0–22)
TROPONIN, HIGH SENSITIVITY: 54 NG/L (ref 0–22)
WBC # BLD AUTO: 7.1 K/UL (ref 4.8–10.8)

## 2024-06-20 PROCEDURE — 80053 COMPREHEN METABOLIC PANEL: CPT

## 2024-06-20 PROCEDURE — 96366 THER/PROPH/DIAG IV INF ADDON: CPT

## 2024-06-20 PROCEDURE — 2140000000 HC CCU INTERMEDIATE R&B

## 2024-06-20 PROCEDURE — 96376 TX/PRO/DX INJ SAME DRUG ADON: CPT

## 2024-06-20 PROCEDURE — 93005 ELECTROCARDIOGRAM TRACING: CPT | Performed by: EMERGENCY MEDICINE

## 2024-06-20 PROCEDURE — 99222 1ST HOSP IP/OBS MODERATE 55: CPT | Performed by: INTERNAL MEDICINE

## 2024-06-20 PROCEDURE — 36415 COLL VENOUS BLD VENIPUNCTURE: CPT

## 2024-06-20 PROCEDURE — 2580000003 HC RX 258

## 2024-06-20 PROCEDURE — 6370000000 HC RX 637 (ALT 250 FOR IP): Performed by: INTERNAL MEDICINE

## 2024-06-20 PROCEDURE — 2500000003 HC RX 250 WO HCPCS: Performed by: INTERNAL MEDICINE

## 2024-06-20 PROCEDURE — 6360000002 HC RX W HCPCS: Performed by: INTERNAL MEDICINE

## 2024-06-20 PROCEDURE — 2500000003 HC RX 250 WO HCPCS: Performed by: EMERGENCY MEDICINE

## 2024-06-20 PROCEDURE — 96365 THER/PROPH/DIAG IV INF INIT: CPT

## 2024-06-20 PROCEDURE — 99285 EMERGENCY DEPT VISIT HI MDM: CPT

## 2024-06-20 PROCEDURE — 2580000003 HC RX 258: Performed by: INTERNAL MEDICINE

## 2024-06-20 PROCEDURE — 6370000000 HC RX 637 (ALT 250 FOR IP)

## 2024-06-20 PROCEDURE — 71045 X-RAY EXAM CHEST 1 VIEW: CPT

## 2024-06-20 PROCEDURE — 84484 ASSAY OF TROPONIN QUANT: CPT

## 2024-06-20 PROCEDURE — 85025 COMPLETE CBC W/AUTO DIFF WBC: CPT

## 2024-06-20 PROCEDURE — 2500000003 HC RX 250 WO HCPCS

## 2024-06-20 PROCEDURE — 83735 ASSAY OF MAGNESIUM: CPT

## 2024-06-20 PROCEDURE — 2580000003 HC RX 258: Performed by: EMERGENCY MEDICINE

## 2024-06-20 RX ORDER — SODIUM CHLORIDE 0.9 % (FLUSH) 0.9 %
5-40 SYRINGE (ML) INJECTION PRN
Status: DISCONTINUED | OUTPATIENT
Start: 2024-06-20 | End: 2024-06-26 | Stop reason: HOSPADM

## 2024-06-20 RX ORDER — ONDANSETRON 2 MG/ML
4 INJECTION INTRAMUSCULAR; INTRAVENOUS EVERY 6 HOURS PRN
Status: DISCONTINUED | OUTPATIENT
Start: 2024-06-20 | End: 2024-06-26 | Stop reason: HOSPADM

## 2024-06-20 RX ORDER — SODIUM CHLORIDE 9 MG/ML
INJECTION, SOLUTION INTRAVENOUS PRN
Status: DISCONTINUED | OUTPATIENT
Start: 2024-06-20 | End: 2024-06-26 | Stop reason: HOSPADM

## 2024-06-20 RX ORDER — POLYETHYLENE GLYCOL 3350 17 G/17G
17 POWDER, FOR SOLUTION ORAL DAILY PRN
Status: DISCONTINUED | OUTPATIENT
Start: 2024-06-20 | End: 2024-06-26 | Stop reason: HOSPADM

## 2024-06-20 RX ORDER — 0.9 % SODIUM CHLORIDE 0.9 %
1000 INTRAVENOUS SOLUTION INTRAVENOUS ONCE
Status: COMPLETED | OUTPATIENT
Start: 2024-06-20 | End: 2024-06-20

## 2024-06-20 RX ORDER — ENOXAPARIN SODIUM 100 MG/ML
1 INJECTION SUBCUTANEOUS ONCE
Status: COMPLETED | OUTPATIENT
Start: 2024-06-20 | End: 2024-06-20

## 2024-06-20 RX ORDER — ACETAMINOPHEN 325 MG/1
650 TABLET ORAL EVERY 6 HOURS PRN
Status: DISCONTINUED | OUTPATIENT
Start: 2024-06-20 | End: 2024-06-24 | Stop reason: SDUPTHER

## 2024-06-20 RX ORDER — ATORVASTATIN CALCIUM 40 MG/1
40 TABLET, FILM COATED ORAL DAILY
Status: DISCONTINUED | OUTPATIENT
Start: 2024-06-20 | End: 2024-06-26 | Stop reason: HOSPADM

## 2024-06-20 RX ORDER — PANTOPRAZOLE SODIUM 40 MG/1
40 TABLET, DELAYED RELEASE ORAL
Status: DISCONTINUED | OUTPATIENT
Start: 2024-06-21 | End: 2024-06-26 | Stop reason: HOSPADM

## 2024-06-20 RX ORDER — DILTIAZEM HYDROCHLORIDE 5 MG/ML
15 INJECTION INTRAVENOUS ONCE
Status: COMPLETED | OUTPATIENT
Start: 2024-06-20 | End: 2024-06-20

## 2024-06-20 RX ORDER — ONDANSETRON 4 MG/1
4 TABLET, ORALLY DISINTEGRATING ORAL EVERY 8 HOURS PRN
Status: DISCONTINUED | OUTPATIENT
Start: 2024-06-20 | End: 2024-06-26 | Stop reason: HOSPADM

## 2024-06-20 RX ORDER — DILTIAZEM HYDROCHLORIDE 5 MG/ML
10 INJECTION INTRAVENOUS ONCE
Status: COMPLETED | OUTPATIENT
Start: 2024-06-20 | End: 2024-06-20

## 2024-06-20 RX ORDER — ACETAMINOPHEN 650 MG/1
650 SUPPOSITORY RECTAL EVERY 6 HOURS PRN
Status: DISCONTINUED | OUTPATIENT
Start: 2024-06-20 | End: 2024-06-24 | Stop reason: SDUPTHER

## 2024-06-20 RX ORDER — SODIUM CHLORIDE 0.9 % (FLUSH) 0.9 %
5-40 SYRINGE (ML) INJECTION EVERY 12 HOURS SCHEDULED
Status: DISCONTINUED | OUTPATIENT
Start: 2024-06-20 | End: 2024-06-26 | Stop reason: HOSPADM

## 2024-06-20 RX ORDER — SODIUM BICARBONATE 650 MG/1
650 TABLET ORAL 2 TIMES DAILY
Status: DISCONTINUED | OUTPATIENT
Start: 2024-06-20 | End: 2024-06-23

## 2024-06-20 RX ORDER — ESCITALOPRAM OXALATE 10 MG/1
20 TABLET ORAL DAILY
Status: DISCONTINUED | OUTPATIENT
Start: 2024-06-20 | End: 2024-06-26 | Stop reason: HOSPADM

## 2024-06-20 RX ADMIN — SODIUM BICARBONATE 650 MG: 650 TABLET ORAL at 19:40

## 2024-06-20 RX ADMIN — ENOXAPARIN SODIUM 80 MG: 100 INJECTION SUBCUTANEOUS at 16:42

## 2024-06-20 RX ADMIN — Medication 1 MG/MIN: at 17:04

## 2024-06-20 RX ADMIN — DILTIAZEM HYDROCHLORIDE 10 MG: 5 INJECTION, SOLUTION INTRAVENOUS at 11:19

## 2024-06-20 RX ADMIN — DILTIAZEM HYDROCHLORIDE 15 MG: 5 INJECTION, SOLUTION INTRAVENOUS at 12:21

## 2024-06-20 RX ADMIN — ATORVASTATIN CALCIUM 40 MG: 40 TABLET, FILM COATED ORAL at 16:53

## 2024-06-20 RX ADMIN — Medication 0.5 MG/MIN: at 23:04

## 2024-06-20 RX ADMIN — APIXABAN 5 MG: 5 TABLET, FILM COATED ORAL at 19:41

## 2024-06-20 RX ADMIN — DILTIAZEM HYDROCHLORIDE 15 MG/HR: 5 INJECTION, SOLUTION INTRAVENOUS at 20:20

## 2024-06-20 RX ADMIN — PIPERACILLIN AND TAZOBACTAM 3375 MG: 3; .375 INJECTION, POWDER, LYOPHILIZED, FOR SOLUTION INTRAVENOUS at 23:10

## 2024-06-20 RX ADMIN — VERAPAMIL HYDROCHLORIDE 180 MG: 180 TABLET, FILM COATED, EXTENDED RELEASE ORAL at 19:40

## 2024-06-20 RX ADMIN — PIPERACILLIN AND TAZOBACTAM 4500 MG: 4; .5 INJECTION, POWDER, LYOPHILIZED, FOR SOLUTION INTRAVENOUS at 16:42

## 2024-06-20 RX ADMIN — AMIODARONE HYDROCHLORIDE 150 MG: 50 INJECTION, SOLUTION INTRAVENOUS at 16:53

## 2024-06-20 RX ADMIN — ESCITALOPRAM OXALATE 20 MG: 10 TABLET ORAL at 16:53

## 2024-06-20 RX ADMIN — SODIUM CHLORIDE 1000 ML: 9 INJECTION, SOLUTION INTRAVENOUS at 11:23

## 2024-06-20 RX ADMIN — DILTIAZEM HYDROCHLORIDE 5 MG/HR: 5 INJECTION, SOLUTION INTRAVENOUS at 12:42

## 2024-06-20 ASSESSMENT — ENCOUNTER SYMPTOMS
VOMITING: 0
SHORTNESS OF BREATH: 0
NAUSEA: 0
CHEST TIGHTNESS: 1
CHEST TIGHTNESS: 0
BACK PAIN: 0
COUGH: 0
BLOOD IN STOOL: 0
ABDOMINAL PAIN: 0
DIARRHEA: 0
WHEEZING: 0
ABDOMINAL DISTENTION: 0

## 2024-06-20 ASSESSMENT — PAIN SCALES - GENERAL: PAINLEVEL_OUTOF10: 0

## 2024-06-20 ASSESSMENT — PAIN - FUNCTIONAL ASSESSMENT: PAIN_FUNCTIONAL_ASSESSMENT: NONE - DENIES PAIN

## 2024-06-20 NOTE — PROGRESS NOTES
4 Eyes Skin Assessment     NAME:  Rojas Call  YOB: 1950  MEDICAL RECORD NUMBER:  [id number]    The patient is being assessed for  Admission    I agree that at least one RN has performed a thorough Head to Toe Skin Assessment on the patient. ALL assessment sites listed below have been assessed.      Areas assessed by both nurses:    Head, Face, Ears, Shoulders, Back, Chest, Arms, Elbows, Hands, Sacrum. Buttock, Coccyx, Ischium, and Legs. Feet and Heels        Does the Patient have a Wound? No noted wound(s)       Bo Prevention initiated by RN: No  Wound Care Orders initiated by RN: No    Pressure Injury (Stage 3,4, Unstageable, DTI, NWPT, and Complex wounds) if present, place Wound referral order by RN under : No    New Ostomies, if present place, Ostomy referral order under : No     Nurse 1 eSignature: Electronically signed by Kath Harley RN on 6/20/24 at 5:42 PM CDT    **SHARE this note so that the co-signing nurse can place an eSignature**    Nurse 2 eSignature: Electronically signed by Kyle Braswell RN on 6/20/24 at 5:43 PM CDT

## 2024-06-20 NOTE — H&P
[facility]      Hospitalist - History & Physical      PCP: Juwan Trevino MD    Date of Admission: 6/20/2024    Date of Service: 6/20/2024    Chief Complaint:  Extremity weakness    History Of Present Illness:   The patient is a 74 y.o. male with Atrial Fibrillation, CAD, COPD, HTN, hyperlipidemia, current smoker comes to ED complaining of extremity weakness in his arms.  Patient reports that he woke up this morning around 9:30 AM with weakness in bilateral arms and pain around his elbows. He endorses that these were the same symptoms when he had his heart attack so he came to the ER. Denies fever, chills, nausea, vomiting, abdominal pain, shortness of breath, and chest pain.      In ED: EKG- A-fib RVR with no ischemic changes; CO2 19, troponin 44.  Will admit inpatient to hospitalist with cardiology consult.       Past Medical History:        Diagnosis Date    Arthritis     knees    Atrial fibrillation (HCC)     CAD (coronary artery disease)     Colon polyp     COPD (chronic obstructive pulmonary disease) (HCC)     slight    Heart attack (HCC) 07/06/2017    History of blood transfusion     History of colon polyps     tubular adenoma, hyperplastic, distal rectum, duodenum    History of transfusion     Hyperlipidemia     Hypertension     Joint pain     Palpitations     Pinched nerve     right shoulder    Pinched nerve in neck     Tobacco abuse     Ulcer        Past Surgical History:        Procedure Laterality Date    COLONOSCOPY  12-10-08    Dr De La Torre    COLONOSCOPY  8-27-01    Dr De La Torre    COLONOSCOPY  07/2013    Dr. Strange: multiple polyps, adenomatous and hyperplastic (3 yr)    CORONARY ANGIOPLASTY WITH STENT PLACEMENT  07/06/2017    ENDOSCOPY, COLON, DIAGNOSTIC      HEMORRHOID SURGERY      JOINT REPLACEMENT Left 06/2012    knee    KNEE ARTHROSCOPY      bilateral-3 scopes on right and 1 on left    NECK SURGERY      PAROTIDECTOMY  03/31/2017    SKIN BIOPSY      lypomas on arms, side, and back     home.  Tobacco:   reports that he has been smoking cigarettes and cigars. He has a 30.0 pack-year smoking history. He has never used smokeless tobacco.  Alcohol:   reports current alcohol use.  Illicit Drugs: denies    Family History:      Problem Relation Age of Onset    Other Sister         pancrease removal    Cancer Father         Larynx and asbestos exposure    Esophageal Cancer Father     Heart Disease Mother         has pacemaker    Diabetes Mother     Other Mother         has had esoph stretched in the past    Colon Polyps Neg Hx     Colon Cancer Neg Hx     Liver Cancer Neg Hx     Liver Disease Neg Hx     Rectal Cancer Neg Hx     Stomach Cancer Neg Hx        Review of Systems:   Review of Systems   Constitutional:  Negative for activity change and fatigue.   HENT:  Negative for congestion.    Eyes:  Negative for visual disturbance.   Respiratory:  Negative for cough, chest tightness and shortness of breath.    Cardiovascular:  Negative for chest pain and palpitations.   Gastrointestinal:  Negative for abdominal pain, nausea and vomiting.   Musculoskeletal:  Positive for arthralgias (bilateral elbows). Negative for joint swelling.   Neurological:  Positive for weakness (bilateral arms). Negative for dizziness and light-headedness.   Psychiatric/Behavioral: Negative.          Physical Examination:  BP (!) 127/92   Pulse (!) 139   Temp 98.1 °F (36.7 °C)   Resp 14   Ht 1.829 m (6')   Wt 79.8 kg (176 lb)   SpO2 95%   BMI 23.87 kg/m²   Physical Exam  Constitutional:       Appearance: Normal appearance.   HENT:      Head: Normocephalic and atraumatic.   Eyes:      Pupils: Pupils are equal, round, and reactive to light.   Cardiovascular:      Rate and Rhythm: Tachycardia present. Rhythm regularly irregular.      Pulses: Normal pulses.      Heart sounds: Normal heart sounds.   Pulmonary:      Effort: Pulmonary effort is normal.      Breath sounds: Normal breath sounds. No wheezing or rhonchi.   Abdominal:

## 2024-06-20 NOTE — ED NOTES
Sergei FRANKLIN notified and aware that pt's diltiazem is currently at 12.5 mg/hr and HR continues to maintain in the 120s-130s without any significant change since starting dilt gtt. Per MD, she has notified cardiology and no new orders for medications at this time.

## 2024-06-20 NOTE — ED PROVIDER NOTES
[facility] EMERGENCY DEPT  eMERGENCY dEPARTMENT eNCOUnter      Pt Name: Rojas Call  MRN: [id number]  Birthdate 1950  Date of evaluation: 6/20/2024  Provider: Tammy Mai DO    CHIEF COMPLAINT       Chief Complaint   Patient presents with    Extremity Weakness     Bilat arm weakness x 1 hour         HISTORY OF PRESENT ILLNESS   (Location/Symptom, Timing/Onset,Context/Setting, Quality, Duration, Modifying Factors, Severity)  Note limiting factors.   Rojas Call is a 74 y.o. male who presents to the emergency department ***     [facility]    NursingNotes were reviewed.    REVIEW OF SYSTEMS    (2-9 systems for level 4, 10 or more for level 5)     As per HPI         PAST MEDICALHISTORY     Past Medical History:   Diagnosis Date    Arthritis     knees    Atrial fibrillation (HCC)     CAD (coronary artery disease)     Colon polyp     COPD (chronic obstructive pulmonary disease) (HCC)     slight    Heart attack (HCC) 07/06/2017    History of blood transfusion     History of colon polyps     tubular adenoma, hyperplastic, distal rectum, duodenum    History of transfusion     Hyperlipidemia     Hypertension     Joint pain     Palpitations     Pinched nerve     right shoulder    Pinched nerve in neck     Tobacco abuse     Ulcer          SURGICAL HISTORY       Past Surgical History:   Procedure Laterality Date    COLONOSCOPY  12-10-08    Dr De La Torre    COLONOSCOPY  8-27-01    Dr De La Torre    COLONOSCOPY  07/2013    Dr. Strange: multiple polyps, adenomatous and hyperplastic (3 yr)    CORONARY ANGIOPLASTY WITH STENT PLACEMENT  07/06/2017    ENDOSCOPY, COLON, DIAGNOSTIC      HEMORRHOID SURGERY      JOINT REPLACEMENT Left 06/2012    knee    KNEE ARTHROSCOPY      bilateral-3 scopes on right and 1 on left    NECK SURGERY      PAROTIDECTOMY  03/31/2017    SKIN BIOPSY      lypomas on arms, side, and back    TOTAL KNEE ARTHROPLASTY      left    UPPER GASTROINTESTINAL ENDOSCOPY  8-27-01    Dr De La Torre    UPPER GASTROINTESTINAL ENDOSCOPY N/A

## 2024-06-20 NOTE — PROGRESS NOTES
ED TO INPATIENT SBAR HANDOFF    Patient Name: Rojas Call   : 1950  74 y.o.   Family/Caregiver Present: Yes  Code Status Order: Prior    C-SSRS: Risk of Suicide: No Risk  Sitter No  Restraints:         Situation  Chief Complaint:   Chief Complaint   Patient presents with    Extremity Weakness     Bilat arm weakness x 1 hour     Patient Diagnosis: Atrial fibrillation with RVR (HCC) [I48.91]     Brief Description of Patient's Condition: The patient is a 74 y.o. male with Atrial Fibrillation, CAD, COPD, HTN, hyperlipidemia, current smoker comes to ED complaining of extremity weakness in his arms.  Patient reports that he woke up this morning around 9:30 AM with weakness in bilateral arms and pain around his elbows. He endorses that these were the same symptoms when he had his heart attack so he came to the ER. Denies fever, chills, nausea, vomiting, abdominal pain, shortness of breath, and chest pain.       Afib RVR - asymptomatic, received 2 boluses of diltiazem and now on dilt gtt with no significant change. ER and cardio MD aware with no new orders.     Mental Status: oriented, alert, coherent, logical, thought processes intact, and able to concentrate and follow conversation  Arrived from: home    Imaging:   XR CHEST PORTABLE   Final Result   Redemonstrated cardiomegaly.  Pulmonary vasculature and mediastinal contours are otherwise intact.  No focal infiltrate, effusion, or pneumothorax is identified.  Benign stable calcified granuloma lateral aspect left lower lobe.                               ______________________________________    Electronically signed by: JACQUIE GAY M.D.   Date:     2024   Time:    10:23         COVID-19 Results:   Internal Administration   First Dose COVID-19, PFIZER PURPLE top, DILUTE for use, (age 12 y+), 30mcg/0.3mL  01/15/2021   Second Dose COVID-19, PFIZER PURPLE top, DILUTE for use, (age 12 y+), 30mcg/0.3mL   2021       Last COVID Lab SARS-CoV-2, PCR  Action  Rate/Dose Change Dose  15 mg/hr Rate  15 mL/hr Route  IntraVENous Administered By  Funmilyao Brown RN        dilTIAZem injection 10 mg Admin Date  06/20/2024 Action  Given Dose  10 mg Rate   Route  IntraVENous Administered By  Shelly Lanza RN        dilTIAZem injection 15 mg Admin Date  06/20/2024 Action  Given Dose  15 mg Rate   Route  IntraVENous Administered By  Shelly Lanza RN        sodium chloride 0.9 % bolus 1,000 mL Admin Date  06/20/2024 Action  New Bag Dose  1,000 mL Rate  1,000 mL/hr Route  IntraVENous Administered By  Shelly Lanza RN            History:   Past Medical History:   Diagnosis Date    Arthritis     knees    Atrial fibrillation (HCC)     CAD (coronary artery disease)     Colon polyp     COPD (chronic obstructive pulmonary disease) (HCC)     slight    Heart attack (HCC) 07/06/2017    History of blood transfusion     History of colon polyps     tubular adenoma, hyperplastic, distal rectum, duodenum    History of transfusion     Hyperlipidemia     Hypertension     Joint pain     Palpitations     Pinched nerve     right shoulder    Pinched nerve in neck     Tobacco abuse     Ulcer        Assessment  Vitals: Level of Consciousness: Alert (0)   Vitals:    06/20/24 1420 06/20/24 1423 06/20/24 1425 06/20/24 1502   BP:    123/87   Pulse: (!) 132 (!) 131 (!) 134 (!) 129   Resp:   16 15   Temp:       SpO2:   98% 93%   Weight:       Height:         Predictive Model Details          36 (Caution)  Factor Value    Calculated 6/20/2024 15:29 34% Age 74 years old    Deterioration Index Model 25% Neurological exam X     25% Pulse 129     7% Potassium 4.4 mmol/L     4% Respiratory rate 15     3% Pulse oximetry 93 %     2% Systolic 123     1% Hematocrit abnormal (52.1 %)     0% Sodium 141 mmol/L     0% Temperature 98.1 °F (36.7 °C)     0% WBC count 7.1 K/uL       NPO? Yes  O2 Flow Rate: O2 Device: None (Room air)    Cardiac Rhythm: Afib RVR  NIH Score: NIH     Active LDA's:   Peripheral IV

## 2024-06-20 NOTE — PROGRESS NOTES
Rojas Clal arrived to room # 728.   Presented with: Chest pain, AFIB RVR    Mental Status: Patient is oriented and alert.   Vitals:    06/20/24 1724   BP: 132/79   Pulse: (!) 134   Resp: 18   Temp: 97 °F (36.1 °C)   SpO2:      Patient safety contract and falls prevention contract reviewed with patient Yes.  Oriented Patient and Family to room.  Call light within reach. Yes.  Needs, issues or concerns expressed at this time: no.      Electronically signed by Kath Harley RN on 6/20/2024 at 5:38 PM

## 2024-06-20 NOTE — PROGRESS NOTES
Pharmacy Adjustment per [facility] protocol    Rojas Call is a 74 y.o. male. Pharmacy has adjusted medications per [facility] protocol.    Recent Labs     06/20/24  0957   BUN 23       Recent Labs     06/20/24  0957   CREATININE 1.2       Estimated Creatinine Clearance: 57 mL/min (based on SCr of 1.2 mg/dL).    Height:   Ht Readings from Last 1 Encounters:   06/20/24 1.829 m (6')     Weight:  Wt Readings from Last 1 Encounters:   06/20/24 79.8 kg (176 lb)         Plan: Adjust the following medications based on [facility] protocol:           Zosyn adjusted to 4500 mg IV x 1 dose over 30 minutes, followed by 3375 mg IV every 8 hours extended infusion.    Electronically signed by Heather Verdugo RPH on 6/20/2024 at 5:36 PM

## 2024-06-20 NOTE — CONSULTS
[facility]  Cardiology Consult      Requesting MD:  Sharla Yip MD   Admit Status:         History obtained from:   [] Patient  [] Other (specify):     PROBLEM LIST:    Patient Active Problem List    Diagnosis Date Noted    NSTEMI (non-ST elevated myocardial infarction) ([facility])      Priority: High    Ventricular tachycardia ([facility])      Priority: High    Atrial fibrillation with RVR ([facility]) 06/20/2024     Priority: Low    Metabolic acidosis with normal anion gap and bicarbonate losses 06/20/2024     Priority: Low    COVID-19 virus infection 06/07/2024     Priority: Low    Dysuria 06/07/2024     Priority: Low    Sepsis ([facility]) 06/07/2024     Priority: Low    Thrombocytopenia ([facility]) 06/07/2024     Priority: Low    Paroxysmal atrial fibrillation ([facility]) 03/27/2022     Priority: Low    CKD (chronic kidney disease) stage 3, GFR 30-59 ml/min ([facility]) 08/24/2019     Priority: Low    Mild episode of recurrent major depressive disorder ([facility]) 08/24/2019     Priority: Low    Mixed hyperlipidemia 04/05/2018     Priority: Low    History of coronary artery stent placement 04/05/2018     Priority: Low     Overview Note:     7/6/17  Non-STEMI, status post stenting to 3rd circumflex marginal branch this admission      CAD (coronary artery disease)      Priority: Low    Smoker      Priority: Low    SVT (supraventricular tachycardia) ([facility]) 11/30/2017     Priority: Low    Essential hypertension 08/03/2016     Priority: Low    Premature ventricular contractions 06/22/2016     Priority: Low    Premature atrial complexes 06/22/2016     Priority: Low    Chest pain 06/22/2016     Priority: Low    Palpitations 06/22/2016     Priority: Low    Referral of patient 06/22/2016     Priority: Low    Atypical chest pain 03/31/2016     Priority: Low    History of adenomatous polyp of colon 06/27/2013     Priority: Low    COPD (chronic obstructive pulmonary disease) ([facility]) 06/27/2013     Priority: Low    History of gastric ulcer     Followed by    amiodarone         Physical Exam:  Vitals:    06/20/24 1724   BP: 132/79   Pulse: (!) 134   Resp: 18   Temp: 97 °F (36.1 °C)   SpO2:        Intake/Output Summary (Last 24 hours) at 6/20/2024 1811  Last data filed at 6/20/2024 1240  Gross per 24 hour   Intake 1000 ml   Output --   Net 1000 ml     Estimated body mass index is 22.7 kg/m² as calculated from the following:    Height as of this encounter: 1.829 m (6').    Weight as of this encounter: 75.9 kg (167 lb 6.4 oz).        Physical Exam  HENT:      Mouth/Throat:      Pharynx: No oropharyngeal exudate.   Eyes:      General: No scleral icterus.        Right eye: No discharge.         Left eye: No discharge.   Neck:      Thyroid: No thyromegaly.      Vascular: No JVD.   Cardiovascular:      Rate and Rhythm: Tachycardia present. Rhythm irregular.      Heart sounds: No murmur heard.     No friction rub. No gallop.      Comments: No JVD  No pitting edema  Extremities are warm    Pulmonary:      Effort: No respiratory distress.      Breath sounds: No stridor. Rales present. No wheezing.      Comments: Right-sided rales noted  Abdominal:      General: Bowel sounds are normal. There is no distension.      Palpations: Abdomen is soft. There is no mass.      Tenderness: There is no abdominal tenderness. There is no guarding or rebound.      Comments: No palpable organomegaly   Musculoskeletal:         General: No deformity.   Skin:     General: Skin is warm.      Coloration: Skin is not pale.      Findings: No erythema or rash.   Neurological:      Mental Status: He is alert and oriented to person, place, and time.      Motor: No abnormal muscle tone.      Coordination: Coordination normal.      Deep Tendon Reflexes: Reflexes normal.           Labs:  Recent Labs     06/20/24  0957   WBC 7.1   HGB 16.6          Recent Labs     06/20/24  0957      K 4.4      CO2 19*   BUN 23   CREATININE 1.2   LABGLOM 63   MG 1.9   CALCIUM 8.9       CK,  echocardiogram.  Will need ischemia evaluation with possible catheterization if troponins to elevate.  2.  Atrial fibrillation with RVR.  Has been on Eliquis but had not taken morning dose.  Would give Lovenox 1 mg/kg x 1 dose and then continue Eliquis.  Rate control with IV Cardizem.  Is on Calan  mg once daily and can increase to twice daily.  Would initiate patient on amiodarone with 150 mg bolus and 1 mg/min and try and chemically cardiovert.  If remains in atrial fibrillation, consideration for DCCV.  3.  Have discussed smoking cessation with patient.      Electronically signed by Nathaniel Barbosa MD on 6/20/2024 at 6:11 PM    Thisdictation was generated by voice recognition computer software.  Although all attempts are made to edit the dictation for accuracy, there may be errors in the transcription that are not intended.

## 2024-06-20 NOTE — ED NOTES
Pt continues to ask for something to eat and drink. Per MD, pt to stay NPO at this time until MD speaks with cardiology. Pt updated and aware.

## 2024-06-21 ENCOUNTER — APPOINTMENT (OUTPATIENT)
Age: 74
DRG: 280 | End: 2024-06-21
Payer: MEDICARE

## 2024-06-21 PROBLEM — Z51.5 PALLIATIVE CARE PATIENT: Status: ACTIVE | Noted: 2024-06-21

## 2024-06-21 LAB
ANION GAP SERPL CALCULATED.3IONS-SCNC: 9 MMOL/L (ref 7–19)
APTT PPP: 32.2 SEC (ref 26–36.2)
APTT PPP: 34 SEC (ref 26–36.2)
BUN SERPL-MCNC: 21 MG/DL (ref 8–23)
CALCIUM SERPL-MCNC: 7.7 MG/DL (ref 8.8–10.2)
CHLORIDE SERPL-SCNC: 111 MMOL/L (ref 98–111)
CO2 SERPL-SCNC: 18 MMOL/L (ref 22–29)
CREAT SERPL-MCNC: 1.2 MG/DL (ref 0.5–1.2)
ECHO AO ASC DIAM: 2.7 CM
ECHO AO ASCENDING AORTA INDEX: 1.34 CM/M2
ECHO AO ROOT DIAM: 2.5 CM
ECHO AO ROOT INDEX: 1.24 CM/M2
ECHO AO SINUS VALSALVA DIAM: 2.5 CM
ECHO AO SINUS VALSALVA INDEX: 1.24 CM/M2
ECHO AO ST JNCT DIAM: 2.1 CM
ECHO AV AREA PEAK VELOCITY: 2.6 CM2
ECHO AV AREA VTI: 2.5 CM2
ECHO AV AREA/BSA PEAK VELOCITY: 1.3 CM2/M2
ECHO AV AREA/BSA VTI: 1.2 CM2/M2
ECHO AV MEAN GRADIENT: 3 MMHG
ECHO AV MEAN VELOCITY: 0.8 M/S
ECHO AV PEAK GRADIENT: 5 MMHG
ECHO AV PEAK VELOCITY: 1.1 M/S
ECHO AV VELOCITY RATIO: 0.82
ECHO AV VTI: 18.9 CM
ECHO BSA: 2.01 M2
ECHO IVC PROX: 1.7 CM
ECHO LA AREA 2C: 25.9 CM2
ECHO LA AREA 4C: 23 CM2
ECHO LA DIAMETER INDEX: 2.18 CM/M2
ECHO LA DIAMETER: 4.4 CM
ECHO LA MAJOR AXIS: 5.8 CM
ECHO LA MINOR AXIS: 5.6 CM
ECHO LA TO AORTIC ROOT RATIO: 1.76
ECHO LA VOL BP: 85 ML (ref 18–58)
ECHO LA VOL MOD A2C: 98 ML (ref 18–58)
ECHO LA VOL MOD A4C: 71 ML (ref 18–58)
ECHO LA VOL/BSA BIPLANE: 42 ML/M2 (ref 16–34)
ECHO LA VOLUME INDEX MOD A2C: 49 ML/M2 (ref 16–34)
ECHO LA VOLUME INDEX MOD A4C: 35 ML/M2 (ref 16–34)
ECHO LV E' LATERAL VELOCITY: 10 CM/S
ECHO LV E' SEPTAL VELOCITY: 10 CM/S
ECHO LV EDV A2C: 74 ML
ECHO LV EDV A4C: 131 ML
ECHO LV EDV INDEX A4C: 65 ML/M2
ECHO LV EDV NDEX A2C: 37 ML/M2
ECHO LV EJECTION FRACTION A2C: 34 %
ECHO LV EJECTION FRACTION A4C: 44 %
ECHO LV EJECTION FRACTION BIPLANE: 40 % (ref 55–100)
ECHO LV ESV A2C: 49 ML
ECHO LV ESV A4C: 74 ML
ECHO LV ESV INDEX A2C: 24 ML/M2
ECHO LV ESV INDEX A4C: 37 ML/M2
ECHO LV FRACTIONAL SHORTENING: 19 % (ref 28–44)
ECHO LV INTERNAL DIMENSION DIASTOLE INDEX: 2.92 CM/M2
ECHO LV INTERNAL DIMENSION DIASTOLIC: 5.9 CM (ref 4.2–5.9)
ECHO LV INTERNAL DIMENSION SYSTOLIC INDEX: 2.38 CM/M2
ECHO LV INTERNAL DIMENSION SYSTOLIC: 4.8 CM
ECHO LV IVSD: 0.9 CM (ref 0.6–1)
ECHO LV MASS 2D: 209.6 G (ref 88–224)
ECHO LV MASS INDEX 2D: 103.7 G/M2 (ref 49–115)
ECHO LV POSTERIOR WALL DIASTOLIC: 0.9 CM (ref 0.6–1)
ECHO LV RELATIVE WALL THICKNESS RATIO: 0.31
ECHO LVOT AREA: 3.1 CM2
ECHO LVOT AV VTI INDEX: 0.8
ECHO LVOT DIAM: 2 CM
ECHO LVOT MEAN GRADIENT: 2 MMHG
ECHO LVOT PEAK GRADIENT: 3 MMHG
ECHO LVOT PEAK VELOCITY: 0.9 M/S
ECHO LVOT STROKE VOLUME INDEX: 23.5 ML/M2
ECHO LVOT SV: 47.4 ML
ECHO LVOT VTI: 15.1 CM
ECHO MV AREA VTI: 1.9 CM2
ECHO MV E DECELERATION TIME (DT): 288 MS
ECHO MV E VELOCITY: 1.11 M/S
ECHO MV E/E' LATERAL: 11.1
ECHO MV E/E' RATIO (AVERAGED): 11.1
ECHO MV E/E' SEPTAL: 11.1
ECHO MV LVOT VTI INDEX: 1.62
ECHO MV MAX VELOCITY: 1.4 M/S
ECHO MV MEAN GRADIENT: 3 MMHG
ECHO MV MEAN VELOCITY: 0.7 M/S
ECHO MV PEAK GRADIENT: 8 MMHG
ECHO MV VTI: 24.4 CM
ECHO PULMONARY ARTERY END DIASTOLIC PRESSURE: 5 MMHG
ECHO PV REGURGITANT MAX VELOCITY: 1.1 M/S
ECHO RA AREA 4C: 15.8 CM2
ECHO RA END SYSTOLIC VOLUME APICAL 4 CHAMBER INDEX BSA: 20 ML/M2
ECHO RA VOLUME: 40 ML
ECHO RV BASAL DIMENSION: 2.9 CM
ECHO RV INTERNAL DIMENSION: 2.4 CM
ECHO RV LONGITUDINAL DIMENSION: 7.3 CM
ECHO RV MID DIMENSION: 2.6 CM
ECHO RV TAPSE: 1.9 CM (ref 1.7–?)
ERYTHROCYTE [DISTWIDTH] IN BLOOD BY AUTOMATED COUNT: 15.4 % (ref 11.5–14.5)
GLUCOSE SERPL-MCNC: 90 MG/DL (ref 74–109)
HCT VFR BLD AUTO: 39.5 % (ref 42–52)
HGB BLD-MCNC: 12.7 G/DL (ref 14–18)
INR PPP: 1.23 (ref 0.88–1.18)
MAGNESIUM SERPL-MCNC: 1.7 MG/DL (ref 1.6–2.4)
MCH RBC QN AUTO: 29.8 PG (ref 27–31)
MCHC RBC AUTO-ENTMCNC: 32.2 G/DL (ref 33–37)
MCV RBC AUTO: 92.7 FL (ref 80–94)
PLATELET # BLD AUTO: 157 K/UL (ref 130–400)
PMV BLD AUTO: 11 FL (ref 9.4–12.4)
POTASSIUM SERPL-SCNC: 3.8 MMOL/L (ref 3.5–5)
PROTHROMBIN TIME: 15.2 SEC (ref 12–14.6)
RBC # BLD AUTO: 4.26 M/UL (ref 4.7–6.1)
SODIUM SERPL-SCNC: 138 MMOL/L (ref 136–145)
TROPONIN, HIGH SENSITIVITY: 60 NG/L (ref 0–22)
TROPONIN, HIGH SENSITIVITY: 61 NG/L (ref 0–22)
WBC # BLD AUTO: 7.7 K/UL (ref 4.8–10.8)

## 2024-06-21 PROCEDURE — 85730 THROMBOPLASTIN TIME PARTIAL: CPT

## 2024-06-21 PROCEDURE — 6360000004 HC RX CONTRAST MEDICATION

## 2024-06-21 PROCEDURE — 2580000003 HC RX 258

## 2024-06-21 PROCEDURE — 84484 ASSAY OF TROPONIN QUANT: CPT

## 2024-06-21 PROCEDURE — 2580000003 HC RX 258: Performed by: INTERNAL MEDICINE

## 2024-06-21 PROCEDURE — 85610 PROTHROMBIN TIME: CPT

## 2024-06-21 PROCEDURE — 80048 BASIC METABOLIC PNL TOTAL CA: CPT

## 2024-06-21 PROCEDURE — 6360000002 HC RX W HCPCS: Performed by: INTERNAL MEDICINE

## 2024-06-21 PROCEDURE — 85027 COMPLETE CBC AUTOMATED: CPT

## 2024-06-21 PROCEDURE — 2500000003 HC RX 250 WO HCPCS: Performed by: INTERNAL MEDICINE

## 2024-06-21 PROCEDURE — 93306 TTE W/DOPPLER COMPLETE: CPT | Performed by: INTERNAL MEDICINE

## 2024-06-21 PROCEDURE — C8929 TTE W OR WO FOL WCON,DOPPLER: HCPCS

## 2024-06-21 PROCEDURE — 36415 COLL VENOUS BLD VENIPUNCTURE: CPT

## 2024-06-21 PROCEDURE — 2140000000 HC CCU INTERMEDIATE R&B

## 2024-06-21 PROCEDURE — 6370000000 HC RX 637 (ALT 250 FOR IP)

## 2024-06-21 PROCEDURE — 6370000000 HC RX 637 (ALT 250 FOR IP): Performed by: INTERNAL MEDICINE

## 2024-06-21 PROCEDURE — 83735 ASSAY OF MAGNESIUM: CPT

## 2024-06-21 PROCEDURE — 99232 SBSQ HOSP IP/OBS MODERATE 35: CPT | Performed by: INTERNAL MEDICINE

## 2024-06-21 PROCEDURE — 2500000003 HC RX 250 WO HCPCS

## 2024-06-21 RX ORDER — HEPARIN SODIUM 1000 [USP'U]/ML
4000 INJECTION, SOLUTION INTRAVENOUS; SUBCUTANEOUS ONCE
Status: DISCONTINUED | OUTPATIENT
Start: 2024-06-21 | End: 2024-06-21

## 2024-06-21 RX ORDER — HEPARIN SODIUM 1000 [USP'U]/ML
2000 INJECTION, SOLUTION INTRAVENOUS; SUBCUTANEOUS PRN
Status: DISCONTINUED | OUTPATIENT
Start: 2024-06-21 | End: 2024-06-21

## 2024-06-21 RX ORDER — HEPARIN SODIUM 10000 [USP'U]/100ML
5-30 INJECTION, SOLUTION INTRAVENOUS CONTINUOUS
Status: DISCONTINUED | OUTPATIENT
Start: 2024-06-21 | End: 2024-06-23

## 2024-06-21 RX ORDER — HEPARIN SODIUM 1000 [USP'U]/ML
4000 INJECTION, SOLUTION INTRAVENOUS; SUBCUTANEOUS PRN
Status: DISCONTINUED | OUTPATIENT
Start: 2024-06-21 | End: 2024-06-23

## 2024-06-21 RX ORDER — HEPARIN SODIUM 10000 [USP'U]/100ML
5-30 INJECTION, SOLUTION INTRAVENOUS CONTINUOUS
Status: DISCONTINUED | OUTPATIENT
Start: 2024-06-21 | End: 2024-06-21

## 2024-06-21 RX ORDER — HEPARIN SODIUM 1000 [USP'U]/ML
4000 INJECTION, SOLUTION INTRAVENOUS; SUBCUTANEOUS ONCE
Status: COMPLETED | OUTPATIENT
Start: 2024-06-21 | End: 2024-06-21

## 2024-06-21 RX ORDER — HEPARIN SODIUM 1000 [USP'U]/ML
2000 INJECTION, SOLUTION INTRAVENOUS; SUBCUTANEOUS PRN
Status: DISCONTINUED | OUTPATIENT
Start: 2024-06-21 | End: 2024-06-23

## 2024-06-21 RX ORDER — HEPARIN SODIUM 1000 [USP'U]/ML
4000 INJECTION, SOLUTION INTRAVENOUS; SUBCUTANEOUS PRN
Status: DISCONTINUED | OUTPATIENT
Start: 2024-06-21 | End: 2024-06-21

## 2024-06-21 RX ADMIN — HEPARIN SODIUM 4000 UNITS: 1000 INJECTION INTRAVENOUS; SUBCUTANEOUS at 22:33

## 2024-06-21 RX ADMIN — SODIUM CHLORIDE, PRESERVATIVE FREE 10 ML: 5 INJECTION INTRAVENOUS at 22:39

## 2024-06-21 RX ADMIN — APIXABAN 5 MG: 5 TABLET, FILM COATED ORAL at 08:58

## 2024-06-21 RX ADMIN — ATORVASTATIN CALCIUM 40 MG: 40 TABLET, FILM COATED ORAL at 08:58

## 2024-06-21 RX ADMIN — PIPERACILLIN AND TAZOBACTAM 3375 MG: 3; .375 INJECTION, POWDER, LYOPHILIZED, FOR SOLUTION INTRAVENOUS at 05:46

## 2024-06-21 RX ADMIN — ESCITALOPRAM OXALATE 20 MG: 10 TABLET ORAL at 08:58

## 2024-06-21 RX ADMIN — Medication 0.5 MG/MIN: at 12:44

## 2024-06-21 RX ADMIN — SODIUM BICARBONATE 650 MG: 650 TABLET ORAL at 08:58

## 2024-06-21 RX ADMIN — VERAPAMIL HYDROCHLORIDE 180 MG: 180 TABLET, FILM COATED, EXTENDED RELEASE ORAL at 08:57

## 2024-06-21 RX ADMIN — HEPARIN SODIUM 12 UNITS/KG/HR: 10000 INJECTION, SOLUTION INTRAVENOUS at 22:39

## 2024-06-21 RX ADMIN — PIPERACILLIN AND TAZOBACTAM 3375 MG: 3; .375 INJECTION, POWDER, LYOPHILIZED, FOR SOLUTION INTRAVENOUS at 22:50

## 2024-06-21 RX ADMIN — DILTIAZEM HYDROCHLORIDE 15 MG/HR: 5 INJECTION, SOLUTION INTRAVENOUS at 04:49

## 2024-06-21 RX ADMIN — PANTOPRAZOLE SODIUM 40 MG: 40 TABLET, DELAYED RELEASE ORAL at 05:44

## 2024-06-21 RX ADMIN — SODIUM BICARBONATE 650 MG: 650 TABLET ORAL at 22:39

## 2024-06-21 RX ADMIN — VERAPAMIL HYDROCHLORIDE 180 MG: 180 TABLET, FILM COATED, EXTENDED RELEASE ORAL at 22:39

## 2024-06-21 RX ADMIN — PIPERACILLIN AND TAZOBACTAM 3375 MG: 3; .375 INJECTION, POWDER, LYOPHILIZED, FOR SOLUTION INTRAVENOUS at 18:12

## 2024-06-21 RX ADMIN — PERFLUTREN 1.5 ML: 6.52 INJECTION, SUSPENSION INTRAVENOUS at 08:28

## 2024-06-21 ASSESSMENT — ENCOUNTER SYMPTOMS
SHORTNESS OF BREATH: 0
ABDOMINAL DISTENTION: 0
COLOR CHANGE: 0
VOMITING: 0
ABDOMINAL PAIN: 0
BLOOD IN STOOL: 0
DIARRHEA: 0
CONSTIPATION: 0
NAUSEA: 0
COUGH: 0
WHEEZING: 0

## 2024-06-21 NOTE — CARE COORDINATION
06/21/24 0828   Readmission Assessment   Number of Days since last admission? 8-30 days   Previous Disposition Home with Family   Who is being Interviewed Patient   What was the patient's/caregiver's perception as to why they think they needed to return back to the hospital? Other (Comment)  (weakness in arms)   Did you visit your Primary Care Physician after you left the hospital, before you returned this time? Yes   Did you see a specialist, such as Cardiac, Pulmonary, Orthopedic Physician, etc. after you left the hospital? No   Who advised the patient to return to the hospital? Self-referral   Does the patient report anything that got in the way of taking their medications? No   In our efforts to provide the best possible care to you and others like you, can you think of anything that we could have done to help you after you left the hospital the first time, so that you might not have needed to return so soon? Other (Comment)  (nothing)     Electronically signed by Robyn Doyle on 6/21/2024 at 8:29 AM

## 2024-06-21 NOTE — ACP (ADVANCE CARE PLANNING)
Advance Care Planning     Advance Care Planning Activator (Inpatient)  Conversation Note      Date of ACP Conversation: 6/21/2024     Conversation Conducted with: Patient, Rojas Call and his wife, Ruth Call, who is at patient bedside.  Patient is sitting up in bed, alert and awake, oriented, able to participate in conversation and answer questions.  Patient is currently a Full Code, and does have ACP documents on file - Living Will.  Discussed this with patient and verified no changes desired.    ACP Activator: Ngoc Miranda RN    Health Care Decision Maker:     Current Designated Health Care Decision Maker:     Primary Decision Maker: Ruth Call - Spouse - [phone]    Care Preferences    Ventilation:  \"If you were in your present state of health and suddenly became very ill and were unable to breathe on your own, what would your preference be about the use of a ventilator (breathing machine) if it were available to you?\"      Would the patient desire the use of ventilator (breathing machine)?: YES    \"If your health worsens and it becomes clear that your chance of recovery is unlikely, what would your preference be about the use of a ventilator (breathing machine) if it were available to you?\"     Would the patient desire the use of ventilator (breathing machine)?: YES      Resuscitation  \"CPR works best to restart the heart when there is a sudden event, like a heart attack, in someone who is otherwise healthy. Unfortunately, CPR does not typically restart the heart for people who have serious health conditions or who are very sick.\"    \"In the event your heart stopped as a result of an underlying serious health condition, would you want attempts to be made to restart your heart (answer \"yes\" for attempt to resuscitate) or would you prefer a natural death (answer \"no\" for do not attempt to resuscitate)?\" YES    Length of ACP Conversation in minutes:  30 minutes    Conversation Outcomes:  ACP

## 2024-06-21 NOTE — PROGRESS NOTES
[facility]      Progress Note    Patient:  Rojas Call  YOB: 1950  Date of Service: 6/21/2024  MRN: [id number]   Acct: [id number]   Primary Care Physician: Juwan Trevino MD  Advance Directive: Full Code  Admit Date: 6/20/2024       Hospital Day: 1    Portions of this note have been copied forward, however, updated to reflect the most current clinical status of this patient.     CHIEF COMPLAINT extremity weakness    SUBJECTIVE:  Mr. Call was resting in bed this morning. States he feels well this morning. Denies SOB or chest pain. Denies nausea or vomiting. Denies fever or chills.       CUMULATIVE HOSPITAL COURSE:   The patient is a 74 y.o. male with PMH of Atrial fibrillation, CAD, COPD, HTN, HLD and current smoker who presented to [facility] ED for evaluation of extremity weakness in bilateral arms. Reported waking up with bilateral arm weakness on morning of admission. Reported having similar symptoms with previous MI, therefore presented to ED for further evaluation. Workup in ED revealed EKG- A-fib RVR with no ischemic changes; CO2 19, troponin 44 followed by 46, Alk phos 140, unremarkable CBC, chest x-ray indicated cardiomegaly, benign stable calcified granuloma lateral aspect of the left lower lobe.  Patient was admitted to hospital medicine for further evaluation with cardiology consultation.  Cardizem drip initiated.  Cardiology recommended initiating amiodarone drip bolus and drip.  Consideration for DCCV if he continues to remain in A-fib. ECHO indicated reduced LV systolic function with EF of 40 to 44%, mild global hypokinesis, moderate hypokinesis of basal inferior, basal inferolateral, and mid inferior, indeterminate diastolic function due to A-fib.           Review of Systems   Constitutional:  Negative for chills, diaphoresis, fatigue and fever.   HENT:  Negative for congestion and ear pain.    Eyes:  Negative for visual disturbance.   Respiratory:  Negative for cough,              Assessment/Plan   Principal Problem:    Atrial fibrillation with RVR (HCC)  Active Problems:    History of gastric ulcer    Essential hypertension    Mixed hyperlipidemia    Mild episode of recurrent major depressive disorder (HCC)    Metabolic acidosis with normal anion gap and bicarbonate losses    Palliative care patient  Resolved Problems:    * No resolved hospital problems. *      Principal Problem:    Atrial fibrillation with RVR (HCC)-   - Cardiology following    - Recommended continuing Cardizem gtt for rate control and initiate Amiodarone Bolus and gtt   - Suggests consideration for DCCV if he continues to remain in A-fib   - Zosyn per cardiology for COPD exacerbation  - Continue Eliquis   - ECHO indicated reduced LV systolic function with EF of 40 to 44%, mild global hypokinesis, moderate hypokinesis of basal inferior, basal inferolateral, and mid inferior, indeterminate diastolic function due to A-fib   - Serial and PRN EKGs  - Monitor on telemetry         Active Problems:    History of gastric ulcer-    - Continue home Protonix       Essential hypertension-   - Stable at this time   - Continue current medications   - Monitor BP closely      Mixed hyperlipidemia-    - Continue statin      Mild episode of recurrent major depressive disorder (HCC)-    - Continue home medication       Metabolic acidosis with normal anion gap and bicarbonate losses-    - Continue Sodium Bicarbonate tablet 650 mg BID    - Monitor labs closely       Palliative care patient        Antibiotics: Zosyn     DVT Prophylaxis: Eliquis    GI prophylaxis: Protonix    Discharge planning: TBD      Further Orders per Clinical course/attending.     Electronically signed by KEVYN Burton CNP on 6/21/2024 at 11:45 AM       EMR Dragon/Transcription disclaimer:   Much of this encounter note is an electronic transcription/translation of spoken language to printed text. The electronic translation of spoken language may

## 2024-06-21 NOTE — CONSULTS
Palliative Care:             Initiated Palliative Care Services for support, goals, and advance care planning.  Conversation conducted with patient, Rojas Call and his wife, Ruth Call, who is at patient bedside. Patient is sitting up in bed, alert and awake, oriented, able to participate in conversation and answer questions. Patient is currently a Full Code status, and does have ACP documents on file - Living Will. Discussed this with patient and verified no changes desired.  Reviewed life at home prior to hospitalization, patient states that he lives at his home with his wife.    Past Medical History:        Past Medical History:   Diagnosis Date    Arthritis     knees    Atrial fibrillation (HCC)     CAD (coronary artery disease)     Colon polyp     COPD (chronic obstructive pulmonary disease) (HCC)     slight    Heart attack (HCC) 07/06/2017    History of blood transfusion     History of colon polyps     tubular adenoma, hyperplastic, distal rectum, duodenum    History of transfusion     Hyperlipidemia     Hypertension     Joint pain     Palliative care patient 06/21/2024    Palpitations     Pinched nerve     right shoulder    Pinched nerve in neck     Tobacco abuse     Ulcer        Advance Directives:       See ACP Note.  Patient is Full Code Status.  Patient has ACP documents on file - Living Will.      Pain/Other Symptoms:   Patient denies any pain, patient reports fair appetite, denies any nausea or vomiting.  Patient denies any shortness of breath, he is not on supplement oxygen.  Patient denies any recent falls or injuries.      Patient states that he had echo today and that he might be scheduled for a cardioversion on Monday and possible stent placement.  Palliative Care to follow up with patient next week for further support.           How are symptoms affecting QOL: Symptoms have affected quality of life, since patient is now hospitalized.      Psychological/Spiritual:  [employer]

## 2024-06-21 NOTE — PROGRESS NOTES
Cardiology Progress Note Nathaniel Barbosa MD      Patient:  Rojas Call  [id number]    Patient Active Problem List    Diagnosis Date Noted    NSTEMI (non-ST elevated myocardial infarction) ([facility])      Priority: High    Ventricular tachycardia ([facility])      Priority: High    Palliative care patient 06/21/2024     Priority: Low    Atrial fibrillation with RVR ([facility]) 06/20/2024     Priority: Low    Metabolic acidosis with normal anion gap and bicarbonate losses 06/20/2024     Priority: Low    COVID-19 virus infection 06/07/2024     Priority: Low    Dysuria 06/07/2024     Priority: Low    Sepsis ([facility]) 06/07/2024     Priority: Low    Thrombocytopenia ([facility]) 06/07/2024     Priority: Low    Paroxysmal atrial fibrillation ([facility]) 03/27/2022     Priority: Low    CKD (chronic kidney disease) stage 3, GFR 30-59 ml/min ([facility]) 08/24/2019     Priority: Low    Mild episode of recurrent major depressive disorder ([facility]) 08/24/2019     Priority: Low    Mixed hyperlipidemia 04/05/2018     Priority: Low    History of coronary artery stent placement 04/05/2018     Priority: Low     Overview Note:     7/6/17  Non-STEMI, status post stenting to 3rd circumflex marginal branch this admission      CAD (coronary artery disease)      Priority: Low    Smoker      Priority: Low    SVT (supraventricular tachycardia) ([facility]) 11/30/2017     Priority: Low    Essential hypertension 08/03/2016     Priority: Low    Premature ventricular contractions 06/22/2016     Priority: Low    Premature atrial complexes 06/22/2016     Priority: Low    Chest pain 06/22/2016     Priority: Low    Palpitations 06/22/2016     Priority: Low    Referral of patient 06/22/2016     Priority: Low    Atypical chest pain 03/31/2016     Priority: Low    History of adenomatous polyp of colon 06/27/2013     Priority: Low    COPD (chronic obstructive pulmonary disease) ([facility]) 06/27/2013     Priority: Low    History of gastric ulcer 06/27/2013     Priority: Low       Admit Date:   irregular.      Heart sounds: No murmur heard.     No friction rub. No gallop.      Comments: No JVD  No edema    Pulmonary:      Effort: No respiratory distress.      Breath sounds: No stridor. No wheezing or rales.   Abdominal:      General: Bowel sounds are normal. There is no distension.      Palpations: Abdomen is soft. There is no mass.      Tenderness: There is no abdominal tenderness. There is no guarding or rebound.      Comments: No palpable organomegaly   Musculoskeletal:         General: No deformity.   Skin:     General: Skin is warm.      Coloration: Skin is not pale.      Findings: No erythema or rash.   Neurological:      Mental Status: He is alert and oriented to person, place, and time.      Motor: No abnormal muscle tone.      Coordination: Coordination normal.      Deep Tendon Reflexes: Reflexes normal.                 Lab Data:  CBC:   Recent Labs     06/20/24  0957   WBC 7.1   HGB 16.6   HCT 52.1*   MCV 95.1*        BMP:   Recent Labs     06/20/24  0957 06/21/24  0534    138   K 4.4 3.8    111   CO2 19* 18*   BUN 23 21   CREATININE 1.2 1.2     LIVER PROFILE:   Recent Labs     06/20/24  0957   AST 24   ALT 37   BILITOT 0.9   ALKPHOS 140*     PT/INR:   Recent Labs     06/21/24  0534   PROTIME 15.2*   INR 1.23*     APTT: No results for input(s): \"APTT\" in the last 72 hours.  BNP:  No results for input(s): \"BNP\" in the last 72 hours.  CK, CKMB, Troponin: @LABRCNT (CKTOTAL:3, CKMB:3, TROPONINI:3)@    IMAGING:  Echo (TTE) complete (PRN contrast/bubble/strain/3D)    Result Date: 6/21/2024    Left Ventricle: Reduced left ventricular systolic function with a visually estimated EF of 40 - 45%. EF by 2D Simpsons Biplane is 40%. Left ventricle is mildly dilated. Normal wall thickness. Mild global hypokinesis present. Moderate hypokinesis of the following segments: basal inferior, basal inferolateral and mid inferior. Indeterminate diastolic function due to atrial fibrillation. No mass  with anginal type symptoms, initial troponins negative with no significant ST-T changes.     1.  Remains in atrial fibrillation but rate controlled now.  Noted mild increase in troponin.  Concern for coronary disease progression with ongoing tobacco use since his PCI in 2017.  Will plan for cardiac catheterization Monday.  Transition Eliquis to IV heparin in the interim.  Will also plan for possible DCCV at that time if he remains in atrial fibrillation.  Continue IV amiodarone.    Nathaniel Barbosa MD, MD 6/21/2024 12:47 PM    Thisdictation was generated by voice recognition computer software.  Although all attempts are made to edit the dictation for accuracy, there may be errors in the transcription that are not intended.

## 2024-06-22 PROBLEM — I48.19 ATRIAL FIBRILLATION, PERSISTENT (HCC): Status: ACTIVE | Noted: 2024-06-20

## 2024-06-22 LAB
ANION GAP SERPL CALCULATED.3IONS-SCNC: 12 MMOL/L (ref 7–19)
APTT PPP: 173.9 SEC (ref 26–36.2)
APTT PPP: 46.1 SEC (ref 26–36.2)
APTT PPP: 54.5 SEC (ref 26–36.2)
APTT PPP: 59.7 SEC (ref 26–36.2)
BASOPHILS # BLD: 0 K/UL (ref 0–0.2)
BASOPHILS NFR BLD: 0.1 % (ref 0–1)
BUN SERPL-MCNC: 23 MG/DL (ref 8–23)
CALCIUM SERPL-MCNC: 8.1 MG/DL (ref 8.8–10.2)
CHLORIDE SERPL-SCNC: 108 MMOL/L (ref 98–111)
CO2 SERPL-SCNC: 20 MMOL/L (ref 22–29)
CREAT SERPL-MCNC: 1.3 MG/DL (ref 0.5–1.2)
EOSINOPHIL # BLD: 0 K/UL (ref 0–0.6)
EOSINOPHIL NFR BLD: 0.5 % (ref 0–5)
ERYTHROCYTE [DISTWIDTH] IN BLOOD BY AUTOMATED COUNT: 15.4 % (ref 11.5–14.5)
GLUCOSE SERPL-MCNC: 89 MG/DL (ref 74–109)
HCT VFR BLD AUTO: 41.3 % (ref 42–52)
HGB BLD-MCNC: 13.4 G/DL (ref 14–18)
IMM GRANULOCYTES # BLD: 0 K/UL
LYMPHOCYTES # BLD: 0.7 K/UL (ref 1.1–4.5)
LYMPHOCYTES NFR BLD: 9.1 % (ref 20–40)
MAGNESIUM SERPL-MCNC: 1.8 MG/DL (ref 1.6–2.4)
MCH RBC QN AUTO: 30.7 PG (ref 27–31)
MCHC RBC AUTO-ENTMCNC: 32.4 G/DL (ref 33–37)
MCV RBC AUTO: 94.5 FL (ref 80–94)
MONOCYTES # BLD: 0.7 K/UL (ref 0–0.9)
MONOCYTES NFR BLD: 9 % (ref 0–10)
NEUTROPHILS # BLD: 6.4 K/UL (ref 1.5–7.5)
NEUTS SEG NFR BLD: 80.9 % (ref 50–65)
PLATELET # BLD AUTO: 168 K/UL (ref 130–400)
PMV BLD AUTO: 11.3 FL (ref 9.4–12.4)
POTASSIUM SERPL-SCNC: 3.4 MMOL/L (ref 3.5–5)
RBC # BLD AUTO: 4.37 M/UL (ref 4.7–6.1)
SODIUM SERPL-SCNC: 140 MMOL/L (ref 136–145)
TROPONIN, HIGH SENSITIVITY: 67 NG/L (ref 0–22)
WBC # BLD AUTO: 7.9 K/UL (ref 4.8–10.8)

## 2024-06-22 PROCEDURE — 80048 BASIC METABOLIC PNL TOTAL CA: CPT

## 2024-06-22 PROCEDURE — 99232 SBSQ HOSP IP/OBS MODERATE 35: CPT | Performed by: INTERNAL MEDICINE

## 2024-06-22 PROCEDURE — 36415 COLL VENOUS BLD VENIPUNCTURE: CPT

## 2024-06-22 PROCEDURE — 6370000000 HC RX 637 (ALT 250 FOR IP): Performed by: NURSE PRACTITIONER

## 2024-06-22 PROCEDURE — 2500000003 HC RX 250 WO HCPCS: Performed by: INTERNAL MEDICINE

## 2024-06-22 PROCEDURE — 83735 ASSAY OF MAGNESIUM: CPT

## 2024-06-22 PROCEDURE — 2140000000 HC CCU INTERMEDIATE R&B

## 2024-06-22 PROCEDURE — 2580000003 HC RX 258: Performed by: INTERNAL MEDICINE

## 2024-06-22 PROCEDURE — 2580000003 HC RX 258

## 2024-06-22 PROCEDURE — 85025 COMPLETE CBC W/AUTO DIFF WBC: CPT

## 2024-06-22 PROCEDURE — 84484 ASSAY OF TROPONIN QUANT: CPT

## 2024-06-22 PROCEDURE — 6370000000 HC RX 637 (ALT 250 FOR IP)

## 2024-06-22 PROCEDURE — 6360000002 HC RX W HCPCS: Performed by: INTERNAL MEDICINE

## 2024-06-22 PROCEDURE — 85730 THROMBOPLASTIN TIME PARTIAL: CPT

## 2024-06-22 PROCEDURE — 6370000000 HC RX 637 (ALT 250 FOR IP): Performed by: INTERNAL MEDICINE

## 2024-06-22 RX ORDER — MECOBALAMIN 5000 MCG
5 TABLET,DISINTEGRATING ORAL NIGHTLY
Status: DISCONTINUED | OUTPATIENT
Start: 2024-06-22 | End: 2024-06-26 | Stop reason: HOSPADM

## 2024-06-22 RX ADMIN — HEPARIN SODIUM 2000 UNITS: 1000 INJECTION INTRAVENOUS; SUBCUTANEOUS at 06:07

## 2024-06-22 RX ADMIN — Medication 0.5 MG/MIN: at 03:48

## 2024-06-22 RX ADMIN — ATORVASTATIN CALCIUM 40 MG: 40 TABLET, FILM COATED ORAL at 08:46

## 2024-06-22 RX ADMIN — HEPARIN SODIUM 4000 UNITS: 1000 INJECTION INTRAVENOUS; SUBCUTANEOUS at 18:43

## 2024-06-22 RX ADMIN — SODIUM BICARBONATE 650 MG: 650 TABLET ORAL at 08:45

## 2024-06-22 RX ADMIN — HEPARIN SODIUM 16 UNITS/KG/HR: 10000 INJECTION, SOLUTION INTRAVENOUS at 12:29

## 2024-06-22 RX ADMIN — HEPARIN SODIUM 2000 UNITS: 1000 INJECTION INTRAVENOUS; SUBCUTANEOUS at 12:27

## 2024-06-22 RX ADMIN — Medication 0.5 MG/MIN: at 23:08

## 2024-06-22 RX ADMIN — Medication 5 MG: at 20:24

## 2024-06-22 RX ADMIN — PIPERACILLIN AND TAZOBACTAM 3375 MG: 3; .375 INJECTION, POWDER, LYOPHILIZED, FOR SOLUTION INTRAVENOUS at 14:44

## 2024-06-22 RX ADMIN — VERAPAMIL HYDROCHLORIDE 180 MG: 180 TABLET, FILM COATED, EXTENDED RELEASE ORAL at 08:45

## 2024-06-22 RX ADMIN — VERAPAMIL HYDROCHLORIDE 180 MG: 180 TABLET, FILM COATED, EXTENDED RELEASE ORAL at 20:23

## 2024-06-22 RX ADMIN — SODIUM CHLORIDE, PRESERVATIVE FREE 10 ML: 5 INJECTION INTRAVENOUS at 20:27

## 2024-06-22 RX ADMIN — PIPERACILLIN AND TAZOBACTAM 3375 MG: 3; .375 INJECTION, POWDER, LYOPHILIZED, FOR SOLUTION INTRAVENOUS at 23:09

## 2024-06-22 RX ADMIN — PIPERACILLIN AND TAZOBACTAM 3375 MG: 3; .375 INJECTION, POWDER, LYOPHILIZED, FOR SOLUTION INTRAVENOUS at 06:25

## 2024-06-22 RX ADMIN — SODIUM BICARBONATE 650 MG: 650 TABLET ORAL at 20:24

## 2024-06-22 RX ADMIN — ESCITALOPRAM OXALATE 20 MG: 10 TABLET ORAL at 08:46

## 2024-06-22 RX ADMIN — SODIUM CHLORIDE, PRESERVATIVE FREE 10 ML: 5 INJECTION INTRAVENOUS at 08:47

## 2024-06-22 RX ADMIN — PANTOPRAZOLE SODIUM 40 MG: 40 TABLET, DELAYED RELEASE ORAL at 06:12

## 2024-06-22 ASSESSMENT — ENCOUNTER SYMPTOMS
CONSTIPATION: 0
DIARRHEA: 0
COLOR CHANGE: 0
ABDOMINAL PAIN: 0
BLOOD IN STOOL: 0
WHEEZING: 0
NAUSEA: 0
ABDOMINAL DISTENTION: 0
SHORTNESS OF BREATH: 0
VOMITING: 0
COUGH: 0

## 2024-06-22 NOTE — PROGRESS NOTES
[facility]      [facility] Note    Patient:  Rojas Call  YOB: 1950  Date of Service: 6/22/2024  MRN: [id number]   Acct: [id number]   Primary Care Physician: Juwan Trevino MD  Advance Directive: Full Code  Admit Date: 6/20/2024       Hospital Day: 2    Portions of this note have been copied forward, however, updated to reflect the most current clinical status of this patient.     CHIEF COMPLAINT extremity weakness    SUBJECTIVE:  Mr. Call was resting in bed this morning. Reports not being able to sleep well last night. Denies shortness of breath or chest pain.      CUMULATIVE HOSPITAL COURSE:   The patient is a 74 y.o. male with PMH of Atrial fibrillation, CAD, COPD, HTN, HLD and current smoker who presented to [facility] ED for evaluation of extremity weakness in bilateral arms. Reported waking up with bilateral arm weakness on morning of admission. Reported having similar symptoms with previous MI, therefore presented to ED for further evaluation. Workup in ED revealed EKG- A-fib RVR with no ischemic changes; CO2 19, troponin 44 followed by 46, Alk phos 140, unremarkable CBC, chest x-ray indicated cardiomegaly, benign stable calcified granuloma lateral aspect of the left lower lobe.  Patient was admitted to hospital medicine for further evaluation with cardiology consultation.  Cardizem drip initiated.  Cardiology recommended initiating amiodarone drip bolus and drip.  Consideration for DCCV if he continues to remain in A-fib. ECHO indicated reduced LV systolic function with EF of 40 to 44%, mild global hypokinesis, moderate hypokinesis of basal inferior, basal inferolateral, and mid inferior, indeterminate diastolic function due to A-fib. Cardiology further recommended plans for cardiac catheterization on Monday with possible DCCV.           Review of Systems   Constitutional:  Negative for chills, diaphoresis, fatigue and fever.   HENT:  Negative for congestion and ear pain.     Eyes:  Negative for visual disturbance.   Respiratory:  Negative for cough, shortness of breath and wheezing.    Cardiovascular:  Negative for chest pain, palpitations and leg swelling.   Gastrointestinal:  Negative for abdominal distention, abdominal pain, blood in stool, constipation, diarrhea, nausea and vomiting.   Endocrine: Negative for cold intolerance and heat intolerance.   Genitourinary:  Negative for difficulty urinating, flank pain, frequency and urgency.   Musculoskeletal:  Negative for arthralgias and myalgias.   Skin:  Negative for color change and wound.   Neurological:  Negative for dizziness, syncope, weakness, light-headedness, numbness and headaches.   Hematological:  Does not bruise/bleed easily.   Psychiatric/Behavioral:  Negative for agitation, confusion and dysphoric mood.         Objective:   VITALS:  /88   Pulse (!) 104   Temp 97.2 °F (36.2 °C) (Temporal)   Resp 18   Ht 1.829 m (6')   Wt 77 kg (169 lb 12.8 oz)   SpO2 94%   BMI 23.03 kg/m²   24HR INTAKE/OUTPUT:    Intake/Output Summary (Last 24 hours) at 6/22/2024 1400  Last data filed at 6/22/2024 1352  Gross per 24 hour   Intake 350 ml   Output 300 ml   Net 50 ml             Physical Exam  Constitutional:       General: He is not in acute distress.     Appearance: Normal appearance. He is not ill-appearing.   HENT:      Head: Normocephalic and atraumatic.      Right Ear: External ear normal.      Left Ear: External ear normal.      Nose: Nose normal.      Mouth/Throat:      Mouth: Mucous membranes are moist.   Eyes:      Extraocular Movements: Extraocular movements intact.      Conjunctiva/sclera: Conjunctivae normal.      Pupils: Pupils are equal, round, and reactive to light.   Cardiovascular:      Rate and Rhythm: Tachycardia present. Rhythm irregular.      Pulses: Normal pulses.      Heart sounds: Normal heart sounds.      Comments: Afib 119 per monitor   Pulmonary:      Effort: Pulmonary effort is normal. No respiratory

## 2024-06-22 NOTE — PROGRESS NOTES
At [facility], we strive to deliver an exceptional experience to you, every time we see you. If you receive a survey, please complete it as we do value your feedback.  If you have MyChart, you can expect to receive results automatically within 24 hours of their completion.  Your provider will send a note interpreting your results as well.   If you do not have MyChart, you should receive your results in about a week by mail.    Your care team:                            Family Medicine Internal Medicine   MD Jesus Mendez MD Shantel Branch-Fleming, MD Srinivasa Vaka, MD Katya Belousova, PARAFAEL Haddad, APRN CNP    Mauricio Brown, MD Pediatrics   Raad Suero, PARAFAEL Tyson, CNP MD Shanda Ibarra APRN CNP   MD Chantal Lamas MD Deborah Mielke, MD Sapna Rehman, APRN [facility]      Clinic hours: Monday - Thursday 7 am-6 pm; Fridays 7 am-5 pm.   Urgent care: Monday - Friday 10 am- 8 pm; Saturday and Sunday 9 am-5 pm.    Clinic: [phone]       [facility]: Monday - Thursday 8 am - 7 pm; Friday 8 am - 6 pm  [facility]: [phone]     Use www.oncare.org for 24/7 diagnosis and treatment of dozens of conditions.     Cardiology Progress Note Nathaniel Barbosa MD      Patient:  Rojas Call  [id number]    Patient Active Problem List    Diagnosis Date Noted    Atrial fibrillation, persistent ([facility]) 06/20/2024     Priority: High    NSTEMI (non-ST elevated myocardial infarction) ([facility])      Priority: High    Ventricular tachycardia ([facility])      Priority: High    Palliative care patient 06/21/2024     Priority: Low    Atrial fibrillation with RVR ([facility]) 06/20/2024     Priority: Low    Metabolic acidosis with normal anion gap and bicarbonate losses 06/20/2024     Priority: Low    COVID-19 virus infection 06/07/2024     Priority: Low    Dysuria 06/07/2024     Priority: Low    Sepsis ([facility]) 06/07/2024     Priority: Low    Thrombocytopenia ([facility]) 06/07/2024     Priority: Low    Paroxysmal atrial fibrillation ([facility]) 03/27/2022     Priority: Low    CKD (chronic kidney disease) stage 3, GFR 30-59 ml/min ([facility]) 08/24/2019     Priority: Low    Mild episode of recurrent major depressive disorder ([facility]) 08/24/2019     Priority: Low    Mixed hyperlipidemia 04/05/2018     Priority: Low    History of coronary artery stent placement 04/05/2018     Priority: Low     Overview Note:     7/6/17  Non-STEMI, status post stenting to 3rd circumflex marginal branch this admission      CAD (coronary artery disease)      Priority: Low    Smoker      Priority: Low    SVT (supraventricular tachycardia) ([facility]) 11/30/2017     Priority: Low    Essential hypertension 08/03/2016     Priority: Low    Premature ventricular contractions 06/22/2016     Priority: Low    Premature atrial complexes 06/22/2016     Priority: Low    Chest pain 06/22/2016     Priority: Low    Palpitations 06/22/2016     Priority: Low    Referral of patient 06/22/2016     Priority: Low    Atypical chest pain 03/31/2016     Priority: Low    History of adenomatous polyp of colon 06/27/2013     Priority: Low    COPD (chronic obstructive pulmonary disease) ([facility]) 06/27/2013     Priority: Low    History of gastric  ______________________________________ Electronically signed by: ELIZABETH FISHER D.O. Date:     06/07/2024 Time:    12:39         Assessment and Plan:    This is a 74 y.o. year old male with past medical history of coronary artery disease with prior PCI to OM1 7/6/2017 with SEBASTIEN, normal LV ejection fraction, paroxysmal atrial fibrillation/SVT, on Eliquis, hypertension, active ongoing tobacco use with probable COPD, presenting with recurrent atrial fibrillation with RVR in the setting of recent COVID infection associated with anginal type symptoms, mildly elevated troponins with no significant ST-T changes.    1.  Remains in atrial fibrillation on IV amiodarone and AV fritz agents.  On IV heparin.  Hemoglobin stable at 13.4.  No further symptoms in the hospital.  Noted troponin elevation with plan for cardiac catheterization Monday with possible DCCV.        Nathaniel Barbosa MD, MD 6/22/2024 1:19 PM    Thisdictation was generated by voice recognition computer software.  Although all attempts are made to edit the dictation for accuracy, there may be errors in the transcription that are not intended.

## 2024-06-23 VITALS
SYSTOLIC BLOOD PRESSURE: 110 MMHG | WEIGHT: 169.8 LBS | HEART RATE: 76 BPM | BODY MASS INDEX: 23 KG/M2 | DIASTOLIC BLOOD PRESSURE: 79 MMHG | OXYGEN SATURATION: 96 % | HEIGHT: 72 IN | TEMPERATURE: 100 F | RESPIRATION RATE: 16 BRPM

## 2024-06-23 LAB
ANION GAP SERPL CALCULATED.3IONS-SCNC: 12 MMOL/L (ref 7–19)
APTT PPP: 61.4 SEC (ref 26–36.2)
BASOPHILS # BLD: 0 K/UL (ref 0–0.2)
BASOPHILS NFR BLD: 0.3 % (ref 0–1)
BUN SERPL-MCNC: 21 MG/DL (ref 8–23)
CALCIUM SERPL-MCNC: 8.1 MG/DL (ref 8.8–10.2)
CHLORIDE SERPL-SCNC: 110 MMOL/L (ref 98–111)
CO2 SERPL-SCNC: 16 MMOL/L (ref 22–29)
CREAT SERPL-MCNC: 1.1 MG/DL (ref 0.5–1.2)
EKG P AXIS: NORMAL DEGREES
EKG P-R INTERVAL: NORMAL MS
EKG Q-T INTERVAL: 292 MS
EKG QRS DURATION: 76 MS
EKG QTC CALCULATION (BAZETT): 458 MS
EKG T AXIS: 100 DEGREES
EOSINOPHIL # BLD: 0 K/UL (ref 0–0.6)
EOSINOPHIL NFR BLD: 0.4 % (ref 0–5)
ERYTHROCYTE [DISTWIDTH] IN BLOOD BY AUTOMATED COUNT: 15.6 % (ref 11.5–14.5)
GLUCOSE SERPL-MCNC: 85 MG/DL (ref 74–109)
HCT VFR BLD AUTO: 42.4 % (ref 42–52)
HGB BLD-MCNC: 12.9 G/DL (ref 14–18)
IMM GRANULOCYTES # BLD: 0 K/UL
LYMPHOCYTES # BLD: 0.5 K/UL (ref 1.1–4.5)
LYMPHOCYTES NFR BLD: 7.2 % (ref 20–40)
MCH RBC QN AUTO: 30.5 PG (ref 27–31)
MCHC RBC AUTO-ENTMCNC: 30.4 G/DL (ref 33–37)
MCV RBC AUTO: 100.2 FL (ref 80–94)
MONOCYTES # BLD: 0.6 K/UL (ref 0–0.9)
MONOCYTES NFR BLD: 7.8 % (ref 0–10)
NEUTROPHILS # BLD: 6.1 K/UL (ref 1.5–7.5)
NEUTS SEG NFR BLD: 83.9 % (ref 50–65)
PLATELET # BLD AUTO: 109 K/UL (ref 130–400)
PMV BLD AUTO: 11 FL (ref 9.4–12.4)
POTASSIUM SERPL-SCNC: 3.6 MMOL/L (ref 3.5–5)
RBC # BLD AUTO: 4.23 M/UL (ref 4.7–6.1)
SODIUM SERPL-SCNC: 138 MMOL/L (ref 136–145)
WBC # BLD AUTO: 7.3 K/UL (ref 4.8–10.8)

## 2024-06-23 PROCEDURE — 36415 COLL VENOUS BLD VENIPUNCTURE: CPT

## 2024-06-23 PROCEDURE — 85730 THROMBOPLASTIN TIME PARTIAL: CPT

## 2024-06-23 PROCEDURE — 6370000000 HC RX 637 (ALT 250 FOR IP)

## 2024-06-23 PROCEDURE — 6370000000 HC RX 637 (ALT 250 FOR IP): Performed by: NURSE PRACTITIONER

## 2024-06-23 PROCEDURE — 99232 SBSQ HOSP IP/OBS MODERATE 35: CPT | Performed by: INTERNAL MEDICINE

## 2024-06-23 PROCEDURE — 80048 BASIC METABOLIC PNL TOTAL CA: CPT

## 2024-06-23 PROCEDURE — 85025 COMPLETE CBC W/AUTO DIFF WBC: CPT

## 2024-06-23 PROCEDURE — 2140000000 HC CCU INTERMEDIATE R&B

## 2024-06-23 PROCEDURE — 2580000003 HC RX 258: Performed by: INTERNAL MEDICINE

## 2024-06-23 PROCEDURE — 6370000000 HC RX 637 (ALT 250 FOR IP): Performed by: INTERNAL MEDICINE

## 2024-06-23 PROCEDURE — 6360000002 HC RX W HCPCS: Performed by: INTERNAL MEDICINE

## 2024-06-23 PROCEDURE — 2580000003 HC RX 258

## 2024-06-23 PROCEDURE — 93010 ELECTROCARDIOGRAM REPORT: CPT | Performed by: INTERNAL MEDICINE

## 2024-06-23 RX ORDER — SODIUM BICARBONATE 650 MG/1
650 TABLET ORAL 3 TIMES DAILY
Status: DISCONTINUED | OUTPATIENT
Start: 2024-06-23 | End: 2024-06-26 | Stop reason: HOSPADM

## 2024-06-23 RX ORDER — ENOXAPARIN SODIUM 100 MG/ML
1 INJECTION SUBCUTANEOUS 2 TIMES DAILY
Status: DISCONTINUED | OUTPATIENT
Start: 2024-06-23 | End: 2024-06-24

## 2024-06-23 RX ORDER — METOPROLOL SUCCINATE 50 MG/1
50 TABLET, EXTENDED RELEASE ORAL 2 TIMES DAILY
Status: DISCONTINUED | OUTPATIENT
Start: 2024-06-23 | End: 2024-06-26 | Stop reason: HOSPADM

## 2024-06-23 RX ADMIN — SODIUM BICARBONATE 650 MG: 650 TABLET ORAL at 08:10

## 2024-06-23 RX ADMIN — PIPERACILLIN AND TAZOBACTAM 3375 MG: 3; .375 INJECTION, POWDER, LYOPHILIZED, FOR SOLUTION INTRAVENOUS at 06:21

## 2024-06-23 RX ADMIN — METOPROLOL SUCCINATE 50 MG: 50 TABLET, EXTENDED RELEASE ORAL at 21:10

## 2024-06-23 RX ADMIN — ENOXAPARIN SODIUM 80 MG: 100 INJECTION SUBCUTANEOUS at 09:24

## 2024-06-23 RX ADMIN — VERAPAMIL HYDROCHLORIDE 180 MG: 180 TABLET, FILM COATED, EXTENDED RELEASE ORAL at 21:10

## 2024-06-23 RX ADMIN — SODIUM BICARBONATE 650 MG: 650 TABLET ORAL at 21:13

## 2024-06-23 RX ADMIN — PIPERACILLIN AND TAZOBACTAM 3375 MG: 3; .375 INJECTION, POWDER, LYOPHILIZED, FOR SOLUTION INTRAVENOUS at 14:43

## 2024-06-23 RX ADMIN — SODIUM BICARBONATE 650 MG: 650 TABLET ORAL at 13:55

## 2024-06-23 RX ADMIN — ESCITALOPRAM OXALATE 20 MG: 10 TABLET ORAL at 08:10

## 2024-06-23 RX ADMIN — Medication 0.5 MG/MIN: at 14:42

## 2024-06-23 RX ADMIN — VERAPAMIL HYDROCHLORIDE 180 MG: 180 TABLET, FILM COATED, EXTENDED RELEASE ORAL at 08:10

## 2024-06-23 RX ADMIN — ENOXAPARIN SODIUM 80 MG: 100 INJECTION SUBCUTANEOUS at 21:09

## 2024-06-23 RX ADMIN — ATORVASTATIN CALCIUM 40 MG: 40 TABLET, FILM COATED ORAL at 08:10

## 2024-06-23 RX ADMIN — PANTOPRAZOLE SODIUM 40 MG: 40 TABLET, DELAYED RELEASE ORAL at 06:22

## 2024-06-23 RX ADMIN — HEPARIN SODIUM 17 UNITS/KG/HR: 10000 INJECTION, SOLUTION INTRAVENOUS at 00:42

## 2024-06-23 RX ADMIN — HEPARIN SODIUM 2000 UNITS: 1000 INJECTION INTRAVENOUS; SUBCUTANEOUS at 08:58

## 2024-06-23 RX ADMIN — METOPROLOL SUCCINATE 50 MG: 50 TABLET, EXTENDED RELEASE ORAL at 10:16

## 2024-06-23 RX ADMIN — SODIUM CHLORIDE, PRESERVATIVE FREE 10 ML: 5 INJECTION INTRAVENOUS at 21:14

## 2024-06-23 RX ADMIN — Medication 5 MG: at 21:13

## 2024-06-23 RX ADMIN — SODIUM CHLORIDE, PRESERVATIVE FREE 10 ML: 5 INJECTION INTRAVENOUS at 08:13

## 2024-06-23 NOTE — PROGRESS NOTES
[facility]      Progress Note    Patient:  Rojas Call  YOB: 1950  Date of Service: 6/23/2024  MRN: [id number]   Acct: [id number]   Primary Care Physician: Juwan Trevino MD  Advance Directive: Full Code  Admit Date: 6/20/2024       Hospital Day: 3    Portions of this note have been copied forward, however, updated to reflect the most current clinical status of this patient.     CHIEF COMPLAINT extremity weakness    SUBJECTIVE:  Mr. Call was resting in bed this morning. Denies SOB or chest pain at this time.       CUMULATIVE HOSPITAL COURSE:   The patient is a 74 y.o. male with PMH of Atrial fibrillation, CAD, COPD, HTN, HLD and current smoker who presented to [facility] ED for evaluation of extremity weakness in bilateral arms. Reported waking up with bilateral arm weakness on morning of admission. Reported having similar symptoms with previous MI, therefore presented to ED for further evaluation. Workup in ED revealed EKG- A-fib RVR with no ischemic changes; CO2 19, troponin 44 followed by 46, Alk phos 140, unremarkable CBC, chest x-ray indicated cardiomegaly, benign stable calcified granuloma lateral aspect of the left lower lobe.  Patient was admitted to hospital medicine for further evaluation with cardiology consultation.  Cardizem drip initiated.  Cardiology recommended initiating amiodarone drip bolus and drip.  Consideration for DCCV if he continues to remain in A-fib. ECHO indicated reduced LV systolic function with EF of 40 to 44%, mild global hypokinesis, moderate hypokinesis of basal inferior, basal inferolateral, and mid inferior, indeterminate diastolic function due to A-fib. Cardiology further recommended plans for cardiac catheterization on Monday with possible DCCV. Cardiology further recommended adding Toprol XL 50 mg BID.           Review of Systems   Constitutional:  Negative for chills, diaphoresis, fatigue and fever.   HENT:  Negative for congestion and ear pain.  Recommended continuing Amiodarone gtt and added Toprol XL 50 mg BID    - Further plans for cardiac catheterization on Monday with possible DCCV    - Zosyn per cardiology for COPD exacerbation  - Heparin gtt adjusted to Lovenox 1 mg/kg BID per cardiology   - ECHO indicated reduced LV systolic function with EF of 40 to 44%, mild global hypokinesis, moderate hypokinesis of basal inferior, basal inferolateral, and mid inferior, indeterminate diastolic function due to A-fib   - Serial and PRN EKGs  - Monitor on telemetry         Active Problems:    History of gastric ulcer-    - Continue home Protonix       Essential hypertension-   - Stable at this time   - Continue current medications   - Monitor BP closely      Mixed hyperlipidemia-    - Continue statin      Mild episode of recurrent major depressive disorder (HCC)-    - Continue home medication       Metabolic acidosis with normal anion gap and bicarbonate losses-   - Increased Sodium Bicarbonate tablet 650 mg to TID     - Monitor labs closely       Palliative care patient        Antibiotics: Zosyn     DVT Prophylaxis: Lovenox     GI prophylaxis: Protonix    Discharge planning: TBD      Further Orders per Clinical course/attending.     Electronically signed by KEVYN Burton CNP on 6/23/2024 at 10:43 AM       EMR Dragon/Transcription disclaimer:   Much of this encounter note is an electronic transcription/translation of spoken language to printed text. The electronic translation of spoken language may permit erroneous, or at times, nonsensical words or phrases to be inadvertently transcribed; although attempts have made to review the note for such errors, some may still exist.

## 2024-06-23 NOTE — PLAN OF CARE
Problem: Discharge Planning  Goal: Discharge to home or other facility with appropriate resources  Outcome: Progressing     Problem: Safety - Adult  Goal: Free from fall injury  Outcome: Progressing     Problem: Cardiovascular - Adult  Goal: Maintains optimal cardiac output and hemodynamic stability  Outcome: Progressing  Goal: Absence of cardiac dysrhythmias or at baseline  Outcome: Progressing     Problem: Metabolic/Fluid and Electrolytes - Adult  Goal: Electrolytes maintained within normal limits  Outcome: Progressing

## 2024-06-23 NOTE — PLAN OF CARE
Problem: Discharge Planning  Goal: Discharge to home or other facility with appropriate resources  6/23/2024 1244 by Mitch Gamez, RN  Outcome: Progressing  6/23/2024 0513 by Holly Blake RN  Outcome: Progressing     Problem: Safety - Adult  Goal: Free from fall injury  6/23/2024 1244 by Mitch Gamez, RN  Outcome: Progressing  6/23/2024 0513 by Holly Blake RN  Outcome: Progressing     Problem: Cardiovascular - Adult  Goal: Maintains optimal cardiac output and hemodynamic stability  6/23/2024 1244 by Mitch Gamez RN  Outcome: Not Progressing  6/23/2024 0513 by Holly Blake RN  Outcome: Progressing     Problem: Cardiovascular - Adult  Goal: Absence of cardiac dysrhythmias or at baseline  6/23/2024 0513 by Holly Blake RN  Outcome: Progressing     Problem: Cardiovascular - Adult  Goal: Maintains optimal cardiac output and hemodynamic stability  6/23/2024 1244 by Mitch Gamez RN  Outcome: Not Progressing  6/23/2024 0513 by Holly Blake RN  Outcome: Progressing

## 2024-06-23 NOTE — PROGRESS NOTES
Cardiology Progress Note Nathaniel Barbosa MD      Patient:  Rojas Call  [id number]    Patient Active Problem List    Diagnosis Date Noted    Atrial fibrillation, persistent ([facility]) 06/20/2024     Priority: High    NSTEMI (non-ST elevated myocardial infarction) ([facility])      Priority: High    Ventricular tachycardia ([facility])      Priority: High    Palliative care patient 06/21/2024     Priority: Low    Atrial fibrillation with RVR ([facility]) 06/20/2024     Priority: Low    Metabolic acidosis with normal anion gap and bicarbonate losses 06/20/2024     Priority: Low    COVID-19 virus infection 06/07/2024     Priority: Low    Dysuria 06/07/2024     Priority: Low    Sepsis ([facility]) 06/07/2024     Priority: Low    Thrombocytopenia ([facility]) 06/07/2024     Priority: Low    Paroxysmal atrial fibrillation ([facility]) 03/27/2022     Priority: Low    CKD (chronic kidney disease) stage 3, GFR 30-59 ml/min ([facility]) 08/24/2019     Priority: Low    Mild episode of recurrent major depressive disorder ([facility]) 08/24/2019     Priority: Low    Mixed hyperlipidemia 04/05/2018     Priority: Low    History of coronary artery stent placement 04/05/2018     Priority: Low     Overview Note:     7/6/17  Non-STEMI, status post stenting to 3rd circumflex marginal branch this admission      CAD (coronary artery disease)      Priority: Low    Smoker      Priority: Low    SVT (supraventricular tachycardia) ([facility]) 11/30/2017     Priority: Low    Essential hypertension 08/03/2016     Priority: Low    Premature ventricular contractions 06/22/2016     Priority: Low    Premature atrial complexes 06/22/2016     Priority: Low    Chest pain 06/22/2016     Priority: Low    Palpitations 06/22/2016     Priority: Low    Referral of patient 06/22/2016     Priority: Low    Atypical chest pain 03/31/2016     Priority: Low    History of adenomatous polyp of colon 06/27/2013     Priority: Low    COPD (chronic obstructive pulmonary disease) ([facility]) 06/27/2013     Priority: Low    History of gastric

## 2024-06-24 ENCOUNTER — APPOINTMENT (OUTPATIENT)
Dept: GENERAL RADIOLOGY | Age: 74
DRG: 280 | End: 2024-06-24
Payer: MEDICARE

## 2024-06-24 PROBLEM — I48.91 ATRIAL FIBRILLATION (HCC): Status: ACTIVE | Noted: 2024-06-20

## 2024-06-24 LAB
ALLENS TEST: ABNORMAL
ANION GAP SERPL CALCULATED.3IONS-SCNC: 14 MMOL/L (ref 7–19)
ANION GAP SERPL CALCULATED.3IONS-SCNC: 15 MMOL/L (ref 7–19)
BASE EXCESS ARTERIAL: -2.3 MMOL/L (ref -2–2)
BASOPHILS # BLD: 0 K/UL (ref 0–0.2)
BASOPHILS NFR BLD: 0.1 % (ref 0–1)
BNP BLD-MCNC: 5412 PG/ML (ref 0–449)
BUN SERPL-MCNC: 20 MG/DL (ref 8–23)
BUN SERPL-MCNC: 21 MG/DL (ref 8–23)
CALCIUM SERPL-MCNC: 8 MG/DL (ref 8.8–10.2)
CALCIUM SERPL-MCNC: 8.1 MG/DL (ref 8.8–10.2)
CARBOXYHEMOGLOBIN ARTERIAL: 1.9 % (ref 0–5)
CHLORIDE SERPL-SCNC: 109 MMOL/L (ref 98–111)
CHLORIDE SERPL-SCNC: 111 MMOL/L (ref 98–111)
CO2 SERPL-SCNC: 16 MMOL/L (ref 22–29)
CO2 SERPL-SCNC: 19 MMOL/L (ref 22–29)
CREAT SERPL-MCNC: 1.2 MG/DL (ref 0.5–1.2)
CREAT SERPL-MCNC: 1.2 MG/DL (ref 0.5–1.2)
EKG P AXIS: NORMAL DEGREES
EKG P AXIS: NORMAL DEGREES
EKG P-R INTERVAL: NORMAL MS
EKG P-R INTERVAL: NORMAL MS
EKG Q-T INTERVAL: 286 MS
EKG Q-T INTERVAL: 380 MS
EKG QRS DURATION: 70 MS
EKG QRS DURATION: 78 MS
EKG QTC CALCULATION (BAZETT): 440 MS
EKG QTC CALCULATION (BAZETT): 455 MS
EKG T AXIS: 0 DEGREES
EKG T AXIS: 60 DEGREES
EOSINOPHIL # BLD: 0 K/UL (ref 0–0.6)
EOSINOPHIL NFR BLD: 0.3 % (ref 0–5)
ERYTHROCYTE [DISTWIDTH] IN BLOOD BY AUTOMATED COUNT: 15.9 % (ref 11.5–14.5)
GLUCOSE SERPL-MCNC: 104 MG/DL (ref 74–109)
GLUCOSE SERPL-MCNC: 116 MG/DL (ref 74–109)
HCO3 ARTERIAL: 19.2 MMOL/L (ref 22–26)
HCT VFR BLD AUTO: 41.9 % (ref 42–52)
HEMOGLOBIN, ART, EXTENDED: 12.7 G/DL (ref 14–18)
HGB BLD-MCNC: 12.6 G/DL (ref 14–18)
IMM GRANULOCYTES # BLD: 0 K/UL
LYMPHOCYTES # BLD: 0.5 K/UL (ref 1.1–4.5)
LYMPHOCYTES NFR BLD: 7.3 % (ref 20–40)
MAGNESIUM SERPL-MCNC: 1.8 MG/DL (ref 1.6–2.4)
MCH RBC QN AUTO: 30.8 PG (ref 27–31)
MCHC RBC AUTO-ENTMCNC: 30.1 G/DL (ref 33–37)
MCV RBC AUTO: 102.4 FL (ref 80–94)
METHEMOGLOBIN ARTERIAL: 0.6 %
MONOCYTES # BLD: 0.6 K/UL (ref 0–0.9)
MONOCYTES NFR BLD: 8.4 % (ref 0–10)
NEUTROPHILS # BLD: 6.1 K/UL (ref 1.5–7.5)
NEUTS SEG NFR BLD: 83.6 % (ref 50–65)
O2 CONTENT ARTERIAL: 15.4 ML/DL
O2 SAT, ARTERIAL: 86.3 %
O2 THERAPY: ABNORMAL
OXYGEN FLOW: 5
PCO2 ARTERIAL: 24 MMHG (ref 35–45)
PH ARTERIAL: 7.51 (ref 7.35–7.45)
PLATELET # BLD AUTO: 132 K/UL (ref 130–400)
PMV BLD AUTO: 12.7 FL (ref 9.4–12.4)
PO2 ARTERIAL: 48 MMHG (ref 80–100)
POTASSIUM BLD-SCNC: 3.6 MMOL/L
POTASSIUM SERPL-SCNC: 3.4 MMOL/L (ref 3.5–5)
POTASSIUM SERPL-SCNC: 4.7 MMOL/L (ref 3.5–5)
RBC # BLD AUTO: 4.09 M/UL (ref 4.7–6.1)
SAMPLE SOURCE: ABNORMAL
SODIUM SERPL-SCNC: 141 MMOL/L (ref 136–145)
SODIUM SERPL-SCNC: 143 MMOL/L (ref 136–145)
WBC # BLD AUTO: 7.3 K/UL (ref 4.8–10.8)

## 2024-06-24 PROCEDURE — 93005 ELECTROCARDIOGRAM TRACING: CPT | Performed by: INTERNAL MEDICINE

## 2024-06-24 PROCEDURE — 2140000000 HC CCU INTERMEDIATE R&B

## 2024-06-24 PROCEDURE — 92960 CARDIOVERSION ELECTRIC EXT: CPT | Performed by: INTERNAL MEDICINE

## 2024-06-24 PROCEDURE — 94640 AIRWAY INHALATION TREATMENT: CPT

## 2024-06-24 PROCEDURE — C1769 GUIDE WIRE: HCPCS | Performed by: INTERNAL MEDICINE

## 2024-06-24 PROCEDURE — 94760 N-INVAS EAR/PLS OXIMETRY 1: CPT

## 2024-06-24 PROCEDURE — 93458 L HRT ARTERY/VENTRICLE ANGIO: CPT | Performed by: INTERNAL MEDICINE

## 2024-06-24 PROCEDURE — 6360000002 HC RX W HCPCS: Performed by: NURSE PRACTITIONER

## 2024-06-24 PROCEDURE — 6360000002 HC RX W HCPCS: Performed by: INTERNAL MEDICINE

## 2024-06-24 PROCEDURE — 36600 WITHDRAWAL OF ARTERIAL BLOOD: CPT

## 2024-06-24 PROCEDURE — 36415 COLL VENOUS BLD VENIPUNCTURE: CPT

## 2024-06-24 PROCEDURE — 2500000003 HC RX 250 WO HCPCS: Performed by: INTERNAL MEDICINE

## 2024-06-24 PROCEDURE — 4A023N7 MEASUREMENT OF CARDIAC SAMPLING AND PRESSURE, LEFT HEART, PERCUTANEOUS APPROACH: ICD-10-PCS | Performed by: INTERNAL MEDICINE

## 2024-06-24 PROCEDURE — 92928 PRQ TCAT PLMT NTRAC ST 1 LES: CPT | Performed by: INTERNAL MEDICINE

## 2024-06-24 PROCEDURE — 99152 MOD SED SAME PHYS/QHP 5/>YRS: CPT | Performed by: INTERNAL MEDICINE

## 2024-06-24 PROCEDURE — 2580000003 HC RX 258: Performed by: INTERNAL MEDICINE

## 2024-06-24 PROCEDURE — 99153 MOD SED SAME PHYS/QHP EA: CPT | Performed by: INTERNAL MEDICINE

## 2024-06-24 PROCEDURE — 2580000003 HC RX 258

## 2024-06-24 PROCEDURE — 83880 ASSAY OF NATRIURETIC PEPTIDE: CPT

## 2024-06-24 PROCEDURE — 5A2204Z RESTORATION OF CARDIAC RHYTHM, SINGLE: ICD-10-PCS | Performed by: INTERNAL MEDICINE

## 2024-06-24 PROCEDURE — C9600 PERC DRUG-EL COR STENT SING: HCPCS | Performed by: INTERNAL MEDICINE

## 2024-06-24 PROCEDURE — 6370000000 HC RX 637 (ALT 250 FOR IP): Performed by: INTERNAL MEDICINE

## 2024-06-24 PROCEDURE — 6360000004 HC RX CONTRAST MEDICATION: Performed by: INTERNAL MEDICINE

## 2024-06-24 PROCEDURE — 027034Z DILATION OF CORONARY ARTERY, ONE ARTERY WITH DRUG-ELUTING INTRALUMINAL DEVICE, PERCUTANEOUS APPROACH: ICD-10-PCS | Performed by: INTERNAL MEDICINE

## 2024-06-24 PROCEDURE — 6370000000 HC RX 637 (ALT 250 FOR IP)

## 2024-06-24 PROCEDURE — 83735 ASSAY OF MAGNESIUM: CPT

## 2024-06-24 PROCEDURE — C1887 CATHETER, GUIDING: HCPCS | Performed by: INTERNAL MEDICINE

## 2024-06-24 PROCEDURE — 82803 BLOOD GASES ANY COMBINATION: CPT

## 2024-06-24 PROCEDURE — C1894 INTRO/SHEATH, NON-LASER: HCPCS | Performed by: INTERNAL MEDICINE

## 2024-06-24 PROCEDURE — 93010 ELECTROCARDIOGRAM REPORT: CPT | Performed by: INTERNAL MEDICINE

## 2024-06-24 PROCEDURE — 80048 BASIC METABOLIC PNL TOTAL CA: CPT

## 2024-06-24 PROCEDURE — C1725 CATH, TRANSLUMIN NON-LASER: HCPCS | Performed by: INTERNAL MEDICINE

## 2024-06-24 PROCEDURE — 2700000000 HC OXYGEN THERAPY PER DAY

## 2024-06-24 PROCEDURE — 85025 COMPLETE CBC W/AUTO DIFF WBC: CPT

## 2024-06-24 PROCEDURE — 6370000000 HC RX 637 (ALT 250 FOR IP): Performed by: NURSE PRACTITIONER

## 2024-06-24 PROCEDURE — C1874 STENT, COATED/COV W/DEL SYS: HCPCS | Performed by: INTERNAL MEDICINE

## 2024-06-24 PROCEDURE — 2709999900 HC NON-CHARGEABLE SUPPLY: Performed by: INTERNAL MEDICINE

## 2024-06-24 PROCEDURE — 71045 X-RAY EXAM CHEST 1 VIEW: CPT

## 2024-06-24 DEVICE — BALLOON  NCEUP27512X  NCEUPHORA  RX US
Type: IMPLANTABLE DEVICE | Status: FUNCTIONAL
Brand: NC EUPHORA™

## 2024-06-24 DEVICE — STENT ONYXNG22512UX ONYX 2.25X12RX
Type: IMPLANTABLE DEVICE | Status: FUNCTIONAL
Brand: ONYX FRONTIER™

## 2024-06-24 RX ORDER — ONDANSETRON 2 MG/ML
4 INJECTION INTRAMUSCULAR; INTRAVENOUS EVERY 6 HOURS PRN
Status: DISCONTINUED | OUTPATIENT
Start: 2024-06-24 | End: 2024-06-24 | Stop reason: HOSPADM

## 2024-06-24 RX ORDER — HEPARIN SODIUM 1000 [USP'U]/ML
INJECTION, SOLUTION INTRAVENOUS; SUBCUTANEOUS PRN
Status: DISCONTINUED | OUTPATIENT
Start: 2024-06-24 | End: 2024-06-24 | Stop reason: HOSPADM

## 2024-06-24 RX ORDER — HYDROCODONE BITARTRATE AND ACETAMINOPHEN 5; 325 MG/1; MG/1
2 TABLET ORAL EVERY 4 HOURS PRN
Status: DISCONTINUED | OUTPATIENT
Start: 2024-06-24 | End: 2024-06-26 | Stop reason: HOSPADM

## 2024-06-24 RX ORDER — IPRATROPIUM BROMIDE AND ALBUTEROL SULFATE 2.5; .5 MG/3ML; MG/3ML
1 SOLUTION RESPIRATORY (INHALATION)
Status: DISCONTINUED | OUTPATIENT
Start: 2024-06-24 | End: 2024-06-24

## 2024-06-24 RX ORDER — FUROSEMIDE 10 MG/ML
40 INJECTION INTRAMUSCULAR; INTRAVENOUS EVERY 6 HOURS
Status: DISCONTINUED | OUTPATIENT
Start: 2024-06-24 | End: 2024-06-24

## 2024-06-24 RX ORDER — NITROGLYCERIN 0.4 MG/1
0.4 TABLET SUBLINGUAL EVERY 5 MIN PRN
Status: DISCONTINUED | OUTPATIENT
Start: 2024-06-24 | End: 2024-06-24 | Stop reason: HOSPADM

## 2024-06-24 RX ORDER — FUROSEMIDE 10 MG/ML
40 INJECTION INTRAMUSCULAR; INTRAVENOUS ONCE
Status: DISCONTINUED | OUTPATIENT
Start: 2024-06-24 | End: 2024-06-24

## 2024-06-24 RX ORDER — CLOPIDOGREL BISULFATE 75 MG/1
TABLET ORAL PRN
Status: DISCONTINUED | OUTPATIENT
Start: 2024-06-24 | End: 2024-06-24 | Stop reason: HOSPADM

## 2024-06-24 RX ORDER — HYDROCODONE BITARTRATE AND ACETAMINOPHEN 5; 325 MG/1; MG/1
1 TABLET ORAL EVERY 4 HOURS PRN
Status: DISCONTINUED | OUTPATIENT
Start: 2024-06-24 | End: 2024-06-26 | Stop reason: HOSPADM

## 2024-06-24 RX ORDER — FUROSEMIDE 10 MG/ML
40 INJECTION INTRAMUSCULAR; INTRAVENOUS EVERY 6 HOURS
Status: COMPLETED | OUTPATIENT
Start: 2024-06-24 | End: 2024-06-25

## 2024-06-24 RX ORDER — SODIUM CHLORIDE 9 MG/ML
INJECTION, SOLUTION INTRAVENOUS CONTINUOUS
Status: DISCONTINUED | OUTPATIENT
Start: 2024-06-24 | End: 2024-06-24 | Stop reason: HOSPADM

## 2024-06-24 RX ORDER — CLOPIDOGREL BISULFATE 75 MG/1
75 TABLET ORAL DAILY
Status: DISCONTINUED | OUTPATIENT
Start: 2024-06-25 | End: 2024-06-26 | Stop reason: HOSPADM

## 2024-06-24 RX ORDER — ATROPINE SULFATE 1 MG/ML
0.5 INJECTION, SOLUTION INTRAVENOUS
Status: DISPENSED | OUTPATIENT
Start: 2024-06-24 | End: 2024-06-25

## 2024-06-24 RX ORDER — MIDAZOLAM HYDROCHLORIDE 1 MG/ML
INJECTION INTRAMUSCULAR; INTRAVENOUS PRN
Status: DISCONTINUED | OUTPATIENT
Start: 2024-06-24 | End: 2024-06-24 | Stop reason: HOSPADM

## 2024-06-24 RX ORDER — ASPIRIN 325 MG
325 TABLET ORAL ONCE
Status: COMPLETED | OUTPATIENT
Start: 2024-06-24 | End: 2024-06-24

## 2024-06-24 RX ORDER — SODIUM CHLORIDE 9 MG/ML
INJECTION, SOLUTION INTRAVENOUS CONTINUOUS
Status: DISCONTINUED | OUTPATIENT
Start: 2024-06-24 | End: 2024-06-26 | Stop reason: HOSPADM

## 2024-06-24 RX ORDER — BIVALIRUDIN 250 MG/5ML
INJECTION, POWDER, LYOPHILIZED, FOR SOLUTION INTRAVENOUS PRN
Status: DISCONTINUED | OUTPATIENT
Start: 2024-06-24 | End: 2024-06-24 | Stop reason: HOSPADM

## 2024-06-24 RX ORDER — FENTANYL CITRATE 50 UG/ML
INJECTION, SOLUTION INTRAMUSCULAR; INTRAVENOUS PRN
Status: DISCONTINUED | OUTPATIENT
Start: 2024-06-24 | End: 2024-06-24 | Stop reason: HOSPADM

## 2024-06-24 RX ORDER — POTASSIUM CHLORIDE 20 MEQ/1
40 TABLET, EXTENDED RELEASE ORAL ONCE
Status: COMPLETED | OUTPATIENT
Start: 2024-06-24 | End: 2024-06-24

## 2024-06-24 RX ORDER — FUROSEMIDE 10 MG/ML
40 INJECTION INTRAMUSCULAR; INTRAVENOUS EVERY 4 HOURS
Status: DISCONTINUED | OUTPATIENT
Start: 2024-06-24 | End: 2024-06-24

## 2024-06-24 RX ORDER — NITROGLYCERIN 20 MG/100ML
INJECTION INTRAVENOUS PRN
Status: DISCONTINUED | OUTPATIENT
Start: 2024-06-24 | End: 2024-06-24 | Stop reason: HOSPADM

## 2024-06-24 RX ORDER — LEVALBUTEROL INHALATION SOLUTION 1.25 MG/3ML
1.25 SOLUTION RESPIRATORY (INHALATION) EVERY 8 HOURS PRN
Status: DISCONTINUED | OUTPATIENT
Start: 2024-06-24 | End: 2024-06-26 | Stop reason: HOSPADM

## 2024-06-24 RX ORDER — ACETAMINOPHEN 325 MG/1
650 TABLET ORAL EVERY 4 HOURS PRN
Status: DISCONTINUED | OUTPATIENT
Start: 2024-06-24 | End: 2024-06-26 | Stop reason: HOSPADM

## 2024-06-24 RX ORDER — SODIUM CHLORIDE 9 MG/ML
INJECTION, SOLUTION INTRAVENOUS CONTINUOUS
Status: DISCONTINUED | OUTPATIENT
Start: 2024-06-25 | End: 2024-06-24 | Stop reason: HOSPADM

## 2024-06-24 RX ADMIN — PANTOPRAZOLE SODIUM 40 MG: 40 TABLET, DELAYED RELEASE ORAL at 07:49

## 2024-06-24 RX ADMIN — SODIUM CHLORIDE, PRESERVATIVE FREE 10 ML: 5 INJECTION INTRAVENOUS at 07:53

## 2024-06-24 RX ADMIN — METOPROLOL SUCCINATE 50 MG: 50 TABLET, EXTENDED RELEASE ORAL at 07:49

## 2024-06-24 RX ADMIN — SODIUM BICARBONATE 650 MG: 650 TABLET ORAL at 07:49

## 2024-06-24 RX ADMIN — ATORVASTATIN CALCIUM 40 MG: 40 TABLET, FILM COATED ORAL at 07:49

## 2024-06-24 RX ADMIN — SODIUM CHLORIDE, PRESERVATIVE FREE 10 ML: 5 INJECTION INTRAVENOUS at 21:40

## 2024-06-24 RX ADMIN — FUROSEMIDE 40 MG: 10 INJECTION, SOLUTION INTRAMUSCULAR; INTRAVENOUS at 15:10

## 2024-06-24 RX ADMIN — ESCITALOPRAM OXALATE 20 MG: 10 TABLET ORAL at 07:49

## 2024-06-24 RX ADMIN — AMIODARONE HYDROCHLORIDE 150 MG: 50 INJECTION, SOLUTION INTRAVENOUS at 18:03

## 2024-06-24 RX ADMIN — FUROSEMIDE 40 MG: 10 INJECTION, SOLUTION INTRAMUSCULAR; INTRAVENOUS at 21:39

## 2024-06-24 RX ADMIN — POTASSIUM CHLORIDE 40 MEQ: 1500 TABLET, EXTENDED RELEASE ORAL at 11:55

## 2024-06-24 RX ADMIN — Medication 1 MG/MIN: at 18:15

## 2024-06-24 RX ADMIN — VERAPAMIL HYDROCHLORIDE 180 MG: 180 TABLET, FILM COATED, EXTENDED RELEASE ORAL at 21:41

## 2024-06-24 RX ADMIN — APIXABAN 5 MG: 5 TABLET, FILM COATED ORAL at 15:12

## 2024-06-24 RX ADMIN — Medication 0.5 MG/MIN: at 07:45

## 2024-06-24 RX ADMIN — SODIUM CHLORIDE: 9 INJECTION, SOLUTION INTRAVENOUS at 08:48

## 2024-06-24 RX ADMIN — SODIUM BICARBONATE 650 MG: 650 TABLET ORAL at 21:39

## 2024-06-24 RX ADMIN — METOPROLOL SUCCINATE 50 MG: 50 TABLET, EXTENDED RELEASE ORAL at 21:39

## 2024-06-24 RX ADMIN — ENOXAPARIN SODIUM 80 MG: 100 INJECTION SUBCUTANEOUS at 07:51

## 2024-06-24 RX ADMIN — Medication 5 MG: at 21:39

## 2024-06-24 RX ADMIN — IPRATROPIUM BROMIDE AND ALBUTEROL SULFATE 1 DOSE: 2.5; .5 SOLUTION RESPIRATORY (INHALATION) at 19:48

## 2024-06-24 RX ADMIN — IPRATROPIUM BROMIDE AND ALBUTEROL SULFATE 1 DOSE: 2.5; .5 SOLUTION RESPIRATORY (INHALATION) at 14:50

## 2024-06-24 RX ADMIN — ASPIRIN 325 MG: 325 TABLET ORAL at 08:44

## 2024-06-24 RX ADMIN — SODIUM BICARBONATE 650 MG: 650 TABLET ORAL at 14:20

## 2024-06-24 RX ADMIN — APIXABAN 5 MG: 5 TABLET, FILM COATED ORAL at 21:39

## 2024-06-24 RX ADMIN — VERAPAMIL HYDROCHLORIDE 180 MG: 180 TABLET, FILM COATED, EXTENDED RELEASE ORAL at 07:49

## 2024-06-24 ASSESSMENT — ENCOUNTER SYMPTOMS
WHEEZING: 0
COUGH: 0
DIARRHEA: 0
COLOR CHANGE: 0
ABDOMINAL PAIN: 0
CONSTIPATION: 0
VOMITING: 0
BLOOD IN STOOL: 0
ABDOMINAL DISTENTION: 0
NAUSEA: 0
SHORTNESS OF BREATH: 0

## 2024-06-24 NOTE — PROGRESS NOTES
Pt arrived to cath holding for hold on cath lab. Pt A/O x 3. NS@ 75 ml/hr, Amiodarone @ .5 ml/hr via RAC. IV site clear. Bilateral groins clipped.   .  LABS:                         10.4   11.99 )-----------( 453      ( 2023 17:53 )             31.3     04-13    x   |  x   |  x   ----------------------------<  x   4.4   |  x   |  x     Ca    9.4      2023 17:53  Mg     2.1     04-13    TPro  8.1  /  Alb  2.5<L>  /  TBili  0.4  /  DBili  0.2  /  AST  241<H>  /  ALT  102<H>  /  AlkPhos  56  04-13    PT/INR - ( 2023 17:53 )   PT: 13.5 sec;   INR: 1.13          PTT - ( 2023 17:53 )  PTT:29.2 sec  Urinalysis Basic - ( 2023 17:53 )    Color: Yellow / Appearance: Clear / S.020 / pH: x  Gluc: x / Ketone: NEGATIVE  / Bili: Negative / Urobili: 0.2 E.U./dL   Blood: x / Protein: 100 mg/dL / Nitrite: NEGATIVE   Leuk Esterase: NEGATIVE / RBC: < 5 /HPF / WBC < 5 /HPF   Sq Epi: x / Non Sq Epi: x / Bacteria: Present /HPF            RADIOLOGY, EKG & ADDITIONAL TESTS: Reviewed.

## 2024-06-24 NOTE — PLAN OF CARE
Problem: Discharge Planning  Goal: Discharge to home or other facility with appropriate resources  Outcome: Progressing     Problem: Safety - Adult  Goal: Free from fall injury  Outcome: Progressing     Problem: Cardiovascular - Adult  Goal: Maintains optimal cardiac output and hemodynamic stability  Outcome: Progressing     Problem: Cardiovascular - Adult  Goal: Absence of cardiac dysrhythmias or at baseline  Outcome: Progressing     Problem: Metabolic/Fluid and Electrolytes - Adult  Goal: Electrolytes maintained within normal limits  Outcome: Progressing

## 2024-06-24 NOTE — PROCEDURES
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  LV systolic function appears reduced.  Unsuccessful attempted DCCV x 3.    Plan: Return for repeat attempted DCCV with anesthesia tomorrow.  Plavix uninterrupted for 6 months.  Continue rate control and amiodarone.

## 2024-06-24 NOTE — PROGRESS NOTES
Call placed to Jeannie NP that pt is SOB and states difficulty with breathing. NP at bedside. Orders in.

## 2024-06-24 NOTE — PROGRESS NOTES
[facility]      Progress Note    Patient:  Rojas Call  YOB: 1950  Date of Service: 6/24/2024  MRN: [id number]   Acct: [id number]   Primary Care Physician: Juwan Trevino MD  Advance Directive: Full Code  Admit Date: 6/20/2024       Hospital Day: 4    Portions of this note have been copied forward, however, updated to reflect the most current clinical status of this patient.     CHIEF COMPLAINT extremity weakness    SUBJECTIVE:  Mr. Call was seen after his heart cath this morning. Currently drowsy post procedure. Offers no complaints at this time.        CUMULATIVE HOSPITAL COURSE:   The patient is a 74 y.o. male with PMH of Atrial fibrillation, CAD, COPD, HTN, HLD and current smoker who presented to [facility] ED for evaluation of extremity weakness in bilateral arms. Reported waking up with bilateral arm weakness on morning of admission. Reported having similar symptoms with previous MI, therefore presented to ED for further evaluation. Workup in ED revealed EKG- A-fib RVR with no ischemic changes; CO2 19, troponin 44 followed by 46, Alk phos 140, unremarkable CBC, chest x-ray indicated cardiomegaly, benign stable calcified granuloma lateral aspect of the left lower lobe.  Patient was admitted to hospital medicine for further evaluation with cardiology consultation.  Cardizem drip initiated.  Cardiology recommended initiating amiodarone drip bolus and drip.  Consideration for DCCV if he continues to remain in A-fib. ECHO indicated reduced LV systolic function with EF of 40 to 44%, mild global hypokinesis, moderate hypokinesis of basal inferior, basal inferolateral, and mid inferior, indeterminate diastolic function due to A-fib. Cardiology further recommended plans for cardiac catheterization with possible DCCV. Cardiology further recommended adding Toprol XL 50 mg BID. Underwent cardiac cath with findings of  double vessel disease with severe in-stent restenosis of OM1 treated with

## 2024-06-24 NOTE — PLAN OF CARE
Acute shortness of breath after cardiac cath  Failed cardioversion  Recent echocardiogram EF of 40 to 45%    Acute systolic heart failure  Acute hypoxic respiratory failure presently on 5 L of oxygen  Combined from A-fib with IV fluid preprocedure  Chest x-ray reviewed bilateral pulmonary edema right more than left  ABG respiratory alkalosis with hypoxia  Lasix 40 mg IV every 6 hourly for 4 dose  Placed on 1 L fluid restriction  Monitor intake and output  Wean off oxygen

## 2024-06-25 ENCOUNTER — APPOINTMENT (OUTPATIENT)
Dept: GENERAL RADIOLOGY | Age: 74
DRG: 280 | End: 2024-06-25
Payer: MEDICARE

## 2024-06-25 ENCOUNTER — ANESTHESIA EVENT (OUTPATIENT)
Age: 74
End: 2024-06-25
Payer: MEDICARE

## 2024-06-25 ENCOUNTER — ANESTHESIA (OUTPATIENT)
Age: 74
End: 2024-06-25
Payer: MEDICARE

## 2024-06-25 LAB
ANION GAP SERPL CALCULATED.3IONS-SCNC: 16 MMOL/L (ref 7–19)
BASOPHILS # BLD: 0 K/UL (ref 0–0.2)
BASOPHILS NFR BLD: 0.1 % (ref 0–1)
BUN SERPL-MCNC: 18 MG/DL (ref 8–23)
CALCIUM SERPL-MCNC: 8.2 MG/DL (ref 8.8–10.2)
CHLORIDE SERPL-SCNC: 103 MMOL/L (ref 98–111)
CO2 SERPL-SCNC: 23 MMOL/L (ref 22–29)
CREAT SERPL-MCNC: 1.4 MG/DL (ref 0.5–1.2)
ECHO BSA: 2.01 M2
ECHO BSA: 2.01 M2
EKG P AXIS: NORMAL DEGREES
EKG P-R INTERVAL: NORMAL MS
EKG Q-T INTERVAL: 412 MS
EKG QRS DURATION: 82 MS
EKG QTC CALCULATION (BAZETT): 455 MS
EKG T AXIS: 49 DEGREES
EOSINOPHIL # BLD: 0 K/UL (ref 0–0.6)
EOSINOPHIL NFR BLD: 0 % (ref 0–5)
ERYTHROCYTE [DISTWIDTH] IN BLOOD BY AUTOMATED COUNT: 15.4 % (ref 11.5–14.5)
GLUCOSE SERPL-MCNC: 124 MG/DL (ref 74–109)
HCT VFR BLD AUTO: 37.9 % (ref 42–52)
HGB BLD-MCNC: 12.3 G/DL (ref 14–18)
IMM GRANULOCYTES # BLD: 0 K/UL
LYMPHOCYTES # BLD: 0.5 K/UL (ref 1.1–4.5)
LYMPHOCYTES NFR BLD: 4.8 % (ref 20–40)
MAGNESIUM SERPL-MCNC: 1.6 MG/DL (ref 1.6–2.4)
MCH RBC QN AUTO: 30.8 PG (ref 27–31)
MCHC RBC AUTO-ENTMCNC: 32.5 G/DL (ref 33–37)
MCV RBC AUTO: 95 FL (ref 80–94)
MONOCYTES # BLD: 0.6 K/UL (ref 0–0.9)
MONOCYTES NFR BLD: 5.7 % (ref 0–10)
NEUTROPHILS # BLD: 9 K/UL (ref 1.5–7.5)
NEUTS SEG NFR BLD: 89 % (ref 50–65)
PLATELET # BLD AUTO: 102 K/UL (ref 130–400)
PMV BLD AUTO: 11.2 FL (ref 9.4–12.4)
POTASSIUM SERPL-SCNC: 3.1 MMOL/L (ref 3.5–5)
RBC # BLD AUTO: 3.99 M/UL (ref 4.7–6.1)
SODIUM SERPL-SCNC: 142 MMOL/L (ref 136–145)
WBC # BLD AUTO: 10.1 K/UL (ref 4.8–10.8)

## 2024-06-25 PROCEDURE — 6360000002 HC RX W HCPCS: Performed by: INTERNAL MEDICINE

## 2024-06-25 PROCEDURE — 92960 CARDIOVERSION ELECTRIC EXT: CPT | Performed by: INTERNAL MEDICINE

## 2024-06-25 PROCEDURE — 6370000000 HC RX 637 (ALT 250 FOR IP): Performed by: INTERNAL MEDICINE

## 2024-06-25 PROCEDURE — 94150 VITAL CAPACITY TEST: CPT

## 2024-06-25 PROCEDURE — 6370000000 HC RX 637 (ALT 250 FOR IP): Performed by: NURSE PRACTITIONER

## 2024-06-25 PROCEDURE — 71046 X-RAY EXAM CHEST 2 VIEWS: CPT

## 2024-06-25 PROCEDURE — 80048 BASIC METABOLIC PNL TOTAL CA: CPT

## 2024-06-25 PROCEDURE — 6360000002 HC RX W HCPCS: Performed by: NURSE PRACTITIONER

## 2024-06-25 PROCEDURE — 99152 MOD SED SAME PHYS/QHP 5/>YRS: CPT | Performed by: INTERNAL MEDICINE

## 2024-06-25 PROCEDURE — 2500000003 HC RX 250 WO HCPCS: Performed by: INTERNAL MEDICINE

## 2024-06-25 PROCEDURE — 85025 COMPLETE CBC W/AUTO DIFF WBC: CPT

## 2024-06-25 PROCEDURE — 94640 AIRWAY INHALATION TREATMENT: CPT

## 2024-06-25 PROCEDURE — 93005 ELECTROCARDIOGRAM TRACING: CPT | Performed by: INTERNAL MEDICINE

## 2024-06-25 PROCEDURE — 6360000002 HC RX W HCPCS: Performed by: NURSE ANESTHETIST, CERTIFIED REGISTERED

## 2024-06-25 PROCEDURE — 2500000003 HC RX 250 WO HCPCS: Performed by: NURSE ANESTHETIST, CERTIFIED REGISTERED

## 2024-06-25 PROCEDURE — 94760 N-INVAS EAR/PLS OXIMETRY 1: CPT

## 2024-06-25 PROCEDURE — 6370000000 HC RX 637 (ALT 250 FOR IP)

## 2024-06-25 PROCEDURE — 36415 COLL VENOUS BLD VENIPUNCTURE: CPT

## 2024-06-25 PROCEDURE — 93010 ELECTROCARDIOGRAM REPORT: CPT | Performed by: INTERNAL MEDICINE

## 2024-06-25 PROCEDURE — 83735 ASSAY OF MAGNESIUM: CPT

## 2024-06-25 PROCEDURE — 02JY3ZZ INSPECTION OF GREAT VESSEL, PERCUTANEOUS APPROACH: ICD-10-PCS | Performed by: INTERNAL MEDICINE

## 2024-06-25 PROCEDURE — 2140000000 HC CCU INTERMEDIATE R&B

## 2024-06-25 PROCEDURE — 3700000000 HC ANESTHESIA ATTENDED CARE: Performed by: INTERNAL MEDICINE

## 2024-06-25 PROCEDURE — 2580000003 HC RX 258

## 2024-06-25 PROCEDURE — 3700000001 HC ADD 15 MINUTES (ANESTHESIA): Performed by: INTERNAL MEDICINE

## 2024-06-25 PROCEDURE — 2700000000 HC OXYGEN THERAPY PER DAY

## 2024-06-25 PROCEDURE — 2580000003 HC RX 258: Performed by: INTERNAL MEDICINE

## 2024-06-25 RX ORDER — PROPOFOL 10 MG/ML
INJECTION, EMULSION INTRAVENOUS PRN
Status: DISCONTINUED | OUTPATIENT
Start: 2024-06-25 | End: 2024-06-25 | Stop reason: SDUPTHER

## 2024-06-25 RX ORDER — AMIODARONE HYDROCHLORIDE 200 MG/1
200 TABLET ORAL DAILY
Status: DISCONTINUED | OUTPATIENT
Start: 2024-07-05 | End: 2024-06-26 | Stop reason: HOSPADM

## 2024-06-25 RX ORDER — AMIODARONE HYDROCHLORIDE 150 MG/3ML
INJECTION, SOLUTION INTRAVENOUS PRN
Status: DISCONTINUED | OUTPATIENT
Start: 2024-06-25 | End: 2024-06-25 | Stop reason: SDUPTHER

## 2024-06-25 RX ORDER — DIGOXIN 125 MCG
125 TABLET ORAL DAILY
Status: DISCONTINUED | OUTPATIENT
Start: 2024-06-26 | End: 2024-06-26 | Stop reason: HOSPADM

## 2024-06-25 RX ORDER — DILTIAZEM HYDROCHLORIDE 180 MG/1
180 CAPSULE, COATED, EXTENDED RELEASE ORAL 2 TIMES DAILY
Status: DISCONTINUED | OUTPATIENT
Start: 2024-06-25 | End: 2024-06-26 | Stop reason: HOSPADM

## 2024-06-25 RX ORDER — METOPROLOL TARTRATE 1 MG/ML
INJECTION, SOLUTION INTRAVENOUS PRN
Status: DISCONTINUED | OUTPATIENT
Start: 2024-06-25 | End: 2024-06-25 | Stop reason: SDUPTHER

## 2024-06-25 RX ORDER — AMIODARONE HYDROCHLORIDE 200 MG/1
200 TABLET ORAL 2 TIMES DAILY
Status: DISCONTINUED | OUTPATIENT
Start: 2024-06-25 | End: 2024-06-26 | Stop reason: HOSPADM

## 2024-06-25 RX ORDER — FUROSEMIDE 40 MG/1
40 TABLET ORAL DAILY
Status: DISCONTINUED | OUTPATIENT
Start: 2024-06-26 | End: 2024-06-26 | Stop reason: HOSPADM

## 2024-06-25 RX ORDER — DIGOXIN 0.25 MG/ML
250 INJECTION INTRAMUSCULAR; INTRAVENOUS EVERY 6 HOURS
Status: COMPLETED | OUTPATIENT
Start: 2024-06-25 | End: 2024-06-25

## 2024-06-25 RX ORDER — DEXMEDETOMIDINE HYDROCHLORIDE 100 UG/ML
INJECTION, SOLUTION INTRAVENOUS PRN
Status: DISCONTINUED | OUTPATIENT
Start: 2024-06-25 | End: 2024-06-25 | Stop reason: SDUPTHER

## 2024-06-25 RX ORDER — DIGOXIN 0.25 MG/ML
500 INJECTION INTRAMUSCULAR; INTRAVENOUS ONCE
Status: COMPLETED | OUTPATIENT
Start: 2024-06-25 | End: 2024-06-25

## 2024-06-25 RX ADMIN — Medication 1 MG/MIN: at 02:35

## 2024-06-25 RX ADMIN — SODIUM BICARBONATE 650 MG: 650 TABLET ORAL at 20:31

## 2024-06-25 RX ADMIN — DILTIAZEM HYDROCHLORIDE 180 MG: 180 CAPSULE, COATED, EXTENDED RELEASE ORAL at 10:28

## 2024-06-25 RX ADMIN — AMIODARONE HYDROCHLORIDE 150 MG: 50 INJECTION, SOLUTION INTRAVENOUS at 09:19

## 2024-06-25 RX ADMIN — DIGOXIN 250 MCG: 250 INJECTION, SOLUTION INTRAMUSCULAR; INTRAVENOUS at 22:16

## 2024-06-25 RX ADMIN — SACUBITRIL AND VALSARTAN 1 TABLET: 24; 26 TABLET, FILM COATED ORAL at 14:58

## 2024-06-25 RX ADMIN — APIXABAN 5 MG: 5 TABLET, FILM COATED ORAL at 20:31

## 2024-06-25 RX ADMIN — SODIUM BICARBONATE 650 MG: 650 TABLET ORAL at 14:58

## 2024-06-25 RX ADMIN — DIGOXIN 250 MCG: 250 INJECTION, SOLUTION INTRAMUSCULAR; INTRAVENOUS at 14:57

## 2024-06-25 RX ADMIN — LEVALBUTEROL HYDROCHLORIDE 1.25 MG: 1.25 SOLUTION RESPIRATORY (INHALATION) at 07:05

## 2024-06-25 RX ADMIN — PROPOFOL 20 MG: 10 INJECTION, EMULSION INTRAVENOUS at 09:17

## 2024-06-25 RX ADMIN — METOPROLOL TARTRATE 5 MG: 1 INJECTION, SOLUTION INTRAVENOUS at 09:16

## 2024-06-25 RX ADMIN — PROPOFOL 20 MG: 10 INJECTION, EMULSION INTRAVENOUS at 09:19

## 2024-06-25 RX ADMIN — PROPOFOL 20 MG: 10 INJECTION, EMULSION INTRAVENOUS at 09:14

## 2024-06-25 RX ADMIN — APIXABAN 5 MG: 5 TABLET, FILM COATED ORAL at 10:13

## 2024-06-25 RX ADMIN — METOPROLOL SUCCINATE 50 MG: 50 TABLET, EXTENDED RELEASE ORAL at 10:14

## 2024-06-25 RX ADMIN — ESCITALOPRAM OXALATE 20 MG: 10 TABLET ORAL at 10:13

## 2024-06-25 RX ADMIN — CLOPIDOGREL BISULFATE 75 MG: 75 TABLET ORAL at 10:14

## 2024-06-25 RX ADMIN — DILTIAZEM HYDROCHLORIDE 180 MG: 180 CAPSULE, COATED, EXTENDED RELEASE ORAL at 20:30

## 2024-06-25 RX ADMIN — Medication 5 MG: at 20:30

## 2024-06-25 RX ADMIN — SODIUM CHLORIDE, PRESERVATIVE FREE 10 ML: 5 INJECTION INTRAVENOUS at 20:31

## 2024-06-25 RX ADMIN — DEXMEDETOMIDINE HYDROCHLORIDE 10 MCG: 100 INJECTION, SOLUTION, CONCENTRATE INTRAVENOUS at 09:04

## 2024-06-25 RX ADMIN — AMIODARONE HYDROCHLORIDE 200 MG: 200 TABLET ORAL at 20:31

## 2024-06-25 RX ADMIN — SODIUM CHLORIDE, PRESERVATIVE FREE 10 ML: 5 INJECTION INTRAVENOUS at 10:15

## 2024-06-25 RX ADMIN — Medication 1 MG/MIN: at 10:36

## 2024-06-25 RX ADMIN — SODIUM BICARBONATE 650 MG: 650 TABLET ORAL at 10:14

## 2024-06-25 RX ADMIN — SODIUM CHLORIDE: 9 INJECTION, SOLUTION INTRAVENOUS at 02:34

## 2024-06-25 RX ADMIN — PROPOFOL 30 MG: 10 INJECTION, EMULSION INTRAVENOUS at 09:11

## 2024-06-25 RX ADMIN — SACUBITRIL AND VALSARTAN 1 TABLET: 24; 26 TABLET, FILM COATED ORAL at 20:30

## 2024-06-25 RX ADMIN — AMIODARONE HYDROCHLORIDE 200 MG: 200 TABLET ORAL at 10:28

## 2024-06-25 RX ADMIN — ATORVASTATIN CALCIUM 40 MG: 40 TABLET, FILM COATED ORAL at 10:14

## 2024-06-25 RX ADMIN — FUROSEMIDE 40 MG: 10 INJECTION, SOLUTION INTRAMUSCULAR; INTRAVENOUS at 10:27

## 2024-06-25 RX ADMIN — FUROSEMIDE 40 MG: 10 INJECTION, SOLUTION INTRAMUSCULAR; INTRAVENOUS at 02:36

## 2024-06-25 RX ADMIN — PANTOPRAZOLE SODIUM 40 MG: 40 TABLET, DELAYED RELEASE ORAL at 05:38

## 2024-06-25 RX ADMIN — DIGOXIN 500 MCG: 250 INJECTION, SOLUTION INTRAMUSCULAR; INTRAVENOUS at 10:13

## 2024-06-25 ASSESSMENT — ENCOUNTER SYMPTOMS
WHEEZING: 0
CONSTIPATION: 0
COUGH: 0
NAUSEA: 0
SHORTNESS OF BREATH: 0
ABDOMINAL PAIN: 0
ABDOMINAL DISTENTION: 0
DIARRHEA: 0
BLOOD IN STOOL: 0
COLOR CHANGE: 0
VOMITING: 0

## 2024-06-25 ASSESSMENT — LIFESTYLE VARIABLES: SMOKING_STATUS: 1

## 2024-06-25 ASSESSMENT — COPD QUESTIONNAIRES: CAT_SEVERITY: MODERATE

## 2024-06-25 NOTE — PROGRESS NOTES
Cardiology progress note:    Unsuccessful DCCV despite being on IV amiodarone with repeat bolus.  Rate control still suboptimal.  Likely element of heart failure with tachycardia induced cardiomyopathy as well as coronary disease with PCI 6/24/2024.  Will need improved rate control.  Change verapamil to Cardizem  mg twice daily.  Add digoxin with initial loading IV.  Start amiodarone orally 200 mg twice daily for 10 days and then 200 mg once daily.  Heart failure management and would add Entresto 24/26 mg twice daily.  Mild increase in creatinine noted with diuresis overnight.  Start Lasix 40 mg p.o. daily tomorrow.  Will reattempt DCCV in 1 month.  Will need adequate rate control prior to discharge.  Continue Eliquis and Plavix.

## 2024-06-25 NOTE — PROCEDURES
PROCEDURE NOTE  Date: 6/25/2024   Name: Rojas Call  YOB: 1950    Procedures    Date of Procedure:  6/25/2024     Indications:  Atrial fibrillation with an appropriate duration of anticoagulation, on amiodarone IV    Conscious Sedation Protocol Used During this Procedure -anesthesia provided by anesthesia service        Anesthesia: Moderate   Sedation: 0 mg Midazolam (Versed)  0 mcg Fentanyl   Start time: 9:30 AM   Stop time: 9:45 AM   ASA Class: 3   EBL Not applicable     A trained medical personnel administered medications at my direction.  The patient was monitored continuously with ECG, pulse oximetry, blood pressure monitoring and direct observation.       After obtaining informed written consent and an appropriate level of conscious sedation, DCCV with 360 J utilizing paddles was unsuccessful in restoring sinus rhythm.  Patient received IV Lopressor 5 mg as well as additional 150 mg amiodarone bolus and is currently on 1 mg/h of amiodarone.  Repeat DCCV attempts x 2 still unable to convert and obtain sinus rhythm.  Unsuccessful DCCV.    Complications:  none      Impression: Unsuccessful DC cardioversion of atrial fibrillation to normal sinus rhythm on Eliquis used for anticoagulation, and amiodarone.    Electronically signed by Nathaniel Barbosa MD on 6/25/24

## 2024-06-25 NOTE — ANESTHESIA POSTPROCEDURE EVALUATION
Department of Anesthesiology  Postprocedure Note    Patient: Rojas Call  MRN: [id number]  YOB: 1950  Date of evaluation: 6/25/2024    Procedure Summary       Date: 06/25/24 Room / Location: [facility] CATH LAB PRE/POST / [facility] CARDIAC CATH LAB    Anesthesia Start: 0901 Anesthesia Stop: 0934    Procedure: Ep cardioversion Diagnosis:       Atrial fibrillation (HCC)      (Atrial fibrillation (HCC) [I48.91])    Providers: Nathaniel Barbosa MD Responsible Provider: Jose Castillo APRN - CRNA    Anesthesia Type: general, TIVA ASA Status: 3            Anesthesia Type: No value filed.    Erik Phase I: Erik Score: 10    Erik Phase II:      Anesthesia Post Evaluation    Patient location during evaluation: bedside  Patient participation: complete - patient participated  Level of consciousness: sleepy but conscious  Pain score: 0  Airway patency: patent  Nausea & Vomiting: no nausea and no vomiting  Cardiovascular status: hemodynamically stable and tachycardic  Respiratory status: acceptable, spontaneous ventilation and nasal cannula  Hydration status: euvolemic  Pain management: adequate    No notable events documented.

## 2024-06-25 NOTE — PROGRESS NOTES
ml   Net -4150 ml             Physical Exam  Constitutional:       General: He is not in acute distress.     Appearance: Normal appearance. He is not ill-appearing.   HENT:      Head: Normocephalic and atraumatic.      Right Ear: External ear normal.      Left Ear: External ear normal.      Nose: Nose normal.      Mouth/Throat:      Mouth: Mucous membranes are moist.   Eyes:      Extraocular Movements: Extraocular movements intact.      Conjunctiva/sclera: Conjunctivae normal.      Pupils: Pupils are equal, round, and reactive to light.   Cardiovascular:      Rate and Rhythm: Normal rate. Rhythm irregular.      Pulses: Normal pulses.      Heart sounds: Normal heart sounds.      Comments: Afib 95 per monitor   Pulmonary:      Effort: Pulmonary effort is normal. No respiratory distress.      Breath sounds: Normal breath sounds. No wheezing, rhonchi or rales.   Abdominal:      General: Bowel sounds are normal. There is no distension.      Palpations: Abdomen is soft.      Tenderness: There is no abdominal tenderness.   Musculoskeletal:         General: No swelling, tenderness or deformity. Normal range of motion.      Cervical back: Normal range of motion and neck supple. No muscular tenderness.      Right lower leg: No edema.      Left lower leg: No edema.   Skin:     General: Skin is warm and dry.      Findings: No bruising or lesion.   Neurological:      Mental Status: He is alert and oriented to person, place, and time.   Psychiatric:         Mood and Affect: Mood normal.         Behavior: Behavior normal.         Thought Content: Thought content normal.            Medications:      sodium chloride 75 mL/hr at 06/25/24 0901    amiodarone 1 mg/min (06/25/24 1036)    sodium chloride 10 mL/hr at 06/25/24 0234    dilTIAZem Stopped (06/22/24 1328)      amiodarone  200 mg Oral BID    sacubitril-valsartan  1 tablet Oral BID    dilTIAZem  180 mg Oral BID    digoxin  250 mcg IntraVENous Q6H    [START ON 6/26/2024] digoxin     Image quality is fair. Contrast used: Definity.       XR CHEST PORTABLE  Result Date: 6/20/2024    Redemonstrated cardiomegaly.  Pulmonary vasculature and mediastinal contours are otherwise intact.  No focal infiltrate, effusion, or pneumothorax is identified.  Benign stable calcified granuloma lateral aspect left lower lobe.        ______________________________________ Electronically signed by: JACQUIE GAY M.D. Date:     06/20/2024 Time:    10:23             Assessment/Plan   Principal Problem:    Atrial fibrillation with RVR (HCC)  Active Problems:    NSTEMI (non-ST elevated myocardial infarction) (HCC)    History of gastric ulcer    Essential hypertension    Mixed hyperlipidemia    Mild episode of recurrent major depressive disorder (HCC)    Metabolic acidosis with normal anion gap and bicarbonate losses    Palliative care patient    Atrial fibrillation, persistent (HCC)  Resolved Problems:    * No resolved hospital problems. *      Principal Problem:    Atrial fibrillation with RVR (HCC)-   - Cardiology following    - on 6/24/2024 Underwent Cath with SEBASTIEN x 2, unsuccessful attempted DCCV x 3, return for repeat attempted DCCV with anesthesia tomorrow   - Plavix uninterrupted for 6 months, continue rate control and amiodarone    - Unsuccessful attempted DCCV this am  - Cardiology further recommended adjustments to rate control medications   - Continue Lovenox 1 mg/kg BID per cardiology   - ECHO indicated reduced LV systolic function with EF of 40 to 44%, mild global hypokinesis, moderate hypokinesis of basal inferior, basal inferolateral, and mid inferior, indeterminate diastolic function due to A-fib   - Serial and PRN EKGs  - Monitor on telemetry   - Supplemental Oxygen as needed         Active Problems:   COPD-   - Completed Zosyn per cardiology for COPD exacerbation              - Supplemental oxygen as needed    - Wean oxygen as tolerated with spO2 goal greater than 88%               - Incentive

## 2024-06-25 NOTE — PLAN OF CARE
Problem: Discharge Planning  Goal: Discharge to home or other facility with appropriate resources  6/25/2024 0000 by Danya Julio RN  Outcome: Progressing  6/24/2024 1023 by Mitch Gamez RN  Outcome: Progressing     Problem: Safety - Adult  Goal: Free from fall injury  6/25/2024 0000 by Danya Julio RN  Outcome: Progressing  6/24/2024 1023 by Mitch Gamez RN  Outcome: Progressing     Problem: Cardiovascular - Adult  Goal: Maintains optimal cardiac output and hemodynamic stability  6/25/2024 0000 by Danya Julio RN  Outcome: Progressing  6/24/2024 1023 by Mitch Gamez RN  Outcome: Progressing  Goal: Absence of cardiac dysrhythmias or at baseline  6/25/2024 0000 by Danya Julio RN  Outcome: Progressing  6/24/2024 1023 by Mitch Gamez RN  Outcome: Progressing     Problem: Metabolic/Fluid and Electrolytes - Adult  Goal: Electrolytes maintained within normal limits  6/25/2024 0000 by Danya Julio RN  Outcome: Progressing  6/24/2024 1023 by Mitch Gamez RN  Outcome: Progressing

## 2024-06-25 NOTE — ANESTHESIA PRE PROCEDURE
Alcohol/week: 0.0 standard drinks of alcohol     Comment: occ                                Ready to quit: Not Answered  Counseling given: Not Answered      Vital Signs (Current):   Vitals:    06/25/24 0013 06/25/24 0508 06/25/24 0706 06/25/24 0754   BP: 117/78 (!) 118/90  120/85   Pulse: 94 60  (!) 116   Resp: 16 16  20   Temp: 98.8 °F (37.1 °C) 98.1 °F (36.7 °C)  97 °F (36.1 °C)   TempSrc: Temporal Temporal  Temporal   SpO2: 95% 94% 95% 94%   Weight:       Height:                                                  BP Readings from Last 3 Encounters:   06/25/24 120/85   06/08/24 118/85   05/14/24 120/68       NPO Status:                                                   Date of last liquid consumption: 06/23/24                        Date of last solid food consumption: 06/23/24    BMI:   Wt Readings from Last 3 Encounters:   06/24/24 76.3 kg (168 lb 4 oz)   06/07/24 79.8 kg (176 lb)   05/14/24 79.1 kg (174 lb 6.4 oz)     Body mass index is 22.82 kg/m².    CBC:   Lab Results   Component Value Date/Time    WBC 10.1 06/25/2024 06:02 AM    RBC 3.99 06/25/2024 06:02 AM    HGB 12.3 06/25/2024 06:02 AM    HCT 37.9 06/25/2024 06:02 AM    MCV 95.0 06/25/2024 06:02 AM    RDW 15.4 06/25/2024 06:02 AM     06/25/2024 06:02 AM       CMP:   Lab Results   Component Value Date/Time     06/25/2024 06:02 AM    K 3.1 06/25/2024 06:02 AM     06/25/2024 06:02 AM    CO2 23 06/25/2024 06:02 AM    BUN 18 06/25/2024 06:02 AM    CREATININE 1.4 06/25/2024 06:02 AM    GFRAA 55 03/10/2022 10:00 AM    LABGLOM 52 06/25/2024 06:02 AM    LABGLOM 52 03/12/2024 02:08 PM    GLUCOSE 124 06/25/2024 06:02 AM    CALCIUM 8.2 06/25/2024 06:02 AM    BILITOT 0.9 06/20/2024 09:57 AM    ALKPHOS 140 06/20/2024 09:57 AM    AST 24 06/20/2024 09:57 AM    ALT 37 06/20/2024 09:57 AM       POC Tests: No results for input(s): \"POCGLU\", \"POCNA\", \"POCK\", \"POCCL\", \"POCBUN\", \"POCHEMO\", \"POCHCT\" in the last 72 hours.    Coags:   Lab Results   Component

## 2024-06-26 ENCOUNTER — APPOINTMENT (OUTPATIENT)
Dept: CT IMAGING | Age: 74
DRG: 280 | End: 2024-06-26
Payer: MEDICARE

## 2024-06-26 VITALS
WEIGHT: 161.38 LBS | OXYGEN SATURATION: 96 % | RESPIRATION RATE: 20 BRPM | SYSTOLIC BLOOD PRESSURE: 99 MMHG | HEART RATE: 124 BPM | BODY MASS INDEX: 21.86 KG/M2 | TEMPERATURE: 98.2 F | DIASTOLIC BLOOD PRESSURE: 74 MMHG | HEIGHT: 72 IN

## 2024-06-26 LAB
ANION GAP SERPL CALCULATED.3IONS-SCNC: 16 MMOL/L (ref 7–19)
BASOPHILS # BLD: 0 K/UL (ref 0–0.2)
BASOPHILS NFR BLD: 0.2 % (ref 0–1)
BUN SERPL-MCNC: 19 MG/DL (ref 8–23)
CALCIUM SERPL-MCNC: 8 MG/DL (ref 8.8–10.2)
CHLORIDE SERPL-SCNC: 103 MMOL/L (ref 98–111)
CO2 SERPL-SCNC: 21 MMOL/L (ref 22–29)
CREAT SERPL-MCNC: 1.3 MG/DL (ref 0.5–1.2)
EOSINOPHIL # BLD: 0 K/UL (ref 0–0.6)
EOSINOPHIL NFR BLD: 0.3 % (ref 0–5)
ERYTHROCYTE [DISTWIDTH] IN BLOOD BY AUTOMATED COUNT: 15 % (ref 11.5–14.5)
GLUCOSE SERPL-MCNC: 107 MG/DL (ref 74–109)
HCT VFR BLD AUTO: 39.4 % (ref 42–52)
HGB BLD-MCNC: 12.9 G/DL (ref 14–18)
IMM GRANULOCYTES # BLD: 0 K/UL
LYMPHOCYTES # BLD: 0.4 K/UL (ref 1.1–4.5)
LYMPHOCYTES NFR BLD: 4.7 % (ref 20–40)
MAGNESIUM SERPL-MCNC: 1.7 MG/DL (ref 1.6–2.4)
MCH RBC QN AUTO: 30.1 PG (ref 27–31)
MCHC RBC AUTO-ENTMCNC: 32.7 G/DL (ref 33–37)
MCV RBC AUTO: 91.8 FL (ref 80–94)
MONOCYTES # BLD: 0.4 K/UL (ref 0–0.9)
MONOCYTES NFR BLD: 4.5 % (ref 0–10)
NEUTROPHILS # BLD: 8.4 K/UL (ref 1.5–7.5)
NEUTS SEG NFR BLD: 89.9 % (ref 50–65)
PLATELET # BLD AUTO: 103 K/UL (ref 130–400)
PMV BLD AUTO: 11 FL (ref 9.4–12.4)
POTASSIUM SERPL-SCNC: 2.9 MMOL/L (ref 3.5–5)
POTASSIUM SERPL-SCNC: 4.1 MMOL/L (ref 3.5–5)
RBC # BLD AUTO: 4.29 M/UL (ref 4.7–6.1)
SODIUM SERPL-SCNC: 140 MMOL/L (ref 136–145)
WBC # BLD AUTO: 9.3 K/UL (ref 4.8–10.8)

## 2024-06-26 PROCEDURE — 2580000003 HC RX 258

## 2024-06-26 PROCEDURE — 71275 CT ANGIOGRAPHY CHEST: CPT

## 2024-06-26 PROCEDURE — 6370000000 HC RX 637 (ALT 250 FOR IP): Performed by: HOSPITALIST

## 2024-06-26 PROCEDURE — 94761 N-INVAS EAR/PLS OXIMETRY MLT: CPT

## 2024-06-26 PROCEDURE — 85025 COMPLETE CBC W/AUTO DIFF WBC: CPT

## 2024-06-26 PROCEDURE — 80048 BASIC METABOLIC PNL TOTAL CA: CPT

## 2024-06-26 PROCEDURE — 36415 COLL VENOUS BLD VENIPUNCTURE: CPT

## 2024-06-26 PROCEDURE — 94618 PULMONARY STRESS TESTING: CPT

## 2024-06-26 PROCEDURE — 6370000000 HC RX 637 (ALT 250 FOR IP): Performed by: NURSE PRACTITIONER

## 2024-06-26 PROCEDURE — 94760 N-INVAS EAR/PLS OXIMETRY 1: CPT

## 2024-06-26 PROCEDURE — 84132 ASSAY OF SERUM POTASSIUM: CPT

## 2024-06-26 PROCEDURE — 6360000004 HC RX CONTRAST MEDICATION: Performed by: INTERNAL MEDICINE

## 2024-06-26 PROCEDURE — 83735 ASSAY OF MAGNESIUM: CPT

## 2024-06-26 PROCEDURE — 6370000000 HC RX 637 (ALT 250 FOR IP)

## 2024-06-26 PROCEDURE — 6370000000 HC RX 637 (ALT 250 FOR IP): Performed by: INTERNAL MEDICINE

## 2024-06-26 PROCEDURE — 2700000000 HC OXYGEN THERAPY PER DAY

## 2024-06-26 RX ORDER — DILTIAZEM HYDROCHLORIDE 180 MG/1
180 CAPSULE, COATED, EXTENDED RELEASE ORAL 2 TIMES DAILY
Qty: 60 CAPSULE | Refills: 0 | Status: SHIPPED | OUTPATIENT
Start: 2024-06-26

## 2024-06-26 RX ORDER — DIGOXIN 125 MCG
125 TABLET ORAL DAILY
Qty: 30 TABLET | Refills: 0 | Status: SHIPPED | OUTPATIENT
Start: 2024-06-27

## 2024-06-26 RX ORDER — METOPROLOL SUCCINATE 50 MG/1
50 TABLET, EXTENDED RELEASE ORAL 2 TIMES DAILY
Qty: 60 TABLET | Refills: 0 | Status: SHIPPED | OUTPATIENT
Start: 2024-06-26

## 2024-06-26 RX ORDER — AMIODARONE HYDROCHLORIDE 200 MG/1
200 TABLET ORAL DAILY
Qty: 30 TABLET | Refills: 0 | Status: SHIPPED | OUTPATIENT
Start: 2024-07-05

## 2024-06-26 RX ORDER — CLOPIDOGREL BISULFATE 75 MG/1
75 TABLET ORAL DAILY
Qty: 30 TABLET | Refills: 0 | Status: SHIPPED | OUTPATIENT
Start: 2024-06-27

## 2024-06-26 RX ORDER — SODIUM BICARBONATE 650 MG/1
650 TABLET ORAL 3 TIMES DAILY
Qty: 90 TABLET | Refills: 0 | Status: SHIPPED | OUTPATIENT
Start: 2024-06-26

## 2024-06-26 RX ORDER — POTASSIUM CHLORIDE 20 MEQ/1
40 TABLET, EXTENDED RELEASE ORAL PRN
Status: DISCONTINUED | OUTPATIENT
Start: 2024-06-26 | End: 2024-06-26 | Stop reason: HOSPADM

## 2024-06-26 RX ORDER — MAGNESIUM SULFATE IN WATER 40 MG/ML
2000 INJECTION, SOLUTION INTRAVENOUS PRN
Status: DISCONTINUED | OUTPATIENT
Start: 2024-06-26 | End: 2024-06-26 | Stop reason: HOSPADM

## 2024-06-26 RX ORDER — POTASSIUM CHLORIDE 7.45 MG/ML
10 INJECTION INTRAVENOUS PRN
Status: DISCONTINUED | OUTPATIENT
Start: 2024-06-26 | End: 2024-06-26 | Stop reason: HOSPADM

## 2024-06-26 RX ORDER — FUROSEMIDE 40 MG/1
40 TABLET ORAL DAILY
Qty: 30 TABLET | Refills: 0 | Status: SHIPPED | OUTPATIENT
Start: 2024-06-27

## 2024-06-26 RX ORDER — AMIODARONE HYDROCHLORIDE 200 MG/1
200 TABLET ORAL 2 TIMES DAILY
Qty: 17 TABLET | Refills: 0 | Status: SHIPPED | OUTPATIENT
Start: 2024-06-26 | End: 2024-07-05

## 2024-06-26 RX ADMIN — SODIUM CHLORIDE, PRESERVATIVE FREE 10 ML: 5 INJECTION INTRAVENOUS at 09:36

## 2024-06-26 RX ADMIN — AMIODARONE HYDROCHLORIDE 200 MG: 200 TABLET ORAL at 09:35

## 2024-06-26 RX ADMIN — PANTOPRAZOLE SODIUM 40 MG: 40 TABLET, DELAYED RELEASE ORAL at 05:59

## 2024-06-26 RX ADMIN — METOPROLOL SUCCINATE 50 MG: 50 TABLET, EXTENDED RELEASE ORAL at 09:35

## 2024-06-26 RX ADMIN — SACUBITRIL AND VALSARTAN 1 TABLET: 24; 26 TABLET, FILM COATED ORAL at 09:35

## 2024-06-26 RX ADMIN — POTASSIUM CHLORIDE 40 MEQ: 1500 TABLET, EXTENDED RELEASE ORAL at 03:48

## 2024-06-26 RX ADMIN — FUROSEMIDE 40 MG: 40 TABLET ORAL at 09:35

## 2024-06-26 RX ADMIN — SODIUM BICARBONATE 650 MG: 650 TABLET ORAL at 09:36

## 2024-06-26 RX ADMIN — DILTIAZEM HYDROCHLORIDE 180 MG: 180 CAPSULE, COATED, EXTENDED RELEASE ORAL at 09:36

## 2024-06-26 RX ADMIN — DIGOXIN 125 MCG: 125 TABLET ORAL at 09:35

## 2024-06-26 RX ADMIN — CLOPIDOGREL BISULFATE 75 MG: 75 TABLET ORAL at 09:35

## 2024-06-26 RX ADMIN — APIXABAN 5 MG: 5 TABLET, FILM COATED ORAL at 09:35

## 2024-06-26 RX ADMIN — ATORVASTATIN CALCIUM 40 MG: 40 TABLET, FILM COATED ORAL at 09:35

## 2024-06-26 RX ADMIN — IOPAMIDOL 75 ML: 755 INJECTION, SOLUTION INTRAVENOUS at 09:11

## 2024-06-26 RX ADMIN — POTASSIUM CHLORIDE 40 MEQ: 1500 TABLET, EXTENDED RELEASE ORAL at 05:59

## 2024-06-26 RX ADMIN — ESCITALOPRAM OXALATE 20 MG: 10 TABLET ORAL at 09:35

## 2024-06-26 NOTE — DISCHARGE SUMMARY
[facility]    Discharge Summary      Rojas Call  :  1950  MRN:  [id number]    Admit date:  2024  Discharge date:    2024    Discharging Physician:  Dr. Willian Nolan     Advance Directive: Full Code    Consults: cardiology    Primary Care Physician:  Juwan Trevino MD    Discharge Diagnoses:  Principal Problem:    Atrial fibrillation with RVR (HCC)  Active Problems:    NSTEMI (non-ST elevated myocardial infarction) (HCC)    History of gastric ulcer    Essential hypertension    Mixed hyperlipidemia    Mild episode of recurrent major depressive disorder (HCC)    Metabolic acidosis with normal anion gap and bicarbonate losses    Palliative care patient    Atrial fibrillation, persistent (HCC)  Resolved Problems:    * No resolved hospital problems. *      Portions of this note have been copied forward, however, changed to reflect the most current clinical status of this patient.    Hospital Course:   The patient is a 74 y.o. male with PMH of Atrial fibrillation, CAD, COPD, HTN, HLD and current smoker who presented to [facility] ED for evaluation of extremity weakness in bilateral arms. Reported waking up with bilateral arm weakness on morning of admission. Reported having similar symptoms with previous MI, therefore presented to ED for further evaluation. Workup in ED revealed EKG- A-fib RVR with no ischemic changes; CO2 19, troponin 44 followed by 46, Alk phos 140, unremarkable CBC, chest x-ray indicated cardiomegaly, benign stable calcified granuloma lateral aspect of the left lower lobe.  Patient was admitted to hospital medicine for further evaluation with cardiology consultation.  Cardizem drip initiated.  Cardiology recommended initiating amiodarone drip bolus and drip.  Consideration for DCCV if he continues to remain in A-fib. ECHO indicated reduced LV systolic function with EF of 40 to 44%, mild global hypokinesis, moderate hypokinesis of basal inferior, basal inferolateral, and mid

## 2024-06-27 ENCOUNTER — TELEPHONE (OUTPATIENT)
Dept: FAMILY MEDICINE CLINIC | Age: 74
End: 2024-06-27

## 2024-06-27 NOTE — TELEPHONE ENCOUNTER
Care Transitions Initial Follow Up Call    Outreach made within 2 business days of discharge: Yes    Patient: Rojas Call Patient : 1950   MRN: [id number]    Reason for Admission: Principal Problem:    Atrial fibrillation with RVR (HCC)  Active Problems:    NSTEMI (non-ST elevated myocardial infarction) (HCC)    History of gastric ulcer    Essential hypertension    Mixed hyperlipidemia    Mild episode of recurrent major depressive disorder (HCC)    Metabolic acidosis with normal anion gap and bicarbonate losses    Palliative care patient    Atrial fibrillation, persistent (HCC)    Discharge Date: 24       Spoke with: Left msg to call back    Discharge department/facility: [facility] Interactive Patient Contact:  Was patient able to fill all prescriptions:   Was patient instructed to bring all medications to the follow-up visit:   Is patient taking all medications as directed in the discharge summary?   Does patient understand their discharge instructions:   Does patient have questions or concerns that need addressed prior to 7-14 day follow up office visit:     Scheduled appointment with PCP within 7-14 days    Follow Up  Future Appointments   Date Time Provider Department Center   2024  8:00 AM [facility] CATH LAB PRE/POST [facility]   2024  1:00 PM [facility]       Kaykay Martel LPN

## 2024-06-28 NOTE — CONSULTS
Cardiac Rehab MI/PTCA/Stent education packet was sent to the patient's address on record.  Handouts titled; \"Home Instructions Following a Cardiac Event\", \"A Healthy Heart: Care Instructions\",  \"Cardiac Diet/Low Cholesterol\", \"Cardiac Rehabilitation: An Individualized Supervised Program For You\" and a [facility] Cardiac & Pulmonary Rehabilitation brochure were included.  Patient was instructed to contact [facility] or the hospital nearest their residence for the opportunity to enroll in Phase II Outpatient Cardiac Rehab.

## 2024-06-28 NOTE — TELEPHONE ENCOUNTER
Care Transitions Initial Follow Up Call    Outreach made within 2 business days of discharge: Yes    Patient: Rojas Call Patient : 1950   MRN: [id number]  Reason for Admission: There are no discharge diagnoses documented for the most recent discharge.  Discharge Date: 24       Spoke with: Left msg to call back    Discharge department/facility: [facility] Interactive Patient Contact:  Was patient able to fill all prescriptions:   Was patient instructed to bring all medications to the follow-up visit:   Is patient taking all medications as directed in the discharge summary?   Does patient understand their discharge instructions:   Does patient have questions or concerns that need addressed prior to 7-14 day follow up office visit:     Scheduled appointment with PCP within 7-14 days    Follow Up  Future Appointments   Date Time Provider Department Center   2024  2:00 PM Ju[facility] Trevino MD [facility]   2024  8:00 AM [facility] CATH LAB PRE/POST [facility]Card   2024  1:00 PM Juwan Trevino MD [facility]       Kaykay Martel LPN

## 2024-07-02 ENCOUNTER — OFFICE VISIT (OUTPATIENT)
Dept: FAMILY MEDICINE CLINIC | Age: 74
End: 2024-07-02

## 2024-07-02 VITALS
HEIGHT: 72 IN | DIASTOLIC BLOOD PRESSURE: 74 MMHG | WEIGHT: 164 LBS | HEART RATE: 46 BPM | TEMPERATURE: 97 F | OXYGEN SATURATION: 99 % | BODY MASS INDEX: 22.21 KG/M2 | SYSTOLIC BLOOD PRESSURE: 108 MMHG

## 2024-07-02 DIAGNOSIS — Z09 HOSPITAL DISCHARGE FOLLOW-UP: ICD-10-CM

## 2024-07-02 DIAGNOSIS — I48.19 ATRIAL FIBRILLATION, PERSISTENT (HCC): ICD-10-CM

## 2024-07-02 DIAGNOSIS — B02.9 HERPES ZOSTER WITHOUT COMPLICATION: ICD-10-CM

## 2024-07-02 DIAGNOSIS — I48.91 ATRIAL FIBRILLATION WITH RVR (HCC): ICD-10-CM

## 2024-07-02 DIAGNOSIS — I25.10 CORONARY ARTERY DISEASE INVOLVING NATIVE CORONARY ARTERY OF NATIVE HEART WITHOUT ANGINA PECTORIS: Primary | ICD-10-CM

## 2024-07-02 RX ORDER — VALACYCLOVIR HYDROCHLORIDE 1 G/1
1000 TABLET, FILM COATED ORAL 3 TIMES DAILY
Qty: 21 TABLET | Refills: 0 | Status: SHIPPED | OUTPATIENT
Start: 2024-07-02 | End: 2024-07-09

## 2024-07-02 RX ORDER — GABAPENTIN 300 MG/1
300 CAPSULE ORAL 3 TIMES DAILY
Qty: 90 CAPSULE | Refills: 0 | Status: SHIPPED | OUTPATIENT
Start: 2024-07-02 | End: 2024-08-01

## 2024-07-02 NOTE — PROGRESS NOTES
Post-Discharge Transitional Care Follow Up      Rojas Call   YOB: 1950    Date of Office Visit:  7/2/2024  Date of Hospital Admission: 6/20/24  Date of Hospital Discharge: 6/26/24  Readmission Risk Score (high >=14%. Medium >=10%):Readmission Risk Score: 12.7      Care management risk score Rising risk (score 2-5) and Complex Care (Scores >=6): No Risk Score On File     Non face to face  following discharge, date last encounter closed (first attempt may have been earlier): 06/27/2024     Call initiated 2 business days of discharge: Yes     Coronary artery disease involving native coronary artery of native heart without angina pectoris  Atrial fibrillation, persistent (HCC)  Atrial fibrillation with RVR (HCC)  Herpes zoster without complication  -     valACYclovir (VALTREX) 1 g tablet; Take 1 tablet by mouth 3 times daily for 7 days, Disp-21 tablet, R-0Normal  -     gabapentin (NEURONTIN) 300 MG capsule; Take 1 capsule by mouth 3 times daily for 30 days. Max Daily Amount: 900 mg, Disp-90 capsule, R-0Normal  Hospital discharge follow-up  -     KS DISCHARGE MEDS RECONCILED W/ CURRENT OUTPATIENT MED LIST    Heart rate low and having some dizziness but has not had any significant problems and is going to talk to cardiology tomorrow.  Stressed importance of maintaining with cardiology.  Discussed physical exam findings and proper use medication active course of shingles.  Discussed signs and symptoms requiring medical attention.  All questions were answered and patient voiced understanding and benefits    Medical Decision Making: high complexity  Return if symptoms worsen or fail to improve, for next scheduled follow up with PCP.           Subjective:   HPI    Inpatient course: Discharge summary reviewed- see chart.    Interval history/Current status:   Was laying in bed and his arms were useless. He states that the symptoms did not go away and he ended up going to the ER. He was noted to be in A

## 2024-07-08 ENCOUNTER — TELEPHONE (OUTPATIENT)
Dept: CARDIOLOGY CLINIC | Age: 74
End: 2024-07-08

## 2024-07-09 ENCOUNTER — SCHEDULED TELEPHONE ENCOUNTER (OUTPATIENT)
Dept: CARDIOLOGY CLINIC | Age: 74
End: 2024-07-09
Payer: MEDICARE

## 2024-07-09 DIAGNOSIS — Z79.01 CHRONIC ANTICOAGULATION: ICD-10-CM

## 2024-07-09 DIAGNOSIS — I10 ESSENTIAL HYPERTENSION: Primary | ICD-10-CM

## 2024-07-09 DIAGNOSIS — I25.10 CORONARY ARTERY DISEASE INVOLVING NATIVE CORONARY ARTERY OF NATIVE HEART WITHOUT ANGINA PECTORIS: ICD-10-CM

## 2024-07-09 DIAGNOSIS — I48.19 ATRIAL FIBRILLATION, PERSISTENT (HCC): ICD-10-CM

## 2024-07-09 DIAGNOSIS — E78.2 MIXED HYPERLIPIDEMIA: ICD-10-CM

## 2024-07-09 PROCEDURE — 99442 PR PHYS/QHP TELEPHONE EVALUATION 11-20 MIN: CPT | Performed by: NURSE PRACTITIONER

## 2024-07-09 NOTE — TELEPHONE ENCOUNTER
Called and spoke with patient's wife, Ruth. Patient cannot be seen in office due to active shingles. Made a TE appointment with Wiliam Miranda for 7/9/24 @ 8:15 am  
Patients wife called in this morning stating they showed up for appt Wednesday, but due to computer glitch they were unable to be seen. She is requesting a call back for an appt to see Dr. Barbosa only, no APRN. She states that patient is having irregular HR still, HR 56. He has feet swelling. She also mentioned that patient currently being treated for shingles. She would like return call to [phone].   
  Post-procedure Care At Home   Be sure to follow your doctor's instructions . You may be put on blood thinners for a few weeks after the procedure. In this case, blood levels of these medicines will need to be monitored via blood tests, usually weekly. You may also be put on a medicine called an anti-arrhythmic. This type of drug will help prevent the abnormal heartbeat from happening again.   Call Your Doctor   After you leave the hospital, contact your doctor if any of the following occurs:   Blisters, redness, or open sores on your chest   Confusion, lightheadedness, or dizziness   Sensation of your heart fluttering (palpitations)   Sensation of a skipped or missed hearbeat, or an irregular pulse   Cough, difficulty breathing, shortness of breath   Severe nausea or vomiting   Chest pain or pain in your left arm or jaw   Pain in your abdomen, back, arms, or legs   Blood in your urine   Changes in vision or speech   Difficulty walking or using your limbs   Drooping facial muscles     In case of an emergency, CALL 911 .

## 2024-07-09 NOTE — PROGRESS NOTES
call him with BMP results once available.    HLD-followed by PCP, on Lipitor, no changes    Persistent atrial fibrillation/chronic anticoagulation-remains and A-fib on amiodarone, digoxin, Cardizem, & Toprol.  Rates appear to be controlled at home.  He is decreasing to 200 mg once daily tomorrow.  He remains anticoagulated on Eliquis without bleeding issues.  Discussed with him that he cannot miss a dose of Eliquis before his cardioversion scheduled on 7/30.    Disposition: Return for Post cardioversion.     I affirm this is a Patient Initiated Episode with an Established Patient who has not had a related appointment within my department in the past 7 days or scheduled within the next 24 hours.    Patient identification was verified at the start of the visit: Yes    Total Time: minutes: 11-20 minutes    Note: not billable if this call serves to triage the patient into an appointment for the relevant concern      KEVYN Pelaez

## 2024-07-10 DIAGNOSIS — I10 ESSENTIAL HYPERTENSION: ICD-10-CM

## 2024-07-10 LAB
ANION GAP SERPL CALCULATED.3IONS-SCNC: 10 MMOL/L (ref 7–19)
BUN SERPL-MCNC: 19 MG/DL (ref 8–23)
CALCIUM SERPL-MCNC: 8.5 MG/DL (ref 8.8–10.2)
CHLORIDE SERPL-SCNC: 102 MMOL/L (ref 98–111)
CO2 SERPL-SCNC: 29 MMOL/L (ref 22–29)
CREAT SERPL-MCNC: 1.6 MG/DL (ref 0.5–1.2)
GLUCOSE SERPL-MCNC: 91 MG/DL (ref 74–109)
POTASSIUM SERPL-SCNC: 4 MMOL/L (ref 3.5–5)
SODIUM SERPL-SCNC: 141 MMOL/L (ref 136–145)

## 2024-07-12 DIAGNOSIS — K21.9 GASTROESOPHAGEAL REFLUX DISEASE, UNSPECIFIED WHETHER ESOPHAGITIS PRESENT: ICD-10-CM

## 2024-07-12 DIAGNOSIS — R10.13 EPIGASTRIC PAIN: ICD-10-CM

## 2024-07-12 ASSESSMENT — ENCOUNTER SYMPTOMS
RHINORRHEA: 0
NAUSEA: 0
DIARRHEA: 0
VOMITING: 0
CONSTIPATION: 0
EYE PAIN: 0
SHORTNESS OF BREATH: 0
ABDOMINAL PAIN: 0
COUGH: 0
EYE DISCHARGE: 0
BACK PAIN: 1

## 2024-07-15 RX ORDER — PANTOPRAZOLE SODIUM 40 MG/1
TABLET, DELAYED RELEASE ORAL
Qty: 30 TABLET | Refills: 0 | Status: SHIPPED | OUTPATIENT
Start: 2024-07-15

## 2024-07-18 ENCOUNTER — OFFICE VISIT (OUTPATIENT)
Dept: FAMILY MEDICINE CLINIC | Age: 74
End: 2024-07-18
Payer: MEDICARE

## 2024-07-18 ENCOUNTER — HOSPITAL ENCOUNTER (OUTPATIENT)
Dept: GENERAL RADIOLOGY | Age: 74
Discharge: HOME | End: 2024-07-18
Payer: MEDICARE

## 2024-07-18 VITALS
HEIGHT: 72 IN | DIASTOLIC BLOOD PRESSURE: 62 MMHG | HEART RATE: 61 BPM | BODY MASS INDEX: 21.89 KG/M2 | WEIGHT: 161.6 LBS | TEMPERATURE: 97.2 F | SYSTOLIC BLOOD PRESSURE: 96 MMHG | OXYGEN SATURATION: 99 %

## 2024-07-18 DIAGNOSIS — R10.30 LOWER ABDOMINAL PAIN: ICD-10-CM

## 2024-07-18 DIAGNOSIS — R10.30 LOWER ABDOMINAL PAIN: Primary | ICD-10-CM

## 2024-07-18 LAB
ALBUMIN SERPL-MCNC: 3 G/DL (ref 3.5–5.2)
ALP SERPL-CCNC: 126 U/L (ref 40–130)
ALT SERPL-CCNC: 29 U/L (ref 5–41)
ANION GAP SERPL CALCULATED.3IONS-SCNC: 7 MMOL/L (ref 7–19)
AST SERPL-CCNC: 20 U/L (ref 5–40)
BASOPHILS # BLD: 0 K/UL (ref 0–0.2)
BASOPHILS NFR BLD: 0.4 % (ref 0–1)
BILIRUB SERPL-MCNC: 0.7 MG/DL (ref 0.2–1.2)
BUN SERPL-MCNC: 18 MG/DL (ref 8–23)
CALCIUM SERPL-MCNC: 8.6 MG/DL (ref 8.8–10.2)
CHLORIDE SERPL-SCNC: 103 MMOL/L (ref 98–111)
CO2 SERPL-SCNC: 30 MMOL/L (ref 22–29)
CREAT SERPL-MCNC: 1.5 MG/DL (ref 0.5–1.2)
EOSINOPHIL # BLD: 0.1 K/UL (ref 0–0.6)
EOSINOPHIL NFR BLD: 1.8 % (ref 0–5)
ERYTHROCYTE [DISTWIDTH] IN BLOOD BY AUTOMATED COUNT: 16.1 % (ref 11.5–14.5)
GLUCOSE SERPL-MCNC: 105 MG/DL (ref 74–109)
HCT VFR BLD AUTO: 43.3 % (ref 42–52)
HGB BLD-MCNC: 13.7 G/DL (ref 14–18)
IMM GRANULOCYTES # BLD: 0 K/UL
LIPASE SERPL-CCNC: 35 U/L (ref 13–60)
LYMPHOCYTES # BLD: 1.1 K/UL (ref 1.1–4.5)
LYMPHOCYTES NFR BLD: 22.7 % (ref 20–40)
MCH RBC QN AUTO: 31.3 PG (ref 27–31)
MCHC RBC AUTO-ENTMCNC: 31.6 G/DL (ref 33–37)
MCV RBC AUTO: 98.9 FL (ref 80–94)
MONOCYTES # BLD: 0.4 K/UL (ref 0–0.9)
MONOCYTES NFR BLD: 8.6 % (ref 0–10)
NEUTROPHILS # BLD: 3.3 K/UL (ref 1.5–7.5)
NEUTS SEG NFR BLD: 66.3 % (ref 50–65)
PLATELET # BLD AUTO: 153 K/UL (ref 130–400)
PMV BLD AUTO: 11.1 FL (ref 9.4–12.4)
POTASSIUM SERPL-SCNC: 4.1 MMOL/L (ref 3.5–5)
PROT SERPL-MCNC: 5.9 G/DL (ref 6.6–8.7)
RBC # BLD AUTO: 4.38 M/UL (ref 4.7–6.1)
SODIUM SERPL-SCNC: 140 MMOL/L (ref 136–145)
WBC # BLD AUTO: 5 K/UL (ref 4.8–10.8)

## 2024-07-18 PROCEDURE — 3078F DIAST BP <80 MM HG: CPT | Performed by: FAMILY MEDICINE

## 2024-07-18 PROCEDURE — 3074F SYST BP LT 130 MM HG: CPT | Performed by: FAMILY MEDICINE

## 2024-07-18 PROCEDURE — 1123F ACP DISCUSS/DSCN MKR DOCD: CPT | Performed by: FAMILY MEDICINE

## 2024-07-18 PROCEDURE — 6360000004 HC RX CONTRAST MEDICATION: Performed by: FAMILY MEDICINE

## 2024-07-18 PROCEDURE — 74178 CT ABD&PLV WO CNTR FLWD CNTR: CPT

## 2024-07-18 PROCEDURE — G8427 DOCREV CUR MEDS BY ELIG CLIN: HCPCS | Performed by: FAMILY MEDICINE

## 2024-07-18 PROCEDURE — 99214 OFFICE O/P EST MOD 30 MIN: CPT | Performed by: FAMILY MEDICINE

## 2024-07-18 PROCEDURE — 1111F DSCHRG MED/CURRENT MED MERGE: CPT | Performed by: FAMILY MEDICINE

## 2024-07-18 PROCEDURE — G8420 CALC BMI NORM PARAMETERS: HCPCS | Performed by: FAMILY MEDICINE

## 2024-07-18 PROCEDURE — 4004F PT TOBACCO SCREEN RCVD TLK: CPT | Performed by: FAMILY MEDICINE

## 2024-07-18 RX ORDER — HYDROCODONE BITARTRATE AND ACETAMINOPHEN 5; 325 MG/1; MG/1
1 TABLET ORAL EVERY 6 HOURS PRN
Qty: 12 TABLET | Refills: 0 | Status: SHIPPED | OUTPATIENT
Start: 2024-07-18 | End: 2024-07-21

## 2024-07-18 RX ADMIN — IOPAMIDOL 75 ML: 755 INJECTION, SOLUTION INTRAVENOUS at 17:17

## 2024-07-18 NOTE — PROGRESS NOTES
in neck     Shingles     Tobacco abuse     Ulcer         Social History     Tobacco Use    Smoking status: Every Day     Current packs/day: 0.50     Average packs/day: 0.5 packs/day for 60.0 years (30.0 ttl pk-yrs)     Types: Cigarettes, Cigars    Smokeless tobacco: Never   Substance Use Topics    Alcohol use: Yes     Alcohol/week: 0.0 standard drinks of alcohol     Comment: occ        Past Surgical History:   Procedure Laterality Date    CARDIAC PROCEDURE N/A 6/24/2024    Left heart cath / coronary angiography performed by Nathaniel Barbosa MD at [facility] CARDIAC CATH LAB    CARDIAC PROCEDURE N/A 6/24/2024    Percutaneous coronary intervention performed by Nathaniel Barbosa MD at [facility] CARDIAC CATH LAB    COLONOSCOPY  12-10-08    Dr De La Torre    COLONOSCOPY  8-27-01    Dr De La Torre    COLONOSCOPY  07/2013    Dr. Strange: multiple polyps, adenomatous and hyperplastic (3 yr)    CORONARY ANGIOPLASTY WITH STENT PLACEMENT  07/06/2017    ENDOSCOPY, COLON, DIAGNOSTIC      EP DEVICE PROCEDURE N/A 6/24/2024    Ep cardioversion performed by Nathaniel Barbosa MD at [facility] CARDIAC CATH LAB    EP DEVICE PROCEDURE N/A 6/25/2024    Ep cardioversion performed by Nathaniel Barbosa MD at [facility] CARDIAC CATH LAB    HEMORRHOID SURGERY      JOINT REPLACEMENT Left 06/2012    knee    KNEE ARTHROSCOPY      bilateral-3 scopes on right and 1 on left    NECK SURGERY      PAROTIDECTOMY  03/31/2017    SKIN BIOPSY      lypomas on arms, side, and back    TOTAL KNEE ARTHROPLASTY      left    UPPER GASTROINTESTINAL ENDOSCOPY  8-27-01    Dr De La Torre    UPPER GASTROINTESTINAL ENDOSCOPY N/A 3/31/2016    Dr Strange-Vilma (-), Chronic esophagitis       Family History   Problem Relation Age of Onset    Other Sister         pancrease removal    Cancer Father         Larynx and asbestos exposure    Esophageal Cancer Father     Heart Disease Mother         has pacemaker    Diabetes Mother     Other Mother         has had esoph stretched in the past    Colon Polyps Neg Hx     Colon Cancer Neg Hx     Liver

## 2024-07-19 ENCOUNTER — TELEPHONE (OUTPATIENT)
Dept: FAMILY MEDICINE CLINIC | Age: 74
End: 2024-07-19

## 2024-07-22 ENCOUNTER — TELEPHONE (OUTPATIENT)
Dept: CARDIOLOGY CLINIC | Age: 74
End: 2024-07-22

## 2024-07-22 ENCOUNTER — OFFICE VISIT (OUTPATIENT)
Dept: CARDIOLOGY CLINIC | Age: 74
End: 2024-07-22
Payer: MEDICARE

## 2024-07-22 VITALS
SYSTOLIC BLOOD PRESSURE: 108 MMHG | HEART RATE: 57 BPM | HEIGHT: 72 IN | DIASTOLIC BLOOD PRESSURE: 74 MMHG | WEIGHT: 160 LBS | BODY MASS INDEX: 21.67 KG/M2 | OXYGEN SATURATION: 100 %

## 2024-07-22 DIAGNOSIS — Q24.8 PERICARDIAL CYST: ICD-10-CM

## 2024-07-22 DIAGNOSIS — I25.10 CORONARY ARTERY DISEASE INVOLVING NATIVE CORONARY ARTERY OF NATIVE HEART WITHOUT ANGINA PECTORIS: ICD-10-CM

## 2024-07-22 DIAGNOSIS — I50.22 CHRONIC SYSTOLIC HEART FAILURE (HCC): ICD-10-CM

## 2024-07-22 DIAGNOSIS — I48.19 PERSISTENT ATRIAL FIBRILLATION (HCC): Primary | ICD-10-CM

## 2024-07-22 DIAGNOSIS — Z79.899 ON AMIODARONE THERAPY: ICD-10-CM

## 2024-07-22 PROCEDURE — G8420 CALC BMI NORM PARAMETERS: HCPCS | Performed by: CLINICAL NURSE SPECIALIST

## 2024-07-22 PROCEDURE — G8427 DOCREV CUR MEDS BY ELIG CLIN: HCPCS | Performed by: CLINICAL NURSE SPECIALIST

## 2024-07-22 PROCEDURE — 4004F PT TOBACCO SCREEN RCVD TLK: CPT | Performed by: CLINICAL NURSE SPECIALIST

## 2024-07-22 PROCEDURE — 99214 OFFICE O/P EST MOD 30 MIN: CPT | Performed by: CLINICAL NURSE SPECIALIST

## 2024-07-22 PROCEDURE — 1111F DSCHRG MED/CURRENT MED MERGE: CPT | Performed by: CLINICAL NURSE SPECIALIST

## 2024-07-22 PROCEDURE — 3078F DIAST BP <80 MM HG: CPT | Performed by: CLINICAL NURSE SPECIALIST

## 2024-07-22 PROCEDURE — 3074F SYST BP LT 130 MM HG: CPT | Performed by: CLINICAL NURSE SPECIALIST

## 2024-07-22 PROCEDURE — 1123F ACP DISCUSS/DSCN MKR DOCD: CPT | Performed by: CLINICAL NURSE SPECIALIST

## 2024-07-22 RX ORDER — METOPROLOL SUCCINATE 50 MG/1
50 TABLET, EXTENDED RELEASE ORAL 2 TIMES DAILY
Qty: 60 TABLET | Refills: 5 | Status: SHIPPED | OUTPATIENT
Start: 2024-07-22

## 2024-07-22 RX ORDER — HYDROCORTISONE ACETATE 25 MG/1
25 SUPPOSITORY RECTAL EVERY 12 HOURS
Qty: 10 SUPPOSITORY | Refills: 1 | Status: SHIPPED | OUTPATIENT
Start: 2024-07-22

## 2024-07-22 RX ORDER — FUROSEMIDE 40 MG/1
40 TABLET ORAL DAILY
Qty: 30 TABLET | Refills: 5 | Status: SHIPPED | OUTPATIENT
Start: 2024-07-22

## 2024-07-22 RX ORDER — AMIODARONE HYDROCHLORIDE 200 MG/1
200 TABLET ORAL DAILY
Qty: 30 TABLET | Refills: 5 | Status: SHIPPED | OUTPATIENT
Start: 2024-07-22

## 2024-07-22 RX ORDER — DIGOXIN 125 MCG
125 TABLET ORAL DAILY
Qty: 30 TABLET | Refills: 5 | Status: SHIPPED | OUTPATIENT
Start: 2024-07-22

## 2024-07-22 RX ORDER — DILTIAZEM HYDROCHLORIDE 180 MG/1
180 CAPSULE, COATED, EXTENDED RELEASE ORAL 2 TIMES DAILY
Qty: 60 CAPSULE | Refills: 5 | Status: SHIPPED | OUTPATIENT
Start: 2024-07-22

## 2024-07-22 RX ORDER — CLOPIDOGREL BISULFATE 75 MG/1
75 TABLET ORAL DAILY
Qty: 30 TABLET | Refills: 5 | Status: SHIPPED | OUTPATIENT
Start: 2024-07-22

## 2024-07-22 ASSESSMENT — ENCOUNTER SYMPTOMS
WHEEZING: 0
EYE REDNESS: 0
CHEST TIGHTNESS: 0
SHORTNESS OF BREATH: 1
NAUSEA: 0
VOMITING: 0
FACIAL SWELLING: 0
ABDOMINAL PAIN: 0
COUGH: 0

## 2024-07-22 NOTE — TELEPHONE ENCOUNTER
Patient was sent back to our office from PCP office after recent CT of the abdomen showed a pericardial cyst.    CT ABD 7/18/24  FINDINGS:  Mild fatty infiltration of the liver.  Gallbladder is within normal limits.  No biliary dilatation or pancreatic pathology.  Spleen and adrenal glands appear normal.  There is a 3 cm simple cyst of the inferior left renal cortex.  No   hydronephrosis.  Moderately severe atherosclerotic disease.  Stomach is within normal limits.  Normal appendix.  Nonobstructive bowel gas pattern.  Urinary bladder and prostate are within normal limits.  There is no ascites or free air.  There is no   abdominal wall hernia.  The bones appear appropriate for age.  Lung bases reveal a 3.7 cm cyst adjacent to the lower right heart border suggesting a pericardial cyst.  IMPRESSION:  1.  Mild fatty infiltration of the liver.  Gallbladder is within normal limits.  No biliary dilatation or pancreatic pathology.  2.  No hydronephrosis.  3.  Moderately severe atherosclerotic disease.  No aneurysmal caliber of the aorta.  4.  Normal appendix.  Nonobstructive bowel gas pattern.  There is no ascites or free air.  5.  Urinary bladder and prostate are unremarkable.    Please advise regarding treatment.  Is something we will just monitor?

## 2024-07-22 NOTE — PROGRESS NOTES
renal cortex.  No   hydronephrosis.  Moderately severe atherosclerotic disease.  Stomach is within normal limits.  Normal appendix.  Nonobstructive bowel gas pattern.  Urinary bladder and prostate are within normal limits.  There is no ascites or free air.  There is no   abdominal wall hernia.  The bones appear appropriate for age.  Lung bases reveal a 3.7 cm cyst adjacent to the lower right heart border suggesting a pericardial cyst.  IMPRESSION:  1.  Mild fatty infiltration of the liver.  Gallbladder is within normal limits.  No biliary dilatation or pancreatic pathology.  2.  No hydronephrosis.  3.  Moderately severe atherosclerotic disease.  No aneurysmal caliber of the aorta.  4.  Normal appendix.  Nonobstructive bowel gas pattern.  There is no ascites or free air.  5.  Urinary bladder and prostate are unremarkable.  - - - - -   Assessment:     Diagnosis Orders   1. Persistent atrial fibrillation (HCC)  apixaban (ELIQUIS) 5 MG TABS tablet      2. Coronary artery disease involving native coronary artery of native heart without angina pectoris        3. Chronic systolic heart failure (HCC)        4. On amiodarone therapy        5. Pericardial cyst          Persistent atrial fibrillation-patient remains anticoagulated.  Failed cardioversion during hospitalization.  He has been loaded with amiodarone and cardioversion is planned 7/30/2024.  This will likely feel better post cardioversion    Coronary artery disease-recent stents as detailed above.  Continue Plavix without interruption for at least 1 year following stents    Chronic systolic heart failure-NYHA class II, stage C-appears euvolemic on Entresto, metoprolol succinate, Lasix.  Multiple new medications in the past month.  Consideration for additional GDMT once he is feeling better.  -  Counseled on daily weights, reporting weight gain of 3lbs in 24hrs or 5lbs in a week, low sodium diet, and fluid restrictions 6 cupsor 48oz daily.     Pericardial cyst-new

## 2024-07-23 ENCOUNTER — TELEPHONE (OUTPATIENT)
Dept: CARDIOLOGY CLINIC | Age: 74
End: 2024-07-23

## 2024-07-23 NOTE — TELEPHONE ENCOUNTER
Nathaniel Barbosa MD Bray, Deborah L, APRN  Caller: Unspecified (Yesterday,  3:26 PM)  Usually a benign finding.  Was not reported on his CT chest done June 2024.  For stability can do a repeat CT in 3 months.  There is no pericardial effusion or compromise on cardiac structure and no intervention is required.    Isamar-please let patient's wife know about Dr. Barbosa's recommendation regarding pericardial cyst.  He states it is usually a benign finding and was not reported on the CT of the chest, only to CT of the abdomen.  Consider repeating a CT of the chest in about 3 months.  There appears to be no compromise to the heart structure from the cyst.

## 2024-07-23 NOTE — TELEPHONE ENCOUNTER
Patients wife called to get time for arrival and if pt needs to be NPO for DCCV scheduled for 7/30. Advised the following from Jessica's documentation on 7/9. She voiced understanding.     Jessica Collins MA LJ    7/9/24 11:15 AM  Note     Spoke with Rojas Call, have DCCV scheduled for July 30, 2024 @8 am , advised patient will receive a phone call from cath lab day prior to procedure with arrival time.  .  Advised to be NPO after midnight, may take all morning medications with a sip of water.  Advised to check in at [facility] registration, come into the main entrance of the hospital, make immediate left, check in at end of hallway.  Advised patient will need someone to drive them home.

## 2024-07-24 ASSESSMENT — ENCOUNTER SYMPTOMS
EYE DISCHARGE: 0
ABDOMINAL PAIN: 1
COUGH: 0
DIARRHEA: 0
EYE PAIN: 0
CONSTIPATION: 0
VOMITING: 0
RHINORRHEA: 0
BACK PAIN: 1
SHORTNESS OF BREATH: 0
NAUSEA: 0

## 2024-07-25 ENCOUNTER — TELEPHONE (OUTPATIENT)
Dept: FAMILY MEDICINE CLINIC | Age: 74
End: 2024-07-25

## 2024-07-25 NOTE — TELEPHONE ENCOUNTER
Spouse had left message that patient wanted to try Viagra.  Left message that Dr. Trevino was out until 08/06 and would as him then

## 2024-07-28 NOTE — H&P
anginal type symptoms, mildly elevated troponins with no significant ST-T changes, cardiac catheterization with severe restenosis of OM1 treated with SEBASTIEN, mid RPL treated with SEBASTIEN, unsuccessful DCCV x 2 despite amiodarone, here for repeat attempt at DCCV.      Risks, benefits, alternatives of DCCV discussed with the patient and full informed consent obtained.  Mallampati score 2  Consent for moderate conscious sedation  ASA 3         Electronically signed by Nathaniel Barbosa MD on 7/28/2024 at 1:36 PM        Thisdictation was generated by voice recognition computer software.  Although all attempts are made to edit the dictation for accuracy, there may be errors in the transcription that are not intended.

## 2024-07-30 ENCOUNTER — HOSPITAL ENCOUNTER (OUTPATIENT)
Age: 74
Discharge: HOME | End: 2024-08-01
Attending: INTERNAL MEDICINE
Payer: MEDICARE

## 2024-07-30 ENCOUNTER — ANESTHESIA (OUTPATIENT)
Age: 74
End: 2024-07-30
Payer: MEDICARE

## 2024-07-30 ENCOUNTER — ANESTHESIA EVENT (OUTPATIENT)
Age: 74
End: 2024-07-30
Payer: MEDICARE

## 2024-07-30 VITALS
DIASTOLIC BLOOD PRESSURE: 68 MMHG | HEIGHT: 72 IN | TEMPERATURE: 97.2 F | WEIGHT: 158 LBS | RESPIRATION RATE: 16 BRPM | HEART RATE: 65 BPM | OXYGEN SATURATION: 98 % | BODY MASS INDEX: 21.4 KG/M2 | SYSTOLIC BLOOD PRESSURE: 104 MMHG

## 2024-07-30 DIAGNOSIS — I48.20 CHRONIC ATRIAL FIBRILLATION (HCC): Primary | ICD-10-CM

## 2024-07-30 DIAGNOSIS — I48.0 PAROXYSMAL ATRIAL FIBRILLATION (HCC): ICD-10-CM

## 2024-07-30 LAB
ECHO BSA: 1.91 M2
EKG P AXIS: NORMAL DEGREES
EKG P-R INTERVAL: NORMAL MS
EKG Q-T INTERVAL: 420 MS
EKG QRS DURATION: 88 MS
EKG QTC CALCULATION (BAZETT): 442 MS
EKG T AXIS: 29 DEGREES

## 2024-07-30 PROCEDURE — 6360000002 HC RX W HCPCS: Performed by: NURSE ANESTHETIST, CERTIFIED REGISTERED

## 2024-07-30 PROCEDURE — 7100000011 HC PHASE II RECOVERY - ADDTL 15 MIN: Performed by: INTERNAL MEDICINE

## 2024-07-30 PROCEDURE — 2500000003 HC RX 250 WO HCPCS: Performed by: NURSE ANESTHETIST, CERTIFIED REGISTERED

## 2024-07-30 PROCEDURE — 93005 ELECTROCARDIOGRAM TRACING: CPT | Performed by: INTERNAL MEDICINE

## 2024-07-30 PROCEDURE — 3700000000 HC ANESTHESIA ATTENDED CARE: Performed by: INTERNAL MEDICINE

## 2024-07-30 PROCEDURE — 92960 CARDIOVERSION ELECTRIC EXT: CPT | Performed by: INTERNAL MEDICINE

## 2024-07-30 PROCEDURE — 3700000001 HC ADD 15 MINUTES (ANESTHESIA): Performed by: INTERNAL MEDICINE

## 2024-07-30 PROCEDURE — 7100000010 HC PHASE II RECOVERY - FIRST 15 MIN: Performed by: INTERNAL MEDICINE

## 2024-07-30 PROCEDURE — 92960 CARDIOVERSION ELECTRIC EXT: CPT

## 2024-07-30 PROCEDURE — 2580000003 HC RX 258: Performed by: INTERNAL MEDICINE

## 2024-07-30 RX ORDER — SODIUM CHLORIDE 9 MG/ML
INJECTION, SOLUTION INTRAVENOUS PRN
Status: DISCONTINUED | OUTPATIENT
Start: 2024-07-30 | End: 2024-08-03 | Stop reason: HOSPADM

## 2024-07-30 RX ORDER — PROPOFOL 10 MG/ML
INJECTION, EMULSION INTRAVENOUS PRN
Status: DISCONTINUED | OUTPATIENT
Start: 2024-07-30 | End: 2024-07-30 | Stop reason: SDUPTHER

## 2024-07-30 RX ORDER — LIDOCAINE HYDROCHLORIDE 10 MG/ML
INJECTION, SOLUTION EPIDURAL; INFILTRATION; INTRACAUDAL; PERINEURAL PRN
Status: DISCONTINUED | OUTPATIENT
Start: 2024-07-30 | End: 2024-07-30 | Stop reason: SDUPTHER

## 2024-07-30 RX ORDER — SODIUM CHLORIDE 0.9 % (FLUSH) 0.9 %
5-40 SYRINGE (ML) INJECTION EVERY 12 HOURS SCHEDULED
Status: DISCONTINUED | OUTPATIENT
Start: 2024-07-30 | End: 2024-08-03 | Stop reason: HOSPADM

## 2024-07-30 RX ORDER — SODIUM CHLORIDE 9 MG/ML
INJECTION, SOLUTION INTRAVENOUS CONTINUOUS
Status: DISCONTINUED | OUTPATIENT
Start: 2024-07-30 | End: 2024-08-03 | Stop reason: HOSPADM

## 2024-07-30 RX ORDER — DILTIAZEM HYDROCHLORIDE 120 MG/1
120 CAPSULE, COATED, EXTENDED RELEASE ORAL 2 TIMES DAILY
Qty: 30 CAPSULE | Refills: 3 | Status: SHIPPED | OUTPATIENT
Start: 2024-07-30

## 2024-07-30 RX ORDER — SODIUM CHLORIDE 0.9 % (FLUSH) 0.9 %
5-40 SYRINGE (ML) INJECTION PRN
Status: DISCONTINUED | OUTPATIENT
Start: 2024-07-30 | End: 2024-08-03 | Stop reason: HOSPADM

## 2024-07-30 RX ADMIN — LIDOCAINE HYDROCHLORIDE 50 MG: 10 INJECTION, SOLUTION EPIDURAL; INFILTRATION; INTRACAUDAL; PERINEURAL at 08:01

## 2024-07-30 RX ADMIN — SODIUM CHLORIDE: 9 INJECTION, SOLUTION INTRAVENOUS at 06:53

## 2024-07-30 RX ADMIN — PROPOFOL 100 MG: 10 INJECTION, EMULSION INTRAVENOUS at 08:08

## 2024-07-30 ASSESSMENT — LIFESTYLE VARIABLES: SMOKING_STATUS: 1

## 2024-07-30 ASSESSMENT — COPD QUESTIONNAIRES: CAT_SEVERITY: MODERATE

## 2024-07-30 NOTE — PROCEDURES
PROCEDURE NOTE  Date: 7/30/2024   Name: Rojas Call  YOB: 1950    Procedures    Date of Procedure:  7/30/2024     Indications:  Atrial fibrillation with an appropriate duration of anticoagulation     Conscious Sedation Protocol Used During this Procedure -anesthesia provided by anesthesia service        Anesthesia: Moderate   Sedation:    0 mg Midazolam (Versed)     0 mcg Fentanyl   Start time: 8:08 AM   Stop time: 8:20 AM   ASA Class: 3   EBL Not applicable     A trained medical personnel administered medications at my direction.  The patient was monitored continuously with ECG, pulse oximetry, blood pressure monitoring and direct observation.       After obtaining informed written consent and an appropriate level of conscious sedation, DCCV with 360 J x 3 was unsuccessful in restoring sinus rhythm.      Complications:  none      Impression: Unsuccessful DC cardioversion of atrial fibrillation to normal sinus rhythm on Eliquis used for anticoagulation.  Discontinue amiodarone.  Will manage with rate control.  Continue heart failure management.  Reevaluate echo in 3 months.    Electronically signed by Nathaniel Barbosa MD on 7/30/24

## 2024-07-30 NOTE — ANESTHESIA POSTPROCEDURE EVALUATION
Department of Anesthesiology  Postprocedure Note    Patient: Rojas Call  MRN: [id number]  YOB: 1950  Date of evaluation: 7/30/2024    Procedure Summary       Date: 07/30/24 Room / Location: [facility] Cardiac Cath Lab    Anesthesia Start: 0758 Anesthesia Stop: 0819    Procedure: CARDIOVERSION EXTERNAL Diagnosis: Paroxysmal atrial fibrillation (HCC)    Scheduled Providers: Nathaniel Barbosa MD Responsible Provider: Ankita Haider APRN - CRNA    Anesthesia Type: General, TIVA ASA Status: 3            Anesthesia Type: General, TIVA    Erik Phase I:      Erik Phase II:      Anesthesia Post Evaluation    Patient location during evaluation: bedside  Patient participation: complete - patient participated  Level of consciousness: awake and alert  Pain score: 0  Airway patency: patent  Nausea & Vomiting: no nausea and no vomiting  Cardiovascular status: blood pressure returned to baseline  Respiratory status: acceptable, spontaneous ventilation, nonlabored ventilation and room air  Hydration status: euvolemic  Comments: /75   Pulse 75   Temp 97.2 °F (36.2 °C) (Temporal)   Resp 15   Ht 1.829 m (6')   Wt 71.7 kg (158 lb)   SpO2 98%   BMI 21.43 kg/m²     Pain management: adequate    No notable events documented.

## 2024-07-30 NOTE — ANESTHESIA PRE PROCEDURE
hyperplastic, distal rectum, duodenum   • History of transfusion    • Hyperlipidemia    • Hypertension    • Joint pain    • Palliative care patient 06/21/2024   • Palpitations    • Pinched nerve     right shoulder   • Pinched nerve in neck    • Shingles    • Tobacco abuse    • Ulcer        Past Surgical History:        Procedure Laterality Date   • CARDIAC PROCEDURE N/A 6/24/2024    Left heart cath / coronary angiography performed by Nathaniel Barbosa MD at [facility] CARDIAC CATH LAB   • CARDIAC PROCEDURE N/A 6/24/2024    Percutaneous coronary intervention performed by Nathaniel Barbosa MD at [facility] CARDIAC CATH LAB   • COLONOSCOPY  12-10-08    Dr De La Torre   • COLONOSCOPY  8-27-01    Dr De La Torre   • COLONOSCOPY  07/2013    Dr. Strange: multiple polyps, adenomatous and hyperplastic (3 yr)   • CORONARY ANGIOPLASTY WITH STENT PLACEMENT  07/06/2017   • ENDOSCOPY, COLON, DIAGNOSTIC     • EP DEVICE PROCEDURE N/A 6/24/2024    Ep cardioversion performed by Nathaniel Barbosa MD at [facility] CARDIAC CATH LAB   • EP DEVICE PROCEDURE N/A 6/25/2024    Ep cardioversion performed by Nathaniel Barbosa MD at [facility] CARDIAC CATH LAB   • HEMORRHOID SURGERY     • JOINT REPLACEMENT Left 06/2012    knee   • KNEE ARTHROSCOPY      bilateral-3 scopes on right and 1 on left   • NECK SURGERY     • PAROTIDECTOMY  03/31/2017   • SKIN BIOPSY      lypomas on arms, side, and back   • TOTAL KNEE ARTHROPLASTY      left   • UPPER GASTROINTESTINAL ENDOSCOPY  8-27-01    Dr De La Torre   • UPPER GASTROINTESTINAL ENDOSCOPY N/A 3/31/2016    Dr Strange-V[place]ma (-), Chronic esophagitis       Social History:    Social History     Tobacco Use   • Smoking status: Every Day     Current packs/day: 0.50     Average packs/day: 0.5 packs/day for 60.0 years (30.0 ttl pk-yrs)     Types: Cigarettes, Cigars   • Smokeless tobacco: Never   Substance Use Topics   • Alcohol use: Yes     Alcohol/week: 0.0 standard drinks of alcohol     Comment: occ                                Ready to quit: Not Answered  Counseling given: Not

## 2024-07-30 NOTE — PROGRESS NOTES
Discharged home with wife in stable condition.  Transferred via wheelchair to front entrance with RN for  per private car.

## 2024-07-30 NOTE — PROGRESS NOTES
Returned post cardioversion.  Drowsy but awakens easily to verbal stimuli.  Denies any c/o pain,  Remains a fib on monitor.

## 2024-07-30 NOTE — PROGRESS NOTES
TRANSFER - OUT REPORT:    Verbal report given to Brandon(name) on Rojas Call being transferred to holding room 7 (unit) for routine progression of patient care       Report consisted of patient's Situation, Background, Assessment and   Recommendations(SBAR).     Information from the following report(s) Nurse Handoff Report, Cardiac Rhythm afib, and Event Log was reviewed with the receiving nurse.    Opportunity for questions and clarification was provided.      Patient transported with:   Registered Nurse

## 2024-07-30 NOTE — PROGRESS NOTES
Patient and wife instructed on care post cardioversion.  Given written instructions.  Both stated understanding.

## 2024-08-05 ENCOUNTER — TELEPHONE (OUTPATIENT)
Dept: FAMILY MEDICINE CLINIC | Age: 74
End: 2024-08-05

## 2024-08-05 NOTE — TELEPHONE ENCOUNTER
----- Message from Winston Farmer sent at 8/5/2024  8:22 AM CDT -----  Regarding: ECC Escalation To Practice  ECC Escalation To Practice      Type of Escalation: Red Flag Symptom  --------------------------------------------------------------------------------------------------------------------------    Information for Provider:  Patient is looking for appointment for: Symptom : weakness  Reasons for Message: Patient disconnected     Additional Information   --------------------------------------------------------------------------------------------------------------------------    Relationship to Patient: Spouse/Partner      Call Back Info: OK to leave message on voicemail  Preferred Call Back Number: Phone [phone]

## 2024-08-08 DIAGNOSIS — S39.012D BACK STRAIN, SUBSEQUENT ENCOUNTER: ICD-10-CM

## 2024-08-08 RX ORDER — TIZANIDINE 4 MG/1
TABLET ORAL
Qty: 90 TABLET | Refills: 0 | Status: SHIPPED | OUTPATIENT
Start: 2024-08-08

## 2024-08-10 ENCOUNTER — HOSPITAL ENCOUNTER (EMERGENCY)
Age: 74
Discharge: HOME | End: 2024-08-10
Attending: PEDIATRICS
Payer: MEDICARE

## 2024-08-10 ENCOUNTER — APPOINTMENT (OUTPATIENT)
Dept: CT IMAGING | Age: 74
End: 2024-08-10
Payer: MEDICARE

## 2024-08-10 VITALS
BODY MASS INDEX: 21.7 KG/M2 | DIASTOLIC BLOOD PRESSURE: 77 MMHG | SYSTOLIC BLOOD PRESSURE: 117 MMHG | RESPIRATION RATE: 15 BRPM | TEMPERATURE: 97.5 F | WEIGHT: 160 LBS | OXYGEN SATURATION: 95 % | HEART RATE: 71 BPM

## 2024-08-10 DIAGNOSIS — I95.9 HYPOTENSIVE EPISODE: ICD-10-CM

## 2024-08-10 DIAGNOSIS — R19.7 DIARRHEA, UNSPECIFIED TYPE: Primary | ICD-10-CM

## 2024-08-10 DIAGNOSIS — R42 DIZZINESS: ICD-10-CM

## 2024-08-10 LAB
ALBUMIN SERPL-MCNC: 3.1 G/DL (ref 3.5–5.2)
ALP SERPL-CCNC: 112 U/L (ref 40–130)
ALT SERPL-CCNC: 17 U/L (ref 5–41)
ANION GAP SERPL CALCULATED.3IONS-SCNC: 10 MMOL/L (ref 7–19)
AST SERPL-CCNC: 15 U/L (ref 5–40)
BASOPHILS # BLD: 0 K/UL (ref 0–0.2)
BASOPHILS NFR BLD: 0.4 % (ref 0–1)
BILIRUB SERPL-MCNC: 0.6 MG/DL (ref 0.2–1.2)
BUN SERPL-MCNC: 20 MG/DL (ref 8–23)
CALCIUM SERPL-MCNC: 8.3 MG/DL (ref 8.8–10.2)
CHLORIDE SERPL-SCNC: 105 MMOL/L (ref 98–111)
CO2 SERPL-SCNC: 25 MMOL/L (ref 22–29)
CREAT SERPL-MCNC: 1.4 MG/DL (ref 0.5–1.2)
EOSINOPHIL # BLD: 0.2 K/UL (ref 0–0.6)
EOSINOPHIL NFR BLD: 3 % (ref 0–5)
ERYTHROCYTE [DISTWIDTH] IN BLOOD BY AUTOMATED COUNT: 15.2 % (ref 11.5–14.5)
GLUCOSE SERPL-MCNC: 102 MG/DL (ref 74–109)
HCT VFR BLD AUTO: 38.8 % (ref 42–52)
HGB BLD-MCNC: 12.7 G/DL (ref 14–18)
IMM GRANULOCYTES # BLD: 0 K/UL
LACTATE BLDV-SCNC: 1.2 MMOL/L (ref 0.5–1.9)
LIPASE SERPL-CCNC: 145 U/L (ref 13–60)
LYMPHOCYTES # BLD: 1 K/UL (ref 1.1–4.5)
LYMPHOCYTES NFR BLD: 20.4 % (ref 20–40)
MAGNESIUM SERPL-MCNC: 1.8 MG/DL (ref 1.6–2.4)
MCH RBC QN AUTO: 31.1 PG (ref 27–31)
MCHC RBC AUTO-ENTMCNC: 32.7 G/DL (ref 33–37)
MCV RBC AUTO: 95.1 FL (ref 80–94)
MONOCYTES # BLD: 0.5 K/UL (ref 0–0.9)
MONOCYTES NFR BLD: 10 % (ref 0–10)
NEUTROPHILS # BLD: 3.3 K/UL (ref 1.5–7.5)
NEUTS SEG NFR BLD: 66 % (ref 50–65)
PLATELET # BLD AUTO: 163 K/UL (ref 130–400)
PMV BLD AUTO: 10 FL (ref 9.4–12.4)
POTASSIUM SERPL-SCNC: 3.2 MMOL/L (ref 3.5–5)
PROT SERPL-MCNC: 5.8 G/DL (ref 6.6–8.7)
RBC # BLD AUTO: 4.08 M/UL (ref 4.7–6.1)
SODIUM SERPL-SCNC: 140 MMOL/L (ref 136–145)
WBC # BLD AUTO: 5 K/UL (ref 4.8–10.8)

## 2024-08-10 PROCEDURE — 96360 HYDRATION IV INFUSION INIT: CPT

## 2024-08-10 PROCEDURE — 74176 CT ABD & PELVIS W/O CONTRAST: CPT

## 2024-08-10 PROCEDURE — 96361 HYDRATE IV INFUSION ADD-ON: CPT

## 2024-08-10 PROCEDURE — 85025 COMPLETE CBC W/AUTO DIFF WBC: CPT

## 2024-08-10 PROCEDURE — 2580000003 HC RX 258: Performed by: PEDIATRICS

## 2024-08-10 PROCEDURE — 36415 COLL VENOUS BLD VENIPUNCTURE: CPT

## 2024-08-10 PROCEDURE — 83735 ASSAY OF MAGNESIUM: CPT

## 2024-08-10 PROCEDURE — 80053 COMPREHEN METABOLIC PANEL: CPT

## 2024-08-10 PROCEDURE — 83690 ASSAY OF LIPASE: CPT

## 2024-08-10 PROCEDURE — 93005 ELECTROCARDIOGRAM TRACING: CPT | Performed by: PEDIATRICS

## 2024-08-10 PROCEDURE — 99284 EMERGENCY DEPT VISIT MOD MDM: CPT

## 2024-08-10 PROCEDURE — 83605 ASSAY OF LACTIC ACID: CPT

## 2024-08-10 RX ORDER — NITROGLYCERIN 0.4 MG/1
0.4 TABLET SUBLINGUAL EVERY 5 MIN PRN
Status: CANCELLED | OUTPATIENT
Start: 2024-08-10

## 2024-08-10 RX ORDER — 0.9 % SODIUM CHLORIDE 0.9 %
1000 INTRAVENOUS SOLUTION INTRAVENOUS ONCE
Status: COMPLETED | OUTPATIENT
Start: 2024-08-10 | End: 2024-08-10

## 2024-08-10 RX ORDER — SODIUM BICARBONATE 650 MG/1
650 TABLET ORAL 3 TIMES DAILY
Status: CANCELLED | OUTPATIENT
Start: 2024-08-10

## 2024-08-10 RX ORDER — DIGOXIN 125 MCG
125 TABLET ORAL DAILY
Status: CANCELLED | OUTPATIENT
Start: 2024-08-11

## 2024-08-10 RX ORDER — CLOPIDOGREL BISULFATE 75 MG/1
75 TABLET ORAL DAILY
Status: CANCELLED | OUTPATIENT
Start: 2024-08-11

## 2024-08-10 RX ORDER — ALBUTEROL SULFATE 2.5 MG/3ML
2.5 SOLUTION RESPIRATORY (INHALATION) EVERY 4 HOURS PRN
Status: CANCELLED | OUTPATIENT
Start: 2024-08-10

## 2024-08-10 RX ORDER — ATORVASTATIN CALCIUM 40 MG/1
40 TABLET, FILM COATED ORAL NIGHTLY
Status: CANCELLED | OUTPATIENT
Start: 2024-08-10

## 2024-08-10 RX ADMIN — SODIUM CHLORIDE 1000 ML: 9 INJECTION, SOLUTION INTRAVENOUS at 18:18

## 2024-08-10 ASSESSMENT — ENCOUNTER SYMPTOMS
BACK PAIN: 0
ABDOMINAL PAIN: 0
COLOR CHANGE: 0
COUGH: 0
VOMITING: 1
BLOOD IN STOOL: 0
DIARRHEA: 1
SHORTNESS OF BREATH: 0
RHINORRHEA: 0

## 2024-08-11 LAB
EKG P AXIS: NORMAL DEGREES
EKG P-R INTERVAL: NORMAL MS
EKG Q-T INTERVAL: 432 MS
EKG QRS DURATION: 90 MS
EKG QTC CALCULATION (BAZETT): 458 MS
EKG T AXIS: -63 DEGREES

## 2024-08-11 PROCEDURE — 93010 ELECTROCARDIOGRAM REPORT: CPT | Performed by: INTERNAL MEDICINE

## 2024-08-11 NOTE — ED PROVIDER NOTES
[facility] EMERGENCY DEPT  eMERGENCY dEPARTMENT eNCOUnter      Pt Name: Rojas Call  MRN: [id number]  Birthdate 1950  Date of evaluation: 8/10/2024  Provider: Priscilla Vazquez MD    CHIEF COMPLAINT       Chief Complaint   Patient presents with    Diarrhea     X6 days          HISTORY OF PRESENT ILLNESS   (Location/Symptom, Timing/Onset,Context/Setting, Quality, Duration, Modifying Factors, Severity)  Note limiting factors.   Rojas Call is a 74 y.o. male who presents to the emergency department with diarrhea.  Patient and family give history.  They state the patient has had diarrhea for the past 6 days.  Patient has had multiple episodes of incontinence secondary to watery diarrhea.  Patient had 1 episode of vomiting today.  Patient states that he became dizzy and \"lost my balance.\"  Patient hit the front of his head but did not completely fall.  Wife states that patient has drink 24 ounces of fluids today and has had decreased intake and decreased appetite.  Patient denies burning with urination, difficulty urinating, abdominal pain, flank pain, black or bloody stools, fever, or syncope.  Family states that patient's blood pressure is normally around 100/60.    HPI    NursingNotes were reviewed.    REVIEW OF SYSTEMS    (2-9 systems for level 4, 10 or more for level 5)     Review of Systems   Constitutional:  Positive for fatigue. Negative for fever.   HENT:  Negative for congestion and rhinorrhea.    Respiratory:  Negative for cough and shortness of breath.    Cardiovascular:  Negative for chest pain and palpitations.   Gastrointestinal:  Positive for diarrhea and vomiting (1 episode today). Negative for abdominal pain and blood in stool.   Genitourinary:  Negative for difficulty urinating and dysuria.   Musculoskeletal:  Negative for back pain and neck pain.   Skin:  Negative for color change and pallor.   Neurological:  Positive for dizziness. Negative for syncope and light-headedness. 
gallbladder but no other significant acute pathology.  I was advised by nurse that patient has pulled his IV out and says that he is ready to leave.  Ordered an outpatient GI panel and C. difficile test.  [place] stable for discharge after monitoring here in the emergency department       CONSULTS:  None    PROCEDURES:  Unless otherwise noted below, none     Procedures        FINAL IMPRESSION     1. Diarrhea, unspecified type    2. Dizziness    3. Hypotensive episode          DISPOSITION/PLAN   DISPOSITION Decision To Discharge    PATIENT REFERRED TO:  [facility] EMERGENCY DEPT  [facility] [id number] [phone]    If symptoms worsen    Juwan Trevino MD  [address] [place] [id number] [phone]    Schedule an appointment as soon as possible for a visit         DISCHARGE MEDICATIONS:  New Prescriptions    No medications on file          (Please note that portions of this note were completed with a voice recognition program.  Efforts were made to edit the dictations but occasionally words aremis-transcribed.)    Vasu Banks Jr, MD (electronically signed)  Emergency Physician          Vasu Banks Jr., MD  08/10/24 0877

## 2024-08-13 ENCOUNTER — OFFICE VISIT (OUTPATIENT)
Dept: FAMILY MEDICINE CLINIC | Age: 74
End: 2024-08-13
Payer: MEDICARE

## 2024-08-13 VITALS
BODY MASS INDEX: 20.72 KG/M2 | WEIGHT: 153 LBS | SYSTOLIC BLOOD PRESSURE: 72 MMHG | TEMPERATURE: 98.1 F | DIASTOLIC BLOOD PRESSURE: 42 MMHG | HEIGHT: 72 IN | OXYGEN SATURATION: 98 % | HEART RATE: 60 BPM

## 2024-08-13 DIAGNOSIS — R53.83 FATIGUE, UNSPECIFIED TYPE: ICD-10-CM

## 2024-08-13 DIAGNOSIS — R19.7 DIARRHEA, UNSPECIFIED TYPE: ICD-10-CM

## 2024-08-13 DIAGNOSIS — E86.0 DEHYDRATION: ICD-10-CM

## 2024-08-13 DIAGNOSIS — R19.7 DIARRHEA, UNSPECIFIED TYPE: Primary | ICD-10-CM

## 2024-08-13 LAB
ALBUMIN SERPL-MCNC: 3.3 G/DL (ref 3.5–5.2)
ALP SERPL-CCNC: 116 U/L (ref 40–130)
ALT SERPL-CCNC: 17 U/L (ref 5–41)
ANION GAP SERPL CALCULATED.3IONS-SCNC: 13 MMOL/L (ref 7–19)
AST SERPL-CCNC: 16 U/L (ref 5–40)
BASOPHILS # BLD: 0 K/UL (ref 0–0.2)
BASOPHILS NFR BLD: 0.3 % (ref 0–1)
BILIRUB SERPL-MCNC: 0.5 MG/DL (ref 0.2–1.2)
BUN SERPL-MCNC: 19 MG/DL (ref 8–23)
CALCIUM SERPL-MCNC: 8.7 MG/DL (ref 8.8–10.2)
CHLORIDE SERPL-SCNC: 104 MMOL/L (ref 98–111)
CO2 SERPL-SCNC: 26 MMOL/L (ref 22–29)
CREAT SERPL-MCNC: 1.4 MG/DL (ref 0.5–1.2)
EOSINOPHIL # BLD: 0.2 K/UL (ref 0–0.6)
EOSINOPHIL NFR BLD: 2.6 % (ref 0–5)
ERYTHROCYTE [DISTWIDTH] IN BLOOD BY AUTOMATED COUNT: 15.3 % (ref 11.5–14.5)
GLUCOSE SERPL-MCNC: 107 MG/DL (ref 74–109)
HCT VFR BLD AUTO: 39.9 % (ref 42–52)
HGB BLD-MCNC: 13 G/DL (ref 14–18)
IMM GRANULOCYTES # BLD: 0 K/UL
LIPASE SERPL-CCNC: 58 U/L (ref 13–60)
LYMPHOCYTES # BLD: 1 K/UL (ref 1.1–4.5)
LYMPHOCYTES NFR BLD: 17.6 % (ref 20–40)
MCH RBC QN AUTO: 31.6 PG (ref 27–31)
MCHC RBC AUTO-ENTMCNC: 32.6 G/DL (ref 33–37)
MCV RBC AUTO: 97.1 FL (ref 80–94)
MONOCYTES # BLD: 0.5 K/UL (ref 0–0.9)
MONOCYTES NFR BLD: 8.7 % (ref 0–10)
NEUTROPHILS # BLD: 4.1 K/UL (ref 1.5–7.5)
NEUTS SEG NFR BLD: 70.3 % (ref 50–65)
PLATELET # BLD AUTO: 178 K/UL (ref 130–400)
PMV BLD AUTO: 10.5 FL (ref 9.4–12.4)
POTASSIUM SERPL-SCNC: 3.1 MMOL/L (ref 3.5–5)
PROT SERPL-MCNC: 6 G/DL (ref 6.6–8.7)
RBC # BLD AUTO: 4.11 M/UL (ref 4.7–6.1)
SODIUM SERPL-SCNC: 143 MMOL/L (ref 136–145)
WBC # BLD AUTO: 5.9 K/UL (ref 4.8–10.8)

## 2024-08-13 PROCEDURE — G8420 CALC BMI NORM PARAMETERS: HCPCS | Performed by: FAMILY MEDICINE

## 2024-08-13 PROCEDURE — 3078F DIAST BP <80 MM HG: CPT | Performed by: FAMILY MEDICINE

## 2024-08-13 PROCEDURE — 3074F SYST BP LT 130 MM HG: CPT | Performed by: FAMILY MEDICINE

## 2024-08-13 PROCEDURE — 4004F PT TOBACCO SCREEN RCVD TLK: CPT | Performed by: FAMILY MEDICINE

## 2024-08-13 PROCEDURE — 1123F ACP DISCUSS/DSCN MKR DOCD: CPT | Performed by: FAMILY MEDICINE

## 2024-08-13 PROCEDURE — G8427 DOCREV CUR MEDS BY ELIG CLIN: HCPCS | Performed by: FAMILY MEDICINE

## 2024-08-13 PROCEDURE — 99214 OFFICE O/P EST MOD 30 MIN: CPT | Performed by: FAMILY MEDICINE

## 2024-08-13 RX ORDER — DIPHENOXYLATE HYDROCHLORIDE AND ATROPINE SULFATE 2.5; .025 MG/1; MG/1
1 TABLET ORAL 4 TIMES DAILY PRN
Qty: 40 TABLET | Refills: 0 | Status: SHIPPED | OUTPATIENT
Start: 2024-08-13 | End: 2024-08-22 | Stop reason: SDUPTHER

## 2024-08-13 NOTE — PROGRESS NOTES
.   KARLA RIVAS [address] [place] [address]  Dept: [phone]  Dept Fax: [phone]    Visit type: Established patient    Reason for Visit: Extremity Weakness, Weight Loss, Diarrhea (2 weeks), and ED Follow-up      Assessment & Plan   Assessment and Plan       Assessment & Plan  Diarrhea, unspecified type  Discussed proper use Lomotil and potential side effects.    Orders:    CBC with Auto Differential; Future    Comprehensive Metabolic Panel; Future    Lipase; Future    Fatigue, unspecified type       Orders:    CBC with Auto Differential; Future    Comprehensive Metabolic Panel; Future    Dehydration       Orders:    Comprehensive Metabolic Panel; Future    Discussed diagnosis, expected course, and proper use of medication, including OTC medications if prescription is too expensive or insurance does not cover.  Discussed signs and symptoms requiring medical attention.  All questions were answered and patient voiced understanding and agreement with plan as discussed.      Return if symptoms worsen or fail to improve, for next scheduled follow up with PCP.       Subjective       HPI   Patient has been having issues with diarrhea and has been weak and has been having the diarrhea ongoing for about the past 12 days and has had several accidents.     Review of Systems   Constitutional:  Positive for fatigue. Negative for activity change, appetite change and fever.   HENT:  Negative for congestion, rhinorrhea and sore throat.    Eyes:  Negative for pain and discharge.   Respiratory:  Negative for cough and shortness of breath.    Cardiovascular:  Negative for chest pain and palpitations.   Gastrointestinal:  Positive for diarrhea. Negative for abdominal pain, constipation, nausea and vomiting.   Endocrine: Negative for cold intolerance and heat intolerance.   Genitourinary:  Negative for dysuria and hematuria.   Musculoskeletal:  Negative for back pain, gait problem, myalgias and neck pain.

## 2024-08-16 DIAGNOSIS — K21.9 GASTROESOPHAGEAL REFLUX DISEASE, UNSPECIFIED WHETHER ESOPHAGITIS PRESENT: ICD-10-CM

## 2024-08-16 DIAGNOSIS — R10.13 EPIGASTRIC PAIN: ICD-10-CM

## 2024-08-16 NOTE — TELEPHONE ENCOUNTER
Rojas Call called to request a refill on his medication.      Last office visit : 8/13/2024   Next office visit : 9/5/2024     Requested Prescriptions     Pending Prescriptions Disp Refills    pantoprazole (PROTONIX) 40 MG tablet [Pharmacy Med Name: PANTOPRAZOLE SOD DR 40 MG TAB] 30 tablet 0     Sig: TAKE 1 TABLET BY MOUTH DAILY IN THE MORNING BEFORE BREAKFAST            Cee Coleman MA

## 2024-08-18 DIAGNOSIS — R19.7 DIARRHEA, UNSPECIFIED TYPE: ICD-10-CM

## 2024-08-18 LAB
ADV 40+41 DNA STL QL NAA+NON-PROBE: NOT DETECTED
C CAYETANENSIS DNA STL QL NAA+NON-PROBE: NOT DETECTED
C COLI+JEJ+UPSA DNA STL QL NAA+NON-PROBE: NOT DETECTED
C DIFF TOX A+B STL QL IA: NORMAL
CRYPTOSP DNA STL QL NAA+NON-PROBE: NOT DETECTED
E HISTOLYT DNA STL QL NAA+NON-PROBE: NOT DETECTED
EAEC PAA PLAS AGGR+AATA ST NAA+NON-PRB: NOT DETECTED
EC STX1+STX2 GENES STL QL NAA+NON-PROBE: NOT DETECTED
EPEC EAE GENE STL QL NAA+NON-PROBE: NOT DETECTED
ETEC LTA+ST1A+ST1B TOX ST NAA+NON-PROBE: NOT DETECTED
G LAMBLIA DNA STL QL NAA+NON-PROBE: NOT DETECTED
GI PATH DNA+RNA PNL STL NAA+NON-PROBE: NOT DETECTED
NOROVIRUS GI+II RNA STL QL NAA+NON-PROBE: NOT DETECTED
P SHIGELLOIDES DNA STL QL NAA+NON-PROBE: NOT DETECTED
RVA RNA STL QL NAA+NON-PROBE: NOT DETECTED
S ENT+BONG DNA STL QL NAA+NON-PROBE: NOT DETECTED
SAPO I+II+IV+V RNA STL QL NAA+NON-PROBE: NOT DETECTED
SHIGELLA SP+EIEC IPAH ST NAA+NON-PROBE: NOT DETECTED
V CHOL+PARA+VUL DNA STL QL NAA+NON-PROBE: NOT DETECTED
V CHOLERAE DNA STL QL NAA+NON-PROBE: NOT DETECTED
Y ENTEROCOL DNA STL QL NAA+NON-PROBE: NOT DETECTED

## 2024-08-19 RX ORDER — PANTOPRAZOLE SODIUM 40 MG/1
TABLET, DELAYED RELEASE ORAL
Qty: 30 TABLET | Refills: 0 | Status: SHIPPED | OUTPATIENT
Start: 2024-08-19

## 2024-08-21 DIAGNOSIS — R19.7 DIARRHEA, UNSPECIFIED TYPE: Primary | ICD-10-CM

## 2024-08-21 DIAGNOSIS — R19.7 DIARRHEA, UNSPECIFIED TYPE: ICD-10-CM

## 2024-08-21 LAB
ADV 40+41 DNA STL QL NAA+NON-PROBE: NOT DETECTED
C CAYETANENSIS DNA STL QL NAA+NON-PROBE: NOT DETECTED
C COLI+JEJ+UPSA DNA STL QL NAA+NON-PROBE: NOT DETECTED
C DIFF TOX A+B STL QL IA: NORMAL
C DIFF TOX GENS STL QL NAA+PROBE: NORMAL
CRYPTOSP DNA STL QL NAA+NON-PROBE: NOT DETECTED
E HISTOLYT DNA STL QL NAA+NON-PROBE: NOT DETECTED
EAEC PAA PLAS AGGR+AATA ST NAA+NON-PRB: NOT DETECTED
EC STX1+STX2 GENES STL QL NAA+NON-PROBE: NOT DETECTED
EPEC EAE GENE STL QL NAA+NON-PROBE: NOT DETECTED
ETEC LTA+ST1A+ST1B TOX ST NAA+NON-PROBE: NOT DETECTED
G LAMBLIA DNA STL QL NAA+NON-PROBE: NOT DETECTED
GI PATH DNA+RNA PNL STL NAA+NON-PROBE: NOT DETECTED
NOROVIRUS GI+II RNA STL QL NAA+NON-PROBE: NOT DETECTED
P SHIGELLOIDES DNA STL QL NAA+NON-PROBE: NOT DETECTED
RVA RNA STL QL NAA+NON-PROBE: NOT DETECTED
S ENT+BONG DNA STL QL NAA+NON-PROBE: NOT DETECTED
SAPO I+II+IV+V RNA STL QL NAA+NON-PROBE: NOT DETECTED
SHIGELLA SP+EIEC IPAH ST NAA+NON-PROBE: NOT DETECTED
V CHOL+PARA+VUL DNA STL QL NAA+NON-PROBE: NOT DETECTED
V CHOLERAE DNA STL QL NAA+NON-PROBE: NOT DETECTED
Y ENTEROCOL DNA STL QL NAA+NON-PROBE: NOT DETECTED

## 2024-08-22 ENCOUNTER — TELEPHONE (OUTPATIENT)
Dept: FAMILY MEDICINE CLINIC | Age: 74
End: 2024-08-22

## 2024-08-22 DIAGNOSIS — R19.7 DIARRHEA, UNSPECIFIED TYPE: ICD-10-CM

## 2024-08-22 RX ORDER — DIPHENOXYLATE HYDROCHLORIDE AND ATROPINE SULFATE 2.5; .025 MG/1; MG/1
1 TABLET ORAL 4 TIMES DAILY PRN
Qty: 40 TABLET | Refills: 0 | Status: SHIPPED | OUTPATIENT
Start: 2024-08-22 | End: 2024-09-01

## 2024-08-23 RX ORDER — SACUBITRIL AND VALSARTAN 24; 26 MG/1; MG/1
TABLET, FILM COATED ORAL
Qty: 60 TABLET | Refills: 5 | Status: SHIPPED | OUTPATIENT
Start: 2024-08-23

## 2024-08-24 ASSESSMENT — ENCOUNTER SYMPTOMS
EYE DISCHARGE: 0
DIARRHEA: 1
BACK PAIN: 0
ABDOMINAL PAIN: 0
EYE PAIN: 0
NAUSEA: 0
RHINORRHEA: 0
COUGH: 0
CONSTIPATION: 0
SORE THROAT: 0
VOMITING: 0
SHORTNESS OF BREATH: 0

## 2024-08-26 ENCOUNTER — OFFICE VISIT (OUTPATIENT)
Dept: FAMILY MEDICINE CLINIC | Age: 74
End: 2024-08-26
Payer: MEDICARE

## 2024-08-26 VITALS
OXYGEN SATURATION: 99 % | SYSTOLIC BLOOD PRESSURE: 92 MMHG | WEIGHT: 149 LBS | HEIGHT: 72 IN | BODY MASS INDEX: 20.18 KG/M2 | TEMPERATURE: 97.3 F | HEART RATE: 49 BPM | DIASTOLIC BLOOD PRESSURE: 58 MMHG

## 2024-08-26 DIAGNOSIS — R10.9 STOMACH CRAMPS: ICD-10-CM

## 2024-08-26 DIAGNOSIS — G47.9 DISTURBANCE IN SLEEP BEHAVIOR: ICD-10-CM

## 2024-08-26 DIAGNOSIS — R19.7 DIARRHEA, UNSPECIFIED TYPE: Primary | ICD-10-CM

## 2024-08-26 DIAGNOSIS — F33.1 MODERATE EPISODE OF RECURRENT MAJOR DEPRESSIVE DISORDER (HCC): ICD-10-CM

## 2024-08-26 PROCEDURE — 3078F DIAST BP <80 MM HG: CPT | Performed by: FAMILY MEDICINE

## 2024-08-26 PROCEDURE — 1123F ACP DISCUSS/DSCN MKR DOCD: CPT | Performed by: FAMILY MEDICINE

## 2024-08-26 PROCEDURE — 4004F PT TOBACCO SCREEN RCVD TLK: CPT | Performed by: FAMILY MEDICINE

## 2024-08-26 PROCEDURE — G8420 CALC BMI NORM PARAMETERS: HCPCS | Performed by: FAMILY MEDICINE

## 2024-08-26 PROCEDURE — 99214 OFFICE O/P EST MOD 30 MIN: CPT | Performed by: FAMILY MEDICINE

## 2024-08-26 PROCEDURE — G8427 DOCREV CUR MEDS BY ELIG CLIN: HCPCS | Performed by: FAMILY MEDICINE

## 2024-08-26 PROCEDURE — 3074F SYST BP LT 130 MM HG: CPT | Performed by: FAMILY MEDICINE

## 2024-08-26 RX ORDER — TRAZODONE HYDROCHLORIDE 50 MG/1
TABLET, FILM COATED ORAL
Qty: 60 TABLET | Refills: 2 | Status: SHIPPED | OUTPATIENT
Start: 2024-08-26 | End: 2024-09-05

## 2024-08-26 RX ORDER — DICYCLOMINE HYDROCHLORIDE 10 MG/1
10 CAPSULE ORAL
Qty: 120 CAPSULE | Refills: 0 | Status: SHIPPED | OUTPATIENT
Start: 2024-08-26

## 2024-08-26 RX ORDER — ARIPIPRAZOLE 5 MG/1
5 TABLET ORAL DAILY
Qty: 30 TABLET | Refills: 3 | Status: SHIPPED | OUTPATIENT
Start: 2024-08-26 | End: 2024-09-05 | Stop reason: SDUPTHER

## 2024-08-26 NOTE — PROGRESS NOTES
.   KARLA RIVAS [address] [place] [address]  Dept: [phone]  Dept Fax: [phone]    Visit type: Established patient    Reason for Visit: Diarrhea (Has gotten a little better), Abdominal Pain (Stomach cramping), and Depression      Assessment & Plan   Assessment and Plan       Assessment & Plan  Diarrhea, unspecified type       Orders:    Libby Judd MD, Gastroenterology, [place]    Stomach cramps       Orders:    Libby Judd MD, Gastroenterology, [place]    dicyclomine (BENTYL) 10 MG capsule; Take 1 capsule by mouth 4 times daily (before meals and nightly)    Moderate episode of recurrent major depressive disorder (HCC)            Disturbance in sleep behavior            With his continued diarrhea and GI symptoms discussed possibility of getting him set up with GI for further evaluation recommendations.  Discussed dietary and lifestyle modifications that may beneficial.  With the changes to his mood and concerns for needing adjustments of the medication discussed possibility of either adding on Abilify or possibly transitioning from Lexapro to alternative medication and patient was agreeable to the addition of Abilify at this time and continued monitoring.  Discussed proper use of trazodone for his sleep and potential side effects from medication.  Discussed signs and symptoms requiring medical attention.  All questions were answered and patient voiced understanding and agreement with plan as discussed.    Return if symptoms worsen or fail to improve, for next scheduled follow up with PCP.       Subjective       HPI   Diarrhea has gotten better but is not having the appetite and has lost quite a bit of weight with the diarrhea and symptoms he has been having. He has not had to take the lomotil in a few days. He is still having a lot of stomach cramping though.     He has been depressed and feels like he is needing a change in his mood medicine. He

## 2024-09-05 ENCOUNTER — OFFICE VISIT (OUTPATIENT)
Dept: FAMILY MEDICINE CLINIC | Age: 74
End: 2024-09-05
Payer: MEDICARE

## 2024-09-05 VITALS
BODY MASS INDEX: 20.86 KG/M2 | TEMPERATURE: 97.5 F | HEIGHT: 72 IN | SYSTOLIC BLOOD PRESSURE: 116 MMHG | OXYGEN SATURATION: 99 % | HEART RATE: 57 BPM | DIASTOLIC BLOOD PRESSURE: 62 MMHG | WEIGHT: 154 LBS

## 2024-09-05 DIAGNOSIS — K21.9 GASTROESOPHAGEAL REFLUX DISEASE, UNSPECIFIED WHETHER ESOPHAGITIS PRESENT: ICD-10-CM

## 2024-09-05 DIAGNOSIS — R53.81 PHYSICAL DECONDITIONING: ICD-10-CM

## 2024-09-05 DIAGNOSIS — E78.2 MIXED HYPERLIPIDEMIA: ICD-10-CM

## 2024-09-05 DIAGNOSIS — S39.012D BACK STRAIN, SUBSEQUENT ENCOUNTER: ICD-10-CM

## 2024-09-05 DIAGNOSIS — F41.9 ANXIETY: ICD-10-CM

## 2024-09-05 DIAGNOSIS — G25.0 ESSENTIAL TREMOR: Primary | ICD-10-CM

## 2024-09-05 DIAGNOSIS — R10.13 EPIGASTRIC PAIN: ICD-10-CM

## 2024-09-05 DIAGNOSIS — F33.1 MODERATE EPISODE OF RECURRENT MAJOR DEPRESSIVE DISORDER (HCC): ICD-10-CM

## 2024-09-05 DIAGNOSIS — I25.10 CORONARY ARTERY DISEASE INVOLVING NATIVE CORONARY ARTERY OF NATIVE HEART WITHOUT ANGINA PECTORIS: ICD-10-CM

## 2024-09-05 DIAGNOSIS — N52.9 ERECTILE DYSFUNCTION, UNSPECIFIED ERECTILE DYSFUNCTION TYPE: ICD-10-CM

## 2024-09-05 DIAGNOSIS — E55.9 VITAMIN D DEFICIENCY: ICD-10-CM

## 2024-09-05 PROCEDURE — G8420 CALC BMI NORM PARAMETERS: HCPCS | Performed by: FAMILY MEDICINE

## 2024-09-05 PROCEDURE — 1123F ACP DISCUSS/DSCN MKR DOCD: CPT | Performed by: FAMILY MEDICINE

## 2024-09-05 PROCEDURE — 99214 OFFICE O/P EST MOD 30 MIN: CPT | Performed by: FAMILY MEDICINE

## 2024-09-05 PROCEDURE — 3078F DIAST BP <80 MM HG: CPT | Performed by: FAMILY MEDICINE

## 2024-09-05 PROCEDURE — 3074F SYST BP LT 130 MM HG: CPT | Performed by: FAMILY MEDICINE

## 2024-09-05 PROCEDURE — 4004F PT TOBACCO SCREEN RCVD TLK: CPT | Performed by: FAMILY MEDICINE

## 2024-09-05 PROCEDURE — G8427 DOCREV CUR MEDS BY ELIG CLIN: HCPCS | Performed by: FAMILY MEDICINE

## 2024-09-05 RX ORDER — ERGOCALCIFEROL 1.25 MG/1
CAPSULE, LIQUID FILLED ORAL
Qty: 12 CAPSULE | Refills: 1 | Status: SHIPPED | OUTPATIENT
Start: 2024-09-05

## 2024-09-05 RX ORDER — SILDENAFIL 100 MG/1
100 TABLET, FILM COATED ORAL PRN
Qty: 30 TABLET | Refills: 2 | Status: SHIPPED | OUTPATIENT
Start: 2024-09-05

## 2024-09-05 RX ORDER — TOPIRAMATE 25 MG/1
25 TABLET, FILM COATED ORAL 2 TIMES DAILY
Qty: 180 TABLET | Refills: 1 | Status: SHIPPED | OUTPATIENT
Start: 2024-09-05

## 2024-09-05 RX ORDER — ESCITALOPRAM OXALATE 20 MG/1
20 TABLET ORAL DAILY
Qty: 90 TABLET | Refills: 1 | Status: SHIPPED | OUTPATIENT
Start: 2024-09-05

## 2024-09-05 RX ORDER — PANTOPRAZOLE SODIUM 40 MG/1
TABLET, DELAYED RELEASE ORAL
Qty: 90 TABLET | Refills: 1 | Status: SHIPPED | OUTPATIENT
Start: 2024-09-05

## 2024-09-05 RX ORDER — ATORVASTATIN CALCIUM 40 MG/1
40 TABLET, FILM COATED ORAL DAILY
Qty: 90 TABLET | Refills: 1 | Status: SHIPPED | OUTPATIENT
Start: 2024-09-05

## 2024-09-05 RX ORDER — ARIPIPRAZOLE 5 MG/1
5 TABLET ORAL DAILY
Qty: 90 TABLET | Refills: 1 | Status: SHIPPED | OUTPATIENT
Start: 2024-09-05

## 2024-09-15 LAB — ECHO BSA: 1.91 M2

## 2024-10-09 ENCOUNTER — OFFICE VISIT (OUTPATIENT)
Dept: CARDIOLOGY CLINIC | Age: 74
End: 2024-10-09
Payer: MEDICARE

## 2024-10-09 ENCOUNTER — TELEPHONE (OUTPATIENT)
Dept: CARDIOLOGY CLINIC | Age: 74
End: 2024-10-09

## 2024-10-09 VITALS
SYSTOLIC BLOOD PRESSURE: 130 MMHG | DIASTOLIC BLOOD PRESSURE: 64 MMHG | HEART RATE: 47 BPM | WEIGHT: 153 LBS | HEIGHT: 72 IN | BODY MASS INDEX: 20.72 KG/M2

## 2024-10-09 DIAGNOSIS — I10 ESSENTIAL HYPERTENSION: ICD-10-CM

## 2024-10-09 DIAGNOSIS — E78.2 MIXED HYPERLIPIDEMIA: ICD-10-CM

## 2024-10-09 DIAGNOSIS — R00.1 BRADYCARDIA: ICD-10-CM

## 2024-10-09 DIAGNOSIS — Q24.8 PERICARDIAL CYST: ICD-10-CM

## 2024-10-09 DIAGNOSIS — I50.22 CHRONIC SYSTOLIC HEART FAILURE (HCC): ICD-10-CM

## 2024-10-09 DIAGNOSIS — I48.0 PAROXYSMAL ATRIAL FIBRILLATION (HCC): Primary | ICD-10-CM

## 2024-10-09 DIAGNOSIS — I25.10 CORONARY ARTERY DISEASE INVOLVING NATIVE CORONARY ARTERY OF NATIVE HEART WITHOUT ANGINA PECTORIS: ICD-10-CM

## 2024-10-09 PROCEDURE — 99214 OFFICE O/P EST MOD 30 MIN: CPT | Performed by: CLINICAL NURSE SPECIALIST

## 2024-10-09 PROCEDURE — G8427 DOCREV CUR MEDS BY ELIG CLIN: HCPCS | Performed by: CLINICAL NURSE SPECIALIST

## 2024-10-09 PROCEDURE — 3078F DIAST BP <80 MM HG: CPT | Performed by: CLINICAL NURSE SPECIALIST

## 2024-10-09 PROCEDURE — 4004F PT TOBACCO SCREEN RCVD TLK: CPT | Performed by: CLINICAL NURSE SPECIALIST

## 2024-10-09 PROCEDURE — G8420 CALC BMI NORM PARAMETERS: HCPCS | Performed by: CLINICAL NURSE SPECIALIST

## 2024-10-09 PROCEDURE — 3075F SYST BP GE 130 - 139MM HG: CPT | Performed by: CLINICAL NURSE SPECIALIST

## 2024-10-09 PROCEDURE — 93000 ELECTROCARDIOGRAM COMPLETE: CPT | Performed by: CLINICAL NURSE SPECIALIST

## 2024-10-09 PROCEDURE — G8484 FLU IMMUNIZE NO ADMIN: HCPCS | Performed by: CLINICAL NURSE SPECIALIST

## 2024-10-09 PROCEDURE — 1123F ACP DISCUSS/DSCN MKR DOCD: CPT | Performed by: CLINICAL NURSE SPECIALIST

## 2024-10-09 ASSESSMENT — ENCOUNTER SYMPTOMS
ABDOMINAL PAIN: 0
FACIAL SWELLING: 0
SHORTNESS OF BREATH: 1
CHEST TIGHTNESS: 0
WHEEZING: 0
EYE REDNESS: 0
NAUSEA: 0
COUGH: 0
VOMITING: 0

## 2024-10-09 NOTE — TELEPHONE ENCOUNTER
Date: TBD    Cardiologist: Familia    Procedure: Dental Extraction    Surgeon: Hasmukh    Last Office Visit: 7/22/24  Reason for office visit and medical concerns addressed at this office visit: afib, cad, ckd, copd, htn, hyperlipidemia,     Testing Performed and Date of Service:  7/30/24 DCCV Impression: Unsuccessful DC cardioversion of atrial fibrillation to normal sinus rhythm on Eliquis used for anticoagulation.  Discontinue amiodarone.  Will manage with rate control.  Continue heart failure management.  Reevaluate echo in 3 months.     6/24/24 Cath Branch vessel disease with severe in-stent restenosis of OM1 and obstructive RPL lesion.  Moderate to severe LV systolic dysfunction.  Successful PCI of OM1 (2.75 x 26 mm Tomer frontier) utilizing drug-eluting stent.  Successful PCI of mid RPL (2.25 x 12 mm Oakley frontier taken to 2.75 mm proximally) utilizing drug-eluting stent.  Unsuccessful attempt at DCCV x 3 of atrial fibrillation.    Does the patient have a stent? If so, what type?  SEBASTIEN 6/24/24    Current Medications: topamax, tizanidine, sodium bicarb, viagra, protonix, metoprolol, lasix, lexapro, entresto, cardizem, digoxin, plavix, bentyl, atorvastatin, abilify, eliquis,    Is the patient currently taking an anticoagulant? If so, what is the diagnosis the patient has been given to warrant the need for the anticoagulant? Eliquis for afib plavix for SEBASTIEN 6/24/24    Additional Notes: requesting to hold eliquis & plavix

## 2024-10-09 NOTE — TELEPHONE ENCOUNTER
Recent stents in June.  Can consider holding these meds 6 months following stents, late December, early January.  I discussed this with patient at visit today

## 2024-10-09 NOTE — PROGRESS NOTES
[facility] Card[facility]  [address] [address]  Phone: [phone]  Fax: [phone]    OFFICE VISIT:  10/9/2024    Rojas BAZZI Oneyda - : 1950    Reason For Visit:  Rojas is a 74 y.o. male who is here for new finding of pericardial cyst, AF, CAD     Diagnosis Orders   1. Paroxysmal atrial fibrillation (HCC)  EKG 12 lead      2. Coronary artery disease involving native coronary artery of native heart without angina pectoris  EKG 12 lead    [facility]      3. Chronic systolic heart failure (HCC)  EKG 12 lead    Echo (TTE) complete (PRN contrast/bubble/strain/3D)      4. Essential hypertension  EKG 12 lead      5. Mixed hyperlipidemia  EKG 12 lead      6. Pericardial cyst  CT CHEST WO CONTRAST      7. Bradycardia              HPI  Patient  hospitalized fibrillation with AF rapid ventricular response , failed cardioversion, initiation of amiodarone with repeat cardioversion 2024 which was also unsuccessful.  Dr. Barbosa suggested rate control versus rhythm control and amiodarone was discontinued    Echo  showed a reduced LVEF of 40-44% with bilateral pulmonary edema.  Heart cath showed double vessel disease with severe in-stent restenosis of the OM1 and mid RPL which was treated with drug-eluting stent.  He was started on both Plavix and Eliquis    He has been very fatigued since his hospital discharge.  A recent CT of the abdomen showed a pericardial cyst cyst which triggered a referral back to our office.  Dr. Barbosa recommended repeating a CT of the chest in about 3 months to assess stability    He denies chest pain.  He has chronic dyspnea, but denies orthopnea, PND,  or sudden weight gain.  Complaint is fatigue and weakness.    He is having issues with diarrhea and wonders if one of the medicines could be causing this.  He will be seeing gastroenterology for an upcoming appointment    He continues to smoke    Juwan Trevino MD is PCP.  Rojas Call

## 2024-10-09 NOTE — PATIENT INSTRUCTIONS
Return in about 3 months (around 1/9/2025) for APRN.  Stop Digoxin    Repeat limited Echo    CT chest    Cardiac Rehab    Could hold Plavix in Jan for oral surgery (6mo from heart cath)    Quit smoking.  Call the [place] Tobacco Quit Line 0-102-QUIT-NOW     - Weigh daily every morning after urinating (this is your dry weight).   Report weight gain of 3lbs or more in 24hrs or 5lbs in one week.  - Call for increasing shortness of breath or increasing swelling in feet and legs.    (This could mean you are retaining too much fluid)  - 2000mg low sodium diet  - Fluid restriction of 1500ml per day (about 6-8 cups (48-64oz) of fluid per day)

## 2024-10-28 DIAGNOSIS — R19.7 DIARRHEA, UNSPECIFIED TYPE: Primary | ICD-10-CM

## 2024-10-28 RX ORDER — DIPHENOXYLATE HYDROCHLORIDE AND ATROPINE SULFATE 2.5; .025 MG/1; MG/1
TABLET ORAL
Qty: 40 TABLET | Refills: 0 | Status: SHIPPED | OUTPATIENT
Start: 2024-10-28 | End: 2025-10-28

## 2024-11-12 ENCOUNTER — OFFICE VISIT (OUTPATIENT)
Dept: GASTROENTEROLOGY | Age: 74
End: 2024-11-12
Payer: MEDICARE

## 2024-11-12 VITALS
BODY MASS INDEX: 21.26 KG/M2 | WEIGHT: 157 LBS | DIASTOLIC BLOOD PRESSURE: 70 MMHG | HEIGHT: 72 IN | SYSTOLIC BLOOD PRESSURE: 128 MMHG

## 2024-11-12 DIAGNOSIS — R19.4 CHANGE IN BOWEL HABITS: ICD-10-CM

## 2024-11-12 DIAGNOSIS — R15.2 FECAL URGENCY: ICD-10-CM

## 2024-11-12 DIAGNOSIS — R19.7 DIARRHEA, UNSPECIFIED TYPE: Primary | ICD-10-CM

## 2024-11-12 PROCEDURE — G8420 CALC BMI NORM PARAMETERS: HCPCS | Performed by: NURSE PRACTITIONER

## 2024-11-12 PROCEDURE — 99204 OFFICE O/P NEW MOD 45 MIN: CPT | Performed by: NURSE PRACTITIONER

## 2024-11-12 PROCEDURE — 3074F SYST BP LT 130 MM HG: CPT | Performed by: NURSE PRACTITIONER

## 2024-11-12 PROCEDURE — G8427 DOCREV CUR MEDS BY ELIG CLIN: HCPCS | Performed by: NURSE PRACTITIONER

## 2024-11-12 PROCEDURE — G8484 FLU IMMUNIZE NO ADMIN: HCPCS | Performed by: NURSE PRACTITIONER

## 2024-11-12 PROCEDURE — 1123F ACP DISCUSS/DSCN MKR DOCD: CPT | Performed by: NURSE PRACTITIONER

## 2024-11-12 PROCEDURE — 3078F DIAST BP <80 MM HG: CPT | Performed by: NURSE PRACTITIONER

## 2024-11-12 PROCEDURE — 1159F MED LIST DOCD IN RCRD: CPT | Performed by: NURSE PRACTITIONER

## 2024-11-12 PROCEDURE — 4004F PT TOBACCO SCREEN RCVD TLK: CPT | Performed by: NURSE PRACTITIONER

## 2024-11-12 ASSESSMENT — ENCOUNTER SYMPTOMS
ABDOMINAL PAIN: 0
COUGH: 0
VOMITING: 0
DIARRHEA: 1
TROUBLE SWALLOWING: 0
ABDOMINAL DISTENTION: 0
RECTAL PAIN: 0
CONSTIPATION: 0
CHOKING: 0
SHORTNESS OF BREATH: 0
NAUSEA: 0
BLOOD IN STOOL: 0
ANAL BLEEDING: 0

## 2024-11-12 NOTE — PROGRESS NOTES
General: There is no distension.   Musculoskeletal:         General: Normal range of motion.      Cervical back: Normal range of motion.   Skin:     General: Skin is warm and dry.   Neurological:      General: No focal deficit present.      Mental Status: He is alert and oriented to person, place, and time.   Psychiatric:         Mood and Affect: Mood normal.         Behavior: Behavior normal.

## 2024-11-14 ENCOUNTER — TELEPHONE (OUTPATIENT)
Dept: CARDIOLOGY CLINIC | Age: 74
End: 2024-11-14

## 2024-11-14 NOTE — TELEPHONE ENCOUNTER
Received clearance request from Dr. Collins for Colonoscopy scheduled on 2/18/25.  It is too early to complete clearance at this time.  Clearance can not be started prior to 12/18/24 due to our clearances having a 60 day expiration.

## 2024-11-25 DIAGNOSIS — E55.9 VITAMIN D DEFICIENCY: ICD-10-CM

## 2024-11-25 RX ORDER — ERGOCALCIFEROL 1.25 MG/1
CAPSULE, LIQUID FILLED ORAL
Qty: 12 CAPSULE | Refills: 1 | Status: SHIPPED | OUTPATIENT
Start: 2024-11-25

## 2024-12-02 RX ORDER — DILTIAZEM HYDROCHLORIDE 120 MG/1
CAPSULE, COATED, EXTENDED RELEASE ORAL
Qty: 60 CAPSULE | Refills: 0 | Status: SHIPPED | OUTPATIENT
Start: 2024-12-02

## 2024-12-10 DIAGNOSIS — R19.7 DIARRHEA, UNSPECIFIED TYPE: ICD-10-CM

## 2024-12-10 RX ORDER — DIPHENOXYLATE HYDROCHLORIDE AND ATROPINE SULFATE 2.5; .025 MG/1; MG/1
TABLET ORAL
Qty: 40 TABLET | Refills: 1 | Status: SHIPPED | OUTPATIENT
Start: 2024-12-10 | End: 2025-12-10

## 2025-01-09 ENCOUNTER — TELEPHONE (OUTPATIENT)
Dept: GASTROENTEROLOGY | Age: 75
End: 2025-01-09

## 2025-01-09 ENCOUNTER — TELEPHONE (OUTPATIENT)
Dept: CARDIOLOGY CLINIC | Age: 75
End: 2025-01-09

## 2025-01-09 RX ORDER — TIZANIDINE 2 MG/1
TABLET ORAL
Qty: 180 TABLET | Refills: 0 | Status: SHIPPED | OUTPATIENT
Start: 2025-01-09

## 2025-01-09 RX ORDER — VALSARTAN 80 MG/1
80 TABLET ORAL DAILY
Qty: 90 TABLET | Refills: 3 | Status: SHIPPED | OUTPATIENT
Start: 2025-01-09

## 2025-01-09 RX ORDER — DILTIAZEM HYDROCHLORIDE 120 MG/1
CAPSULE, COATED, EXTENDED RELEASE ORAL
Qty: 60 CAPSULE | Refills: 5 | Status: SHIPPED | OUTPATIENT
Start: 2025-01-09

## 2025-01-09 NOTE — TELEPHONE ENCOUNTER
Patient: Rojas Call    YOB: 1950      Clearance was received on January 9, 2025.    for Endoscopy / Colonoscopy scheduled for: 2/18/25    Patient may discontinue the use of PLAVIX  for 5  days prior to the procedure.  And ELIQUIS for 3 days    IS Lovenox required:  NO    PATIENT NOTIFIED ON:  1/9/25 -  sent [facility] [employer] message      Clearance scanned into chart

## 2025-01-09 NOTE — TELEPHONE ENCOUNTER
Patient's spouse called in to advise they can no longer afford entresto as their copay has gone up to over $600.  They would like to know if there's something else he can take.

## 2025-01-09 NOTE — TELEPHONE ENCOUNTER
Can change from Entresto to valsartan 80 mg once daily.  I will send to his pharmacy.  Keep follow-up as scheduled later this month

## 2025-01-10 NOTE — TELEPHONE ENCOUNTER
Called and spoke with patient's spouse, gave medication change information and advised to keep follow up.  Verbally understood.

## 2025-01-28 RX ORDER — CLOPIDOGREL BISULFATE 75 MG/1
75 TABLET ORAL DAILY
Qty: 30 TABLET | Refills: 5 | Status: SHIPPED | OUTPATIENT
Start: 2025-01-28

## 2025-01-28 RX ORDER — FUROSEMIDE 40 MG/1
40 TABLET ORAL DAILY
Qty: 30 TABLET | Refills: 5 | Status: SHIPPED | OUTPATIENT
Start: 2025-01-28

## 2025-01-30 ENCOUNTER — TELEPHONE (OUTPATIENT)
Dept: CARDIOLOGY CLINIC | Age: 75
End: 2025-01-30

## 2025-01-30 ENCOUNTER — OFFICE VISIT (OUTPATIENT)
Dept: CARDIOLOGY CLINIC | Age: 75
End: 2025-01-30
Payer: MEDICARE

## 2025-01-30 VITALS
RESPIRATION RATE: 20 BRPM | OXYGEN SATURATION: 97 % | WEIGHT: 160 LBS | DIASTOLIC BLOOD PRESSURE: 62 MMHG | HEIGHT: 72 IN | BODY MASS INDEX: 21.67 KG/M2 | SYSTOLIC BLOOD PRESSURE: 108 MMHG | HEART RATE: 140 BPM

## 2025-01-30 DIAGNOSIS — I48.19 PERSISTENT ATRIAL FIBRILLATION (HCC): ICD-10-CM

## 2025-01-30 DIAGNOSIS — I50.22 CHRONIC SYSTOLIC HEART FAILURE (HCC): Primary | ICD-10-CM

## 2025-01-30 DIAGNOSIS — Q24.8 PERICARDIAL CYST: ICD-10-CM

## 2025-01-30 DIAGNOSIS — I25.10 CORONARY ARTERY DISEASE INVOLVING NATIVE CORONARY ARTERY OF NATIVE HEART WITHOUT ANGINA PECTORIS: ICD-10-CM

## 2025-01-30 DIAGNOSIS — I49.5 TACHYCARDIA-BRADYCARDIA SYNDROME (HCC): ICD-10-CM

## 2025-01-30 DIAGNOSIS — I48.0 PAROXYSMAL ATRIAL FIBRILLATION (HCC): ICD-10-CM

## 2025-01-30 DIAGNOSIS — F17.200 SMOKER: ICD-10-CM

## 2025-01-30 PROCEDURE — G8420 CALC BMI NORM PARAMETERS: HCPCS | Performed by: CLINICAL NURSE SPECIALIST

## 2025-01-30 PROCEDURE — 93000 ELECTROCARDIOGRAM COMPLETE: CPT | Performed by: CLINICAL NURSE SPECIALIST

## 2025-01-30 PROCEDURE — G8427 DOCREV CUR MEDS BY ELIG CLIN: HCPCS | Performed by: CLINICAL NURSE SPECIALIST

## 2025-01-30 PROCEDURE — 3074F SYST BP LT 130 MM HG: CPT | Performed by: CLINICAL NURSE SPECIALIST

## 2025-01-30 PROCEDURE — 1123F ACP DISCUSS/DSCN MKR DOCD: CPT | Performed by: CLINICAL NURSE SPECIALIST

## 2025-01-30 PROCEDURE — 99214 OFFICE O/P EST MOD 30 MIN: CPT | Performed by: CLINICAL NURSE SPECIALIST

## 2025-01-30 PROCEDURE — 3078F DIAST BP <80 MM HG: CPT | Performed by: CLINICAL NURSE SPECIALIST

## 2025-01-30 PROCEDURE — 4004F PT TOBACCO SCREEN RCVD TLK: CPT | Performed by: CLINICAL NURSE SPECIALIST

## 2025-01-30 PROCEDURE — 1159F MED LIST DOCD IN RCRD: CPT | Performed by: CLINICAL NURSE SPECIALIST

## 2025-01-30 ASSESSMENT — ENCOUNTER SYMPTOMS
ABDOMINAL PAIN: 0
COUGH: 0
WHEEZING: 0
VOMITING: 0
EYE REDNESS: 0
SHORTNESS OF BREATH: 1
NAUSEA: 0
FACIAL SWELLING: 0
CHEST TIGHTNESS: 0

## 2025-01-30 NOTE — PROGRESS NOTES
[facility] Card[facility]  [address] [address]  Phone: [phone]  Fax: [phone]    OFFICE VISIT:  2025    Rojas Call - : 1950    Reason For Visit:  Rojas is a 75 y.o. male who is here for new finding of pericardial cyst, AF, CAD     Diagnosis Orders   1. Chronic systolic heart failure (HCC)  EKG 12 lead      2. Persistent atrial fibrillation (HCC)        3. Coronary artery disease involving native coronary artery of native heart without angina pectoris        4. Pericardial cyst        5. Tachycardia-bradycardia syndrome (HCC)        6. Smoker                HPI  Patient  hospitalized fibrillation with AF rapid ventricular response , failed cardioversion, initiation of amiodarone with repeat cardioversion 2024 which was also unsuccessful.  Dr. Barbosa suggested rate control versus rhythm control and amiodarone was discontinued    Echo  showed a reduced LVEF of 40-44% with bilateral pulmonary edema.  Heart cath showed double vessel disease with severe in-stent restenosis of the OM1 and mid RPL which was treated with drug-eluting stent.  He was started on both Plavix and Eliquis    Pericardial cyst per CT of the abdomen noted.  Dr. Barbosa recommended repeating CT of the chest in the future to assess stability    A 2D echo and CT of the chest was ordered at last visit in November, but patient states he was not feeling well the day of his appointments and did not reschedule    He denies chest pain.  He has chronic dyspnea, but denies orthopnea, PND,  or sudden weight gain.  Main complaint is fatigue.  He is not aware of palpitations or fast heart rates    He continues to smoke    Juwan Trevino MD is PCP.  Rojas Call has the following history as recorded in [facility]:    Patient Active Problem List    Diagnosis Date Noted    NSTEMI (non-ST elevated myocardial infarction) ([facility])     Ventricular tachycardia (HCC)     Bradycardia 10/09/2024    Chronic systolic

## 2025-01-30 NOTE — TELEPHONE ENCOUNTER
Dr. Barbosa -  patient seen for follow-up today in atrial fibrillation Rate 140 and  seems  to be tolerating fairly well.  Previous appointment 10/24 he was in sinus bradycardia at rate 47 and I discontinued his digoxin at that visit.  It was recommended previously that we no longer pursue rhythm control, but just rate control.  Blood pressure is soft and he is on both metoprolol and Cardizem.    Do think he would be a candidate for a pacemaker to help better control his A-fib?  Please advise

## 2025-01-30 NOTE — PATIENT INSTRUCTIONS
Return in about 2 months (around 3/30/2025) for APRN.  Discuss your case with Dr Barbosa and will get back to you    Quit smoking.  Call the [place] Tobacco Quit Line 1-800-QUIT-NOW       Postpone colonoscopy    Repeat limited Echo    CT chest    Quit smoking.  Call the [place] Tobacco Quit Line 1-800-QUIT-NOW     - Weigh daily every morning after urinating (this is your dry weight).   Report weight gain of 3lbs or more in 24hrs or 5lbs in one week.  - Call for increasing shortness of breath or increasing swelling in feet and legs.    (This could mean you are retaining too much fluid)  - 2000mg low sodium diet  - Fluid restriction of 1500ml per day (about 6-8 cups (48-64oz) of fluid per day)

## 2025-02-03 RX ORDER — DIGOXIN 125 MCG
125 TABLET ORAL DAILY
Qty: 90 TABLET | Refills: 1 | Status: SHIPPED | OUTPATIENT
Start: 2025-02-03

## 2025-02-28 ASSESSMENT — PATIENT HEALTH QUESTIONNAIRE - PHQ9
9. THOUGHTS THAT YOU WOULD BE BETTER OFF DEAD, OR OF HURTING YOURSELF: NOT AT ALL
5. POOR APPETITE OR OVEREATING: NOT AT ALL
2. FEELING DOWN, DEPRESSED OR HOPELESS: NOT AT ALL
SUM OF ALL RESPONSES TO PHQ QUESTIONS 1-9: 0
5. POOR APPETITE OR OVEREATING: NOT AT ALL
10. IF YOU CHECKED OFF ANY PROBLEMS, HOW DIFFICULT HAVE THESE PROBLEMS MADE IT FOR YOU TO DO YOUR WORK, TAKE CARE OF THINGS AT HOME, OR GET ALONG WITH OTHER PEOPLE: NOT DIFFICULT AT ALL
6. FEELING BAD ABOUT YOURSELF - OR THAT YOU ARE A FAILURE OR HAVE LET YOURSELF OR YOUR FAMILY DOWN: NOT AT ALL
4. FEELING TIRED OR HAVING LITTLE ENERGY: NOT AT ALL
3. TROUBLE FALLING OR STAYING ASLEEP: NOT AT ALL
8. MOVING OR SPEAKING SO SLOWLY THAT OTHER PEOPLE COULD HAVE NOTICED. OR THE OPPOSITE, BEING SO FIGETY OR RESTLESS THAT YOU HAVE BEEN MOVING AROUND A LOT MORE THAN USUAL: NOT AT ALL
1. LITTLE INTEREST OR PLEASURE IN DOING THINGS: NOT AT ALL
SUM OF ALL RESPONSES TO PHQ QUESTIONS 1-9: 0
9. THOUGHTS THAT YOU WOULD BE BETTER OFF DEAD, OR OF HURTING YOURSELF: NOT AT ALL
7. TROUBLE CONCENTRATING ON THINGS, SUCH AS READING THE NEWSPAPER OR WATCHING TELEVISION: NOT AT ALL
SUM OF ALL RESPONSES TO PHQ QUESTIONS 1-9: 0
10. IF YOU CHECKED OFF ANY PROBLEMS, HOW DIFFICULT HAVE THESE PROBLEMS MADE IT FOR YOU TO DO YOUR WORK, TAKE CARE OF THINGS AT HOME, OR GET ALONG WITH OTHER PEOPLE: NOT DIFFICULT AT ALL
3. TROUBLE FALLING OR STAYING ASLEEP: NOT AT ALL
6. FEELING BAD ABOUT YOURSELF - OR THAT YOU ARE A FAILURE OR HAVE LET YOURSELF OR YOUR FAMILY DOWN: NOT AT ALL
2. FEELING DOWN, DEPRESSED OR HOPELESS: NOT AT ALL
4. FEELING TIRED OR HAVING LITTLE ENERGY: NOT AT ALL
7. TROUBLE CONCENTRATING ON THINGS, SUCH AS READING THE NEWSPAPER OR WATCHING TELEVISION: NOT AT ALL
8. MOVING OR SPEAKING SO SLOWLY THAT OTHER PEOPLE COULD HAVE NOTICED. OR THE OPPOSITE - BEING SO FIDGETY OR RESTLESS THAT YOU HAVE BEEN MOVING AROUND A LOT MORE THAN USUAL: NOT AT ALL
SUM OF ALL RESPONSES TO PHQ QUESTIONS 1-9: 0
1. LITTLE INTEREST OR PLEASURE IN DOING THINGS: NOT AT ALL
SUM OF ALL RESPONSES TO PHQ QUESTIONS 1-9: 0

## 2025-03-03 ENCOUNTER — OFFICE VISIT (OUTPATIENT)
Age: 75
End: 2025-03-03
Payer: MEDICARE

## 2025-03-03 VITALS
WEIGHT: 161.4 LBS | OXYGEN SATURATION: 99 % | SYSTOLIC BLOOD PRESSURE: 114 MMHG | DIASTOLIC BLOOD PRESSURE: 72 MMHG | TEMPERATURE: 98.4 F | BODY MASS INDEX: 21.89 KG/M2 | HEART RATE: 75 BPM

## 2025-03-03 DIAGNOSIS — F41.9 ANXIETY: ICD-10-CM

## 2025-03-03 DIAGNOSIS — G47.00 INSOMNIA, UNSPECIFIED TYPE: Primary | ICD-10-CM

## 2025-03-03 DIAGNOSIS — S39.012D BACK STRAIN, SUBSEQUENT ENCOUNTER: ICD-10-CM

## 2025-03-03 DIAGNOSIS — R10.13 EPIGASTRIC PAIN: ICD-10-CM

## 2025-03-03 DIAGNOSIS — K21.9 GASTROESOPHAGEAL REFLUX DISEASE, UNSPECIFIED WHETHER ESOPHAGITIS PRESENT: ICD-10-CM

## 2025-03-03 DIAGNOSIS — G25.0 ESSENTIAL TREMOR: ICD-10-CM

## 2025-03-03 DIAGNOSIS — F33.1 MODERATE EPISODE OF RECURRENT MAJOR DEPRESSIVE DISORDER (HCC): ICD-10-CM

## 2025-03-03 PROCEDURE — 3078F DIAST BP <80 MM HG: CPT | Performed by: FAMILY MEDICINE

## 2025-03-03 PROCEDURE — 3074F SYST BP LT 130 MM HG: CPT | Performed by: FAMILY MEDICINE

## 2025-03-03 PROCEDURE — G8427 DOCREV CUR MEDS BY ELIG CLIN: HCPCS | Performed by: FAMILY MEDICINE

## 2025-03-03 PROCEDURE — 1159F MED LIST DOCD IN RCRD: CPT | Performed by: FAMILY MEDICINE

## 2025-03-03 PROCEDURE — 1123F ACP DISCUSS/DSCN MKR DOCD: CPT | Performed by: FAMILY MEDICINE

## 2025-03-03 PROCEDURE — 4004F PT TOBACCO SCREEN RCVD TLK: CPT | Performed by: FAMILY MEDICINE

## 2025-03-03 PROCEDURE — 99214 OFFICE O/P EST MOD 30 MIN: CPT | Performed by: FAMILY MEDICINE

## 2025-03-03 PROCEDURE — G8420 CALC BMI NORM PARAMETERS: HCPCS | Performed by: FAMILY MEDICINE

## 2025-03-03 RX ORDER — DOXEPIN 3 MG/1
3 TABLET, FILM COATED ORAL NIGHTLY
Qty: 30 TABLET | Refills: 2 | Status: SHIPPED | OUTPATIENT
Start: 2025-03-03

## 2025-03-03 RX ORDER — TOPIRAMATE 25 MG/1
25 TABLET, FILM COATED ORAL 2 TIMES DAILY
Qty: 180 TABLET | Refills: 1 | Status: SHIPPED | OUTPATIENT
Start: 2025-03-03

## 2025-03-03 RX ORDER — PANTOPRAZOLE SODIUM 40 MG/1
TABLET, DELAYED RELEASE ORAL
Qty: 90 TABLET | Refills: 1 | Status: SHIPPED | OUTPATIENT
Start: 2025-03-03

## 2025-03-03 RX ORDER — ESCITALOPRAM OXALATE 20 MG/1
20 TABLET ORAL DAILY
Qty: 90 TABLET | Refills: 1 | Status: SHIPPED | OUTPATIENT
Start: 2025-03-03

## 2025-03-03 SDOH — ECONOMIC STABILITY: FOOD INSECURITY: WITHIN THE PAST 12 MONTHS, THE FOOD YOU BOUGHT JUST DIDN'T LAST AND YOU DIDN'T HAVE MONEY TO GET MORE.: NEVER TRUE

## 2025-03-03 SDOH — ECONOMIC STABILITY: FOOD INSECURITY: WITHIN THE PAST 12 MONTHS, YOU WORRIED THAT YOUR FOOD WOULD RUN OUT BEFORE YOU GOT MONEY TO BUY MORE.: NEVER TRUE

## 2025-03-03 NOTE — PROGRESS NOTES
mouth every 8 hours as needed (muscle spasm) 90 tablet 1    topiramate (TOPAMAX) 25 MG tablet Take 1 tablet by mouth 2 times daily 180 tablet 1    dicyclomine (BENTYL) 10 MG capsule Take 1 capsule by mouth 4 times daily (before meals and nightly) (Patient not taking: Reported on 10/9/2024) 120 capsule 0    albuterol sulfate HFA (PROVENTIL;VENTOLIN;PROAIR) 108 (90 Base) MCG/ACT inhaler Inhale 2 puffs into the lungs every 4 hours as needed for Wheezing or Shortness of Breath 1 g 0     No facility-administered medications prior to visit.        Past Medical History:   Diagnosis Date    Arthritis     knees    Atrial fibrillation (HCC)     CAD (coronary artery disease)     Colon polyp     COPD (chronic obstructive pulmonary disease) (HCC)     slight    Heart attack (HCC) 07/06/2017    History of blood transfusion     History of colon polyps     tubular adenoma, hyperplastic, distal rectum, duodenum    History of transfusion     Hyperlipidemia     Hypertension     Joint pain     Palliative care patient 06/21/2024    Palpitations     Pinched nerve     right shoulder    Pinched nerve in neck     Shingles     Tobacco abuse     Ulcer         Social History     Tobacco Use    Smoking status: Every Day     Current packs/day: 0.50     Average packs/day: 0.5 packs/day for 60.0 years (30.0 ttl pk-yrs)     Types: Cigarettes, Cigars    Smokeless tobacco: Never   Substance Use Topics    Alcohol use: Yes     Alcohol/week: 0.0 standard drinks of alcohol     Comment: occ        Past Surgical History:   Procedure Laterality Date    CARDIAC PROCEDURE N/A 6/24/2024    Left heart cath / coronary angiography performed by Nathaniel Barbosa MD at [facility] CARDIAC CATH LAB    CARDIAC PROCEDURE N/A 6/24/2024    Percutaneous coronary intervention performed by Nathaniel Barbosa MD at [facility] CARDIAC CATH LAB    COLONOSCOPY  12-10-08    Dr De La Torre    COLONOSCOPY  8-27-01    Dr De La Torre    COLONOSCOPY  07/2013    Dr. Strange: multiple polyps, adenomatous and hyperplastic (3

## 2025-03-10 ASSESSMENT — ENCOUNTER SYMPTOMS
VOMITING: 0
SORE THROAT: 0
RHINORRHEA: 0
COUGH: 0
BACK PAIN: 0
EYE PAIN: 0
EYE DISCHARGE: 0
ABDOMINAL PAIN: 0
SHORTNESS OF BREATH: 0
NAUSEA: 0
CONSTIPATION: 0

## 2025-04-01 ENCOUNTER — OFFICE VISIT (OUTPATIENT)
Dept: CARDIOLOGY CLINIC | Age: 75
End: 2025-04-01
Payer: MEDICARE

## 2025-04-01 VITALS
BODY MASS INDEX: 21.67 KG/M2 | SYSTOLIC BLOOD PRESSURE: 124 MMHG | DIASTOLIC BLOOD PRESSURE: 80 MMHG | HEART RATE: 52 BPM | WEIGHT: 160 LBS | HEIGHT: 72 IN

## 2025-04-01 DIAGNOSIS — I49.5 TACHYCARDIA-BRADYCARDIA SYNDROME (HCC): ICD-10-CM

## 2025-04-01 DIAGNOSIS — I10 ESSENTIAL HYPERTENSION: ICD-10-CM

## 2025-04-01 DIAGNOSIS — E78.2 MIXED HYPERLIPIDEMIA: ICD-10-CM

## 2025-04-01 DIAGNOSIS — I48.0 PAROXYSMAL ATRIAL FIBRILLATION (HCC): ICD-10-CM

## 2025-04-01 DIAGNOSIS — Q24.8 PERICARDIAL CYST: ICD-10-CM

## 2025-04-01 DIAGNOSIS — I21.4 NSTEMI (NON-ST ELEVATED MYOCARDIAL INFARCTION) (HCC): Primary | ICD-10-CM

## 2025-04-01 DIAGNOSIS — R06.02 SHORTNESS OF BREATH: ICD-10-CM

## 2025-04-01 DIAGNOSIS — F17.200 SMOKER: ICD-10-CM

## 2025-04-01 DIAGNOSIS — I25.10 CORONARY ARTERY DISEASE INVOLVING NATIVE CORONARY ARTERY OF NATIVE HEART WITHOUT ANGINA PECTORIS: ICD-10-CM

## 2025-04-01 DIAGNOSIS — I50.22 CHRONIC SYSTOLIC HEART FAILURE (HCC): ICD-10-CM

## 2025-04-01 PROCEDURE — 1123F ACP DISCUSS/DSCN MKR DOCD: CPT | Performed by: CLINICAL NURSE SPECIALIST

## 2025-04-01 PROCEDURE — 99214 OFFICE O/P EST MOD 30 MIN: CPT | Performed by: CLINICAL NURSE SPECIALIST

## 2025-04-01 PROCEDURE — G8427 DOCREV CUR MEDS BY ELIG CLIN: HCPCS | Performed by: CLINICAL NURSE SPECIALIST

## 2025-04-01 PROCEDURE — 1159F MED LIST DOCD IN RCRD: CPT | Performed by: CLINICAL NURSE SPECIALIST

## 2025-04-01 PROCEDURE — 3079F DIAST BP 80-89 MM HG: CPT | Performed by: CLINICAL NURSE SPECIALIST

## 2025-04-01 PROCEDURE — 93000 ELECTROCARDIOGRAM COMPLETE: CPT | Performed by: CLINICAL NURSE SPECIALIST

## 2025-04-01 PROCEDURE — 4004F PT TOBACCO SCREEN RCVD TLK: CPT | Performed by: CLINICAL NURSE SPECIALIST

## 2025-04-01 PROCEDURE — G8420 CALC BMI NORM PARAMETERS: HCPCS | Performed by: CLINICAL NURSE SPECIALIST

## 2025-04-01 PROCEDURE — 3074F SYST BP LT 130 MM HG: CPT | Performed by: CLINICAL NURSE SPECIALIST

## 2025-04-01 ASSESSMENT — ENCOUNTER SYMPTOMS
SHORTNESS OF BREATH: 1
COUGH: 0
WHEEZING: 0
CHEST TIGHTNESS: 0
VOMITING: 0
ABDOMINAL PAIN: 0
FACIAL SWELLING: 0
DIARRHEA: 1
NAUSEA: 0
EYE REDNESS: 0

## 2025-04-01 NOTE — PROGRESS NOTES
present. Moderate hypokinesis of the following segments: basal inferior, basal inferolateral and mid inferior. Indeterminate diastolic function due to atrial fibrillation. No mass present.    Right Ventricle: Right ventricle size is normal. Normal systolic function.    Mitral Valve: Mild regurgitation.    Left Atrium: Left atrium is moderately dilated.    Right Atrium: Right atrium size is normal.    Aorta: Normal sized sinus of Valsalva, aortic root and ascending aorta.    Pericardium: No pericardial effusion.    IVC/SVC: IVC is normal (RAP ~3 mmHg).    Image quality is fair. Contrast used: Definity.    Heart Cath 6/24/24  Branch vessel disease with severe in-stent restenosis of OM1 and obstructive RPL lesion.  Moderate to severe LV systolic dysfunction.  Successful PCI of OM1 (2.75 x 26 mm Cochrane frontier) utilizing drug-eluting stent.  Successful PCI of mid RPL (2.25 x 12 mm Cochrane frontier taken to 2.75 mm proximally) utilizing drug-eluting stent.  Unsuccessful attempt at DCCV x 3 of atrial fibrillation.       EKG shows sinus bradycardia with frequent PACs, rate 52  - - - - -   Assessment:     Diagnosis Orders   1. NSTEMI (non-ST elevated myocardial infarction) (HCC)  EKG 12 lead      2. Paroxysmal atrial fibrillation (HCC)  EKG 12 lead      3. Tachycardia-bradycardia syndrome (HCC)        4. Chronic systolic heart failure (HCC)  EKG 12 lead      5. Shortness of breath  Echo (TTE) complete (PRN contrast/bubble/strain/3D)      6. Coronary artery disease involving native coronary artery of native heart without angina pectoris        7. Essential hypertension        8. Mixed hyperlipidemia        9. Pericardial cyst        10. Smoker            Paroxysmal atrial fibrillation/tachycardia bradycardia syndrome-  thought to be persistent after a second failed cardioversion in July 2024.  Amiodarone was discontinued.  EKG 10/9/2024 shows sinus bradycardia rate 47 and his digoxin was discontinued at that visit.  Return to

## 2025-04-01 NOTE — PATIENT INSTRUCTIONS
If having dizziness, weak spells, call office    Quit smoking.  Call the [place] Tobacco Quit Line 1-533-QUIT-NOW     Colonoscopy in future    Repeat limited Echo in the near future    CT chest in near future to monitor pericardial cyst    - Weigh daily every morning after urinating (this is your dry weight).   Report weight gain of 3lbs or more in 24hrs or 5lbs in one week.  - Call for increasing shortness of breath or increasing swelling in feet and legs.    (This could mean you are retaining too much fluid)  - 2000mg low sodium diet  - Fluid restriction of 1500ml per day (about 6-8 cups (48-64oz) of fluid per day)

## 2025-04-03 DIAGNOSIS — R19.7 DIARRHEA, UNSPECIFIED TYPE: ICD-10-CM

## 2025-04-03 RX ORDER — DIPHENOXYLATE HYDROCHLORIDE AND ATROPINE SULFATE 2.5; .025 MG/1; MG/1
TABLET ORAL
Qty: 40 TABLET | Refills: 0 | Status: SHIPPED | OUTPATIENT
Start: 2025-04-03 | End: 2026-04-03

## 2025-04-07 ENCOUNTER — RESULTS FOLLOW-UP (OUTPATIENT)
Dept: CARDIOLOGY CLINIC | Age: 75
End: 2025-04-07

## 2025-04-07 ENCOUNTER — HOSPITAL ENCOUNTER (OUTPATIENT)
Age: 75
Discharge: HOME | End: 2025-04-09
Payer: MEDICARE

## 2025-04-07 VITALS
DIASTOLIC BLOOD PRESSURE: 80 MMHG | SYSTOLIC BLOOD PRESSURE: 124 MMHG | HEIGHT: 72 IN | BODY MASS INDEX: 21.67 KG/M2 | WEIGHT: 160 LBS

## 2025-04-07 DIAGNOSIS — R06.02 SHORTNESS OF BREATH: ICD-10-CM

## 2025-04-07 LAB
ECHO AO ASC DIAM: 2.6 CM
ECHO AO ASCENDING AORTA INDEX: 1.34 CM/M2
ECHO AO ROOT DIAM: 1.3 CM
ECHO AO ROOT INDEX: 0.67 CM/M2
ECHO AO SINUS VALSALVA DIAM: 2.7 CM
ECHO AO SINUS VALSALVA INDEX: 1.39 CM/M2
ECHO AO ST JNCT DIAM: 1.9 CM
ECHO AV AREA PEAK VELOCITY: 2.4 CM2
ECHO AV AREA VTI: 2.7 CM2
ECHO AV AREA/BSA PEAK VELOCITY: 1.2 CM2/M2
ECHO AV AREA/BSA VTI: 1.4 CM2/M2
ECHO AV MEAN GRADIENT: 4 MMHG
ECHO AV MEAN VELOCITY: 0.9 M/S
ECHO AV PEAK GRADIENT: 7 MMHG
ECHO AV PEAK VELOCITY: 1.3 M/S
ECHO AV VELOCITY RATIO: 0.85
ECHO AV VTI: 22.7 CM
ECHO BSA: 1.92 M2
ECHO EST RA PRESSURE: 8 MMHG
ECHO IVC PROX: 1.7 CM
ECHO LA AREA 2C: 18.9 CM2
ECHO LA AREA 4C: 20.8 CM2
ECHO LA DIAMETER INDEX: 2.01 CM/M2
ECHO LA DIAMETER: 3.9 CM
ECHO LA MAJOR AXIS: 5.9 CM
ECHO LA MINOR AXIS: 5.2 CM
ECHO LA TO AORTIC ROOT RATIO: 3
ECHO LA VOL BP: 60 ML (ref 18–58)
ECHO LA VOL MOD A2C: 56 ML (ref 18–58)
ECHO LA VOL MOD A4C: 57 ML (ref 18–58)
ECHO LA VOL/BSA BIPLANE: 31 ML/M2 (ref 16–34)
ECHO LA VOLUME INDEX MOD A2C: 29 ML/M2 (ref 16–34)
ECHO LA VOLUME INDEX MOD A4C: 29 ML/M2 (ref 16–34)
ECHO LV E' LATERAL VELOCITY: 12 CM/S
ECHO LV E' SEPTAL VELOCITY: 9.14 CM/S
ECHO LV EDV A2C: 122 ML
ECHO LV EDV A4C: 120 ML
ECHO LV EDV INDEX A4C: 62 ML/M2
ECHO LV EDV NDEX A2C: 63 ML/M2
ECHO LV EF PHYSICIAN: 50 %
ECHO LV EJECTION FRACTION A2C: 49 %
ECHO LV EJECTION FRACTION A4C: 47 %
ECHO LV EJECTION FRACTION BIPLANE: 46 % (ref 55–100)
ECHO LV ESV A2C: 62 ML
ECHO LV ESV A4C: 64 ML
ECHO LV ESV INDEX A2C: 32 ML/M2
ECHO LV ESV INDEX A4C: 33 ML/M2
ECHO LV FRACTIONAL SHORTENING: 46 % (ref 28–44)
ECHO LV INTERNAL DIMENSION DIASTOLE INDEX: 1.8 CM/M2
ECHO LV INTERNAL DIMENSION DIASTOLIC: 3.5 CM (ref 4.2–5.9)
ECHO LV INTERNAL DIMENSION SYSTOLIC INDEX: 0.98 CM/M2
ECHO LV INTERNAL DIMENSION SYSTOLIC: 1.9 CM
ECHO LV IVSD: 0.9 CM (ref 0.6–1)
ECHO LV IVSS: 3.3 CM
ECHO LV MASS 2D: 95.9 G (ref 88–224)
ECHO LV MASS INDEX 2D: 49.5 G/M2 (ref 49–115)
ECHO LV POSTERIOR WALL DIASTOLIC: 1 CM (ref 0.6–1)
ECHO LV POSTERIOR WALL SYSTOLIC: 4.1 CM
ECHO LV RELATIVE WALL THICKNESS RATIO: 0.57
ECHO LVOT AREA: 2.8 CM2
ECHO LVOT AV VTI INDEX: 0.94
ECHO LVOT DIAM: 1.9 CM
ECHO LVOT MEAN GRADIENT: 2 MMHG
ECHO LVOT MEAN GRADIENT: 2 MMHG
ECHO LVOT PEAK GRADIENT: 5 MMHG
ECHO LVOT PEAK VELOCITY: 1.1 M/S
ECHO LVOT STROKE VOLUME INDEX: 31.1 ML/M2
ECHO LVOT SV: 60.4 ML
ECHO LVOT VTI: 21.3 CM
ECHO MV A VELOCITY: 0.77 M/S
ECHO MV AREA VTI: 1.7 CM2
ECHO MV E DECELERATION TIME (DT): 193 MS
ECHO MV E VELOCITY: 0.8 M/S
ECHO MV E/A RATIO: 1.04
ECHO MV E/E' LATERAL: 6.67
ECHO MV E/E' RATIO (AVERAGED): 7.71
ECHO MV E/E' SEPTAL: 8.75
ECHO MV LVOT VTI INDEX: 1.68
ECHO MV MAX VELOCITY: 0.8 M/S
ECHO MV MEAN GRADIENT: 1 MMHG
ECHO MV MEAN VELOCITY: 0.5 M/S
ECHO MV PEAK GRADIENT: 3 MMHG
ECHO MV REGURGITANT PEAK GRADIENT: 81 MMHG
ECHO MV REGURGITANT PEAK VELOCITY: 4.5 M/S
ECHO MV REGURGITANT VTIA: 163 CM
ECHO MV VTI: 35.7 CM
ECHO PULMONARY ARTERY END DIASTOLIC PRESSURE: 4 MMHG
ECHO PV REGURGITANT MAX VELOCITY: 1.1 M/S
ECHO RA AREA 4C: 11.5 CM2
ECHO RA END SYSTOLIC VOLUME APICAL 4 CHAMBER INDEX BSA: 13 ML/M2
ECHO RA VOLUME: 25 ML
ECHO RIGHT VENTRICULAR SYSTOLIC PRESSURE (RVSP): 33 MMHG
ECHO RV BASAL DIMENSION: 3.5 CM
ECHO RV INTERNAL DIMENSION: 3.3 CM
ECHO RV LONGITUDINAL DIMENSION: 6.5 CM
ECHO RV MID DIMENSION: 2.1 CM
ECHO RV TAPSE: 1.7 CM (ref 1.7–?)
ECHO TV REGURGITANT MAX VELOCITY: 2.48 M/S
ECHO TV REGURGITANT PEAK GRADIENT: 25 MMHG

## 2025-04-07 PROCEDURE — C8929 TTE W OR WO FOL WCON,DOPPLER: HCPCS

## 2025-04-07 PROCEDURE — 6360000004 HC RX CONTRAST MEDICATION: Performed by: CLINICAL NURSE SPECIALIST

## 2025-04-07 RX ADMIN — SULFUR HEXAFLUORIDE 2 ML: 60.7; .19; .19 INJECTION, POWDER, LYOPHILIZED, FOR SUSPENSION INTRAVENOUS; INTRAVESICAL at 09:17

## 2025-04-23 DIAGNOSIS — R19.7 DIARRHEA, UNSPECIFIED TYPE: ICD-10-CM

## 2025-04-23 RX ORDER — DIPHENOXYLATE HYDROCHLORIDE AND ATROPINE SULFATE 2.5; .025 MG/1; MG/1
TABLET ORAL
Qty: 40 TABLET | Refills: 0 | Status: SHIPPED | OUTPATIENT
Start: 2025-04-23 | End: 2026-04-23

## 2025-05-13 DIAGNOSIS — S39.012D BACK STRAIN, SUBSEQUENT ENCOUNTER: ICD-10-CM

## 2025-05-29 DIAGNOSIS — R19.7 DIARRHEA, UNSPECIFIED TYPE: ICD-10-CM

## 2025-05-29 RX ORDER — DIPHENOXYLATE HYDROCHLORIDE AND ATROPINE SULFATE 2.5; .025 MG/1; MG/1
TABLET ORAL
Qty: 40 TABLET | Refills: 0 | Status: SHIPPED | OUTPATIENT
Start: 2025-05-29 | End: 2026-05-29

## 2025-06-06 ENCOUNTER — TELEPHONE (OUTPATIENT)
Dept: GASTROENTEROLOGY | Age: 75
End: 2025-06-06

## 2025-06-06 ENCOUNTER — TELEPHONE (OUTPATIENT)
Dept: CARDIOLOGY CLINIC | Age: 75
End: 2025-06-06

## 2025-06-06 NOTE — TELEPHONE ENCOUNTER
Patient's wife left message that patient is having a colonoscopy and needs to know what medications to hold. Returned call and advised that the MD needs to send cardiac clearance request to the office with what medications they want to hold and office will send them a letter back approving or denying request. She voiced understanding.

## 2025-06-06 NOTE — TELEPHONE ENCOUNTER
Patient called today regarding his Eliquis/plavix clearance for his CLN on 25.  Patient had originially been scheduled in Feb. And had rescheduled to .  Clearance was originally rcvd, but has  - I have resent clearance to Dorita Barnhart and [facility]

## 2025-06-09 ENCOUNTER — TELEPHONE (OUTPATIENT)
Dept: CARDIOLOGY CLINIC | Age: 75
End: 2025-06-09

## 2025-06-09 NOTE — TELEPHONE ENCOUNTER
Date: 6/25/25     Cardiologist: only NP since [place]     Procedure: Colonoscopy     Surgeon: Dennis     Last Office Visit: 4/1/25  Reason for office visit and medical concerns addressed at this office visit: afib, cad, chf, ckd, copd, htn, hyperlipidemia,      Testing Performed and Date of Service:  4/7/25 Echo   Left Ventricle: Normal left ventricular systolic function with a visually estimated EF of 50 - 55%. Left ventricle size is normal. Normal wall thickness. Normal wall motion. Normal diastolic function.    Aortic Valve: Mildly thickened cusps. Moderately calcified cusps.    Mitral Valve: Mildly thickened leaflets.    Interatrial Septum: Agitated saline study was negative with and without provocation.    Image quality is technically difficult. Contrast used: Lumason.    Does the patient have a stent? If so, what type?  SEBASTIEN 6/24/24     Current Medications: topamax, tizanidine, viagra, protonix, metoprolol, lasix, lexapro, entresto, cardizem, plavix, atorvastatin, abilify, eliquis,      Is the patient currently taking an anticoagulant? If so, what is the diagnosis the patient has been given to warrant the need for the anticoagulant? Eliquis for afib, plavix for SEBASTIEN 6/24/24     Additional Notes: requesting to hold eliquis and plavix

## 2025-06-10 DIAGNOSIS — S39.012D BACK STRAIN, SUBSEQUENT ENCOUNTER: ICD-10-CM

## 2025-06-16 ENCOUNTER — OFFICE VISIT (OUTPATIENT)
Age: 75
End: 2025-06-16

## 2025-06-16 VITALS
HEIGHT: 72 IN | WEIGHT: 156 LBS | BODY MASS INDEX: 21.13 KG/M2 | OXYGEN SATURATION: 98 % | SYSTOLIC BLOOD PRESSURE: 98 MMHG | TEMPERATURE: 98.3 F | DIASTOLIC BLOOD PRESSURE: 76 MMHG | HEART RATE: 65 BPM

## 2025-06-16 DIAGNOSIS — N18.31 STAGE 3A CHRONIC KIDNEY DISEASE (HCC): ICD-10-CM

## 2025-06-16 DIAGNOSIS — I50.22 CHRONIC SYSTOLIC HEART FAILURE (HCC): ICD-10-CM

## 2025-06-16 DIAGNOSIS — J44.9 CHRONIC OBSTRUCTIVE PULMONARY DISEASE, UNSPECIFIED COPD TYPE (HCC): ICD-10-CM

## 2025-06-16 DIAGNOSIS — I25.10 CORONARY ARTERY DISEASE INVOLVING NATIVE CORONARY ARTERY OF NATIVE HEART WITHOUT ANGINA PECTORIS: ICD-10-CM

## 2025-06-16 DIAGNOSIS — Z87.891 PERSONAL HISTORY OF TOBACCO USE, PRESENTING HAZARDS TO HEALTH: ICD-10-CM

## 2025-06-16 DIAGNOSIS — E78.2 MIXED HYPERLIPIDEMIA: ICD-10-CM

## 2025-06-16 DIAGNOSIS — F17.210 CIGARETTE SMOKER MOTIVATED TO QUIT: ICD-10-CM

## 2025-06-16 DIAGNOSIS — F41.9 ANXIETY: ICD-10-CM

## 2025-06-16 DIAGNOSIS — S39.012D BACK STRAIN, SUBSEQUENT ENCOUNTER: ICD-10-CM

## 2025-06-16 DIAGNOSIS — Z12.5 SCREENING FOR MALIGNANT NEOPLASM OF PROSTATE: ICD-10-CM

## 2025-06-16 DIAGNOSIS — R19.7 DIARRHEA, UNSPECIFIED TYPE: ICD-10-CM

## 2025-06-16 DIAGNOSIS — Z00.00 MEDICARE ANNUAL WELLNESS VISIT, SUBSEQUENT: ICD-10-CM

## 2025-06-16 DIAGNOSIS — I48.0 PAROXYSMAL ATRIAL FIBRILLATION (HCC): ICD-10-CM

## 2025-06-16 DIAGNOSIS — F33.1 MODERATE EPISODE OF RECURRENT MAJOR DEPRESSIVE DISORDER (HCC): ICD-10-CM

## 2025-06-16 DIAGNOSIS — Z87.891 PERSONAL HISTORY OF TOBACCO USE: ICD-10-CM

## 2025-06-16 DIAGNOSIS — Z00.00 MEDICARE ANNUAL WELLNESS VISIT, SUBSEQUENT: Primary | ICD-10-CM

## 2025-06-16 LAB
ALBUMIN SERPL-MCNC: 3.9 G/DL (ref 3.5–5.2)
ALP SERPL-CCNC: 116 U/L (ref 40–129)
ALT SERPL-CCNC: 17 U/L (ref 10–50)
ANION GAP SERPL CALCULATED.3IONS-SCNC: 12 MMOL/L (ref 8–16)
AST SERPL-CCNC: 16 U/L (ref 10–50)
BASOPHILS # BLD: 0 K/UL (ref 0–0.2)
BASOPHILS NFR BLD: 0.7 % (ref 0–1)
BILIRUB SERPL-MCNC: 0.4 MG/DL (ref 0.2–1.2)
BUN SERPL-MCNC: 19 MG/DL (ref 8–23)
CALCIUM SERPL-MCNC: 9.5 MG/DL (ref 8.8–10.2)
CHLORIDE SERPL-SCNC: 107 MMOL/L (ref 98–107)
CHOLEST SERPL-MCNC: 116 MG/DL (ref 0–199)
CO2 SERPL-SCNC: 23 MMOL/L (ref 22–29)
CREAT SERPL-MCNC: 1.8 MG/DL (ref 0.7–1.2)
CREAT UR-MCNC: 115 MG/DL (ref 39–259)
EOSINOPHIL # BLD: 0.4 K/UL (ref 0–0.6)
EOSINOPHIL NFR BLD: 9.6 % (ref 0–5)
ERYTHROCYTE [DISTWIDTH] IN BLOOD BY AUTOMATED COUNT: 14.5 % (ref 11.5–14.5)
GLUCOSE SERPL-MCNC: 83 MG/DL (ref 70–99)
HBA1C MFR BLD: 5.5 % (ref 4–5.6)
HCT VFR BLD AUTO: 40.2 % (ref 42–52)
HDLC SERPL-MCNC: 53 MG/DL (ref 40–60)
HGB BLD-MCNC: 13 G/DL (ref 14–18)
IMM GRANULOCYTES # BLD: 0 K/UL
LDLC SERPL CALC-MCNC: 51 MG/DL
LYMPHOCYTES # BLD: 1.1 K/UL (ref 1.1–4.5)
LYMPHOCYTES NFR BLD: 24.7 % (ref 20–40)
MCH RBC QN AUTO: 30.2 PG (ref 27–31)
MCHC RBC AUTO-ENTMCNC: 32.3 G/DL (ref 33–37)
MCV RBC AUTO: 93.3 FL (ref 80–94)
MICROALBUMIN UR-MCNC: <1.2 MG/DL (ref 0–1.99)
MICROALBUMIN/CREAT UR-RTO: NORMAL MG/G (ref 0–29)
MONOCYTES # BLD: 0.3 K/UL (ref 0–0.9)
MONOCYTES NFR BLD: 7.4 % (ref 0–10)
NEUTROPHILS # BLD: 2.6 K/UL (ref 1.5–7.5)
NEUTS SEG NFR BLD: 57.4 % (ref 50–65)
PLATELET # BLD AUTO: 171 K/UL (ref 130–400)
PMV BLD AUTO: 11.3 FL (ref 9.4–12.4)
POTASSIUM SERPL-SCNC: 3.6 MMOL/L (ref 3.5–5.1)
PROT SERPL-MCNC: 6.4 G/DL (ref 6.4–8.3)
PSA SERPL-MCNC: 0.97 NG/ML (ref 0–4)
RBC # BLD AUTO: 4.31 M/UL (ref 4.7–6.1)
SODIUM SERPL-SCNC: 142 MMOL/L (ref 136–145)
TRIGL SERPL-MCNC: 59 MG/DL (ref 0–149)
TSH SERPL DL<=0.005 MIU/L-ACNC: 2.32 UIU/ML (ref 0.27–4.2)
WBC # BLD AUTO: 4.6 K/UL (ref 4.8–10.8)

## 2025-06-16 RX ORDER — VARENICLINE TARTRATE 1 MG/1
1 TABLET, FILM COATED ORAL 2 TIMES DAILY
Qty: 60 TABLET | Refills: 4 | Status: SHIPPED | OUTPATIENT
Start: 2025-06-16

## 2025-06-16 RX ORDER — ESCITALOPRAM OXALATE 20 MG/1
20 TABLET ORAL DAILY
Qty: 90 TABLET | Refills: 1 | Status: SHIPPED | OUTPATIENT
Start: 2025-06-16

## 2025-06-16 RX ORDER — ATORVASTATIN CALCIUM 40 MG/1
40 TABLET, FILM COATED ORAL DAILY
Qty: 90 TABLET | Refills: 1 | Status: SHIPPED | OUTPATIENT
Start: 2025-06-16

## 2025-06-16 RX ORDER — TIOTROPIUM BROMIDE 18 UG/1
18 CAPSULE ORAL; RESPIRATORY (INHALATION) DAILY
Qty: 30 CAPSULE | Refills: 2 | Status: SHIPPED | OUTPATIENT
Start: 2025-06-16

## 2025-06-16 RX ORDER — DIPHENOXYLATE HYDROCHLORIDE AND ATROPINE SULFATE 2.5; .025 MG/1; MG/1
2 TABLET ORAL 4 TIMES DAILY PRN
Qty: 40 TABLET | Refills: 2 | Status: SHIPPED | OUTPATIENT
Start: 2025-06-16 | End: 2026-06-16

## 2025-06-16 RX ORDER — ARIPIPRAZOLE 10 MG/1
10 TABLET ORAL DAILY
Qty: 90 TABLET | Refills: 1 | Status: SHIPPED | OUTPATIENT
Start: 2025-06-16

## 2025-06-16 RX ORDER — VARENICLINE TARTRATE 0.5 (11)-1
KIT ORAL
Qty: 1 EACH | Refills: 0 | Status: SHIPPED | OUTPATIENT
Start: 2025-06-16

## 2025-06-16 ASSESSMENT — PATIENT HEALTH QUESTIONNAIRE - PHQ9
9. THOUGHTS THAT YOU WOULD BE BETTER OFF DEAD, OR OF HURTING YOURSELF: NOT AT ALL
SUM OF ALL RESPONSES TO PHQ QUESTIONS 1-9: 5
10. IF YOU CHECKED OFF ANY PROBLEMS, HOW DIFFICULT HAVE THESE PROBLEMS MADE IT FOR YOU TO DO YOUR WORK, TAKE CARE OF THINGS AT HOME, OR GET ALONG WITH OTHER PEOPLE: SOMEWHAT DIFFICULT
3. TROUBLE FALLING OR STAYING ASLEEP: SEVERAL DAYS
SUM OF ALL RESPONSES TO PHQ QUESTIONS 1-9: 5
SUM OF ALL RESPONSES TO PHQ QUESTIONS 1-9: 5
7. TROUBLE CONCENTRATING ON THINGS, SUCH AS READING THE NEWSPAPER OR WATCHING TELEVISION: NOT AT ALL
6. FEELING BAD ABOUT YOURSELF - OR THAT YOU ARE A FAILURE OR HAVE LET YOURSELF OR YOUR FAMILY DOWN: NOT AT ALL
5. POOR APPETITE OR OVEREATING: NOT AT ALL
8. MOVING OR SPEAKING SO SLOWLY THAT OTHER PEOPLE COULD HAVE NOTICED. OR THE OPPOSITE, BEING SO FIGETY OR RESTLESS THAT YOU HAVE BEEN MOVING AROUND A LOT MORE THAN USUAL: NOT AT ALL
1. LITTLE INTEREST OR PLEASURE IN DOING THINGS: SEVERAL DAYS
SUM OF ALL RESPONSES TO PHQ QUESTIONS 1-9: 5
4. FEELING TIRED OR HAVING LITTLE ENERGY: MORE THAN HALF THE DAYS
2. FEELING DOWN, DEPRESSED OR HOPELESS: SEVERAL DAYS

## 2025-06-16 NOTE — PROGRESS NOTES
Medicare Annual Wellness Visit    Rojas Call is here for Medicare AWV    Assessment & Plan   Medicare annual wellness visit, subsequent  -     CBC with Auto Differential; Future  -     Comprehensive Metabolic Panel; Future  -     Hemoglobin A1C; Future  -     Lipid Panel; Future  -     Albumin/Creatinine Ratio, Urine; Future  -     PSA Screening; Future  -     TSH; Future  Moderate episode of recurrent major depressive disorder (HCC)  -     ARIPiprazole (ABILIFY) 10 MG tablet; Take 1 tablet by mouth daily, Disp-90 tablet, R-1Normal  -     escitalopram (LEXAPRO) 20 MG tablet; Take 1 tablet by mouth daily, Disp-90 tablet, R-1Normal  Coronary artery disease involving native coronary artery of native heart without angina pectoris  -     atorvastatin (LIPITOR) 40 MG tablet; Take 1 tablet by mouth daily, Disp-90 tablet, R-1Normal  -     tiotropium (SPIRIVA HANDIHALER) 18 MCG inhalation capsule; Inhale 1 capsule into the lungs daily, Disp-30 capsule, R-2Normal  Mixed hyperlipidemia  -     atorvastatin (LIPITOR) 40 MG tablet; Take 1 tablet by mouth daily, Disp-90 tablet, R-1Normal  Anxiety  -     escitalopram (LEXAPRO) 20 MG tablet; Take 1 tablet by mouth daily, Disp-90 tablet, R-1Normal  Diarrhea, unspecified type  -     diphenoxylate-atropine (LOMOTIL) 2.5-0.025 MG per tablet; Take 2 tablets by mouth 4 times daily as needed for Diarrhea. Max Daily Amount: 8 tablets, Disp-40 tablet, R-2Normal  Chronic systolic heart failure (HCC)  Chronic obstructive pulmonary disease, unspecified COPD type (HCC)  Paroxysmal atrial fibrillation (HCC)  Stage 3a chronic kidney disease (HCC)  Cigarette smoker motivated to quit  -     SD Smoking and Tobacco Use Cessation (Intermediate): 3-10 MINUTES [03858]  -     varenicline (CHANTIX CONTINUING MONTH PAK) 1 MG tablet; Take 1 tablet by mouth 2 times daily, Disp-60 tablet, R-4Normal  -     Varenicline Tartrate, Starter, (CHANTIX STARTING MONTH ANURADHA) 0.5 MG X 11 & 1 MG X 42 TBPK; Use as

## 2025-06-20 ENCOUNTER — TELEPHONE (OUTPATIENT)
Dept: GASTROENTEROLOGY | Age: 75
End: 2025-06-20

## 2025-06-20 NOTE — TELEPHONE ENCOUNTER
called patient to confirm  procedure scheduled for  6/25/25@830 with Dr. Collins at [facility] outpatient      Patient: oRjas Call    YOB: 1950      Clearance was received on June 20, 2025.    for Endoscopy / Colonoscopy scheduled for: 6/25/25    Patient may discontinue the use of PLAVIX FOR 5 DAYS & ELIQUIS 2 DAYS       PATIENT NOTIFIED ON:  6/20/25      Clearance scanned into chart

## 2025-06-22 ENCOUNTER — RESULTS FOLLOW-UP (OUTPATIENT)
Age: 75
End: 2025-06-22

## 2025-06-24 ENCOUNTER — ANESTHESIA EVENT (OUTPATIENT)
Dept: ENDOSCOPY | Age: 75
End: 2025-06-24
Payer: MEDICARE

## 2025-06-24 NOTE — ANESTHESIA PRE PROCEDURE
Department of Anesthesiology  Preprocedure Note       Name:  Rojas Call   Age:  75 y.o.  :  1950                                          MRN:  [id number]         Date:  2025      Surgeon: Surgeon(s):  Yordy Collins MD    Procedure: Procedure(s):  COLONOSCOPY DIAGNOSTIC    Medications prior to admission:   Prior to Admission medications    Medication Sig Start Date End Date Taking? Authorizing Provider   ARIPiprazole (ABILIFY) 10 MG tablet Take 1 tablet by mouth daily 25   Juwan Trevino MD   atorvastatin (LIPITOR) 40 MG tablet Take 1 tablet by mouth daily 25   Juwan Trevino MD   escitalopram (LEXAPRO) 20 MG tablet Take 1 tablet by mouth daily 25   Juwan Trevino MD   diphenoxylate-atropine (LOMOTIL) 2.5-0.025 MG per tablet Take 2 tablets by mouth 4 times daily as needed for Diarrhea. Max Daily Amount: 8 tablets 25  Juwan Trevino MD   tiotropium (SPIRIVA HANDIHALER) 18 MCG inhalation capsule Inhale 1 capsule into the lungs daily 25   Juwan Trevino MD   varenicline (CHANTIX CONTINUING MONTH ) 1 MG tablet Take 1 tablet by mouth 2 times daily 25   Juwan Trevino MD   Varenicline Tartrate, Starter, (CHANTIX STARTING MONTH ) 0.5 MG X 11 & 1 MG X 42 TBPK Use as directed 25   Juwan Trevino MD   tiZANidine (ZANAFLEX) 4 MG tablet Take 1 tablet by mouth every 8 hours as needed (back spasm) 25   Juwan Trevino MD   pantoprazole (PROTONIX) 40 MG tablet TAKE 1 TABLET BY MOUTH DAILY IN THE MORNING BEFORE BREAKFAST 3/3/25   Juwan Trevino MD   topiramate (TOPAMAX) 25 MG tablet Take 1 tablet by mouth 2 times daily 3/3/25   Juwan Trevino MD   doxepin (SILENOR) 3 MG TABS tablet Take 1 tablet by mouth nightly 3/3/25   Juwan Trevino MD   digoxin (LANOXIN) 125 MCG tablet Take 1 tablet by mouth daily 2/3/25   Dorita Barnhart APRN   clopidogrel (PLAVIX) 75 MG tablet TAKE 1 TABLET BY MOUTH ONCE DAILY 25   Dorita Barnhart, APRN   furosemide (LASIX) 40 MG tablet TAKE 1

## 2025-06-25 ENCOUNTER — ANCILLARY PROCEDURE (OUTPATIENT)
Dept: ENDOSCOPY | Age: 75
End: 2025-06-25
Attending: INTERNAL MEDICINE
Payer: MEDICARE

## 2025-06-25 ENCOUNTER — HOSPITAL ENCOUNTER (OUTPATIENT)
Age: 75
Setting detail: OUTPATIENT SURGERY
Discharge: HOME | End: 2025-06-25
Attending: INTERNAL MEDICINE | Admitting: INTERNAL MEDICINE
Payer: MEDICARE

## 2025-06-25 ENCOUNTER — ANESTHESIA (OUTPATIENT)
Dept: ENDOSCOPY | Age: 75
End: 2025-06-25
Payer: MEDICARE

## 2025-06-25 VITALS
WEIGHT: 156 LBS | BODY MASS INDEX: 21.13 KG/M2 | HEART RATE: 100 BPM | SYSTOLIC BLOOD PRESSURE: 124 MMHG | TEMPERATURE: 97.4 F | OXYGEN SATURATION: 100 % | HEIGHT: 72 IN | DIASTOLIC BLOOD PRESSURE: 88 MMHG | RESPIRATION RATE: 20 BRPM

## 2025-06-25 DIAGNOSIS — R19.4 CHANGE IN BOWEL HABITS: ICD-10-CM

## 2025-06-25 DIAGNOSIS — R19.7 DIARRHEA, UNSPECIFIED TYPE: ICD-10-CM

## 2025-06-25 DIAGNOSIS — Z86.0100 HISTORY OF COLON POLYPS: ICD-10-CM

## 2025-06-25 PROCEDURE — 3700000000 HC ANESTHESIA ATTENDED CARE: Performed by: INTERNAL MEDICINE

## 2025-06-25 PROCEDURE — 3609027000 HC COLONOSCOPY: Performed by: INTERNAL MEDICINE

## 2025-06-25 PROCEDURE — 88305 TISSUE EXAM BY PATHOLOGIST: CPT

## 2025-06-25 PROCEDURE — 7100000010 HC PHASE II RECOVERY - FIRST 15 MIN: Performed by: INTERNAL MEDICINE

## 2025-06-25 PROCEDURE — 2709999900 HC NON-CHARGEABLE SUPPLY: Performed by: INTERNAL MEDICINE

## 2025-06-25 PROCEDURE — 2580000003 HC RX 258: Performed by: ANESTHESIOLOGY

## 2025-06-25 PROCEDURE — 7100000011 HC PHASE II RECOVERY - ADDTL 15 MIN: Performed by: INTERNAL MEDICINE

## 2025-06-25 PROCEDURE — 3700000001 HC ADD 15 MINUTES (ANESTHESIA): Performed by: INTERNAL MEDICINE

## 2025-06-25 PROCEDURE — 6360000002 HC RX W HCPCS: Performed by: NURSE ANESTHETIST, CERTIFIED REGISTERED

## 2025-06-25 RX ORDER — ONDANSETRON 2 MG/ML
INJECTION INTRAMUSCULAR; INTRAVENOUS
Status: DISCONTINUED | OUTPATIENT
Start: 2025-06-25 | End: 2025-06-25 | Stop reason: SDUPTHER

## 2025-06-25 RX ORDER — PROPOFOL 10 MG/ML
INJECTION, EMULSION INTRAVENOUS
Status: DISCONTINUED | OUTPATIENT
Start: 2025-06-25 | End: 2025-06-25 | Stop reason: SDUPTHER

## 2025-06-25 RX ORDER — SODIUM CHLORIDE, SODIUM LACTATE, POTASSIUM CHLORIDE, CALCIUM CHLORIDE 600; 310; 30; 20 MG/100ML; MG/100ML; MG/100ML; MG/100ML
INJECTION, SOLUTION INTRAVENOUS CONTINUOUS
Status: DISCONTINUED | OUTPATIENT
Start: 2025-06-25 | End: 2025-06-25 | Stop reason: HOSPADM

## 2025-06-25 RX ORDER — LABETALOL HYDROCHLORIDE 5 MG/ML
INJECTION, SOLUTION INTRAVENOUS
Status: DISCONTINUED | OUTPATIENT
Start: 2025-06-25 | End: 2025-06-25 | Stop reason: SDUPTHER

## 2025-06-25 RX ORDER — LIDOCAINE HYDROCHLORIDE 10 MG/ML
INJECTION, SOLUTION INFILTRATION; PERINEURAL
Status: DISCONTINUED | OUTPATIENT
Start: 2025-06-25 | End: 2025-06-25 | Stop reason: SDUPTHER

## 2025-06-25 RX ORDER — ESMOLOL HYDROCHLORIDE 10 MG/ML
INJECTION INTRAVENOUS
Status: DISCONTINUED | OUTPATIENT
Start: 2025-06-25 | End: 2025-06-25 | Stop reason: SDUPTHER

## 2025-06-25 RX ADMIN — LABETALOL HYDROCHLORIDE 5 MG: 5 INJECTION INTRAVENOUS at 10:55

## 2025-06-25 RX ADMIN — LABETALOL HYDROCHLORIDE 5 MG: 5 INJECTION INTRAVENOUS at 10:42

## 2025-06-25 RX ADMIN — ESMOLOL HYDROCHLORIDE 20 MG: 10 INJECTION, SOLUTION INTRAVENOUS at 10:38

## 2025-06-25 RX ADMIN — ESMOLOL HYDROCHLORIDE 10 MG: 10 INJECTION, SOLUTION INTRAVENOUS at 11:00

## 2025-06-25 RX ADMIN — ESMOLOL HYDROCHLORIDE 10 MG: 10 INJECTION, SOLUTION INTRAVENOUS at 10:49

## 2025-06-25 RX ADMIN — ESMOLOL HYDROCHLORIDE 10 MG: 10 INJECTION, SOLUTION INTRAVENOUS at 11:08

## 2025-06-25 RX ADMIN — ONDANSETRON 4 MG: 2 INJECTION, SOLUTION INTRAMUSCULAR; INTRAVENOUS at 10:46

## 2025-06-25 RX ADMIN — LABETALOL HYDROCHLORIDE 5 MG: 5 INJECTION INTRAVENOUS at 10:46

## 2025-06-25 RX ADMIN — LIDOCAINE HYDROCHLORIDE 40 MG: 10 INJECTION, SOLUTION INFILTRATION; PERINEURAL at 10:59

## 2025-06-25 RX ADMIN — SODIUM CHLORIDE, SODIUM LACTATE, POTASSIUM CHLORIDE, AND CALCIUM CHLORIDE: .6; .31; .03; .02 INJECTION, SOLUTION INTRAVENOUS at 09:16

## 2025-06-25 RX ADMIN — PROPOFOL 150 MG: 10 INJECTION, EMULSION INTRAVENOUS at 10:59

## 2025-06-25 ASSESSMENT — PAIN - FUNCTIONAL ASSESSMENT
PAIN_FUNCTIONAL_ASSESSMENT: NONE - DENIES PAIN
PAIN_FUNCTIONAL_ASSESSMENT: NONE - DENIES PAIN
PAIN_FUNCTIONAL_ASSESSMENT: 0-10
PAIN_FUNCTIONAL_ASSESSMENT: NONE - DENIES PAIN

## 2025-06-25 ASSESSMENT — LIFESTYLE VARIABLES: SMOKING_STATUS: 1

## 2025-06-25 NOTE — OP NOTE
Patient: Rojas Call : 1950  Med Rec#: [id number] Acc#: [id number]   Primary Care Provider Juwan Trevino MD    Date of Procedure:  2025    Endoscopist: Yordy Collins MD    Referring Provider: Juwan Trevino MD, J Gossom APRN    Operation Performed:   Colonoscopy with snare polypectomy  Colonoscopy with biopsy    Indications: diarrhea     Anesthesia:  Sedation was administered by anesthesia who monitored the patient during the procedure.    I met with Rojas Call prior to procedure. We discussed the procedure itself, and I have discussed the risks of endoscopy (including-- but not limited to-- pain, discomfort, bleeding potentially requiring second endoscopic procedure and/or blood transfusion, organ perforation requiring operative repair, damage to organs near the colon, infection, aspiration, cardiopulmonary/allergic reaction), benefits, indications to endoscopy. Additionally, we discussed options other than colonoscopy. The patient expressed understanding. All questions answered. The patient decided to proceed with the procedure.  Signed informed consent was placed on the chart.    Blood Loss: minimal    Withdrawal time: > 6 minutes  Bowel Prep: adequate     Complications: no immediate complications    DESCRIPTION OF PROCEDURE:     A time out was performed. After written informed consent was obtained, the patient was placed in the left lateral position.     The perianal area was inspected, and a digital rectal exam was performed. A rectal exam was performed: normal tone, no palpable lesions. At this point, a forward viewing Olympus colonoscope was inserted into the anus and carefully advanced to the cecum.  The cecum was identified by the ileocecal valve and the appendiceal orifice. The colonoscope was then slowly withdrawn with careful inspection of the mucosa in a linear and circumferential fashion. The scope was retroflexed in the rectum. Suction was utilized during the procedure to

## 2025-06-25 NOTE — ANESTHESIA POSTPROCEDURE EVALUATION
Department of Anesthesiology  Postprocedure Note    Patient: Rojas Call  MRN: [id number]  YOB: 1950  Date of evaluation: 6/25/2025    Procedure Summary       Date: 06/25/25 Room / Location: [place] [address] / [facility]    Anesthesia Start: 1031 Anesthesia Stop: 1128    Procedure: COLONOSCOPY DIAGNOSTIC Diagnosis:       Change in bowel habits      Diarrhea, unspecified type      History of colon polyps      (Change in bowel habits [R19.4])      (Diarrhea, unspecified type [R19.7])      (History of colon polyps [Z86.0100])    Surgeons: Yordy Collins MD Responsible Provider: Jing Reveles APRN - CRNA    Anesthesia Type: general, TIVA ASA Status: 3            Anesthesia Type: No value filed.    Erik Phase I: Erik Score: 10    Erik Phase II:      Anesthesia Post Evaluation    Patient location during evaluation: bedside  Patient participation: complete - patient participated  Level of consciousness: sleepy but conscious  Pain score: 0  Airway patency: patent  Nausea & Vomiting: no nausea and no vomiting  Cardiovascular status: hemodynamically stable and blood pressure returned to baseline  Respiratory status: acceptable and nasal cannula  Hydration status: stable  Pain management: adequate    No notable events documented.

## 2025-06-25 NOTE — DISCHARGE INSTRUCTIONS
Recommendations:  1. Repeat colonoscopy: pending pathology - max of 5 yrs for screening  2. Await biopsy results.   3. I would suggest a follow OV in our office in next 2 weeks to evaluate symptoms and further workup. If biopsies unrevealing, I think further consideration of SIBO vs GB disease would be warranted.                 Colonoscopy: What to Expect at Home  Your Recovery    After the test, you may be bloated or have gas pains. You may need to pass gas. If a biopsy was done or a polyp was removed, you may have streaks of blood in your stool (feces) for a few days. Problems such as heavy rectal bleeding may not occur until several weeks after the test. This isn't common. But it can happen after polyps are removed.  This care sheet gives you a general idea about how long it will take for you to recover. But each person recovers at a different pace. Follow the steps below to get better as quickly as possible.    How can you care for yourself at home?  Activity    Rest when you feel tired.     You can do your normal activities when it feels okay to do so.   Diet    You may resume your regular diet.     Drink plenty of fluids. This helps to replace the fluids that were lost during the colon prep.     Do not drink alcohol.   Medicines    Your doctor will tell you if and when you can restart your medicines. You will also be given instructions about taking any new medicines.     If you stopped taking aspirin or some other blood thinner, your doctor will tell you when to start taking it again.     If polyps were removed or a biopsy was done during the test, your doctor may tell you not to take aspirin or other anti-inflammatory medicines for a few days. These include ibuprofen (Advil, Motrin) and naproxen (Aleve).   Other instructions    For your safety, do not drive or operate machinery for 24 hours.     Do not sign legal documents or make major decisions until the medicine wears off and you can think clearly.

## 2025-06-26 ENCOUNTER — RESULTS FOLLOW-UP (OUTPATIENT)
Dept: GASTROENTEROLOGY | Age: 75
End: 2025-06-26

## 2025-07-01 ENCOUNTER — OFFICE VISIT (OUTPATIENT)
Dept: CARDIOLOGY CLINIC | Age: 75
End: 2025-07-01
Payer: MEDICARE

## 2025-07-01 VITALS
HEART RATE: 133 BPM | OXYGEN SATURATION: 98 % | HEIGHT: 72 IN | SYSTOLIC BLOOD PRESSURE: 120 MMHG | BODY MASS INDEX: 21.13 KG/M2 | DIASTOLIC BLOOD PRESSURE: 84 MMHG | WEIGHT: 156 LBS

## 2025-07-01 DIAGNOSIS — I49.5 TACHYCARDIA-BRADYCARDIA SYNDROME (HCC): ICD-10-CM

## 2025-07-01 DIAGNOSIS — I48.91 ATRIAL FIBRILLATION AND FLUTTER (HCC): Primary | ICD-10-CM

## 2025-07-01 DIAGNOSIS — F17.219 CIGARETTE NICOTINE DEPENDENCE WITH NICOTINE-INDUCED DISORDER: ICD-10-CM

## 2025-07-01 DIAGNOSIS — I48.92 ATRIAL FIBRILLATION AND FLUTTER (HCC): Primary | ICD-10-CM

## 2025-07-01 DIAGNOSIS — I21.4 NSTEMI (NON-ST ELEVATED MYOCARDIAL INFARCTION) (HCC): ICD-10-CM

## 2025-07-01 DIAGNOSIS — I10 ESSENTIAL HYPERTENSION: ICD-10-CM

## 2025-07-01 DIAGNOSIS — I50.22 CHRONIC SYSTOLIC HEART FAILURE (HCC): ICD-10-CM

## 2025-07-01 DIAGNOSIS — I25.10 CORONARY ARTERY DISEASE INVOLVING NATIVE CORONARY ARTERY OF NATIVE HEART WITHOUT ANGINA PECTORIS: ICD-10-CM

## 2025-07-01 DIAGNOSIS — Q24.8 PERICARDIAL CYST: ICD-10-CM

## 2025-07-01 DIAGNOSIS — E78.2 MIXED HYPERLIPIDEMIA: ICD-10-CM

## 2025-07-01 PROCEDURE — 93000 ELECTROCARDIOGRAM COMPLETE: CPT | Performed by: CLINICAL NURSE SPECIALIST

## 2025-07-01 PROCEDURE — 4004F PT TOBACCO SCREEN RCVD TLK: CPT | Performed by: CLINICAL NURSE SPECIALIST

## 2025-07-01 PROCEDURE — 1123F ACP DISCUSS/DSCN MKR DOCD: CPT | Performed by: CLINICAL NURSE SPECIALIST

## 2025-07-01 PROCEDURE — G8420 CALC BMI NORM PARAMETERS: HCPCS | Performed by: CLINICAL NURSE SPECIALIST

## 2025-07-01 PROCEDURE — 3074F SYST BP LT 130 MM HG: CPT | Performed by: CLINICAL NURSE SPECIALIST

## 2025-07-01 PROCEDURE — 99214 OFFICE O/P EST MOD 30 MIN: CPT | Performed by: CLINICAL NURSE SPECIALIST

## 2025-07-01 PROCEDURE — 1159F MED LIST DOCD IN RCRD: CPT | Performed by: CLINICAL NURSE SPECIALIST

## 2025-07-01 PROCEDURE — G8427 DOCREV CUR MEDS BY ELIG CLIN: HCPCS | Performed by: CLINICAL NURSE SPECIALIST

## 2025-07-01 PROCEDURE — 3079F DIAST BP 80-89 MM HG: CPT | Performed by: CLINICAL NURSE SPECIALIST

## 2025-07-01 ASSESSMENT — ENCOUNTER SYMPTOMS
SHORTNESS OF BREATH: 1
EYE REDNESS: 0
NAUSEA: 0
FACIAL SWELLING: 0
CHEST TIGHTNESS: 0
VOMITING: 0
DIARRHEA: 1
WHEEZING: 0
ABDOMINAL PAIN: 0
COUGH: 0

## 2025-07-01 NOTE — PROGRESS NOTES
[facility]  [address] [address]  Phone: [phone]  Fax: [phone]    OFFICE VISIT:  2025    Rojas Call - : 1950    Reason For Visit:  Rojas is a 75 y.o. male who is here for AF, CAD, CHF     Diagnosis Orders   1. Atrial fibrillation and flutter (HCC)        2. Tachycardia-bradycardia syndrome (HCC)        3. NSTEMI (non-ST elevated myocardial infarction) ([facility])  EKG 12 lead      4. Coronary artery disease involving native coronary artery of native heart without angina pectoris        5. Chronic systolic heart failure (HCC)        6. Essential hypertension        7. Mixed hyperlipidemia        8. Pericardial cyst        9. Cigarette nicotine dependence with nicotine-induced disorder                  HPI  Patient  hospitalized fibrillation with AF rapid ventricular response , failed cardioversion, initiation of amiodarone with repeat cardioversion 2024 which was also unsuccessful.  Dr. Barbosa suggested rate control versus rhythm control and amiodarone was discontinued    Echo  showed a reduced LVEF of 40-44% with bilateral pulmonary edema.  Heart cath showed double vessel disease with severe in-stent restenosis of the OM1 and mid RPL which was treated with drug-eluting stent.  He was started on both Plavix and Eliquis    Pericardial cyst per CT of the abdomen noted.  Dr. Barbosa recommended repeating CT of the chest in the future to assess stability    At office visit , he was found to be in atrial fibrillation with a heart rate of 140.  His digoxin had been discontinued a few months prior due to heart rate of 47.  Case was discussed with Dr. Barbosa and digoxin was restarted.  Follow-up 25, back in sinus  bradycardia rate 52 with PACs.  He has been intermittently in and out of atrial fibrillation and tolerates it fairly well    He denies chest pain.  He has chronic dyspnea which is unchanged, but denies orthopnea, PND,  or sudden weight gain.

## 2025-07-01 NOTE — PATIENT INSTRUCTIONS
Return for Dr Peña.  Discuss ablation, Dr Peña- will get soon appt    Stay off digoxin    OK to stop Plavix, continue eliquis    If having dizziness, weak spells, call office    Quit smoking.  Call the [place] Tobacco Quit Line 4-166-QUIT-NOW

## 2025-07-07 ENCOUNTER — TELEPHONE (OUTPATIENT)
Dept: CARDIOLOGY CLINIC | Age: 75
End: 2025-07-07

## 2025-07-07 NOTE — TELEPHONE ENCOUNTER
He does have an appointment with Dr. Peña later this month to discuss ablation.  You could ask Brenda if he could be worked in this week.  If he is feeling progressively worse, recommend the emergency room

## 2025-07-07 NOTE — TELEPHONE ENCOUNTER
Patient's wife left message that his AVS said to call you if patient has any weakness. I called her back and she said patient is so weak he can hardly walk, extremely weak, and can barely  feet to walk. She said he was having this weakness before he saw you and it has remain unchanged. -140, //60, 02 good. He is not having any dizziness. Advised I would let you know to see if you advised any changes.

## 2025-07-07 NOTE — TELEPHONE ENCOUNTER
Spoke with Ruth and advised will see if Dr. Prado's MA can work patient in this week, advised she if off today but will have her look at schedule tomorrow. Advised ED if symptoms worsening. She said she has tried to get him to go twice to ED but he refuses.

## 2025-07-09 ENCOUNTER — OFFICE VISIT (OUTPATIENT)
Dept: GASTROENTEROLOGY | Age: 75
End: 2025-07-09
Payer: MEDICARE

## 2025-07-09 VITALS
DIASTOLIC BLOOD PRESSURE: 78 MMHG | WEIGHT: 150 LBS | BODY MASS INDEX: 20.32 KG/M2 | SYSTOLIC BLOOD PRESSURE: 120 MMHG | OXYGEN SATURATION: 99 % | HEIGHT: 72 IN | HEART RATE: 73 BPM

## 2025-07-09 DIAGNOSIS — K58.0 IRRITABLE BOWEL SYNDROME WITH DIARRHEA: Primary | ICD-10-CM

## 2025-07-09 PROCEDURE — 1159F MED LIST DOCD IN RCRD: CPT | Performed by: NURSE PRACTITIONER

## 2025-07-09 PROCEDURE — 3074F SYST BP LT 130 MM HG: CPT | Performed by: NURSE PRACTITIONER

## 2025-07-09 PROCEDURE — G8427 DOCREV CUR MEDS BY ELIG CLIN: HCPCS | Performed by: NURSE PRACTITIONER

## 2025-07-09 PROCEDURE — 4004F PT TOBACCO SCREEN RCVD TLK: CPT | Performed by: NURSE PRACTITIONER

## 2025-07-09 PROCEDURE — 3078F DIAST BP <80 MM HG: CPT | Performed by: NURSE PRACTITIONER

## 2025-07-09 PROCEDURE — G8420 CALC BMI NORM PARAMETERS: HCPCS | Performed by: NURSE PRACTITIONER

## 2025-07-09 PROCEDURE — 1123F ACP DISCUSS/DSCN MKR DOCD: CPT | Performed by: NURSE PRACTITIONER

## 2025-07-09 PROCEDURE — 99214 OFFICE O/P EST MOD 30 MIN: CPT | Performed by: NURSE PRACTITIONER

## 2025-07-10 ENCOUNTER — TELEPHONE (OUTPATIENT)
Dept: GASTROENTEROLOGY | Age: 75
End: 2025-07-10

## 2025-07-10 RX ORDER — NEOMYCIN SULFATE 500 MG/1
500 TABLET ORAL 2 TIMES DAILY
Qty: 20 TABLET | Refills: 0 | Status: SHIPPED | OUTPATIENT
Start: 2025-07-10 | End: 2025-07-20

## 2025-07-10 ASSESSMENT — ENCOUNTER SYMPTOMS
ABDOMINAL DISTENTION: 0
COUGH: 0
SHORTNESS OF BREATH: 0
RECTAL PAIN: 0
ANAL BLEEDING: 0
BLOOD IN STOOL: 0
TROUBLE SWALLOWING: 0
ABDOMINAL PAIN: 0
CONSTIPATION: 0
VOMITING: 0
DIARRHEA: 1
CHOKING: 0
NAUSEA: 0

## 2025-07-10 NOTE — TELEPHONE ENCOUNTER
07- Ruth Spouse called for the patient stated that [facility] has called them and their copay for Xifaxan is 500.00 but that they can't break a bottle and so that the bottle is around 3000.00. She would like  to see if there is something else that can be sent in     Routed to JULIANNA BAGLEY

## 2025-07-11 NOTE — TELEPHONE ENCOUNTER
07- Called and notified patient of medication change and sent to pharmacy as per JULIANNA BAGLEY

## 2025-07-15 ENCOUNTER — OFFICE VISIT (OUTPATIENT)
Dept: CARDIOLOGY CLINIC | Age: 75
End: 2025-07-15
Payer: MEDICARE

## 2025-07-15 VITALS
HEART RATE: 72 BPM | DIASTOLIC BLOOD PRESSURE: 56 MMHG | SYSTOLIC BLOOD PRESSURE: 102 MMHG | HEIGHT: 72 IN | BODY MASS INDEX: 20.32 KG/M2 | WEIGHT: 150 LBS

## 2025-07-15 DIAGNOSIS — I48.20 CHRONIC ATRIAL FIBRILLATION (HCC): Primary | ICD-10-CM

## 2025-07-15 PROCEDURE — 1123F ACP DISCUSS/DSCN MKR DOCD: CPT | Performed by: INTERNAL MEDICINE

## 2025-07-15 PROCEDURE — 4004F PT TOBACCO SCREEN RCVD TLK: CPT | Performed by: INTERNAL MEDICINE

## 2025-07-15 PROCEDURE — 3078F DIAST BP <80 MM HG: CPT | Performed by: INTERNAL MEDICINE

## 2025-07-15 PROCEDURE — 3074F SYST BP LT 130 MM HG: CPT | Performed by: INTERNAL MEDICINE

## 2025-07-15 PROCEDURE — 99204 OFFICE O/P NEW MOD 45 MIN: CPT | Performed by: INTERNAL MEDICINE

## 2025-07-15 PROCEDURE — 1159F MED LIST DOCD IN RCRD: CPT | Performed by: INTERNAL MEDICINE

## 2025-07-15 PROCEDURE — G8427 DOCREV CUR MEDS BY ELIG CLIN: HCPCS | Performed by: INTERNAL MEDICINE

## 2025-07-15 PROCEDURE — G8420 CALC BMI NORM PARAMETERS: HCPCS | Performed by: INTERNAL MEDICINE

## 2025-07-15 NOTE — PROGRESS NOTES
Housing in the Last Year: No     Number of Times Moved in the Last Year: 0     Homeless in the Last Year: No       Allergies   Allergen Reactions    Tape [Adhesive Tape] Rash     welps           Current Outpatient Medications:     neomycin (MYCIFRADIN) 500 MG tablet, Take 1 tablet by mouth in the morning and 1 tablet in the evening. Do all this for 10 days., Disp: 20 tablet, Rfl: 0    ARIPiprazole (ABILIFY) 10 MG tablet, Take 1 tablet by mouth daily, Disp: 90 tablet, Rfl: 1    atorvastatin (LIPITOR) 40 MG tablet, Take 1 tablet by mouth daily, Disp: 90 tablet, Rfl: 1    escitalopram (LEXAPRO) 20 MG tablet, Take 1 tablet by mouth daily, Disp: 90 tablet, Rfl: 1    diphenoxylate-atropine (LOMOTIL) 2.5-0.025 MG per tablet, Take 2 tablets by mouth 4 times daily as needed for Diarrhea. Max Daily Amount: 8 tablets, Disp: 40 tablet, Rfl: 2    tiotropium (SPIRIVA HANDIHALER) 18 MCG inhalation capsule, Inhale 1 capsule into the lungs daily, Disp: 30 capsule, Rfl: 2    varenicline (CHANTIX CONTINUING MONTH PAK) 1 MG tablet, Take 1 tablet by mouth 2 times daily, Disp: 60 tablet, Rfl: 4    Varenicline Tartrate, Starter, (CHANTIX STARTING MONTH ANURADHA) 0.5 MG X 11 & 1 MG X 42 TBPK, Use as directed, Disp: 1 each, Rfl: 0    tiZANidine (ZANAFLEX) 4 MG tablet, Take 1 tablet by mouth every 8 hours as needed (back spasm), Disp: 90 tablet, Rfl: 0    pantoprazole (PROTONIX) 40 MG tablet, TAKE 1 TABLET BY MOUTH DAILY IN THE MORNING BEFORE BREAKFAST, Disp: 90 tablet, Rfl: 1    topiramate (TOPAMAX) 25 MG tablet, Take 1 tablet by mouth 2 times daily, Disp: 180 tablet, Rfl: 1    doxepin (SILENOR) 3 MG TABS tablet, Take 1 tablet by mouth nightly, Disp: 30 tablet, Rfl: 2    furosemide (LASIX) 40 MG tablet, TAKE 1 TABLET BY MOUTH DAILY., Disp: 30 tablet, Rfl: 5    dilTIAZem (CARDIZEM CD) 120 MG extended release capsule, TAKE (1) CAPSULE BY MOUTH TWICE DAILY., Disp: 60 capsule, Rfl: 5    valsartan (DIOVAN) 80 MG tablet, Take 1 tablet by mouth daily,

## 2025-07-15 NOTE — PATIENT INSTRUCTIONS
AV node ablation  AV node ablation is a treatment for an irregular and often very rapid heartbeat called atrial fibrillation (AFib). The treatment uses heat energy, called radiofrequency energy, to destroy a small amount of tissue between the upper and lower chambers of the heart. This area is called the atrioventricular node, also called the AV node.  If you have AV node ablation, you need a permanent device called a pacemaker. The device is placed under the skin by the collarbone. It prevents the heart from beating too slowly.  Why it's done  AV node ablation may be done if you have atrial fibrillation (AFib) that doesn't get better with medicine or other treatments. It's generally considered the last treatment option for AFib because it requires the placement of a pacemaker.  What you can expect  Before the procedure  AV node ablation  Enlarge image   Before AV node ablation, several tests are done to check your heart health.  Anyone who has AV node ablation needs a pacemaker for life to help the heart beat correctly. It's placed under the skin by the collarbone. You might have this device placed several weeks before AV node ablation to make sure it works well. But some people receive the pacemaker the day of the ablation.  During the procedure  Cardiac ablation is done in the hospital. A member of your care team places an IV into your forearm or hand. You get medicine to help you relax.  During the procedure, you may be awake or lightly sedated. Some people get a combination of medicines to put them in a sleep-like state. This is called general anesthesia. The type of sedation you receive depends on your type of irregular heartbeat and overall health.  The doctor inserts a thin, flexible tube called a catheter through a blood vessel, usually in your groin. The doctor guides the tube to the area of the heart being treated.  Sensors on the tip of the catheter apply heat to the heart tissue at the AV node. The heat

## 2025-07-16 ENCOUNTER — TELEPHONE (OUTPATIENT)
Dept: CARDIOLOGY CLINIC | Age: 75
End: 2025-07-16

## 2025-07-16 DIAGNOSIS — I47.20 V-TACH (HCC): ICD-10-CM

## 2025-07-16 DIAGNOSIS — I48.0 PAF (PAROXYSMAL ATRIAL FIBRILLATION) (HCC): ICD-10-CM

## 2025-07-16 DIAGNOSIS — I49.5 TACHYCARDIA-BRADYCARDIA SYNDROME (HCC): ICD-10-CM

## 2025-07-16 DIAGNOSIS — I48.19 PERSISTENT ATRIAL FIBRILLATION (HCC): Primary | ICD-10-CM

## 2025-07-16 DIAGNOSIS — I48.19 PERSISTENT ATRIAL FIBRILLATION (HCC): ICD-10-CM

## 2025-07-16 DIAGNOSIS — Z01.818 PRE-OP TESTING: ICD-10-CM

## 2025-07-16 RX ORDER — APIXABAN 5 MG/1
TABLET, FILM COATED ORAL
Qty: 60 TABLET | Refills: 0 | Status: SHIPPED | OUTPATIENT
Start: 2025-07-16

## 2025-07-16 RX ORDER — DILTIAZEM HYDROCHLORIDE 120 MG/1
CAPSULE, COATED, EXTENDED RELEASE ORAL
Qty: 60 CAPSULE | Refills: 0 | Status: SHIPPED | OUTPATIENT
Start: 2025-07-16

## 2025-07-16 NOTE — TELEPHONE ENCOUNTER
[place] at the [facility] [place] ([facility]) located on the first floor of [facility]. Enter through hospital main entrance and turn immediately to your left.     Procedure Date: 08/04/25  Procedure Arrival Time: 10 am  Procedure Start Time: 12 pm  (Times are subject to change)     Please have your lab work (CBC and BMP) done before your procedure Date.     Procedure Instructions:   1) You will need to not have anything to eat or drink the morning before the procedure.   2) You will stay overnight for observation.  3) You can still take all of your morning medication with a sip of water   4) Do not take Eliquis for 2 days before your procedure   5) You will need a  for the procedure.         Went over the above instructions with patient and he voiced understanding

## 2025-08-04 ENCOUNTER — APPOINTMENT (OUTPATIENT)
Age: 75
End: 2025-08-04
Attending: INTERNAL MEDICINE
Payer: MEDICARE

## 2025-08-04 ENCOUNTER — HOSPITAL ENCOUNTER (OUTPATIENT)
Age: 75
Setting detail: OBSERVATION
Discharge: HOME | End: 2025-08-05
Attending: INTERNAL MEDICINE | Admitting: INTERNAL MEDICINE
Payer: MEDICARE

## 2025-08-04 DIAGNOSIS — I48.91 UNSPECIFIED ATRIAL FIBRILLATION (HCC): Primary | ICD-10-CM

## 2025-08-04 DIAGNOSIS — I48.0 PAF (PAROXYSMAL ATRIAL FIBRILLATION) (HCC): ICD-10-CM

## 2025-08-04 DIAGNOSIS — I47.20 V-TACH (HCC): ICD-10-CM

## 2025-08-04 DIAGNOSIS — I48.20 CHRONIC ATRIAL FIBRILLATION (HCC): ICD-10-CM

## 2025-08-04 LAB
ANION GAP SERPL CALCULATED.3IONS-SCNC: 14 MMOL/L (ref 8–16)
BUN SERPL-MCNC: 20 MG/DL (ref 8–23)
CALCIUM SERPL-MCNC: 9.6 MG/DL (ref 8.8–10.2)
CHLORIDE SERPL-SCNC: 107 MMOL/L (ref 98–107)
CO2 SERPL-SCNC: 20 MMOL/L (ref 22–29)
CREAT SERPL-MCNC: 1.9 MG/DL (ref 0.7–1.2)
ECHO BSA: 1.86 M2
ECHO BSA: 1.86 M2
ECHO LV EF PHYSICIAN: 50 %
ERYTHROCYTE [DISTWIDTH] IN BLOOD BY AUTOMATED COUNT: 14.6 % (ref 11.5–14.5)
GLUCOSE SERPL-MCNC: 91 MG/DL (ref 70–99)
HCT VFR BLD AUTO: 43.3 % (ref 42–52)
HGB BLD-MCNC: 14 G/DL (ref 14–18)
MCH RBC QN AUTO: 29.6 PG (ref 27–31)
MCHC RBC AUTO-ENTMCNC: 32.3 G/DL (ref 33–37)
MCV RBC AUTO: 91.5 FL (ref 80–94)
PLATELET # BLD AUTO: 170 K/UL (ref 130–400)
PMV BLD AUTO: 11.1 FL (ref 9.4–12.4)
POTASSIUM SERPL-SCNC: 3.3 MMOL/L (ref 3.5–5.1)
RBC # BLD AUTO: 4.73 M/UL (ref 4.7–6.1)
SODIUM SERPL-SCNC: 141 MMOL/L (ref 136–145)
WBC # BLD AUTO: 5.3 K/UL (ref 4.8–10.8)

## 2025-08-04 PROCEDURE — G0378 HOSPITAL OBSERVATION PER HR: HCPCS

## 2025-08-04 PROCEDURE — 6360000002 HC RX W HCPCS: Performed by: INTERNAL MEDICINE

## 2025-08-04 PROCEDURE — 6370000000 HC RX 637 (ALT 250 FOR IP): Performed by: INTERNAL MEDICINE

## 2025-08-04 PROCEDURE — C1769 GUIDE WIRE: HCPCS | Performed by: INTERNAL MEDICINE

## 2025-08-04 PROCEDURE — 2709999900 HC NON-CHARGEABLE SUPPLY: Performed by: INTERNAL MEDICINE

## 2025-08-04 PROCEDURE — 94760 N-INVAS EAR/PLS OXIMETRY 1: CPT

## 2025-08-04 PROCEDURE — 33274 TCAT INSJ/RPL PERM LDLS PM: CPT | Performed by: INTERNAL MEDICINE

## 2025-08-04 PROCEDURE — 93005 ELECTROCARDIOGRAM TRACING: CPT

## 2025-08-04 PROCEDURE — 93308 TTE F-UP OR LMTD: CPT | Performed by: INTERNAL MEDICINE

## 2025-08-04 PROCEDURE — 2580000003 HC RX 258: Performed by: INTERNAL MEDICINE

## 2025-08-04 PROCEDURE — C1786 PMKR, SINGLE, RATE-RESP: HCPCS | Performed by: INTERNAL MEDICINE

## 2025-08-04 PROCEDURE — C1894 INTRO/SHEATH, NON-LASER: HCPCS | Performed by: INTERNAL MEDICINE

## 2025-08-04 PROCEDURE — 94640 AIRWAY INHALATION TREATMENT: CPT

## 2025-08-04 PROCEDURE — 2500000003 HC RX 250 WO HCPCS: Performed by: INTERNAL MEDICINE

## 2025-08-04 PROCEDURE — 85027 COMPLETE CBC AUTOMATED: CPT

## 2025-08-04 PROCEDURE — 99152 MOD SED SAME PHYS/QHP 5/>YRS: CPT | Performed by: INTERNAL MEDICINE

## 2025-08-04 PROCEDURE — 93308 TTE F-UP OR LMTD: CPT

## 2025-08-04 PROCEDURE — 80048 BASIC METABOLIC PNL TOTAL CA: CPT

## 2025-08-04 PROCEDURE — 99153 MOD SED SAME PHYS/QHP EA: CPT | Performed by: INTERNAL MEDICINE

## 2025-08-04 PROCEDURE — 6360000004 HC RX CONTRAST MEDICATION: Performed by: INTERNAL MEDICINE

## 2025-08-04 PROCEDURE — 36415 COLL VENOUS BLD VENIPUNCTURE: CPT

## 2025-08-04 DEVICE — IMPLANTABLE DEVICE: Type: IMPLANTABLE DEVICE | Status: FUNCTIONAL

## 2025-08-04 RX ORDER — FUROSEMIDE 40 MG/1
40 TABLET ORAL DAILY
Status: DISCONTINUED | OUTPATIENT
Start: 2025-08-05 | End: 2025-08-05 | Stop reason: HOSPADM

## 2025-08-04 RX ORDER — DIPHENOXYLATE HYDROCHLORIDE AND ATROPINE SULFATE 2.5; .025 MG/1; MG/1
2 TABLET ORAL 4 TIMES DAILY PRN
Status: DISCONTINUED | OUTPATIENT
Start: 2025-08-04 | End: 2025-08-05 | Stop reason: HOSPADM

## 2025-08-04 RX ORDER — DOXEPIN 3 MG/1
3 TABLET, FILM COATED ORAL NIGHTLY
Status: DISCONTINUED | OUTPATIENT
Start: 2025-08-04 | End: 2025-08-05 | Stop reason: HOSPADM

## 2025-08-04 RX ORDER — PANTOPRAZOLE SODIUM 40 MG/1
40 TABLET, DELAYED RELEASE ORAL
Status: DISCONTINUED | OUTPATIENT
Start: 2025-08-05 | End: 2025-08-05 | Stop reason: HOSPADM

## 2025-08-04 RX ORDER — FENTANYL CITRATE 50 UG/ML
INJECTION, SOLUTION INTRAMUSCULAR; INTRAVENOUS PRN
Status: DISCONTINUED | OUTPATIENT
Start: 2025-08-04 | End: 2025-08-04 | Stop reason: HOSPADM

## 2025-08-04 RX ORDER — SODIUM CHLORIDE 9 MG/ML
INJECTION, SOLUTION INTRAVENOUS PRN
Status: DISCONTINUED | OUTPATIENT
Start: 2025-08-04 | End: 2025-08-04 | Stop reason: HOSPADM

## 2025-08-04 RX ORDER — ARIPIPRAZOLE 10 MG/1
10 TABLET ORAL DAILY
Status: DISCONTINUED | OUTPATIENT
Start: 2025-08-05 | End: 2025-08-05 | Stop reason: HOSPADM

## 2025-08-04 RX ORDER — NITROGLYCERIN 0.4 MG/1
0.4 TABLET SUBLINGUAL PRN
Status: DISCONTINUED | OUTPATIENT
Start: 2025-08-04 | End: 2025-08-05 | Stop reason: HOSPADM

## 2025-08-04 RX ORDER — SENNOSIDES 8.6 MG
650 CAPSULE ORAL EVERY 8 HOURS PRN
Status: DISCONTINUED | OUTPATIENT
Start: 2025-08-04 | End: 2025-08-05 | Stop reason: HOSPADM

## 2025-08-04 RX ORDER — MIDAZOLAM HYDROCHLORIDE 1 MG/ML
INJECTION, SOLUTION INTRAMUSCULAR; INTRAVENOUS PRN
Status: DISCONTINUED | OUTPATIENT
Start: 2025-08-04 | End: 2025-08-04 | Stop reason: HOSPADM

## 2025-08-04 RX ORDER — ATORVASTATIN CALCIUM 80 MG/1
40 TABLET, FILM COATED ORAL DAILY
Status: DISCONTINUED | OUTPATIENT
Start: 2025-08-05 | End: 2025-08-05 | Stop reason: HOSPADM

## 2025-08-04 RX ORDER — ALBUTEROL SULFATE 90 UG/1
2 INHALANT RESPIRATORY (INHALATION) EVERY 4 HOURS PRN
Status: DISCONTINUED | OUTPATIENT
Start: 2025-08-04 | End: 2025-08-05 | Stop reason: HOSPADM

## 2025-08-04 RX ORDER — HEPARIN SODIUM 1000 [USP'U]/ML
INJECTION, SOLUTION INTRAVENOUS; SUBCUTANEOUS PRN
Status: DISCONTINUED | OUTPATIENT
Start: 2025-08-04 | End: 2025-08-04 | Stop reason: HOSPADM

## 2025-08-04 RX ORDER — SODIUM CHLORIDE 0.9 % (FLUSH) 0.9 %
5-40 SYRINGE (ML) INJECTION EVERY 12 HOURS SCHEDULED
Status: DISCONTINUED | OUTPATIENT
Start: 2025-08-04 | End: 2025-08-04 | Stop reason: HOSPADM

## 2025-08-04 RX ORDER — SODIUM CHLORIDE 0.9 % (FLUSH) 0.9 %
5-40 SYRINGE (ML) INJECTION PRN
Status: DISCONTINUED | OUTPATIENT
Start: 2025-08-04 | End: 2025-08-04 | Stop reason: HOSPADM

## 2025-08-04 RX ORDER — SODIUM CHLORIDE 9 MG/ML
INJECTION, SOLUTION INTRAVENOUS CONTINUOUS
Status: DISCONTINUED | OUTPATIENT
Start: 2025-08-04 | End: 2025-08-04 | Stop reason: HOSPADM

## 2025-08-04 RX ORDER — ESCITALOPRAM OXALATE 10 MG/1
20 TABLET ORAL DAILY
Status: DISCONTINUED | OUTPATIENT
Start: 2025-08-05 | End: 2025-08-05 | Stop reason: HOSPADM

## 2025-08-04 RX ORDER — SODIUM CHLORIDE 0.9 % (FLUSH) 0.9 %
5-40 SYRINGE (ML) INJECTION PRN
Status: DISCONTINUED | OUTPATIENT
Start: 2025-08-04 | End: 2025-08-05 | Stop reason: HOSPADM

## 2025-08-04 RX ORDER — TOPIRAMATE 25 MG/1
25 TABLET, FILM COATED ORAL 2 TIMES DAILY
Status: DISCONTINUED | OUTPATIENT
Start: 2025-08-04 | End: 2025-08-05 | Stop reason: HOSPADM

## 2025-08-04 RX ORDER — DILTIAZEM HYDROCHLORIDE 120 MG/1
120 CAPSULE, COATED, EXTENDED RELEASE ORAL DAILY
Status: DISCONTINUED | OUTPATIENT
Start: 2025-08-05 | End: 2025-08-05 | Stop reason: HOSPADM

## 2025-08-04 RX ORDER — VALSARTAN 80 MG/1
80 TABLET ORAL DAILY
Status: DISCONTINUED | OUTPATIENT
Start: 2025-08-05 | End: 2025-08-05 | Stop reason: HOSPADM

## 2025-08-04 RX ORDER — IODIXANOL 320 MG/ML
INJECTION, SOLUTION INTRAVASCULAR PRN
Status: DISCONTINUED | OUTPATIENT
Start: 2025-08-04 | End: 2025-08-04 | Stop reason: HOSPADM

## 2025-08-04 RX ORDER — SODIUM CHLORIDE 9 MG/ML
INJECTION, SOLUTION INTRAVENOUS PRN
Status: DISCONTINUED | OUTPATIENT
Start: 2025-08-04 | End: 2025-08-05 | Stop reason: HOSPADM

## 2025-08-04 RX ORDER — SODIUM CHLORIDE 0.9 % (FLUSH) 0.9 %
5-40 SYRINGE (ML) INJECTION EVERY 12 HOURS SCHEDULED
Status: DISCONTINUED | OUTPATIENT
Start: 2025-08-04 | End: 2025-08-05 | Stop reason: HOSPADM

## 2025-08-04 RX ORDER — DIPHENHYDRAMINE HCL 25 MG
50 TABLET ORAL ONCE
Status: COMPLETED | OUTPATIENT
Start: 2025-08-04 | End: 2025-08-04

## 2025-08-04 RX ADMIN — SODIUM CHLORIDE: 0.9 INJECTION, SOLUTION INTRAVENOUS at 10:46

## 2025-08-04 RX ADMIN — SODIUM CHLORIDE, PRESERVATIVE FREE 10 ML: 5 INJECTION INTRAVENOUS at 21:03

## 2025-08-04 RX ADMIN — TOPIRAMATE 25 MG: 25 TABLET, FILM COATED ORAL at 21:03

## 2025-08-04 RX ADMIN — ACETAMINOPHEN 650 MG: 650 TABLET, FILM COATED, EXTENDED RELEASE ORAL at 21:03

## 2025-08-04 RX ADMIN — TIZANIDINE 4 MG: 4 TABLET ORAL at 21:14

## 2025-08-04 RX ADMIN — DIPHENHYDRAMINE HYDROCHLORIDE 50 MG: 25 TABLET ORAL at 21:03

## 2025-08-04 RX ADMIN — METOPROLOL SUCCINATE 75 MG: 25 TABLET, FILM COATED, EXTENDED RELEASE ORAL at 17:02

## 2025-08-04 RX ADMIN — TIOTROPIUM BROMIDE INHALATION SPRAY 5 MCG: 3.12 SPRAY, METERED RESPIRATORY (INHALATION) at 14:50

## 2025-08-04 ASSESSMENT — PAIN SCALES - GENERAL: PAINLEVEL_OUTOF10: 6

## 2025-08-04 ASSESSMENT — PAIN - FUNCTIONAL ASSESSMENT: PAIN_FUNCTIONAL_ASSESSMENT: ACTIVITIES ARE NOT PREVENTED

## 2025-08-04 ASSESSMENT — PAIN DESCRIPTION - LOCATION: LOCATION: INCISION;GROIN

## 2025-08-04 ASSESSMENT — PAIN DESCRIPTION - ORIENTATION: ORIENTATION: RIGHT

## 2025-08-04 ASSESSMENT — PAIN DESCRIPTION - DESCRIPTORS: DESCRIPTORS: SORE;DISCOMFORT

## 2025-08-05 ENCOUNTER — APPOINTMENT (OUTPATIENT)
Age: 75
End: 2025-08-05
Attending: INTERNAL MEDICINE
Payer: MEDICARE

## 2025-08-05 DIAGNOSIS — S39.012D BACK STRAIN, SUBSEQUENT ENCOUNTER: ICD-10-CM

## 2025-08-05 LAB
ECHO BSA: 1.93 M2
ECHO LV EF PHYSICIAN: 55 %
ECHO LV FRACTIONAL SHORTENING: 29 % (ref 28–44)
ECHO LV INTERNAL DIMENSION DIASTOLE INDEX: 2.82 CM/M2
ECHO LV INTERNAL DIMENSION DIASTOLIC: 5.5 CM (ref 4.2–5.9)
ECHO LV INTERNAL DIMENSION SYSTOLIC INDEX: 2 CM/M2
ECHO LV INTERNAL DIMENSION SYSTOLIC: 3.9 CM
ECHO LV IVSD: 1.1 CM (ref 0.6–1)
ECHO LV MASS 2D: 242 G (ref 88–224)
ECHO LV MASS INDEX 2D: 124.1 G/M2 (ref 49–115)
ECHO LV POSTERIOR WALL DIASTOLIC: 1.1 CM (ref 0.6–1)
ECHO LV RELATIVE WALL THICKNESS RATIO: 0.4
ECHO RV INTERNAL DIMENSION: 2.6 CM
EKG P AXIS: NORMAL DEGREES
EKG P-R INTERVAL: NORMAL MS
EKG Q-T INTERVAL: 330 MS
EKG QRS DURATION: 76 MS
EKG QTC CALCULATION (BAZETT): 429 MS
EKG T AXIS: 53 DEGREES

## 2025-08-05 PROCEDURE — 6370000000 HC RX 637 (ALT 250 FOR IP): Performed by: INTERNAL MEDICINE

## 2025-08-05 PROCEDURE — 2500000003 HC RX 250 WO HCPCS: Performed by: INTERNAL MEDICINE

## 2025-08-05 PROCEDURE — G0378 HOSPITAL OBSERVATION PER HR: HCPCS

## 2025-08-05 PROCEDURE — 93308 TTE F-UP OR LMTD: CPT

## 2025-08-05 PROCEDURE — 93308 TTE F-UP OR LMTD: CPT | Performed by: INTERNAL MEDICINE

## 2025-08-05 PROCEDURE — 94640 AIRWAY INHALATION TREATMENT: CPT

## 2025-08-05 PROCEDURE — 94760 N-INVAS EAR/PLS OXIMETRY 1: CPT

## 2025-08-05 RX ORDER — METOPROLOL SUCCINATE 25 MG/1
75 TABLET, EXTENDED RELEASE ORAL DAILY
Qty: 30 TABLET | Refills: 3 | Status: SHIPPED | OUTPATIENT
Start: 2025-08-06

## 2025-08-05 RX ADMIN — ATORVASTATIN CALCIUM 40 MG: 80 TABLET, FILM COATED ORAL at 08:33

## 2025-08-05 RX ADMIN — TOPIRAMATE 25 MG: 25 TABLET, FILM COATED ORAL at 08:33

## 2025-08-05 RX ADMIN — ARIPIPRAZOLE 10 MG: 10 TABLET ORAL at 08:33

## 2025-08-05 RX ADMIN — ACETAMINOPHEN 650 MG: 650 TABLET, FILM COATED, EXTENDED RELEASE ORAL at 05:02

## 2025-08-05 RX ADMIN — METOPROLOL SUCCINATE 75 MG: 25 TABLET, FILM COATED, EXTENDED RELEASE ORAL at 08:33

## 2025-08-05 RX ADMIN — PANTOPRAZOLE SODIUM 40 MG: 40 TABLET, DELAYED RELEASE ORAL at 05:02

## 2025-08-05 RX ADMIN — ESCITALOPRAM OXALATE 20 MG: 10 TABLET ORAL at 08:33

## 2025-08-05 RX ADMIN — SODIUM CHLORIDE, PRESERVATIVE FREE 10 ML: 5 INJECTION INTRAVENOUS at 08:47

## 2025-08-05 RX ADMIN — FUROSEMIDE 40 MG: 40 TABLET ORAL at 08:33

## 2025-08-05 RX ADMIN — VALSARTAN 80 MG: 80 TABLET ORAL at 08:33

## 2025-08-05 RX ADMIN — TIOTROPIUM BROMIDE INHALATION SPRAY 5 MCG: 3.12 SPRAY, METERED RESPIRATORY (INHALATION) at 06:02

## 2025-08-05 RX ADMIN — DILTIAZEM HYDROCHLORIDE 120 MG: 120 CAPSULE, COATED, EXTENDED RELEASE ORAL at 08:33

## 2025-08-05 ASSESSMENT — PAIN SCALES - GENERAL: PAINLEVEL_OUTOF10: 7

## 2025-08-05 ASSESSMENT — PAIN DESCRIPTION - LOCATION: LOCATION: LEG

## 2025-08-06 ENCOUNTER — TELEPHONE (OUTPATIENT)
Age: 75
End: 2025-08-06

## 2025-08-06 VITALS
HEART RATE: 60 BPM | WEIGHT: 158.73 LBS | TEMPERATURE: 97.2 F | HEIGHT: 73 IN | OXYGEN SATURATION: 96 % | SYSTOLIC BLOOD PRESSURE: 114 MMHG | RESPIRATION RATE: 16 BRPM | BODY MASS INDEX: 21.04 KG/M2 | DIASTOLIC BLOOD PRESSURE: 84 MMHG

## 2025-08-08 RX ORDER — FUROSEMIDE 40 MG/1
40 TABLET ORAL DAILY
Qty: 30 TABLET | Refills: 5 | Status: SHIPPED | OUTPATIENT
Start: 2025-08-08

## 2025-08-13 ENCOUNTER — OFFICE VISIT (OUTPATIENT)
Age: 75
End: 2025-08-13

## 2025-08-13 VITALS
SYSTOLIC BLOOD PRESSURE: 115 MMHG | HEART RATE: 99 BPM | BODY MASS INDEX: 20.01 KG/M2 | OXYGEN SATURATION: 95 % | TEMPERATURE: 98.4 F | WEIGHT: 151 LBS | HEIGHT: 73 IN | DIASTOLIC BLOOD PRESSURE: 80 MMHG

## 2025-08-13 DIAGNOSIS — Z09 HOSPITAL DISCHARGE FOLLOW-UP: ICD-10-CM

## 2025-08-13 DIAGNOSIS — G47.9 DISTURBANCE IN SLEEP BEHAVIOR: Primary | ICD-10-CM

## 2025-08-13 DIAGNOSIS — Z95.0 S/P PLACEMENT OF CARDIAC PACEMAKER: ICD-10-CM

## 2025-08-13 DIAGNOSIS — I49.5 TACHYCARDIA-BRADYCARDIA SYNDROME (HCC): ICD-10-CM

## 2025-08-13 RX ORDER — TRAZODONE HYDROCHLORIDE 50 MG/1
50-100 TABLET ORAL NIGHTLY PRN
Qty: 60 TABLET | Refills: 2 | Status: SHIPPED | OUTPATIENT
Start: 2025-08-13

## 2025-08-13 ASSESSMENT — ENCOUNTER SYMPTOMS
VOMITING: 0
COUGH: 0
NAUSEA: 0
SORE THROAT: 0
RHINORRHEA: 0
EYE PAIN: 0
EYE DISCHARGE: 0
SHORTNESS OF BREATH: 0
CONSTIPATION: 0
ABDOMINAL PAIN: 0
BACK PAIN: 0

## 2025-08-20 ENCOUNTER — OFFICE VISIT (OUTPATIENT)
Dept: GASTROENTEROLOGY | Age: 75
End: 2025-08-20
Payer: MEDICARE

## 2025-08-20 VITALS
DIASTOLIC BLOOD PRESSURE: 80 MMHG | SYSTOLIC BLOOD PRESSURE: 120 MMHG | WEIGHT: 153 LBS | HEIGHT: 72 IN | BODY MASS INDEX: 20.72 KG/M2

## 2025-08-20 DIAGNOSIS — K58.0 IRRITABLE BOWEL SYNDROME WITH DIARRHEA: Primary | ICD-10-CM

## 2025-08-20 PROCEDURE — 4004F PT TOBACCO SCREEN RCVD TLK: CPT | Performed by: NURSE PRACTITIONER

## 2025-08-20 PROCEDURE — 3079F DIAST BP 80-89 MM HG: CPT | Performed by: NURSE PRACTITIONER

## 2025-08-20 PROCEDURE — 3074F SYST BP LT 130 MM HG: CPT | Performed by: NURSE PRACTITIONER

## 2025-08-20 PROCEDURE — 1159F MED LIST DOCD IN RCRD: CPT | Performed by: NURSE PRACTITIONER

## 2025-08-20 PROCEDURE — 1123F ACP DISCUSS/DSCN MKR DOCD: CPT | Performed by: NURSE PRACTITIONER

## 2025-08-20 PROCEDURE — G8420 CALC BMI NORM PARAMETERS: HCPCS | Performed by: NURSE PRACTITIONER

## 2025-08-20 PROCEDURE — 99213 OFFICE O/P EST LOW 20 MIN: CPT | Performed by: NURSE PRACTITIONER

## 2025-08-20 PROCEDURE — G8427 DOCREV CUR MEDS BY ELIG CLIN: HCPCS | Performed by: NURSE PRACTITIONER

## 2025-08-20 ASSESSMENT — ENCOUNTER SYMPTOMS
BLOOD IN STOOL: 0
TROUBLE SWALLOWING: 0
NAUSEA: 0
CONSTIPATION: 0
ABDOMINAL DISTENTION: 0
COUGH: 0
ABDOMINAL PAIN: 0
VOMITING: 0
RECTAL PAIN: 0
SHORTNESS OF BREATH: 0
CHOKING: 0
ANAL BLEEDING: 0
DIARRHEA: 0

## 2025-08-22 DIAGNOSIS — I48.19 PERSISTENT ATRIAL FIBRILLATION (HCC): ICD-10-CM

## 2025-08-25 RX ORDER — DILTIAZEM HYDROCHLORIDE 120 MG/1
CAPSULE, COATED, EXTENDED RELEASE ORAL
Qty: 60 CAPSULE | Refills: 5 | Status: SHIPPED | OUTPATIENT
Start: 2025-08-25

## 2025-08-25 RX ORDER — APIXABAN 5 MG/1
TABLET, FILM COATED ORAL
Qty: 60 TABLET | Refills: 5 | Status: SHIPPED | OUTPATIENT
Start: 2025-08-25

## 2025-09-04 ENCOUNTER — OFFICE VISIT (OUTPATIENT)
Dept: CARDIOLOGY CLINIC | Age: 75
End: 2025-09-04

## 2025-09-04 VITALS
BODY MASS INDEX: 20.67 KG/M2 | HEIGHT: 73 IN | HEART RATE: 132 BPM | SYSTOLIC BLOOD PRESSURE: 126 MMHG | WEIGHT: 156 LBS | DIASTOLIC BLOOD PRESSURE: 82 MMHG

## 2025-09-04 DIAGNOSIS — Z95.0 PACEMAKER: ICD-10-CM

## 2025-09-04 DIAGNOSIS — I48.19 PERSISTENT ATRIAL FIBRILLATION (HCC): Primary | ICD-10-CM

## 2025-09-05 ENCOUNTER — TELEPHONE (OUTPATIENT)
Dept: CARDIOLOGY CLINIC | Age: 75
End: 2025-09-05

## 2025-09-05 DIAGNOSIS — I45.89 AV NODE DYSFUNCTION: Primary | ICD-10-CM

## (undated) DEVICE — FORCEPS BX L240CM JAW DIA2.4MM ORNG L CAP W/ NDL DISP RAD

## (undated) DEVICE — KIT ANGIO W/ AT P65 PREM HND CTRL FOR CNTRST DEL ANGIOTOUCH

## (undated) DEVICE — 20/30 INDEFLATOR INFLATION DEVICE 30 ATM 20 CC: Brand: INDEFLATOR

## (undated) DEVICE — TORQUE DEVICE: Brand: TORQUE DEVICE

## (undated) DEVICE — SURGICAL PROCEDURE PACK CRD CATH LOURDES HOSP LF

## (undated) DEVICE — SHEATH INTRO 6FR L10CM MINI GWIRE L45CM 0.035IN PERIPH KINK

## (undated) DEVICE — VALVE HEMSTAT 8FR INNR LUMN CRSS SLT SEAL DSGN WATCHDOG

## (undated) DEVICE — ENDO KIT,LOURDES HOSPITAL: Brand: MEDLINE INDUSTRIES, INC.

## (undated) DEVICE — HI-TORQUE BALANCE MIDDLEWEIGHT UNIVERSAL GUIDE WIRE .014 STRAIGHT TIP 3.0 CM X 190 CM: Brand: HI-TORQUE BALANCE MIDDLEWEIGHT UNIVERSAL

## (undated) DEVICE — Device

## (undated) DEVICE — SUTURE ETHIBOND EXCEL SZ 0 L30IN NONABSORBABLE GRN CT1 L36MM X424H

## (undated) DEVICE — CATH BLLN ANGIO 2X12MM SC EUPHORA RX

## (undated) DEVICE — CATH BLLN ANGIO 2.75X8MM NC EUPHORIA RX

## (undated) DEVICE — CATHETER GUID 6FR GWIRE 0.071IN COR EXTRA BKUP SUPP 3.75

## (undated) DEVICE — TUBING CNTRST DEL NO SYR HI PRSS INJ FEM LUER LCK ROT ADPT

## (undated) DEVICE — COVER PRB 48X6 IN 20 CC W/BND TAPE STRL GEL FLEXI-FEEL

## (undated) DEVICE — RADIFOCUS OPTITORQUE ANGIOGRAPHIC CATHETER: Brand: OPTITORQUE

## (undated) DEVICE — SINGLE-USE SNARE: Brand: CAPTIVATOR™ COLD

## (undated) DEVICE — CATHETER DIAG 5FR L110CM LUMN ID0.047IN PGTL 6 SIDE H HUB

## (undated) DEVICE — MEDTRONIC 5FR AR 2.0 DIAGNOSTIC CATHETER

## (undated) DEVICE — ELECTRODE DEFIB AD RADIOTRANSPLANTED AD MULTIFUNCTIONAL PHY CTRL

## (undated) DEVICE — CATHETER GUID 6FR 0.071IN COR AMPLATZ R 2 MID FLEXIBILITY

## (undated) DEVICE — CATH BLLN ANGIO 2.50X8MM NC EUPHORIA RX

## (undated) DEVICE — CANNULA NSL W/ O2 DEL AD 4 M

## (undated) DEVICE — Device: Brand: NOMOLINE™ LH ADULT NASAL CO2 CANNULA WITH O2 4M

## (undated) DEVICE — KIT MFLD ISOLATN NACL CNTRST PRT TBNG SPIK W/ PRSS TRNSDUC

## (undated) DEVICE — GUIDEWIRE VASC L260CM DIA0038IN TIP L3MM PTFE J TIP FIX COR

## (undated) DEVICE — CATH BLLN ANGIO 2.75X12MM NC EUPHORIA RX

## (undated) DEVICE — INTRODUCER SHTH W51XH73XL557MM D13MM DIA7.8MM HYDRPHLC

## (undated) DEVICE — GUIDEWIRE VASC L260CM DIA0.035IN TIP L7CM PTFE S STL STR

## (undated) DEVICE — SYSTEM GWIRE L260CM DIA0035IN STD STEER HYDROPHOBIC STR TIP

## (undated) DEVICE — BAND COMPR L24CM REG CLR PLAS HEMSTAT EXT HK AND LOOP RETEN

## (undated) DEVICE — GLIDESHEATH SS KIT HYDROPHILIC COATED INTRODUCER SHEATH: Brand: GLIDESHEATH

## (undated) DEVICE — STENT ONYXNG27508UX ONYX 2.75X08RX
Type: IMPLANTABLE DEVICE | Status: NON-FUNCTIONAL
Brand: ONYX FRONTIER™